# Patient Record
Sex: MALE | Race: BLACK OR AFRICAN AMERICAN | NOT HISPANIC OR LATINO | Employment: OTHER | URBAN - METROPOLITAN AREA
[De-identification: names, ages, dates, MRNs, and addresses within clinical notes are randomized per-mention and may not be internally consistent; named-entity substitution may affect disease eponyms.]

---

## 2017-02-27 RX ORDER — FUROSEMIDE 40 MG/1
TABLET ORAL
Qty: 12 TABLET | Refills: 0 | Status: SHIPPED | OUTPATIENT
Start: 2017-02-27 | End: 2017-08-04 | Stop reason: SDUPTHER

## 2017-03-30 ENCOUNTER — CLINICAL SUPPORT (OUTPATIENT)
Dept: ELECTROPHYSIOLOGY | Facility: CLINIC | Age: 72
End: 2017-03-30
Payer: MEDICARE

## 2017-03-30 DIAGNOSIS — Z95.0 CARDIAC PACEMAKER: ICD-10-CM

## 2017-03-30 DIAGNOSIS — I49.5 SSS (SICK SINUS SYNDROME): ICD-10-CM

## 2017-03-30 PROCEDURE — 93293 PM PHONE R-STRIP DEVICE EVAL: CPT | Mod: S$GLB,,, | Performed by: INTERNAL MEDICINE

## 2017-04-19 RX ORDER — FUROSEMIDE 40 MG/1
TABLET ORAL
Qty: 12 TABLET | Refills: 0 | Status: SHIPPED | OUTPATIENT
Start: 2017-04-19 | End: 2017-08-04 | Stop reason: SDUPTHER

## 2017-07-08 RX ORDER — LOSARTAN POTASSIUM 100 MG/1
TABLET ORAL
Qty: 90 TABLET | Refills: 0 | Status: SHIPPED | OUTPATIENT
Start: 2017-07-08 | End: 2018-05-09 | Stop reason: SDUPTHER

## 2017-07-10 ENCOUNTER — CLINICAL SUPPORT (OUTPATIENT)
Dept: ELECTROPHYSIOLOGY | Facility: CLINIC | Age: 72
End: 2017-07-10
Payer: MEDICARE

## 2017-07-10 DIAGNOSIS — I49.5 SSS (SICK SINUS SYNDROME): ICD-10-CM

## 2017-07-10 DIAGNOSIS — Z95.0 CARDIAC PACEMAKER: ICD-10-CM

## 2017-07-10 PROCEDURE — 93293 PM PHONE R-STRIP DEVICE EVAL: CPT | Mod: S$GLB,,, | Performed by: INTERNAL MEDICINE

## 2017-07-13 DIAGNOSIS — K74.60 HEPATIC CIRRHOSIS, UNSPECIFIED HEPATIC CIRRHOSIS TYPE: Primary | ICD-10-CM

## 2017-07-13 DIAGNOSIS — I85.00 ESOPHAGEAL VARICES WITHOUT BLEEDING, UNSPECIFIED ESOPHAGEAL VARICES TYPE: Primary | ICD-10-CM

## 2017-07-28 RX ORDER — FUROSEMIDE 40 MG/1
TABLET ORAL
Qty: 12 TABLET | Refills: 0 | Status: SHIPPED | OUTPATIENT
Start: 2017-07-28 | End: 2017-11-15 | Stop reason: SDUPTHER

## 2017-07-31 RX ORDER — HYDROCORTISONE 10 MG/1
TABLET ORAL
Qty: 60 TABLET | Refills: 0 | Status: SHIPPED | OUTPATIENT
Start: 2017-07-31 | End: 2017-08-04 | Stop reason: SDUPTHER

## 2017-07-31 NOTE — TELEPHONE ENCOUNTER
Previous pt of Dr. Valenzuela. Pt is scheduled for an upcoming appointment on 8/4/2017. Pt is out of medication

## 2017-08-04 ENCOUNTER — OFFICE VISIT (OUTPATIENT)
Dept: ENDOCRINOLOGY | Facility: CLINIC | Age: 72
End: 2017-08-04
Payer: MEDICARE

## 2017-08-04 VITALS
HEART RATE: 73 BPM | BODY MASS INDEX: 28.52 KG/M2 | SYSTOLIC BLOOD PRESSURE: 136 MMHG | WEIGHT: 203.69 LBS | DIASTOLIC BLOOD PRESSURE: 77 MMHG | HEIGHT: 71 IN | RESPIRATION RATE: 16 BRPM

## 2017-08-04 DIAGNOSIS — E27.49 SECONDARY ADRENAL INSUFFICIENCY: ICD-10-CM

## 2017-08-04 DIAGNOSIS — E29.1 SECONDARY MALE HYPOGONADISM: ICD-10-CM

## 2017-08-04 DIAGNOSIS — D35.2 NON-FUNCTIONING PITUITARY ADENOMA: ICD-10-CM

## 2017-08-04 DIAGNOSIS — E23.0 GROWTH HORMONE DEFICIENCY: ICD-10-CM

## 2017-08-04 DIAGNOSIS — E23.0 HYPOPITUITARISM: Primary | ICD-10-CM

## 2017-08-04 PROCEDURE — 1159F MED LIST DOCD IN RCRD: CPT | Mod: S$GLB,,, | Performed by: INTERNAL MEDICINE

## 2017-08-04 PROCEDURE — 99214 OFFICE O/P EST MOD 30 MIN: CPT | Mod: S$GLB,,, | Performed by: INTERNAL MEDICINE

## 2017-08-04 PROCEDURE — 3075F SYST BP GE 130 - 139MM HG: CPT | Mod: S$GLB,,, | Performed by: INTERNAL MEDICINE

## 2017-08-04 PROCEDURE — 1126F AMNT PAIN NOTED NONE PRSNT: CPT | Mod: S$GLB,,, | Performed by: INTERNAL MEDICINE

## 2017-08-04 PROCEDURE — 3078F DIAST BP <80 MM HG: CPT | Mod: S$GLB,,, | Performed by: INTERNAL MEDICINE

## 2017-08-04 PROCEDURE — 99999 PR PBB SHADOW E&M-EST. PATIENT-LVL IV: CPT | Mod: PBBFAC,,, | Performed by: INTERNAL MEDICINE

## 2017-08-04 RX ORDER — HYDROCORTISONE 10 MG/1
TABLET ORAL
Qty: 180 TABLET | Refills: 3 | Status: SHIPPED | OUTPATIENT
Start: 2017-08-04 | End: 2018-08-22 | Stop reason: SDUPTHER

## 2017-08-04 RX ORDER — DEXAMETHASONE SODIUM PHOSPHATE 4 MG/ML
4 INJECTION, SOLUTION INTRA-ARTICULAR; INTRALESIONAL; INTRAMUSCULAR; INTRAVENOUS; SOFT TISSUE ONCE
Qty: 1 ML | Refills: 2 | Status: SHIPPED | OUTPATIENT
Start: 2017-08-04 | End: 2017-08-04

## 2017-08-04 NOTE — ASSESSMENT & PLAN NOTE
See individual hormone deficiencies below.  Counseled that he needs to follow-up with Dr. Calero for imaging surveillance.  Due for eye exam as well for visual fields - no vision complaints today.  Reassess thyroid axis with TSH and FT4

## 2017-08-04 NOTE — ASSESSMENT & PLAN NOTE
Discussed T replacement. Patient would like to defer for now.  Will recheck total T today.  Check DEXA for BMD assessment.

## 2017-08-04 NOTE — PROGRESS NOTES
Subjective:      Chief Complaint: Hypogonadism and adrenal insufficiency; pituitary adenoma.      HPI: Fox Lyons Sr. is a 72 y.o. 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    He has a medical history of Hep C with cirrhosis, COPD, coronary disease, and CKD.    With regards to hypopituitarism (secondary hypogonadism, secondary adrenal insufficiency, GH deficiency):    Patient was initially seen in our clinic in 2014 after being diagnosed with hypogonadism. He was found to have a 2cm pituitary macroadenoma, which was non-functioning. Hormonal workup revealed secondary hypogonadism, hypoadrenalism, and GH deficiency. Thyroid axis was intact. He underwent gamma-knife radiosurgery in 2014, and last saw Dr. Calero in 2015.     Since being started hydrocortisone, he has generally felt better. He denies orthostatic symptoms, weakness, poor energy. He is worried the hydrocortisone may have been causing his abdomen to swell. At his last visit with Dr. Valenzuela, there was discussion regarding starting testosterone replacement due to decreased libido and erectile dysfunction. He is still having these issues. However, he says that he prefers not to start on T replacement, because he doesn't want another medication, and those things are not important to him at his age.     He has been maintained on hydrocortisone 15 AM and 5 PM. He has not been on testosterone therapy or GH replacement.    Denies visual complaints. Last saw ophthalmology in 2015.      Reviewed past medical, family, social history and updated as appropriate.    Review of Systems   Constitutional: Negative for unexpected weight change.   Eyes: Negative for visual disturbance.   Respiratory: Negative for shortness of breath.    Cardiovascular: Positive for leg swelling (intermittent). Negative for chest pain.   Gastrointestinal: Positive for abdominal distention (Intermittent). Negative for abdominal pain.   Genitourinary: Negative for  urgency.   Musculoskeletal: Negative for arthralgias.   Skin: Negative for wound.   Neurological: Negative for headaches.   Hematological: Does not bruise/bleed easily.   Psychiatric/Behavioral: Negative for sleep disturbance.     Objective:     Vitals:    08/04/17 1110   BP: 136/77   Pulse: 73   Resp: 16       Physical Exam   Constitutional: He appears well-developed.   Neck: No tracheal deviation present. No thyromegaly present.   Cardiovascular: Normal rate.    No murmur heard.  Pulmonary/Chest: Effort normal and breath sounds normal.   Abdominal: Soft. He exhibits distension (mild). There is no tenderness.   Musculoskeletal: He exhibits no edema.   No digital clubbing or extremity cyanosis   Nursing note and vitals reviewed.      Lab Results   Component Value Date    HGBA1C 6.2 10/26/2016     Lab Results   Component Value Date    CHOL 172 10/26/2016    HDL 37 (L) 10/26/2016    LDLCALC 82.6 10/26/2016    TRIG 262 (H) 10/26/2016    CHOLHDL 21.5 10/26/2016     Lab Results   Component Value Date     10/26/2016    K 3.8 10/26/2016     10/26/2016    CO2 19 (L) 10/26/2016     (H) 10/26/2016    BUN 12 10/26/2016    CREATININE 1.3 10/26/2016    CALCIUM 9.9 10/26/2016    PROT 7.8 10/26/2016    ALBUMIN 3.8 10/26/2016    BILITOT 1.1 (H) 10/26/2016    ALKPHOS 83 10/26/2016    AST 53 (H) 10/26/2016    ALT 34 10/26/2016    ANIONGAP 14 10/26/2016    ESTGFRAFRICA >60.0 10/26/2016    EGFRNONAA 54.9 (A) 10/26/2016    TSH 1.988 10/26/2016        Assessment/Plan:     Non-functioning pituitary adenoma  See individual hormone deficiencies below.  Counseled that he needs to follow-up with Dr. Calero for imaging surveillance.  Due for eye exam as well for visual fields - no vision complaints today.  Reassess thyroid axis with TSH and FT4    Secondary male hypogonadism  Discussed T replacement. Patient would like to defer for now.  Will recheck total T today.  Check DEXA for BMD assessment.    Secondary adrenal  insufficiency  Continue current dose of hydrocortisone.    Given handout on precautions and dose adjustments for acute illness.    Ordered emergency decadron IM and discussed when to give this.    Given handout for med-alert bracelet.    Will check CMP to look at sodium and potassium.    Growth hormone deficiency  No indication to treat at this time.      RTC in 6 months    I discussed the case with Dr. Gold and she is in agreement with the plan.

## 2017-08-04 NOTE — ASSESSMENT & PLAN NOTE
Continue current dose of hydrocortisone.    Given handout on precautions and dose adjustments for acute illness.    Ordered emergency decadron IM and discussed when to give this.    Given handout for med-alert bracelet.    Will check CMP to look at sodium and potassium.

## 2017-08-16 ENCOUNTER — ANESTHESIA EVENT (OUTPATIENT)
Dept: ENDOSCOPY | Facility: HOSPITAL | Age: 72
End: 2017-08-16
Payer: MEDICARE

## 2017-08-17 ENCOUNTER — ANESTHESIA (OUTPATIENT)
Dept: ENDOSCOPY | Facility: HOSPITAL | Age: 72
End: 2017-08-17
Payer: MEDICARE

## 2017-08-17 ENCOUNTER — TELEPHONE (OUTPATIENT)
Dept: GASTROENTEROLOGY | Facility: CLINIC | Age: 72
End: 2017-08-17

## 2017-08-17 ENCOUNTER — HOSPITAL ENCOUNTER (OUTPATIENT)
Facility: HOSPITAL | Age: 72
Discharge: HOME | End: 2017-08-17
Attending: INTERNAL MEDICINE | Admitting: INTERNAL MEDICINE
Payer: MEDICARE

## 2017-08-17 VITALS
BODY MASS INDEX: 28.63 KG/M2 | WEIGHT: 200 LBS | HEART RATE: 82 BPM | SYSTOLIC BLOOD PRESSURE: 141 MMHG | HEIGHT: 70 IN | RESPIRATION RATE: 15 BRPM | OXYGEN SATURATION: 95 % | DIASTOLIC BLOOD PRESSURE: 85 MMHG | TEMPERATURE: 98 F | RESPIRATION RATE: 18 BRPM

## 2017-08-17 DIAGNOSIS — I85.00 ESOPHAGEAL VARICES: Primary | ICD-10-CM

## 2017-08-17 DIAGNOSIS — K74.60 HEPATIC CIRRHOSIS, UNSPECIFIED HEPATIC CIRRHOSIS TYPE: ICD-10-CM

## 2017-08-17 DIAGNOSIS — K21.9 GASTROESOPHAGEAL REFLUX DISEASE WITHOUT ESOPHAGITIS: ICD-10-CM

## 2017-08-17 DIAGNOSIS — I85.10 SECONDARY ESOPHAGEAL VARICES WITHOUT BLEEDING: Primary | ICD-10-CM

## 2017-08-17 PROCEDURE — 88305 TISSUE EXAM BY PATHOLOGIST: CPT | Performed by: PATHOLOGY

## 2017-08-17 PROCEDURE — 43239 EGD BIOPSY SINGLE/MULTIPLE: CPT | Mod: 51,,, | Performed by: INTERNAL MEDICINE

## 2017-08-17 PROCEDURE — D9220A PRA ANESTHESIA: Mod: ,,, | Performed by: ANESTHESIOLOGY

## 2017-08-17 PROCEDURE — 63600175 PHARM REV CODE 636 W HCPCS: Performed by: NURSE ANESTHETIST, CERTIFIED REGISTERED

## 2017-08-17 PROCEDURE — 43239 EGD BIOPSY SINGLE/MULTIPLE: CPT | Performed by: INTERNAL MEDICINE

## 2017-08-17 PROCEDURE — 37000009 HC ANESTHESIA EA ADD 15 MINS: Performed by: INTERNAL MEDICINE

## 2017-08-17 PROCEDURE — 25000003 PHARM REV CODE 250: Performed by: INTERNAL MEDICINE

## 2017-08-17 PROCEDURE — 37000008 HC ANESTHESIA 1ST 15 MINUTES: Performed by: INTERNAL MEDICINE

## 2017-08-17 PROCEDURE — 88342 IMHCHEM/IMCYTCHM 1ST ANTB: CPT | Mod: 26,,, | Performed by: PATHOLOGY

## 2017-08-17 PROCEDURE — 43244 EGD VARICES LIGATION: CPT | Mod: ,,, | Performed by: INTERNAL MEDICINE

## 2017-08-17 PROCEDURE — 27201012 HC FORCEPS, HOT/COLD, DISP: Performed by: INTERNAL MEDICINE

## 2017-08-17 PROCEDURE — 43244 EGD VARICES LIGATION: CPT | Performed by: INTERNAL MEDICINE

## 2017-08-17 RX ORDER — FENTANYL CITRATE 50 UG/ML
INJECTION, SOLUTION INTRAMUSCULAR; INTRAVENOUS
Status: DISCONTINUED | OUTPATIENT
Start: 2017-08-17 | End: 2017-08-17

## 2017-08-17 RX ORDER — SODIUM CHLORIDE 9 MG/ML
INJECTION, SOLUTION INTRAVENOUS CONTINUOUS
Status: DISCONTINUED | OUTPATIENT
Start: 2017-08-17 | End: 2017-08-17 | Stop reason: HOSPADM

## 2017-08-17 RX ORDER — PROPOFOL 10 MG/ML
VIAL (ML) INTRAVENOUS
Status: DISCONTINUED | OUTPATIENT
Start: 2017-08-17 | End: 2017-08-17

## 2017-08-17 RX ORDER — LIDOCAINE HCL/PF 100 MG/5ML
SYRINGE (ML) INTRAVENOUS
Status: DISCONTINUED | OUTPATIENT
Start: 2017-08-17 | End: 2017-08-17

## 2017-08-17 RX ADMIN — PROPOFOL 100 MG: 10 INJECTION, EMULSION INTRAVENOUS at 09:08

## 2017-08-17 RX ADMIN — PROPOFOL 50 MG: 10 INJECTION, EMULSION INTRAVENOUS at 09:08

## 2017-08-17 RX ADMIN — LIDOCAINE HYDROCHLORIDE 50 MG: 20 INJECTION, SOLUTION INTRAVENOUS at 09:08

## 2017-08-17 RX ADMIN — FENTANYL CITRATE 25 MCG: 50 INJECTION, SOLUTION INTRAMUSCULAR; INTRAVENOUS at 09:08

## 2017-08-17 RX ADMIN — SODIUM CHLORIDE: 0.9 INJECTION, SOLUTION INTRAVENOUS at 08:08

## 2017-08-17 NOTE — TELEPHONE ENCOUNTER
Continue prilosec 40 mg bid and repeat egd in one month to assess need for repeat banding. May need banding of pyloric polyp. Await biopsies.

## 2017-08-17 NOTE — ANESTHESIA PREPROCEDURE EVALUATION
08/17/2017  Fox Lyons Sr. is a 72 y.o., male.    Pre-op Assessment    I have reviewed the Patient Summary Reports.      I have reviewed the Medications.     Review of Systems  Anesthesia Hx:  No problems with previous Anesthesia    Social:  Former Smoker, No Alcohol Use    Hematology/Oncology:        Oncology Comments: HCC   Cardiovascular:   Pacemaker Hypertension CAD  CABG/stent Dysrhythmias (SSS)  PVD hyperlipidemia    Pulmonary:   COPD    Renal/:   Chronic Renal Disease, CRI    Hepatic/GI:   GERD Liver Disease, (HCC) Hepatitis, C        Physical Exam  General:  Well nourished    Airway/Jaw/Neck:  Airway Findings: Mouth Opening: Normal Tongue: Normal  Mallampati: II  TM Distance: Normal, at least 6 cm      Dental:  Dental Findings: Upper Dentures, Lower Dentures   Chest/Lungs:  Chest/Lungs Findings: Normal Respiratory Rate     Heart/Vascular:  Heart Findings: Rate: Normal  Rhythm: Regular Rhythm        Mental Status:  Mental Status Findings:  Cooperative         Anesthesia Plan  Type of Anesthesia, risks & benefits discussed:  Anesthesia Type:  general  Patient's Preference:   Intra-op Monitoring Plan: standard ASA monitors  Intra-op Monitoring Plan Comments:   Post Op Pain Control Plan:   Post Op Pain Control Plan Comments:   Induction:   IV  Beta Blocker:  Patient is not currently on a Beta-Blocker (No further documentation required).       Informed Consent: Patient understands risks and agrees with Anesthesia plan.  Questions answered. Anesthesia consent signed with patient.  ASA Score: 3     Day of Surgery Review of History & Physical:    H&P update referred to the provider.         Ready For Surgery From Anesthesia Perspective.     Past Medical History:   Diagnosis Date    Anticoagulant long-term use     Cataract     Coronary artery disease     Hyperlipidemia     Hypertension      Pacemaker      Past Surgical History:   Procedure Laterality Date    CARDIAC PACEMAKER PLACEMENT      st rochelle    CARDIAC SURGERY      stents x2    CATARACT EXTRACTION  9/24/13    RT    EYE SURGERY      s/p PPV/AFX/EL (od)  05/09/2012

## 2017-08-17 NOTE — ANESTHESIA POSTPROCEDURE EVALUATION
"Anesthesia Post Evaluation    Patient: Fox Lyons     Procedure(s) Performed: Procedure(s) (LRB):  ESOPHAGOGASTRODUODENOSCOPY (EGD) (N/A)    Final Anesthesia Type: general  Patient location during evaluation: GI PACU  Patient participation: Yes- Able to Participate  Level of consciousness: awake and alert  Post-procedure vital signs: reviewed and stable  Pain management: adequate  Airway patency: patent  PONV status at discharge: No PONV  Anesthetic complications: no      Cardiovascular status: blood pressure returned to baseline  Respiratory status: unassisted  Hydration status: euvolemic  Follow-up not needed.        Visit Vitals  BP (!) 141/85 (BP Location: Left arm, Patient Position: Sitting)   Pulse 82   Temp 36.5 °C (97.7 °F)   Resp 18   Ht 5' 10" (1.778 m)   Wt 90.7 kg (200 lb)   SpO2 95%   BMI 28.70 kg/m²       Pain/Rosetta Score: Pain Assessment Performed: Yes (8/17/2017  9:53 AM)  Presence of Pain: complains of pain/discomfort (8/17/2017  9:53 AM)  Rosetta Score: 10 (8/17/2017  9:53 AM)      "

## 2017-08-17 NOTE — TELEPHONE ENCOUNTER
MA called patient inform patient to continue his Prilosec BID. And to schedule his repeat EGD. Patient understood.     ENDOSCOPY number were given,. MARCINJ

## 2017-08-17 NOTE — PATIENT INSTRUCTIONS
Discharge Summary/Instructions after an Endoscopic Procedure  Patient Name: Fox Lyons  Patient MRN: [id number]  Patient YOB: 1945 Thursday, August 17, 2017  Trice Funes MD  RESTRICTIONS:  During your procedure today, you received medications for sedation.  These   medications may affect your judgment, balance and coordination.  Therefore,   for 24 hours, you have the following restrictions:   - DO NOT drive a car, operate machinery, make legal/financial decisions,   sign important papers or drink alcohol.    ACTIVITY:  The following day: return to full activity including work, except no heavy   lifting, straining or running for 3 days if polyps were removed.  DIET:  Eat and drink normally unless instructed otherwise.  TREATMENT FOR COMMON SIDE EFFECTS:  - Mild abdominal pain, belching, bloating or excessive gas: rest, eat   lightly and use a heating pad.  - Sore Throat: treat with throat lozenges and/or gargle with warm salt   water.  SYMPTOMS TO WATCH FOR AND REPORT TO YOUR PHYSICIAN:  1. Abdominal pain or bloating, other than gas cramps.  2. Chest pain.  3. Back pain.  4. Chills or fever occurring within 24 hours after the procedure.  5. Rectal bleeding, which would show as bright red, maroon, or black stools.   (A tablespoon of blood from the rectum is not serious, especially if   hemorrhoids are present.)  6. Vomiting.  7. Weakness or dizziness.  8. Because air was used during the procedure, expelling large amounts of air   from your rectum or belching is normal.  9. If a bowel prep was taken, you may not have a bowel movement for 1-3   days.  This is normal.  GO DIRECTLY TO THE EMERGENCY ROOM IF YOU HAVE ANY OF THE FOLLOWING:   Difficulty breathing  Chills and/or fever over 101 F   Persistent vomiting and/or vomiting blood   Severe abdominal pain   Severe chest pain   Black, tarry stools   Bleeding- more than one tablespoon  Your doctor recommends these additional instructions:  If any  biopsies were taken, your doctorâs clinic will call you in 1 to 2   weeks with any results.  You are being discharged to home.   Eat a mechanical soft diet for three days.   Continue your present medications.   We are waiting for your pathology results.   Your physician has recommended a repeat upper endoscopy in one month for   surveillance.  For questions, problems or results please call your physician - Trice Funes MD at Work:  [phone].  [facility], EMERGENCY ROOM PHONE NUMBER: [phone]  IF A COMPLICATION OR EMERGENCY SITUATION ARISES AND YOU ARE UNABLE TO REACH   YOUR PHYSICIAN - GO DIRECTLY TO THE EMERGENCY ROOM.  Trice Funes MD  8/17/2017 9:33:39 AM  This report has been verified and signed electronically.

## 2017-08-17 NOTE — TRANSFER OF CARE
"Anesthesia Transfer of Care Note    Patient: Fox Lyons Sr.    Procedure(s) Performed: Procedure(s) (LRB):  ESOPHAGOGASTRODUODENOSCOPY (EGD) (N/A)    Patient location: PACU    Anesthesia Type: general    Transport from OR: Transported from OR on room air with adequate spontaneous ventilation    Post pain: adequate analgesia    Post assessment: no apparent anesthetic complications    Post vital signs: stable    Level of consciousness: awake and alert    Nausea/Vomiting: no nausea/vomiting    Complications: none    Transfer of care protocol was followed      Last vitals:   Visit Vitals  BP (!) 169/81   Pulse 74   Temp 36.5 °C (97.7 °F)   Resp 18   Ht 5' 10" (1.778 m)   Wt 90.7 kg (200 lb)   SpO2 95%   BMI 28.70 kg/m²     "

## 2017-08-17 NOTE — H&P
Short Stay Endoscopy History and Physical    PCP - Clover White MD  Referring Physician - Trice Funes MD  [address] [place] [address]    Procedure - EGD  ASA - per anesthesia  Mallampati - per anesthesia  History of Anesthesia problems - see anesthesia note  Family history Anesthesia problems -  see anesthesia note  Plan of anesthesia - as per anesthesia    HPI  72 y.o. male with cirrhosis and small esophageal varices.     Reason for procedure: variceal surveillance    Abdominal/epigastric pain: No  Reflux: No   Dysphagia: No  Change in bowel habits: No    ROS:  Constitutional: No fevers, chills  CV: No chest pain  Pulm: No shortness of breath  GI: see HPI    Medical History:  has a past medical history of Anticoagulant long-term use; Cataract; Coronary artery disease; Hyperlipidemia; Hypertension; and Pacemaker.    Surgical History:  has a past surgical history that includes s/p PPV/AFX/EL (od) (05/09/2012); Cardiac surgery; Cardiac pacemaker placement; Cataract extraction (9/24/13); and Eye surgery.    Family History: family history includes Cancer in his brother, father, sister, and sister; Diabetes in his brother and sister; Heart disease in his mother and sister; Lung cancer in his brother; No Known Problems in his brother and sister.. Otherwise no colon cancer, inflammatory bowel disease, or GI malignancies.    Social History:  reports that he quit smoking about 22 years ago. His smoking use included Cigarettes. He has a 35.00 pack-year smoking history. He has never used smokeless tobacco. He reports that he does not drink alcohol or use drugs.    Review of patient's allergies indicates:   Allergen Reactions    Codeine Itching, Rash, Edema and Other (See Comments)     Other reaction(s): Itching       Medications:   Prescriptions Prior to Admission   Medication Sig Dispense Refill Last Dose    amlodipine (NORVASC) 5 MG tablet Take 1 tablet (5 mg total) by mouth every evening. 90  tablet 3 8/17/2017 at Unknown time    aspirin (ECOTRIN) 81 MG EC tablet Take 81 mg by mouth once daily.     8/16/2017 at Unknown time    fluticasone-vilanterol (BREO) 100-25 mcg/dose diskus inhaler Inhale 1 puff into the lungs once daily. 1 each 12 8/16/2017 at Unknown time    furosemide (LASIX) 40 MG tablet TAKE ONE TABLET BY MOUTH EVERY MONDAY, WEDNESDAY, AND FRIDAY 12 tablet 0 8/16/2017 at Unknown time    hydrocodone-acetaminophen 5-325mg (NORCO) 5-325 mg per tablet Take 1 tablet by mouth every 4 (four) hours as needed for Pain. 21 tablet 0 8/16/2017 at Unknown time    hydrocortisone (CORTEF) 10 MG Tab TAKE ONE AND ONE-HALF TABLETS BY MOUTH EVERY MORNING AND ONE-HALF TABLET EVERY EVENING. 180 tablet 3 8/16/2017 at Unknown time    losartan (COZAAR) 100 MG tablet Take 1 tablet (100 mg total) by mouth once daily. 90 tablet 3 8/17/2017 at Unknown time    losartan (COZAAR) 100 MG tablet TAKE 1 TABLET BY MOUTH ONCE DAILY. 90 tablet 0 8/17/2017 at Unknown time    multivitamin-minerals-lutein (CENTRUM SILVER) Tab Take 1 tablet by mouth once daily.     8/17/2017 at Unknown time    niacin 500 MG Tab Take 1 tablet (500 mg total) by mouth 3 (three) times daily with meals. (Patient taking differently: Take 500 mg by mouth 2 (two) times daily with meals. ) 90 tablet 6 8/17/2017 at Unknown time    omeprazole (PRILOSEC) 40 MG capsule Take 1 capsule (40 mg total) by mouth 2 (two) times daily before meals. 180 capsule 3 8/17/2017 at Unknown time    ondansetron (ZOFRAN) 4 MG tablet Take 1 tablet (4 mg total) by mouth every 8 (eight) hours as needed. 12 tablet 0 8/17/2017 at Unknown time    pravastatin (PRAVACHOL) 40 MG tablet Take 1 tablet (40 mg total) by mouth every evening. 90 tablet 3 8/16/2017 at Unknown time    ursodiol (ACTIGALL) 300 mg capsule Take 1 capsule (300 mg total) by mouth 3 (three) times daily. 90 capsule 11 8/16/2017 at Unknown time    NITROSTAT 0.4 mg SL tablet DISSOLVE 1 TABLET UNDER THE TONGUE  EVERY 5 MINUTES AS NEEDED 25 tablet 0 Taking       Physical Exam:    Vital Signs:   Vitals:    08/17/17 0815   BP: (!) 169/81   Pulse: 74   Resp: 18   Temp: 97.7 °F (36.5 °C)       General Appearance: Well appearing in no acute distress  Lungs: No respiratory distress.   Heart:  Regular rate, S1, S2 normal.  Abdomen: Soft, non tender, non distended with normal bowel sounds, no masses    Labs:  Lab Results   Component Value Date    WBC 4.73 08/17/2017    HGB 12.2 (L) 08/17/2017    HCT 35.1 (L) 08/17/2017    MCV 85 08/17/2017    PLT 55 (L) 08/17/2017     Lab Results   Component Value Date    INR 1.2 08/17/2017     No results found for: IRON, FERRITIN, TIBC, FESATURATED  Lab Results   Component Value Date     08/04/2017    K 4.0 08/04/2017     08/04/2017    CO2 22 (L) 08/04/2017    BUN 12 08/04/2017    CREATININE 1.6 (H) 08/04/2017       I have explained the risks and benefits of this endoscopic procedure to the patient/family including but not limited to bleeding, inflammation, infection, perforation, hypoxia/respiratory failure, and death.       Nubia Hernandez MD  Gastroenterology & Hepatology Fellow  Pager: [phone]      I have reviewed and concur with the fellow's history, physical, assessment, and plan.  I have personally interviewed and examined the patient at bedside.

## 2017-08-21 ENCOUNTER — TELEPHONE (OUTPATIENT)
Dept: HEPATOLOGY | Facility: CLINIC | Age: 72
End: 2017-08-21

## 2017-08-21 NOTE — TELEPHONE ENCOUNTER
----- Message from Trice Funes MD sent at 8/20/2017  8:44 AM CDT -----  No stomach cancer or h pylori - please let patient know.

## 2017-08-24 ENCOUNTER — TELEPHONE (OUTPATIENT)
Dept: ENDOSCOPY | Facility: HOSPITAL | Age: 72
End: 2017-08-24

## 2017-08-25 ENCOUNTER — TELEPHONE (OUTPATIENT)
Dept: ELECTROPHYSIOLOGY | Facility: CLINIC | Age: 72
End: 2017-08-25

## 2017-08-25 DIAGNOSIS — K74.60 HEPATIC CIRRHOSIS, UNSPECIFIED HEPATIC CIRRHOSIS TYPE: Primary | ICD-10-CM

## 2017-08-25 NOTE — TELEPHONE ENCOUNTER
----- Message from Chang Degroot sent at 8/25/2017 11:48 AM CDT -----  Contact: pt wife   Please call pt wife at [phone]. Need to reschedule telephonic device ck scheduled on  10/12/17 because he has another appt same day    Thank you    I spoke with patients wife and moved patients TTM to an earlier time.

## 2017-09-07 ENCOUNTER — TELEPHONE (OUTPATIENT)
Dept: NEUROSURGERY | Facility: CLINIC | Age: 72
End: 2017-09-07

## 2017-09-07 DIAGNOSIS — D49.7 PITUITARY TUMOR: Primary | ICD-10-CM

## 2017-10-12 ENCOUNTER — ANESTHESIA EVENT (OUTPATIENT)
Dept: ENDOSCOPY | Facility: HOSPITAL | Age: 72
End: 2017-10-12
Payer: MEDICARE

## 2017-10-12 ENCOUNTER — TELEPHONE (OUTPATIENT)
Dept: ENDOSCOPY | Facility: HOSPITAL | Age: 72
End: 2017-10-12

## 2017-10-12 ENCOUNTER — SURGERY (OUTPATIENT)
Age: 72
End: 2017-10-12

## 2017-10-12 ENCOUNTER — HOSPITAL ENCOUNTER (OUTPATIENT)
Facility: HOSPITAL | Age: 72
Discharge: HOME | End: 2017-10-12
Attending: INTERNAL MEDICINE | Admitting: INTERNAL MEDICINE
Payer: MEDICARE

## 2017-10-12 ENCOUNTER — CLINICAL SUPPORT (OUTPATIENT)
Dept: ELECTROPHYSIOLOGY | Facility: CLINIC | Age: 72
End: 2017-10-12
Payer: MEDICARE

## 2017-10-12 ENCOUNTER — ANESTHESIA (OUTPATIENT)
Dept: ENDOSCOPY | Facility: HOSPITAL | Age: 72
End: 2017-10-12
Payer: MEDICARE

## 2017-10-12 VITALS
DIASTOLIC BLOOD PRESSURE: 88 MMHG | BODY MASS INDEX: 26.6 KG/M2 | OXYGEN SATURATION: 96 % | HEART RATE: 82 BPM | WEIGHT: 190 LBS | HEIGHT: 71 IN | SYSTOLIC BLOOD PRESSURE: 141 MMHG | RESPIRATION RATE: 16 BRPM | TEMPERATURE: 98 F

## 2017-10-12 VITALS — RESPIRATION RATE: 23 BRPM

## 2017-10-12 DIAGNOSIS — I85.00 ESOPHAGEAL VARICES: ICD-10-CM

## 2017-10-12 DIAGNOSIS — I49.5 SSS (SICK SINUS SYNDROME): ICD-10-CM

## 2017-10-12 DIAGNOSIS — Z95.0 CARDIAC PACEMAKER: ICD-10-CM

## 2017-10-12 DIAGNOSIS — I85.00 ESOPHAGEAL VARICES WITHOUT BLEEDING, UNSPECIFIED ESOPHAGEAL VARICES TYPE: Primary | ICD-10-CM

## 2017-10-12 PROCEDURE — 63600175 PHARM REV CODE 636 W HCPCS: Performed by: NURSE ANESTHETIST, CERTIFIED REGISTERED

## 2017-10-12 PROCEDURE — 43244 EGD VARICES LIGATION: CPT | Performed by: INTERNAL MEDICINE

## 2017-10-12 PROCEDURE — 25000003 PHARM REV CODE 250: Performed by: INTERNAL MEDICINE

## 2017-10-12 PROCEDURE — 63600175 PHARM REV CODE 636 W HCPCS: Performed by: ANESTHESIOLOGY

## 2017-10-12 PROCEDURE — 37000009 HC ANESTHESIA EA ADD 15 MINS: Performed by: INTERNAL MEDICINE

## 2017-10-12 PROCEDURE — 43244 EGD VARICES LIGATION: CPT | Mod: ,,, | Performed by: INTERNAL MEDICINE

## 2017-10-12 PROCEDURE — D9220A PRA ANESTHESIA: Mod: ,,, | Performed by: ANESTHESIOLOGY

## 2017-10-12 PROCEDURE — 37000008 HC ANESTHESIA 1ST 15 MINUTES: Performed by: INTERNAL MEDICINE

## 2017-10-12 RX ORDER — SODIUM CHLORIDE 9 MG/ML
INJECTION, SOLUTION INTRAVENOUS CONTINUOUS
Status: DISCONTINUED | OUTPATIENT
Start: 2017-10-12 | End: 2017-10-12 | Stop reason: HOSPADM

## 2017-10-12 RX ORDER — LIDOCAINE HCL/PF 100 MG/5ML
SYRINGE (ML) INTRAVENOUS
Status: DISCONTINUED | OUTPATIENT
Start: 2017-10-12 | End: 2017-10-12

## 2017-10-12 RX ORDER — FENTANYL CITRATE 50 UG/ML
25 INJECTION, SOLUTION INTRAMUSCULAR; INTRAVENOUS EVERY 5 MIN PRN
Status: DISCONTINUED | OUTPATIENT
Start: 2017-10-12 | End: 2017-10-12 | Stop reason: HOSPADM

## 2017-10-12 RX ORDER — PROPOFOL 10 MG/ML
VIAL (ML) INTRAVENOUS CONTINUOUS PRN
Status: DISCONTINUED | OUTPATIENT
Start: 2017-10-12 | End: 2017-10-12

## 2017-10-12 RX ORDER — PROPOFOL 10 MG/ML
VIAL (ML) INTRAVENOUS
Status: DISCONTINUED | OUTPATIENT
Start: 2017-10-12 | End: 2017-10-12

## 2017-10-12 RX ADMIN — PROPOFOL 80 MG: 10 INJECTION, EMULSION INTRAVENOUS at 02:10

## 2017-10-12 RX ADMIN — SODIUM CHLORIDE: 0.9 INJECTION, SOLUTION INTRAVENOUS at 01:10

## 2017-10-12 RX ADMIN — PROPOFOL 40 MG: 10 INJECTION, EMULSION INTRAVENOUS at 02:10

## 2017-10-12 RX ADMIN — LIDOCAINE HYDROCHLORIDE 75 MG: 20 INJECTION, SOLUTION INTRAVENOUS at 02:10

## 2017-10-12 RX ADMIN — FENTANYL CITRATE 25 MCG: 50 INJECTION, SOLUTION INTRAMUSCULAR; INTRAVENOUS at 03:10

## 2017-10-12 RX ADMIN — PROPOFOL 150 MCG/KG/MIN: 10 INJECTION, EMULSION INTRAVENOUS at 02:10

## 2017-10-12 NOTE — TRANSFER OF CARE
"Anesthesia Transfer of Care Note    Patient: Fox Lyons Sr.    Procedure(s) Performed: Procedure(s) (LRB):  ESOPHAGOGASTRODUODENOSCOPY (EGD) (Left)    Patient location: PACU    Anesthesia Type: general    Transport from OR: Transported from OR on 6-10 L/min O2 by face mask with adequate spontaneous ventilation    Post pain: adequate analgesia    Post assessment: no apparent anesthetic complications    Post vital signs: stable    Level of consciousness: sedated    Nausea/Vomiting: no nausea/vomiting    Complications: none          Last vitals:   Visit Vitals  BP (!) 163/86 (Patient Position: Lying)   Pulse 71   Temp 36.6 °C (97.9 °F) (Temporal)   Resp 16   Ht 5' 11" (1.803 m)   Wt 86.2 kg (190 lb)   SpO2 96%   BMI 26.50 kg/m²     "

## 2017-10-12 NOTE — PATIENT INSTRUCTIONS
Discharge Summary/Instructions after an Endoscopic Procedure  Patient Name: Fox Lyons  Patient MRN: [id number]  Patient YOB: 1945 Thursday, October 12, 2017  Mallika Humphries MD  RESTRICTIONS:  During your procedure today, you received medications for sedation.  These   medications may affect your judgment, balance and coordination.  Therefore,   for 24 hours, you have the following restrictions:   - DO NOT drive a car, operate machinery, make legal/financial decisions,   sign important papers or drink alcohol.    ACTIVITY:  The following day: return to full activity including work, except no heavy   lifting, straining or running for 3 days if polyps were removed.  DIET:  Eat and drink normally unless instructed otherwise.     TREATMENT FOR COMMON SIDE EFFECTS:  - Mild abdominal pain, belching, bloating or excessive gas: rest, eat   lightly and use a heating pad.  - Sore Throat: treat with throat lozenges and/or gargle with warm salt   water.  SYMPTOMS TO WATCH FOR AND REPORT TO YOUR PHYSICIAN:  1. Abdominal pain or bloating, other than gas cramps.  2. Chest pain.  3. Back pain.  4. Chills or fever occurring within 24 hours after the procedure.  5. Rectal bleeding, which would show as bright red, maroon, or black stools.   (A tablespoon of blood from the rectum is not serious, especially if   hemorrhoids are present.)  6. Vomiting.  7. Weakness or dizziness.  8. Because air was used during the procedure, expelling large amounts of air   from your rectum or belching is normal.  9. If a bowel prep was taken, you may not have a bowel movement for 1-3   days.  This is normal.  GO DIRECTLY TO THE NEAREST EMERGENCY ROOM IF YOU HAVE ANY OF THE FOLLOWING:      Difficulty breathing  Chills and/or fever over 101 F   Persistent vomiting and/or vomiting blood   Severe abdominal pain   Severe chest pain   Black, tarry stools   Bleeding- more than one tablespoon  Your doctor recommends these additional  instructions:  If any biopsies were taken, your doctors clinic will contact you in 1 to 2   weeks with any results.  You are being discharged to home.   Eat a soft diet for two days.   Continue your present medications.   Your physician has recommended a repeat upper endoscopy in six weeks for   surveillance.   Return to your referring physician as previously scheduled.  For questions, problems or results please call your physician - Mallika Humphries MD at Work:  [phone].  [facility], EMERGENCY ROOM PHONE NUMBER: [phone]  IF A COMPLICATION OR EMERGENCY SITUATION ARISES AND YOU ARE UNABLE TO REACH   YOUR PHYSICIAN - GO DIRECTLY TO THE EMERGENCY ROOM.  Mallika Humphries MD  10/12/2017 2:47:44 PM  This report has been verified and signed electronically.

## 2017-10-12 NOTE — ANESTHESIA PREPROCEDURE EVALUATION
10/12/2017  Fox Lyons Sr. is a 72 y.o., male.  Pre-operative evaluation for ESOPHAGOGASTRODUODENOSCOPY (EGD) (Left)    Chief Complaint: GERD with esophagitis    PMH:  Essential hypertension with retinopathy   Pituitary tumor, stable    Portal hypertension secondary to hep C with cirrhosis   S/P coronary artery stent placement    Secondary adrenal insufficiency    Sick sinus syndrome with cardiac pacemaker      COPD, mild -35 pk-yr hx    Past Surgical History:   Procedure Laterality Date    CARDIAC PACEMAKER PLACEMENT      [facility]    CARDIAC SURGERY      stents x2    CATARACT EXTRACTION  13    RT    EYE SURGERY      s/p PPV/AFX/EL (od)  2012         Vital Signs Range (Last 24H):         CBC:   No results for input(s): WBC, RBC, HGB, HCT, PLT, MCV, MCH, MCHC in the last 720 hours.    CMP: No results for input(s): NA, K, CL, CO2, BUN, CREATININE, GLU, MG, PHOS, CALCIUM, ALBUMIN, PROT, ALKPHOS, ALT, AST, BILITOT in the last 720 hours.    INR:  No results for input(s): INR, PROTIME, APTT in the last 720 hours.    Invalid input(s): PT      Diagnostic Studies:      EKD Echo:  Anesthesia Evaluation         Review of Systems      Physical Exam  General:  Obesity    Airway/Jaw/Neck:  Airway Findings: Mouth Opening: Normal Tongue: Normal  General Airway Assessment: Possible difficult mask airway  Mallampati: I  TM Distance: Normal, at least 6 cm  Jaw/Neck Findings:  Neck ROM: Normal ROM      Dental:  Dental Findings: Edentulous   Chest/Lungs:  Chest/Lungs Findings: Clear to auscultation, Normal Respiratory Rate     Heart/Vascular:  Heart Findings: Rate: Normal  Rhythm: Regular Rhythm  Sounds: Normal     Abdomen:  Abdomen Findings:  Ascites       Mental Status:  Mental Status Findings:  Cooperative, Alert and Oriented         Anesthesia Plan  Type of Anesthesia, risks & benefits  discussed:  Anesthesia Type:  general  Patient's Preference:   Intra-op Monitoring Plan:   Intra-op Monitoring Plan Comments:   Post Op Pain Control Plan:   Post Op Pain Control Plan Comments:   Induction:   IV  Beta Blocker:  Patient is not currently on a Beta-Blocker (No further documentation required).       Informed Consent: Patient understands risks and agrees with Anesthesia plan.  Questions answered. Anesthesia consent signed with patient.  ASA Score: 3     Day of Surgery Review of History & Physical:    H&P update referred to the surgeon.         Ready For Surgery From Anesthesia Perspective.

## 2017-10-12 NOTE — H&P
Short Stay Endoscopy History and Physical    PCP - Clover White MD     Procedure - EGD  ASA - per anesthesia  Mallampati - per anesthesia  History of Anesthesia problems - no  Family history Anesthesia problems -  no   Plan of anesthesia - General    HPI:  This is a 72 y.o. male here for evaluation of :     Varices surveillance. Last egd 1 month ago.with banding  Follow up gastric polyp.        ROS:  Constitutional: No fevers, chills, No weight loss  CV: No chest pain  Pulm: No cough, No shortness of breath  Ophtho: No vision changes  GI: see HPI  Derm: No rash    Medical History:  has a past medical history of Anticoagulant long-term use; Cataract; Coronary artery disease; Hyperlipidemia; Hypertension; and Pacemaker.    Surgical History:  has a past surgical history that includes s/p PPV/AFX/EL (od) (05/09/2012); Cardiac surgery; Cardiac pacemaker placement; Cataract extraction (9/24/13); and Eye surgery.    Family History: family history includes Cancer in his brother, father, sister, and sister; Diabetes in his brother and sister; Heart disease in his mother and sister; Lung cancer in his brother; No Known Problems in his brother and sister.. Otherwise no colon cancer, inflammatory bowel disease, or GI malignancies.    Social History:  reports that he quit smoking about 22 years ago. His smoking use included Cigarettes. He has a 35.00 pack-year smoking history. He has never used smokeless tobacco. He reports that he does not drink alcohol or use drugs.    Review of patient's allergies indicates:   Allergen Reactions    Codeine Itching, Rash, Edema and Other (See Comments)     Other reaction(s): Itching       Medications:   Prescriptions Prior to Admission   Medication Sig Dispense Refill Last Dose    amlodipine (NORVASC) 5 MG tablet Take 1 tablet (5 mg total) by mouth every evening. 90 tablet 3 10/12/2017 at Unknown time    aspirin (ECOTRIN) 81 MG EC tablet Take 81 mg by mouth once daily.      10/11/2017 at Unknown time    furosemide (LASIX) 40 MG tablet TAKE ONE TABLET BY MOUTH EVERY MONDAY, WEDNESDAY, AND FRIDAY 12 tablet 0 Past Week at Unknown time    hydrocortisone (CORTEF) 10 MG Tab TAKE ONE AND ONE-HALF TABLETS BY MOUTH EVERY MORNING AND ONE-HALF TABLET EVERY EVENING. 180 tablet 3 10/12/2017 at Unknown time    losartan (COZAAR) 100 MG tablet Take 1 tablet (100 mg total) by mouth once daily. 90 tablet 3 10/12/2017 at Unknown time    multivitamin-minerals-lutein (CENTRUM SILVER) Tab Take 1 tablet by mouth once daily.     10/12/2017 at Unknown time    niacin 500 MG Tab Take 1 tablet (500 mg total) by mouth 3 (three) times daily with meals. (Patient taking differently: Take 500 mg by mouth 2 (two) times daily with meals. ) 90 tablet 6 10/12/2017 at Unknown time    omeprazole (PRILOSEC) 40 MG capsule Take 1 capsule (40 mg total) by mouth 2 (two) times daily before meals. 180 capsule 3 10/12/2017 at Unknown time    pravastatin (PRAVACHOL) 40 MG tablet Take 1 tablet (40 mg total) by mouth every evening. 90 tablet 3 10/11/2017 at Unknown time    fluticasone-vilanterol (BREO) 100-25 mcg/dose diskus inhaler Inhale 1 puff into the lungs once daily. 1 each 12 More than a month at Unknown time    hydrocodone-acetaminophen 5-325mg (NORCO) 5-325 mg per tablet Take 1 tablet by mouth every 4 (four) hours as needed for Pain. 21 tablet 0 More than a month at Unknown time    losartan (COZAAR) 100 MG tablet TAKE 1 TABLET BY MOUTH ONCE DAILY. 90 tablet 0 8/17/2017 at Unknown time    NITROSTAT 0.4 mg SL tablet DISSOLVE 1 TABLET UNDER THE TONGUE EVERY 5 MINUTES AS NEEDED 25 tablet 0 Taking    ondansetron (ZOFRAN) 4 MG tablet Take 1 tablet (4 mg total) by mouth every 8 (eight) hours as needed. 12 tablet 0 More than a month at Unknown time    ursodiol (ACTIGALL) 300 mg capsule Take 1 capsule (300 mg total) by mouth 3 (three) times daily. 90 capsule 11 8/16/2017 at Unknown time       Physical Exam:    Vital  Signs:   Vitals:    10/12/17 1326   BP: (!) 163/86   Pulse: 71   Resp: 16   Temp: 97.9 °F (36.6 °C)       General Appearance: Well appearing in no acute distress  Eyes:    No scleral icterus  ENT: Neck supple, Lips, mucosa, normal  Lungs: CTA anteriorly ; nonlabored  Heart:  Regular rate, S1, S2 normal, no murmurs heard.  Abdomen: Soft, non tender, modest distended with normal bowel sounds.   Extremities: No edema  Skin: No rash    Labs:  Lab Results   Component Value Date    WBC 5.16 10/12/2017    HGB 11.7 (L) 10/12/2017    HCT 33.7 (L) 10/12/2017    PLT 61 (L) 10/12/2017    CHOL 172 10/26/2016    TRIG 262 (H) 10/26/2016    HDL 37 (L) 10/26/2016    ALT 27 08/04/2017    AST 49 (H) 08/04/2017     08/04/2017    K 4.0 08/04/2017     08/04/2017    CREATININE 1.6 (H) 08/04/2017    BUN 12 08/04/2017    CO2 22 (L) 08/04/2017    TSH 2.387 08/04/2017    PSA <0.01 10/26/2016    INR 1.2 10/12/2017    HGBA1C 6.2 10/26/2016       LABS REVIEWED  INR 1.2  Plt 61    I have explained the risks and benefits of endoscopy procedures to the patient including but not limited to bleeding, perforation, infection, and death.      Abundio Mckeon MD

## 2017-10-12 NOTE — ANESTHESIA POSTPROCEDURE EVALUATION
"Anesthesia Post Evaluation    Patient: Fox Lyons Sr.    Procedure(s) Performed: Procedure(s) (LRB):  ESOPHAGOGASTRODUODENOSCOPY (EGD) (Left)    Final Anesthesia Type: general  Patient location during evaluation: PACU  Patient participation: Yes- Able to Participate  Level of consciousness: awake and alert and oriented  Post-procedure vital signs: reviewed and stable  Pain management: adequate  Airway patency: patent  PONV status at discharge: No PONV  Anesthetic complications: no      Cardiovascular status: hemodynamically stable  Respiratory status: unassisted  Hydration status: euvolemic  Follow-up not needed.        Visit Vitals  BP (!) 145/87 (BP Location: Left arm, Patient Position: Sitting)   Pulse 89   Temp 36.5 °C (97.7 °F)   Resp 16   Ht 5' 11" (1.803 m)   Wt 86.2 kg (190 lb)   SpO2 95%   BMI 26.50 kg/m²       Pain/Rosetta Score: Pain Assessment Performed: Yes (10/12/2017  3:30 PM)  Presence of Pain: denies (10/12/2017  3:30 PM)  Pain Rating Prior to Med Admin: 8 (10/12/2017  3:15 PM)  Pain Rating Post Med Admin: 0 (10/12/2017  3:30 PM)  Rosetta Score: 9 (10/12/2017  3:08 PM)      "

## 2017-10-13 ENCOUNTER — TELEPHONE (OUTPATIENT)
Dept: ENDOSCOPY | Facility: HOSPITAL | Age: 72
End: 2017-10-13

## 2017-10-13 NOTE — TELEPHONE ENCOUNTER
Patient and wife in office to schedule EGD.  Reviewed medical history, medications and allergies.  It was noted that he has a cardiac history.  They were unsure of last cardiology appointment. His last visit with Dr Medina was 2 years ago on 11/18/15 and he was to return in 1 year.   Informed wife and patient that a follow up with Cardiology is necessary prior to scheduling procedure. Stated understanding.  Direct line phone number given to return call after appointment.

## 2017-10-19 ENCOUNTER — TELEPHONE (OUTPATIENT)
Dept: ENDOSCOPY | Facility: HOSPITAL | Age: 72
End: 2017-10-19

## 2017-10-30 DIAGNOSIS — K74.60 CIRRHOSIS OF LIVER WITHOUT ASCITES, UNSPECIFIED HEPATIC CIRRHOSIS TYPE: Primary | ICD-10-CM

## 2017-11-12 RX ORDER — PRAVASTATIN SODIUM 40 MG/1
TABLET ORAL
Qty: 90 TABLET | Refills: 0 | Status: SHIPPED | OUTPATIENT
Start: 2017-11-12 | End: 2018-02-11 | Stop reason: SDUPTHER

## 2017-11-14 RX ORDER — FUROSEMIDE 40 MG/1
TABLET ORAL
Qty: 12 TABLET | Refills: 0 | OUTPATIENT
Start: 2017-11-14

## 2017-11-15 RX ORDER — FUROSEMIDE 40 MG/1
40 TABLET ORAL DAILY
Qty: 12 TABLET | Refills: 3 | Status: SHIPPED | OUTPATIENT
Start: 2017-11-15 | End: 2017-11-15 | Stop reason: SDUPTHER

## 2017-11-15 RX ORDER — FUROSEMIDE 40 MG/1
TABLET ORAL
Qty: 90 TABLET | Refills: 3 | Status: SHIPPED | OUTPATIENT
Start: 2017-11-15 | End: 2018-05-09 | Stop reason: SDUPTHER

## 2017-11-30 ENCOUNTER — TELEPHONE (OUTPATIENT)
Dept: GASTROENTEROLOGY | Facility: CLINIC | Age: 72
End: 2017-11-30

## 2017-11-30 ENCOUNTER — HOSPITAL ENCOUNTER (OUTPATIENT)
Facility: HOSPITAL | Age: 72
Discharge: HOME | End: 2017-11-30
Attending: INTERNAL MEDICINE | Admitting: INTERNAL MEDICINE
Payer: MEDICARE

## 2017-11-30 ENCOUNTER — ANESTHESIA EVENT (OUTPATIENT)
Dept: ENDOSCOPY | Facility: HOSPITAL | Age: 72
End: 2017-11-30
Payer: MEDICARE

## 2017-11-30 ENCOUNTER — ANESTHESIA (OUTPATIENT)
Dept: ENDOSCOPY | Facility: HOSPITAL | Age: 72
End: 2017-11-30
Payer: MEDICARE

## 2017-11-30 VITALS
DIASTOLIC BLOOD PRESSURE: 76 MMHG | SYSTOLIC BLOOD PRESSURE: 127 MMHG | HEIGHT: 71 IN | BODY MASS INDEX: 26.6 KG/M2 | TEMPERATURE: 98 F | RESPIRATION RATE: 14 BRPM | RESPIRATION RATE: 16 BRPM | HEART RATE: 69 BPM | OXYGEN SATURATION: 97 % | WEIGHT: 190 LBS

## 2017-11-30 DIAGNOSIS — I85.00 ESOPHAGEAL VARICES: ICD-10-CM

## 2017-11-30 DIAGNOSIS — I85.10 SECONDARY ESOPHAGEAL VARICES WITHOUT BLEEDING: Primary | ICD-10-CM

## 2017-11-30 LAB — KOH PREP SPEC: NORMAL

## 2017-11-30 PROCEDURE — 27200946 HC BRUSH, CYTOLOGY: Performed by: INTERNAL MEDICINE

## 2017-11-30 PROCEDURE — 63600175 PHARM REV CODE 636 W HCPCS: Performed by: NURSE ANESTHETIST, CERTIFIED REGISTERED

## 2017-11-30 PROCEDURE — 87210 SMEAR WET MOUNT SALINE/INK: CPT

## 2017-11-30 PROCEDURE — 25000003 PHARM REV CODE 250: Performed by: INTERNAL MEDICINE

## 2017-11-30 PROCEDURE — 43235 EGD DIAGNOSTIC BRUSH WASH: CPT | Mod: ,,, | Performed by: INTERNAL MEDICINE

## 2017-11-30 PROCEDURE — 37000009 HC ANESTHESIA EA ADD 15 MINS: Performed by: INTERNAL MEDICINE

## 2017-11-30 PROCEDURE — D9220A PRA ANESTHESIA: Mod: ,,, | Performed by: ANESTHESIOLOGY

## 2017-11-30 PROCEDURE — 37000008 HC ANESTHESIA 1ST 15 MINUTES: Performed by: INTERNAL MEDICINE

## 2017-11-30 PROCEDURE — 43235 EGD DIAGNOSTIC BRUSH WASH: CPT | Performed by: INTERNAL MEDICINE

## 2017-11-30 RX ORDER — SODIUM CHLORIDE 9 MG/ML
INJECTION, SOLUTION INTRAVENOUS CONTINUOUS
Status: DISCONTINUED | OUTPATIENT
Start: 2017-11-30 | End: 2017-11-30 | Stop reason: HOSPADM

## 2017-11-30 RX ORDER — LIDOCAINE HCL/PF 100 MG/5ML
SYRINGE (ML) INTRAVENOUS
Status: DISCONTINUED | OUTPATIENT
Start: 2017-11-30 | End: 2017-11-30

## 2017-11-30 RX ORDER — PROPOFOL 10 MG/ML
VIAL (ML) INTRAVENOUS
Status: DISCONTINUED | OUTPATIENT
Start: 2017-11-30 | End: 2017-11-30

## 2017-11-30 RX ADMIN — PROPOFOL 20 MG: 10 INJECTION, EMULSION INTRAVENOUS at 01:11

## 2017-11-30 RX ADMIN — PROPOFOL 70 MG: 10 INJECTION, EMULSION INTRAVENOUS at 01:11

## 2017-11-30 RX ADMIN — LIDOCAINE HYDROCHLORIDE 50 MG: 20 INJECTION, SOLUTION INTRAVENOUS at 01:11

## 2017-11-30 RX ADMIN — PROPOFOL 40 MG: 10 INJECTION, EMULSION INTRAVENOUS at 01:11

## 2017-11-30 RX ADMIN — SODIUM CHLORIDE: 0.9 INJECTION, SOLUTION INTRAVENOUS at 01:11

## 2017-11-30 NOTE — ANESTHESIA POSTPROCEDURE EVALUATION
"Anesthesia Post Evaluation    Patient: Fox Lyons     Procedure(s) Performed: Procedure(s) (LRB):  ESOPHAGOGASTRODUODENOSCOPY (EGD) (N/A)    Final Anesthesia Type: general  Patient location during evaluation: PACU  Patient participation: Yes- Able to Participate  Level of consciousness: awake and alert and oriented  Post-procedure vital signs: reviewed and stable  Pain management: adequate  Airway patency: patent  PONV status at discharge: No PONV  Anesthetic complications: no      Cardiovascular status: stable  Respiratory status: unassisted, spontaneous ventilation and room air  Hydration status: euvolemic  Follow-up not needed.        Visit Vitals  /72 (Patient Position: Lying)   Pulse 69   Temp 36.7 °C (98 °F) (Temporal)   Resp 16   Ht 5' 11" (1.803 m)   Wt 86.2 kg (190 lb)   SpO2 97%   BMI 26.50 kg/m²       Pain/Rosetta Score: Pain Assessment Performed: Yes (11/30/2017  1:34 PM)  Presence of Pain: denies (11/30/2017 12:40 PM)  Rosetta Score: 10 (11/30/2017  1:34 PM)      "

## 2017-11-30 NOTE — PATIENT INSTRUCTIONS
Discharge Summary/Instructions after an Endoscopic Procedure  Patient Name: Fox Lyons  Patient MRN: [id number]  Patient YOB: 1945 Thursday, November 30, 2017  Trice Funes MD  RESTRICTIONS:  During your procedure today, you received medications for sedation.  These   medications may affect your judgment, balance and coordination.  Therefore,   for 24 hours, you have the following restrictions:   - DO NOT drive a car, operate machinery, make legal/financial decisions,   sign important papers or drink alcohol.    ACTIVITY:  The following day: return to full activity including work, except no heavy   lifting, straining or running for 3 days if polyps were removed.  DIET:  Eat and drink normally unless instructed otherwise.     TREATMENT FOR COMMON SIDE EFFECTS:  - Mild abdominal pain, belching, bloating or excessive gas: rest, eat   lightly and use a heating pad.  - Sore Throat: treat with throat lozenges and/or gargle with warm salt   water.  SYMPTOMS TO WATCH FOR AND REPORT TO YOUR PHYSICIAN:  1. Abdominal pain or bloating, other than gas cramps.  2. Chest pain.  3. Back pain.  4. Chills or fever occurring within 24 hours after the procedure.  5. Rectal bleeding, which would show as bright red, maroon, or black stools.   (A tablespoon of blood from the rectum is not serious, especially if   hemorrhoids are present.)  6. Vomiting.  7. Weakness or dizziness.  8. Because air was used during the procedure, expelling large amounts of air   from your rectum or belching is normal.  9. If a bowel prep was taken, you may not have a bowel movement for 1-3   days.  This is normal.  GO DIRECTLY TO THE NEAREST EMERGENCY ROOM IF YOU HAVE ANY OF THE FOLLOWING:      Difficulty breathing  Chills and/or fever over 101 F   Persistent vomiting and/or vomiting blood   Severe abdominal pain   Severe chest pain   Black, tarry stools   Bleeding- more than one tablespoon   Any other symptom or condition that you may  feel needs urgent attention  Your doctor recommends these additional instructions:  If any biopsies were taken, your doctor s clinic will contact you in 1 to 2   weeks with any results.  You are being discharged to home.   Resume your previous diet.   Continue your present medications.   Your physician has recommended a repeat upper endoscopy in three months for   surveillance.  For questions, problems or results please call your physician - Trice Funes MD at Work:  [phone].  [facility], EMERGENCY ROOM PHONE NUMBER: [phone]  IF A COMPLICATION OR EMERGENCY SITUATION ARISES AND YOU ARE UNABLE TO REACH   YOUR PHYSICIAN - GO DIRECTLY TO THE EMERGENCY ROOM.  Trice Funes MD  11/30/2017 1:23:14 PM  This report has been verified and signed electronically.

## 2017-11-30 NOTE — H&P
Short Stay Endoscopy History and Physical    PCP - Clover White MD     Procedure - EGD  ASA - per anesthesia  Mallampati - per anesthesia  History of Anesthesia problems - no  Family history Anesthesia problems -  no   Plan of anesthesia - General    HPI:  This is a 72 y.o. male here for evaluation of : esophageal varices. Rescope to see if further banding is needed.    Reflux - no  Dysphagia - no  Abdominal pain - no  Diarrhea - no    ROS:  Constitutional: No fevers, chills, No weight loss  CV: No chest pain  Pulm: No cough, No shortness of breath  Ophtho: No vision changes  GI: see HPI  Derm: No rash    Medical History:  has a past medical history of Anticoagulant long-term use; Cataract; Coronary artery disease; Hyperlipidemia; Hypertension; and Pacemaker.    Surgical History:  has a past surgical history that includes s/p PPV/AFX/EL (od) (05/09/2012); Cardiac surgery; Cardiac pacemaker placement; Cataract extraction (9/24/13); and Eye surgery.    Family History: family history includes Cancer in his brother, father, sister, and sister; Diabetes in his brother and sister; Heart disease in his mother and sister; Lung cancer in his brother; No Known Problems in his brother and sister.. Otherwise no colon cancer, inflammatory bowel disease, or GI malignancies.    Social History:  reports that he quit smoking about 22 years ago. His smoking use included Cigarettes. He has a 35.00 pack-year smoking history. He has never used smokeless tobacco. He reports that he does not drink alcohol or use drugs.    Review of patient's allergies indicates:   Allergen Reactions    Codeine Itching, Rash, Edema and Other (See Comments)     Other reaction(s): Itching       Medications:   Prescriptions Prior to Admission   Medication Sig Dispense Refill Last Dose    amlodipine (NORVASC) 5 MG tablet Take 1 tablet (5 mg total) by mouth every evening. 90 tablet 3 11/30/2017 at Unknown time    aspirin (ECOTRIN) 81 MG EC tablet  Take 81 mg by mouth once daily.     11/29/2017 at Unknown time    furosemide (LASIX) 40 MG tablet TAKE 1 TABLET(40 MG) BY MOUTH EVERY DAY 90 tablet 3 Past Month at Unknown time    hydrocodone-acetaminophen 5-325mg (NORCO) 5-325 mg per tablet Take 1 tablet by mouth every 4 (four) hours as needed for Pain. 21 tablet 0 Past Month at Unknown time    hydrocortisone (CORTEF) 10 MG Tab TAKE ONE AND ONE-HALF TABLETS BY MOUTH EVERY MORNING AND ONE-HALF TABLET EVERY EVENING. 180 tablet 3 11/30/2017 at Unknown time    losartan (COZAAR) 100 MG tablet Take 1 tablet (100 mg total) by mouth once daily. 90 tablet 3 11/30/2017 at Unknown time    multivitamin-minerals-lutein (CENTRUM SILVER) Tab Take 1 tablet by mouth once daily.     11/30/2017 at Unknown time    niacin 500 MG Tab Take 1 tablet (500 mg total) by mouth 3 (three) times daily with meals. (Patient taking differently: Take 500 mg by mouth 2 (two) times daily with meals. ) 90 tablet 6 11/30/2017 at Unknown time    omeprazole (PRILOSEC) 40 MG capsule Take 1 capsule (40 mg total) by mouth 2 (two) times daily before meals. 180 capsule 3 11/30/2017 at Unknown time    ondansetron (ZOFRAN) 4 MG tablet Take 1 tablet (4 mg total) by mouth every 8 (eight) hours as needed. 12 tablet 0 Past Month at Unknown time    pravastatin (PRAVACHOL) 40 MG tablet TAKE 1 TABLET(40 MG) BY MOUTH EVERY EVENING 90 tablet 0 11/30/2017 at Unknown time    fluticasone-vilanterol (BREO) 100-25 mcg/dose diskus inhaler Inhale 1 puff into the lungs once daily. 1 each 12 More than a month at Unknown time    losartan (COZAAR) 100 MG tablet TAKE 1 TABLET BY MOUTH ONCE DAILY. 90 tablet 0 8/17/2017 at Unknown time    NITROSTAT 0.4 mg SL tablet DISSOLVE 1 TABLET UNDER THE TONGUE EVERY 5 MINUTES AS NEEDED 25 tablet 0 Taking    ursodiol (ACTIGALL) 300 mg capsule Take 1 capsule (300 mg total) by mouth 3 (three) times daily. 90 capsule 11 8/16/2017 at Unknown time       Physical Exam:    Vital Signs:    Vitals:    11/30/17 1244   BP: (!) 146/80   Pulse: 87   Resp: 16   Temp: 97.2 °F (36.2 °C)       General Appearance: Well appearing in no acute distress  Eyes:    No scleral icterus  ENT: Neck supple, Lips, mucosa, and tongue normal; teeth and gums normal  Lungs: CTA anteriorly  Heart:  Regular rate, S1, S2 normal, no murmurs heard.  Abdomen: Soft, non tender, non distended with normal bowel sounds. No hepatosplenomegaly, ascites, or mass.  Extremities: No edema  Skin: No rash    Labs:  Lab Results   Component Value Date    WBC 5.38 11/30/2017    HGB 10.3 (L) 11/30/2017    HCT 31.7 (L) 11/30/2017    PLT 68 (L) 11/30/2017    CHOL 172 10/26/2016    TRIG 262 (H) 10/26/2016    HDL 37 (L) 10/26/2016    ALT 27 08/04/2017    AST 49 (H) 08/04/2017     08/04/2017    K 4.0 08/04/2017     08/04/2017    CREATININE 1.6 (H) 08/04/2017    BUN 12 08/04/2017    CO2 22 (L) 08/04/2017    TSH 2.387 08/04/2017    PSA <0.01 10/26/2016    INR 1.1 11/30/2017    HGBA1C 6.2 10/26/2016   The patient was awake and alert. The patient is stable to proceed with the procedure. The risks, benefits and alternatives, including bleeding, perforation, cardiac or respiratory complications were explained. The patient was given the opportunity to ask questions and wishes to proceed.      Trice Funes MD

## 2017-11-30 NOTE — TRANSFER OF CARE
"Anesthesia Transfer of Care Note    Patient: Fox Lyons Sr.    Procedure(s) Performed: Procedure(s) (LRB):  ESOPHAGOGASTRODUODENOSCOPY (EGD) (N/A)    Patient location: PACU    Anesthesia Type: general    Transport from OR: Transported from OR on room air with adequate spontaneous ventilation    Post pain: adequate analgesia    Post assessment: no apparent anesthetic complications    Post vital signs: stable    Level of consciousness: awake, alert and oriented    Nausea/Vomiting: no nausea/vomiting    Complications: none    Transfer of care protocol was followed      Last vitals:   Visit Vitals  BP (!) 146/80   Pulse 87   Temp 36.2 °C (97.2 °F)   Resp 16   Ht 5' 11" (1.803 m)   Wt 86.2 kg (190 lb)   SpO2 95%   BMI 26.50 kg/m²     "

## 2017-11-30 NOTE — DISCHARGE INSTRUCTIONS

## 2017-11-30 NOTE — ANESTHESIA PREPROCEDURE EVALUATION
11/30/2017  Fox Lyons Sr. is a 72 y.o., male.    Anesthesia Evaluation    I have reviewed the Patient Summary Reports.    I have reviewed the Nursing Notes.   I have reviewed the Medications.     Review of Systems      Physical Exam  General:  Well nourished    Airway/Jaw/Neck:  Airway Findings: Mouth Opening: Normal General Airway Assessment: Adult  Mallampati: II  Improves to II with phonation.  Jaw/Neck Findings:  Limited Ability to Prognath  Neck ROM: Normal ROM     Eyes/Ears/Nose:  Eyes/Ears/Nose Findings:    Dental:  Dental Findings: In tact   Chest/Lungs:  Chest/Lungs Findings: Clear to auscultation, Normal Respiratory Rate     Heart/Vascular:  Heart Findings: Rate: Normal  Rhythm: Regular Rhythm  Sounds: Normal     Abdomen:  Abdomen Findings:  Normal     Musculoskeletal:  Musculoskeletal Findings:    Skin:  Skin Findings:     Mental Status:  Mental Status Findings:  Cooperative, Alert and Oriented         Anesthesia Plan  Type of Anesthesia, risks & benefits discussed:  Anesthesia Type:  general, MAC  Patient's Preference:   Intra-op Monitoring Plan:   Intra-op Monitoring Plan Comments:   Post Op Pain Control Plan:   Post Op Pain Control Plan Comments:   Induction:   IV  Beta Blocker:  Patient is not currently on a Beta-Blocker (No further documentation required).       Informed Consent: Patient understands risks and agrees with Anesthesia plan.  Questions answered. Anesthesia consent signed with patient.  ASA Score: 2     Day of Surgery Review of History & Physical:    H&P update referred to the surgeon.         Ready For Surgery From Anesthesia Perspective.

## 2017-12-03 RX ORDER — AMLODIPINE BESYLATE 5 MG/1
TABLET ORAL
Qty: 90 TABLET | Refills: 0 | Status: SHIPPED | OUTPATIENT
Start: 2017-12-03 | End: 2018-03-02 | Stop reason: SDUPTHER

## 2017-12-04 DIAGNOSIS — K21.9 GASTROESOPHAGEAL REFLUX DISEASE WITHOUT ESOPHAGITIS: ICD-10-CM

## 2017-12-04 RX ORDER — OMEPRAZOLE 40 MG/1
CAPSULE, DELAYED RELEASE ORAL
Qty: 180 CAPSULE | Refills: 0 | Status: SHIPPED | OUTPATIENT
Start: 2017-12-04 | End: 2017-12-05 | Stop reason: SDUPTHER

## 2017-12-05 DIAGNOSIS — K21.9 GASTROESOPHAGEAL REFLUX DISEASE WITHOUT ESOPHAGITIS: ICD-10-CM

## 2017-12-05 RX ORDER — LOSARTAN POTASSIUM 100 MG/1
100 TABLET ORAL DAILY
Qty: 90 TABLET | Refills: 3 | Status: SHIPPED | OUTPATIENT
Start: 2017-12-05 | End: 2018-05-09 | Stop reason: SDUPTHER

## 2017-12-05 RX ORDER — OMEPRAZOLE 40 MG/1
CAPSULE, DELAYED RELEASE ORAL
Qty: 180 CAPSULE | Refills: 0 | Status: SHIPPED | OUTPATIENT
Start: 2017-12-05 | End: 2018-10-11

## 2017-12-05 NOTE — TELEPHONE ENCOUNTER
----- Message from Alverto Wick sent at 12/5/2017  8:31 AM CST -----  Contact: Fox Eloy      X_  1st Request  _  2nd Request  _  3rd Request    Please refill the medication(s) listed below. Please call the patient when the prescription(s) is ready for  at this phone number            Medication #1    losartan (COZAAR) 100 MG tablet 90 tablet 0 7/8/2017  No  Sig: TAKE 1 TABLET BY MOUTH ONCE DAILY.  Class: Normal  Order: [id number]  Date/Time Signed: 7/8/2017 12:52      E-Prescribing Status: Receipt confirmed by pharmacy (7/8/2017 12:52 PM CDT)        Medication #2          Preferred Pharmacy:    [facility] [address] [place] [address] AT [facility] [phone] (Phone)  [phone] (Fax)

## 2017-12-05 NOTE — TELEPHONE ENCOUNTER
----- Message from Alverto Wick sent at 12/5/2017  8:34 AM CST -----  Contact: Fox Lyons  X_  1st Request  _  2nd Request  _  3rd Request        Who: Fox Lyons    Why: Patient states the pharmacy did not receive omeprazole (PRILOSEC) 40 MG capsule         What Number to Call Back: [phone]    When to Expect a call back: (Within 24 hours)    Please return the call at earliest convenience. Thanks!

## 2017-12-06 DIAGNOSIS — I49.5 SSS (SICK SINUS SYNDROME): Primary | ICD-10-CM

## 2017-12-07 ENCOUNTER — TELEPHONE (OUTPATIENT)
Dept: ENDOSCOPY | Facility: HOSPITAL | Age: 72
End: 2017-12-07

## 2017-12-08 ENCOUNTER — OFFICE VISIT (OUTPATIENT)
Dept: ELECTROPHYSIOLOGY | Facility: CLINIC | Age: 72
End: 2017-12-08
Payer: MEDICARE

## 2017-12-08 ENCOUNTER — HOSPITAL ENCOUNTER (OUTPATIENT)
Dept: CARDIOLOGY | Facility: CLINIC | Age: 72
Discharge: HOME | End: 2017-12-08
Payer: MEDICARE

## 2017-12-08 ENCOUNTER — CLINICAL SUPPORT (OUTPATIENT)
Dept: ELECTROPHYSIOLOGY | Facility: CLINIC | Age: 72
End: 2017-12-08
Attending: INTERNAL MEDICINE
Payer: MEDICARE

## 2017-12-08 VITALS
SYSTOLIC BLOOD PRESSURE: 123 MMHG | HEIGHT: 71 IN | BODY MASS INDEX: 28.61 KG/M2 | WEIGHT: 204.38 LBS | DIASTOLIC BLOOD PRESSURE: 68 MMHG | HEART RATE: 70 BPM

## 2017-12-08 DIAGNOSIS — Z86.19 HISTORY OF HEPATITIS C: ICD-10-CM

## 2017-12-08 DIAGNOSIS — Z95.0 CARDIAC PACEMAKER IN SITU: Primary | ICD-10-CM

## 2017-12-08 DIAGNOSIS — I10 ESSENTIAL HYPERTENSION: Chronic | ICD-10-CM

## 2017-12-08 DIAGNOSIS — I25.10 CORONARY ARTERY DISEASE DUE TO LIPID RICH PLAQUE: Chronic | ICD-10-CM

## 2017-12-08 DIAGNOSIS — I49.5 SICK SINUS SYNDROME: Primary | Chronic | ICD-10-CM

## 2017-12-08 DIAGNOSIS — I25.83 CORONARY ARTERY DISEASE DUE TO LIPID RICH PLAQUE: Chronic | ICD-10-CM

## 2017-12-08 DIAGNOSIS — I49.5 SSS (SICK SINUS SYNDROME): ICD-10-CM

## 2017-12-08 DIAGNOSIS — E29.1 SECONDARY MALE HYPOGONADISM: ICD-10-CM

## 2017-12-08 DIAGNOSIS — Z95.0 CARDIAC PACEMAKER IN SITU: ICD-10-CM

## 2017-12-08 DIAGNOSIS — H35.033 HYPERTENSIVE RETINOPATHY OF BOTH EYES: ICD-10-CM

## 2017-12-08 DIAGNOSIS — E27.49 SECONDARY ADRENAL INSUFFICIENCY: ICD-10-CM

## 2017-12-08 DIAGNOSIS — I85.10 SECONDARY ESOPHAGEAL VARICES WITHOUT BLEEDING: ICD-10-CM

## 2017-12-08 DIAGNOSIS — Z95.0 CARDIAC PACEMAKER IN SITU: Chronic | ICD-10-CM

## 2017-12-08 DIAGNOSIS — E78.5 HYPERLIPIDEMIA, UNSPECIFIED HYPERLIPIDEMIA TYPE: Chronic | ICD-10-CM

## 2017-12-08 DIAGNOSIS — C22.0 HCC (HEPATOCELLULAR CARCINOMA): ICD-10-CM

## 2017-12-08 DIAGNOSIS — R76.8 HEPATITIS B ANTIBODY POSITIVE: ICD-10-CM

## 2017-12-08 DIAGNOSIS — Z95.5 S/P CORONARY ARTERY STENT PLACEMENT: Chronic | ICD-10-CM

## 2017-12-08 DIAGNOSIS — D69.6 THROMBOCYTOPENIA: ICD-10-CM

## 2017-12-08 DIAGNOSIS — K55.069 SUPERIOR MESENTERIC VEIN THROMBOSIS: ICD-10-CM

## 2017-12-08 PROCEDURE — 93279 PRGRMG DEV EVAL PM/LDLS PM: CPT | Mod: S$GLB,,, | Performed by: INTERNAL MEDICINE

## 2017-12-08 PROCEDURE — 93000 ELECTROCARDIOGRAM COMPLETE: CPT | Mod: S$GLB,,, | Performed by: INTERNAL MEDICINE

## 2017-12-08 PROCEDURE — 99999 PR PBB SHADOW E&M-EST. PATIENT-LVL III: CPT | Mod: PBBFAC,,, | Performed by: INTERNAL MEDICINE

## 2017-12-08 PROCEDURE — 99215 OFFICE O/P EST HI 40 MIN: CPT | Mod: S$GLB,,, | Performed by: INTERNAL MEDICINE

## 2017-12-08 PROCEDURE — 99499 UNLISTED E&M SERVICE: CPT | Mod: S$GLB,,, | Performed by: INTERNAL MEDICINE

## 2017-12-08 NOTE — PROGRESS NOTES
Subjective:   Patient ID:  Fox Lyons Sr. is a 72 y.o. 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 He was dx w/ chronic HCV infection in 1995, likely r/t IVDU.   For the past few months, he has been more SOB and with more ankle edema -- this times to the onset of steroid rx -- has piut tumor and had adrenal insufficiency  He gets SALDIVAR within 5 min of starting to walk etc  He in fact had similar complaints to me when he saw me in 7/2014. At the time he also c/o fatigue  He has had a PPM for SSS/RBBB in 2011 -- normal fxn -- no issues   ECG today with AR pacing and AR= 230 msec., RBBB  His wife says that his SALDIVAR is worse this year as compared to last year.   He has cut back on his salt and the ankle edema is less but the SALDIVAR is the same. He now cannot mow his grass anymore. His wife has to help.   He has a 40 py Hx of smoking -- started age 14, stopped in 1985. 1.5 pack a day.  CXR is pending today    Update since then:  He has not seen me in 2 years and has not seen Dr Contreras since 2014.  He is doing OK , can still cut his grass but gets some SALDIVAR with that. Can do 2 flights of stairs.   I have reviewed the actual image of the ECG tracing obtained today and it shows AR pacing  With RBBB, QRSd 142 and o/w with normal intervals  Has singe chamber AAIR Reply PPM      Current Outpatient Prescriptions   Medication Sig    amLODIPine (NORVASC) 5 MG tablet TAKE ONE TABLET BY MOUTH EVERY EVENING    aspirin (ECOTRIN) 81 MG EC tablet Take 81 mg by mouth once daily.      fluticasone-vilanterol (BREO) 100-25 mcg/dose diskus inhaler Inhale 1 puff into the lungs once daily.    furosemide (LASIX) 40 MG tablet TAKE 1 TABLET(40 MG) BY MOUTH EVERY DAY    hydrocodone-acetaminophen 5-325mg (NORCO) 5-325 mg per tablet Take 1  tablet by mouth every 4 (four) hours as needed for Pain.    hydrocortisone (CORTEF) 10 MG Tab TAKE ONE AND ONE-HALF TABLETS BY MOUTH EVERY MORNING AND ONE-HALF TABLET EVERY EVENING.    losartan (COZAAR) 100 MG tablet TAKE 1 TABLET BY MOUTH ONCE DAILY.    losartan (COZAAR) 100 MG tablet Take 1 tablet (100 mg total) by mouth once daily.    multivitamin-minerals-lutein (CENTRUM SILVER) Tab Take 1 tablet by mouth once daily.      niacin 500 MG Tab Take 1 tablet (500 mg total) by mouth 3 (three) times daily with meals. (Patient taking differently: Take 500 mg by mouth 2 (two) times daily with meals. )    NITROSTAT 0.4 mg SL tablet DISSOLVE 1 TABLET UNDER THE TONGUE EVERY 5 MINUTES AS NEEDED    omeprazole (PRILOSEC) 40 MG capsule TAKE 1 CAPSULE(40 MG) BY MOUTH TWICE DAILY BEFORE MEALS    ondansetron (ZOFRAN) 4 MG tablet Take 1 tablet (4 mg total) by mouth every 8 (eight) hours as needed.    pravastatin (PRAVACHOL) 40 MG tablet TAKE 1 TABLET(40 MG) BY MOUTH EVERY EVENING    ursodiol (ACTIGALL) 300 mg capsule Take 1 capsule (300 mg total) by mouth 3 (three) times daily.     No current facility-administered medications for this visit.      Review of Systems   Constitution: Positive for night sweats. Negative for decreased appetite, weakness, malaise/fatigue, weight gain and weight loss.   Eyes: Negative for blurred vision.   Cardiovascular: Negative for chest pain, claudication, cyanosis, dyspnea on exertion, irregular heartbeat, leg swelling, near-syncope, orthopnea and palpitations.   Respiratory: Positive for shortness of breath. Negative for cough, sleep disturbances due to breathing, snoring and wheezing.    Endocrine: Negative for heat intolerance.   Hematologic/Lymphatic: Does not bruise/bleed easily.   Musculoskeletal: Negative for muscle weakness and myalgias.   Gastrointestinal: Negative for melena, nausea and vomiting.   Genitourinary: Negative for nocturia.   Neurological: Positive for dizziness,  light-headedness and loss of balance. Negative for excessive daytime sleepiness and headaches.   Psychiatric/Behavioral: Negative for depression, memory loss and substance abuse. The patient does not have insomnia and is not nervous/anxious.        Objective:   Physical Exam   Constitutional: He is oriented to person, place, and time. He appears well-developed and well-nourished.   overweight   HENT:   Head: Normocephalic and atraumatic.   Right Ear: External ear normal.   Left Ear: External ear normal.   Eyes: Conjunctivae are normal. Pupils are equal, round, and reactive to light. Right eye exhibits no discharge. Left eye exhibits no discharge. Right conjunctiva is not injected. Left conjunctiva is not injected. Left conjunctiva has no hemorrhage.   Neck: Neck supple. No JVD present. No thyromegaly present.   Cardiovascular: Normal rate, regular rhythm, normal heart sounds and intact distal pulses.  PMI is not displaced.  Exam reveals no gallop, no friction rub, no midsystolic click and no opening snap.    No murmur heard.  Pulses:       Carotid pulses are 2+ on the right side, and 2+ on the left side.       Radial pulses are 2+ on the right side, and 2+ on the left side.        Dorsalis pedis pulses are 2+ on the right side, and 2+ on the left side.        Posterior tibial pulses are 2+ on the right side, and 2+ on the left side.   Pulmonary/Chest: Effort normal and breath sounds normal. No respiratory distress. He has no wheezes. He has no rales. He exhibits no tenderness.   Device pocket is in excellent repair     Abdominal: Soft. Normal appearance. He exhibits no pulsatile liver. There is no hepatomegaly. There is no tenderness. There is no rigidity, no rebound and no guarding.   Obese abdomen   Musculoskeletal: Normal range of motion. He exhibits no edema or tenderness.        Right knee: He exhibits no swelling.        Left knee: He exhibits no swelling.        Right ankle: He exhibits no swelling.         "Left ankle: He exhibits no swelling.        Right lower leg: He exhibits no swelling.        Left lower leg: He exhibits no swelling.        Right foot: There is no swelling.        Left foot: There is no swelling.   Neurological: He is alert and oriented to person, place, and time. He has normal strength and normal reflexes. No cranial nerve deficit. Coordination normal.   Skin: Skin is warm, dry and intact. No rash noted. He is not diaphoretic. No cyanosis. No pallor.   Psychiatric: He has a normal mood and affect. His behavior is normal.   Nursing note and vitals reviewed.    /68   Pulse 70   Ht 5' 11" (1.803 m)   Wt 92.7 kg (204 lb 5.9 oz)   BMI 28.50 kg/m²      Assessment:    He seems to be doing OK from a cardiac point of view  1. Sick sinus syndrome    2. Cardiac pacemaker in situ    3. Coronary artery disease due to lipid rich plaque    4. Secondary esophageal varices without bleeding    5. HCC (hepatocellular carcinoma)    6. Hepatitis B antibody positive    7. History of hepatitis C    8. Hyperlipidemia, unspecified hyperlipidemia type    9. Essential hypertension    10. Hypertensive retinopathy of both eyes    11. S/P coronary artery stent placement    12. Secondary adrenal insufficiency    13. Secondary male hypogonadism    14. Superior mesenteric vein thrombosis    15. Thrombocytopenia        Plan:    PPM eval now--- alll  OK >. Decreased outputs >. 4 to 5 year battery life    No orders of the defined types were placed in this encounter.    No Follow-up on file.  There are no discontinued medications.  New Prescriptions    No medications on file     Modified Medications    No medications on file              "

## 2018-01-23 ENCOUNTER — TELEPHONE (OUTPATIENT)
Dept: FAMILY MEDICINE | Facility: CLINIC | Age: 73
End: 2018-01-23

## 2018-01-23 NOTE — TELEPHONE ENCOUNTER
----- Message from Alverto Wick sent at 1/23/2018  7:09 AM CST -----  Contact: Fox Lyons  X_  1st Request  _  2nd Request  _  3rd Request        Who: Fox Lyons    Why: Patient needs to schedule a flu shot    What Number to Call Back: [phone]    When to Expect a call back: (Within 24 hours)    Please return the call at earliest convenience. Thanks!

## 2018-01-30 ENCOUNTER — TELEPHONE (OUTPATIENT)
Dept: PULMONOLOGY | Facility: CLINIC | Age: 73
End: 2018-01-30

## 2018-01-30 NOTE — TELEPHONE ENCOUNTER
----- Message from Katelyn Ortiz MD sent at 1/30/2018  9:33 AM CST -----  Contact: Self [phone]  Can we send him a coupon>  BRENDA Stephens  ----- Message -----  From: Micheline Griffith MA  Sent: 1/26/2018   2:42 PM  To: Katelyn Ortiz MD        ----- Message -----  From: Tianna Luevano  Sent: 1/26/2018   2:32 PM  To: Angel OWENS Staff    PT states Breo is too expensive. He wants to know if there's an alternative.     ..  [facility] [address] [place] [address] AT [address] [address]  Phone: [phone] Fax: [phone]

## 2018-02-05 RX ORDER — FLUTICASONE FUROATE AND VILANTEROL TRIFENATATE 100; 25 UG/1; UG/1
POWDER RESPIRATORY (INHALATION)
Qty: 90 EACH | Refills: 4 | Status: SHIPPED | OUTPATIENT
Start: 2018-02-05 | End: 2018-11-09

## 2018-02-12 RX ORDER — PRAVASTATIN SODIUM 40 MG/1
TABLET ORAL
Qty: 90 TABLET | Refills: 0 | Status: SHIPPED | OUTPATIENT
Start: 2018-02-12 | End: 2018-05-09 | Stop reason: SDUPTHER

## 2018-02-22 ENCOUNTER — TELEPHONE (OUTPATIENT)
Dept: ENDOSCOPY | Facility: HOSPITAL | Age: 73
End: 2018-02-22

## 2018-03-02 DIAGNOSIS — K21.9 GASTROESOPHAGEAL REFLUX DISEASE WITHOUT ESOPHAGITIS: ICD-10-CM

## 2018-03-03 RX ORDER — OMEPRAZOLE 40 MG/1
CAPSULE, DELAYED RELEASE ORAL
Qty: 180 CAPSULE | Refills: 0 | Status: SHIPPED | OUTPATIENT
Start: 2018-03-03 | End: 2018-05-09 | Stop reason: SDUPTHER

## 2018-03-03 RX ORDER — AMLODIPINE BESYLATE 5 MG/1
TABLET ORAL
Qty: 90 TABLET | Refills: 0 | Status: SHIPPED | OUTPATIENT
Start: 2018-03-03 | End: 2018-05-09

## 2018-03-05 ENCOUNTER — TELEPHONE (OUTPATIENT)
Dept: ELECTROPHYSIOLOGY | Facility: CLINIC | Age: 73
End: 2018-03-05

## 2018-03-05 NOTE — TELEPHONE ENCOUNTER
----- Message from Chang Degroot sent at 3/5/2018  9:43 AM CST -----  Contact: patient  Please call pt at [phone] today. Patient would like to cancel and reschedule his Telephonic pacemaker check from 03/12/18 to today about 10 am    Thank you

## 2018-03-05 NOTE — TELEPHONE ENCOUNTER
Returned the patient's call on this morning.  Informed the patient today would be too soon to do his Transtelephonic Pacemaker check as to it is less than 91 days from his last check.  Offered to reschedule the appointment to another day past the 12 th however the patient stated he would prefer to just call back to reschedule.

## 2018-03-08 ENCOUNTER — OFFICE VISIT (OUTPATIENT)
Dept: INTERNAL MEDICINE | Facility: CLINIC | Age: 73
End: 2018-03-08
Payer: MEDICARE

## 2018-03-08 VITALS
HEART RATE: 72 BPM | OXYGEN SATURATION: 94 % | TEMPERATURE: 98 F | DIASTOLIC BLOOD PRESSURE: 72 MMHG | WEIGHT: 207.88 LBS | BODY MASS INDEX: 29.1 KG/M2 | SYSTOLIC BLOOD PRESSURE: 124 MMHG | HEIGHT: 71 IN

## 2018-03-08 DIAGNOSIS — J06.9 UPPER RESPIRATORY TRACT INFECTION, UNSPECIFIED TYPE: Primary | ICD-10-CM

## 2018-03-08 DIAGNOSIS — R05.9 COUGH: ICD-10-CM

## 2018-03-08 PROCEDURE — 99999 PR PBB SHADOW E&M-EST. PATIENT-LVL III: CPT | Mod: PBBFAC,,, | Performed by: NURSE PRACTITIONER

## 2018-03-08 PROCEDURE — 3078F DIAST BP <80 MM HG: CPT | Mod: S$GLB,,, | Performed by: NURSE PRACTITIONER

## 2018-03-08 PROCEDURE — 3074F SYST BP LT 130 MM HG: CPT | Mod: S$GLB,,, | Performed by: NURSE PRACTITIONER

## 2018-03-08 PROCEDURE — 99213 OFFICE O/P EST LOW 20 MIN: CPT | Mod: S$GLB,,, | Performed by: NURSE PRACTITIONER

## 2018-03-08 RX ORDER — BENZONATATE 200 MG/1
200 CAPSULE ORAL 3 TIMES DAILY PRN
Qty: 30 CAPSULE | Refills: 0 | Status: SHIPPED | OUTPATIENT
Start: 2018-03-08 | End: 2018-03-18

## 2018-03-08 RX ORDER — MONTELUKAST SODIUM 10 MG/1
10 TABLET ORAL NIGHTLY
Qty: 30 TABLET | Refills: 2 | Status: SHIPPED | OUTPATIENT
Start: 2018-03-08 | End: 2018-04-07

## 2018-03-08 NOTE — PROGRESS NOTES
Subjective:       Patient ID: Fox Lyons Sr. is a 72 y.o. male.    Chief Complaint: URI (cough)    HPI:  73 yo male that presents to clinic today with cough and nasal congestion.    Patient does have a history of COPD.    States that it has been going on for about two days now.  Reports that his nasal drainage is clear looking.  States that cough is mostly dry but he does occasionally brings up some mucus which is also clear.    States that he has tried taken Claritin and zyrtec in the past but state they do not work.    Denies any fever, chest pain, SOB or dizziness.  States that his appetite and energy level are good.    Review of Systems   Constitutional: Negative for appetite change, chills, fatigue and fever.   HENT: Positive for congestion, postnasal drip and rhinorrhea. Negative for ear pain, sinus pain, sinus pressure and sore throat.    Respiratory: Positive for cough. Negative for apnea, chest tightness, shortness of breath and wheezing.    Cardiovascular: Negative for chest pain, palpitations and leg swelling.   Gastrointestinal: Negative for abdominal pain, constipation, diarrhea, nausea and vomiting.   Musculoskeletal: Negative for arthralgias, joint swelling, myalgias, neck pain and neck stiffness.   Skin: Negative for color change and rash.   Neurological: Negative for dizziness, weakness, light-headedness, numbness and headaches.   Psychiatric/Behavioral: Negative for behavioral problems.       Objective:      Physical Exam   Constitutional: He is oriented to person, place, and time. He appears well-developed and well-nourished. No distress.   HENT:   Head: Normocephalic and atraumatic.   Right Ear: External ear normal.   Left Ear: External ear normal.   + post nasal drip and mild erythema to oropharynx.   Neck: Normal range of motion. Neck supple. No thyromegaly present.   Cardiovascular: Normal rate, regular rhythm, normal heart sounds and intact distal pulses.    No murmur  heard.  Pulmonary/Chest: Effort normal and breath sounds normal. No respiratory distress. He has no wheezes. He has no rales.   Abdominal: Soft. Bowel sounds are normal. He exhibits no distension and no mass. There is no tenderness.   Lymphadenopathy:     He has no cervical adenopathy.   Neurological: He is alert and oriented to person, place, and time. No sensory deficit.   Skin: Skin is warm and dry. No erythema.   Psychiatric: His behavior is normal.       Assessment:       1. Upper respiratory tract infection, unspecified type    2. Cough        Plan:     1. Upper respiratory tract infection, unspecified type   -Appears to be viral in origin.  -Vitals are stable in clinic.  -Will start on nightly Singulair 10mg po at night to help with symptoms.  -Encouraged to increase intake of water and get plenty of rest.      2. Cough   -Will give prescription for tessalon pearls to help with cough.

## 2018-03-13 ENCOUNTER — PES CALL (OUTPATIENT)
Dept: ADMINISTRATIVE | Facility: CLINIC | Age: 73
End: 2018-03-13

## 2018-03-20 ENCOUNTER — CLINICAL SUPPORT (OUTPATIENT)
Dept: ELECTROPHYSIOLOGY | Facility: CLINIC | Age: 73
End: 2018-03-20
Attending: INTERNAL MEDICINE
Payer: MEDICARE

## 2018-03-20 DIAGNOSIS — Z95.0 CARDIAC PACEMAKER IN SITU: ICD-10-CM

## 2018-03-20 PROCEDURE — 93293 PM PHONE R-STRIP DEVICE EVAL: CPT | Mod: S$GLB,,, | Performed by: INTERNAL MEDICINE

## 2018-05-02 DIAGNOSIS — I85.00 VARICES OF ESOPHAGUS DETERMINED BY ENDOSCOPY: Primary | ICD-10-CM

## 2018-05-09 ENCOUNTER — OFFICE VISIT (OUTPATIENT)
Dept: FAMILY MEDICINE | Facility: CLINIC | Age: 73
End: 2018-05-09
Attending: FAMILY MEDICINE
Payer: MEDICARE

## 2018-05-09 ENCOUNTER — LAB VISIT (OUTPATIENT)
Dept: LAB | Facility: HOSPITAL | Age: 73
End: 2018-05-09
Attending: FAMILY MEDICINE
Payer: MEDICARE

## 2018-05-09 VITALS
HEIGHT: 71 IN | SYSTOLIC BLOOD PRESSURE: 136 MMHG | HEART RATE: 71 BPM | OXYGEN SATURATION: 98 % | WEIGHT: 200.88 LBS | BODY MASS INDEX: 28.12 KG/M2 | DIASTOLIC BLOOD PRESSURE: 84 MMHG

## 2018-05-09 DIAGNOSIS — Z23 NEED FOR PNEUMOCOCCAL VACCINATION: ICD-10-CM

## 2018-05-09 DIAGNOSIS — Z00.00 ANNUAL PHYSICAL EXAM: Primary | ICD-10-CM

## 2018-05-09 DIAGNOSIS — E78.00 HYPERCHOLESTEROLEMIA: ICD-10-CM

## 2018-05-09 DIAGNOSIS — Z00.00 ANNUAL PHYSICAL EXAM: ICD-10-CM

## 2018-05-09 DIAGNOSIS — I10 HTN (HYPERTENSION), BENIGN: ICD-10-CM

## 2018-05-09 LAB
ALBUMIN SERPL BCP-MCNC: 3.7 G/DL
ALP SERPL-CCNC: 112 U/L
ALT SERPL W/O P-5'-P-CCNC: 29 U/L
ANION GAP SERPL CALC-SCNC: 8 MMOL/L
AST SERPL-CCNC: 51 U/L
BASOPHILS # BLD AUTO: 0.03 K/UL
BASOPHILS NFR BLD: 0.5 %
BILIRUB SERPL-MCNC: 1.2 MG/DL
BUN SERPL-MCNC: 13 MG/DL
CALCIUM SERPL-MCNC: 9.4 MG/DL
CHLORIDE SERPL-SCNC: 109 MMOL/L
CHOLEST SERPL-MCNC: 143 MG/DL
CHOLEST/HDLC SERPL: 3.9 {RATIO}
CO2 SERPL-SCNC: 24 MMOL/L
CREAT SERPL-MCNC: 1.5 MG/DL
DIFFERENTIAL METHOD: ABNORMAL
EOSINOPHIL # BLD AUTO: 0.1 K/UL
EOSINOPHIL NFR BLD: 0.8 %
ERYTHROCYTE [DISTWIDTH] IN BLOOD BY AUTOMATED COUNT: 17.9 %
EST. GFR  (AFRICAN AMERICAN): 53 ML/MIN/1.73 M^2
EST. GFR  (NON AFRICAN AMERICAN): 45.9 ML/MIN/1.73 M^2
GLUCOSE SERPL-MCNC: 146 MG/DL
HCT VFR BLD AUTO: 32.7 %
HDLC SERPL-MCNC: 37 MG/DL
HDLC SERPL: 25.9 %
HGB BLD-MCNC: 9.8 G/DL
IMM GRANULOCYTES # BLD AUTO: 0.02 K/UL
IMM GRANULOCYTES NFR BLD AUTO: 0.3 %
LDLC SERPL CALC-MCNC: 70.6 MG/DL
LYMPHOCYTES # BLD AUTO: 0.9 K/UL
LYMPHOCYTES NFR BLD: 14.6 %
MCH RBC QN AUTO: 23.6 PG
MCHC RBC AUTO-ENTMCNC: 30 G/DL
MCV RBC AUTO: 79 FL
MONOCYTES # BLD AUTO: 0.4 K/UL
MONOCYTES NFR BLD: 5.8 %
NEUTROPHILS # BLD AUTO: 4.9 K/UL
NEUTROPHILS NFR BLD: 78 %
NONHDLC SERPL-MCNC: 106 MG/DL
NRBC BLD-RTO: 0 /100 WBC
PLATELET # BLD AUTO: 73 K/UL
PMV BLD AUTO: 10.9 FL
POTASSIUM SERPL-SCNC: 4 MMOL/L
PROT SERPL-MCNC: 8.1 G/DL
RBC # BLD AUTO: 4.15 M/UL
SODIUM SERPL-SCNC: 141 MMOL/L
TRIGL SERPL-MCNC: 177 MG/DL
TSH SERPL DL<=0.005 MIU/L-ACNC: 1.57 UIU/ML
WBC # BLD AUTO: 6.22 K/UL

## 2018-05-09 PROCEDURE — 80053 COMPREHEN METABOLIC PANEL: CPT

## 2018-05-09 PROCEDURE — G0009 ADMIN PNEUMOCOCCAL VACCINE: HCPCS | Mod: S$GLB,,, | Performed by: FAMILY MEDICINE

## 2018-05-09 PROCEDURE — 36415 COLL VENOUS BLD VENIPUNCTURE: CPT | Mod: PO

## 2018-05-09 PROCEDURE — 3075F SYST BP GE 130 - 139MM HG: CPT | Mod: CPTII,S$GLB,, | Performed by: FAMILY MEDICINE

## 2018-05-09 PROCEDURE — 85025 COMPLETE CBC W/AUTO DIFF WBC: CPT

## 2018-05-09 PROCEDURE — 99999 PR PBB SHADOW E&M-EST. PATIENT-LVL III: CPT | Mod: PBBFAC,,, | Performed by: FAMILY MEDICINE

## 2018-05-09 PROCEDURE — 99397 PER PM REEVAL EST PAT 65+ YR: CPT | Mod: 25,S$GLB,, | Performed by: FAMILY MEDICINE

## 2018-05-09 PROCEDURE — 90732 PPSV23 VACC 2 YRS+ SUBQ/IM: CPT | Mod: S$GLB,,, | Performed by: FAMILY MEDICINE

## 2018-05-09 PROCEDURE — 3079F DIAST BP 80-89 MM HG: CPT | Mod: CPTII,S$GLB,, | Performed by: FAMILY MEDICINE

## 2018-05-09 PROCEDURE — 84443 ASSAY THYROID STIM HORMONE: CPT

## 2018-05-09 PROCEDURE — 99499 UNLISTED E&M SERVICE: CPT | Mod: S$GLB,,, | Performed by: FAMILY MEDICINE

## 2018-05-09 PROCEDURE — 80061 LIPID PANEL: CPT

## 2018-05-09 RX ORDER — FUROSEMIDE 40 MG/1
TABLET ORAL
Qty: 90 TABLET | Refills: 3 | Status: SHIPPED | OUTPATIENT
Start: 2018-05-09 | End: 2018-11-09

## 2018-05-09 RX ORDER — LOSARTAN POTASSIUM 100 MG/1
100 TABLET ORAL DAILY
Qty: 90 TABLET | Refills: 3 | Status: ON HOLD | OUTPATIENT
Start: 2018-05-09 | End: 2019-05-24 | Stop reason: HOSPADM

## 2018-05-09 RX ORDER — PRAVASTATIN SODIUM 40 MG/1
TABLET ORAL
Qty: 90 TABLET | Refills: 3 | Status: ON HOLD | OUTPATIENT
Start: 2018-05-09 | End: 2019-05-24 | Stop reason: HOSPADM

## 2018-05-09 NOTE — PROGRESS NOTES
Subjective:       Patient ID: Fox Lyons Sr. is a 72 y.o. male.    Chief Complaint: Annual Exam    HPI   Pt is here for annual exam pt is well no sob/cp no heart burn no weight changes  Review of Systems   Constitutional: Negative for activity change, chills, fatigue and fever.   HENT: Negative for congestion, ear pain, hearing loss, postnasal drip, rhinorrhea, sinus pressure and sore throat.    Eyes: Negative for photophobia, pain, redness and visual disturbance.   Respiratory: Negative for cough, chest tightness and shortness of breath.    Cardiovascular: Negative for chest pain, palpitations and leg swelling.   Gastrointestinal: Negative for abdominal pain, blood in stool, constipation, diarrhea, nausea and vomiting.   Genitourinary: Negative for decreased urine volume, difficulty urinating, discharge, dysuria, frequency and urgency.   Musculoskeletal: Negative for back pain, joint swelling and neck pain.   Skin: Negative for color change, pallor and rash.   Neurological: Negative for dizziness, seizures, speech difficulty and numbness.   Hematological: Does not bruise/bleed easily.   Psychiatric/Behavioral: Negative for behavioral problems, confusion, decreased concentration and suicidal ideas.       Objective:      Physical Exam   Constitutional: He is oriented to person, place, and time. He appears well-developed and well-nourished. No distress.   HENT:   Head: Normocephalic and atraumatic.   Nose: Nose normal.   Mouth/Throat: Oropharynx is clear and moist.   Eyes: EOM are normal. Pupils are equal, round, and reactive to light.   Neck: Normal range of motion. Neck supple. No thyromegaly present.   Cardiovascular: Normal rate, regular rhythm and intact distal pulses.  Exam reveals no gallop and no friction rub.    Pulmonary/Chest: Effort normal and breath sounds normal. No respiratory distress.   Abdominal: Soft. Bowel sounds are normal. He exhibits no distension. There is no tenderness. There is no rebound  "and no guarding.   Musculoskeletal: Normal range of motion. He exhibits no edema or tenderness.   Neurological: He is alert and oriented to person, place, and time. No cranial nerve deficit. Coordination normal.   Skin: Skin is warm and dry. No erythema.   Psychiatric: He has a normal mood and affect. His behavior is normal. Judgment and thought content normal.       Assessment:       1. Annual physical exam    2. Need for pneumococcal vaccination    3. HTN (hypertension), benign    4. Hypercholesterolemia        Plan:     orders cbc cmp lipid tsh urine  Cont meds  Low atlow salt diet  Graded exercise  rtc 6 months and prn    Health maintenance  Lipid ordered  Flu in fall  Tetanus q 10 years  Pneumonia discussed  Colonoscopy discussed       "This note will not be shared with the patient."   "

## 2018-05-16 ENCOUNTER — TELEPHONE (OUTPATIENT)
Dept: FAMILY MEDICINE | Facility: CLINIC | Age: 73
End: 2018-05-16

## 2018-05-16 NOTE — TELEPHONE ENCOUNTER
----- Message from Clover White MD sent at 5/9/2018  1:56 PM CDT -----  Please have pt increase water intake

## 2018-05-31 ENCOUNTER — ANESTHESIA (OUTPATIENT)
Dept: ENDOSCOPY | Facility: HOSPITAL | Age: 73
End: 2018-05-31
Payer: MEDICARE

## 2018-05-31 ENCOUNTER — SURGERY (OUTPATIENT)
Age: 73
End: 2018-05-31

## 2018-05-31 ENCOUNTER — HOSPITAL ENCOUNTER (OUTPATIENT)
Facility: HOSPITAL | Age: 73
Discharge: HOME | End: 2018-05-31
Attending: INTERNAL MEDICINE | Admitting: INTERNAL MEDICINE
Payer: MEDICARE

## 2018-05-31 ENCOUNTER — ANESTHESIA EVENT (OUTPATIENT)
Dept: ENDOSCOPY | Facility: HOSPITAL | Age: 73
End: 2018-05-31
Payer: MEDICARE

## 2018-05-31 VITALS
HEIGHT: 71 IN | HEART RATE: 69 BPM | OXYGEN SATURATION: 94 % | BODY MASS INDEX: 27.3 KG/M2 | WEIGHT: 195 LBS | TEMPERATURE: 98 F | RESPIRATION RATE: 18 BRPM | DIASTOLIC BLOOD PRESSURE: 76 MMHG | SYSTOLIC BLOOD PRESSURE: 130 MMHG

## 2018-05-31 DIAGNOSIS — I85.10 SECONDARY ESOPHAGEAL VARICES WITHOUT BLEEDING: Primary | ICD-10-CM

## 2018-05-31 DIAGNOSIS — I85.00 ESOPHAGEAL VARICES: ICD-10-CM

## 2018-05-31 PROCEDURE — 37000009 HC ANESTHESIA EA ADD 15 MINS: Performed by: INTERNAL MEDICINE

## 2018-05-31 PROCEDURE — 43239 EGD BIOPSY SINGLE/MULTIPLE: CPT | Mod: ,,, | Performed by: INTERNAL MEDICINE

## 2018-05-31 PROCEDURE — E9220 PRA ENDO ANESTHESIA: HCPCS | Mod: ,,, | Performed by: NURSE ANESTHETIST, CERTIFIED REGISTERED

## 2018-05-31 PROCEDURE — 43239 EGD BIOPSY SINGLE/MULTIPLE: CPT | Performed by: INTERNAL MEDICINE

## 2018-05-31 PROCEDURE — 25000003 PHARM REV CODE 250: Performed by: INTERNAL MEDICINE

## 2018-05-31 PROCEDURE — 37000008 HC ANESTHESIA 1ST 15 MINUTES: Performed by: INTERNAL MEDICINE

## 2018-05-31 PROCEDURE — 27201012 HC FORCEPS, HOT/COLD, DISP: Performed by: INTERNAL MEDICINE

## 2018-05-31 PROCEDURE — 63600175 PHARM REV CODE 636 W HCPCS: Performed by: NURSE ANESTHETIST, CERTIFIED REGISTERED

## 2018-05-31 PROCEDURE — 88305 TISSUE EXAM BY PATHOLOGIST: CPT | Mod: 26,,, | Performed by: PATHOLOGY

## 2018-05-31 PROCEDURE — 88305 TISSUE EXAM BY PATHOLOGIST: CPT | Mod: 59 | Performed by: PATHOLOGY

## 2018-05-31 RX ORDER — SODIUM CHLORIDE 9 MG/ML
INJECTION, SOLUTION INTRAVENOUS CONTINUOUS
Status: DISCONTINUED | OUTPATIENT
Start: 2018-05-31 | End: 2018-05-31 | Stop reason: HOSPADM

## 2018-05-31 RX ORDER — PROPOFOL 10 MG/ML
VIAL (ML) INTRAVENOUS
Status: DISCONTINUED | OUTPATIENT
Start: 2018-05-31 | End: 2018-05-31

## 2018-05-31 RX ORDER — LIDOCAINE HYDROCHLORIDE 10 MG/ML
1 INJECTION, SOLUTION EPIDURAL; INFILTRATION; INTRACAUDAL; PERINEURAL ONCE
Status: CANCELLED | OUTPATIENT
Start: 2018-05-31 | End: 2018-05-31

## 2018-05-31 RX ADMIN — PROPOFOL 50 MG: 10 INJECTION, EMULSION INTRAVENOUS at 12:05

## 2018-05-31 RX ADMIN — SODIUM CHLORIDE: 9 INJECTION, SOLUTION INTRAVENOUS at 12:05

## 2018-05-31 RX ADMIN — PROPOFOL 30 MG: 10 INJECTION, EMULSION INTRAVENOUS at 01:05

## 2018-05-31 RX ADMIN — PROPOFOL 50 MG: 10 INJECTION, EMULSION INTRAVENOUS at 01:05

## 2018-05-31 NOTE — TRANSFER OF CARE
"Anesthesia Transfer of Care Note    Patient: Fox Lyons Sr.    Procedure(s) Performed: Procedure(s) (LRB):  ESOPHAGOGASTRODUODENOSCOPY (EGD) (Left)    Patient location: PACU    Anesthesia Type: general    Transport from OR: Transported from OR on 2-3 L/min O2 by NC with adequate spontaneous ventilation    Post pain: adequate analgesia    Post assessment: no apparent anesthetic complications    Post vital signs: stable    Level of consciousness: awake and alert    Nausea/Vomiting: no nausea/vomiting    Complications: none    Transfer of care protocol was followed      Last vitals:   Visit Vitals  BP (!) 166/86   Pulse 69   Temp 36.5 °C (97.7 °F)   Resp 16   Ht 5' 11" (1.803 m)   Wt 88.5 kg (195 lb)   SpO2 95%   BMI 27.20 kg/m²     "

## 2018-05-31 NOTE — H&P
Short Stay Endoscopy History and Physical    PCP - Clover White MD     Procedure - EGD  ASA - per anesthesia  Mallampati - per anesthesia  History of Anesthesia problems - no  Family history Anesthesia problems -  no   Plan of anesthesia - General    HPI:  This is a 72 y.o. male here for evaluation of : cirrhosis-r/o esophageal varices.      Reflux - no  Dysphagia - no  Abdominal pain - no  Diarrhea - no    ROS:  Constitutional: No fevers, chills, No weight loss  CV: No chest pain  Pulm: No cough, No shortness of breath  Ophtho: No vision changes  GI: see HPI  Derm: No rash    Medical History:  has a past medical history of Anticoagulant long-term use; Cataract; Coronary artery disease; Hyperlipidemia; Hypertension; and Pacemaker.    Surgical History:  has a past surgical history that includes s/p PPV/AFX/EL (od) (05/09/2012); Cardiac surgery; Cardiac pacemaker placement; Cataract extraction (9/24/13); and Eye surgery.    Family History: family history includes Cancer in his brother, father, sister, and sister; Diabetes in his brother and sister; Heart disease in his mother and sister; Lung cancer in his brother; No Known Problems in his brother and sister.. Otherwise no colon cancer, inflammatory bowel disease, or GI malignancies.    Social History:  reports that he quit smoking about 22 years ago. His smoking use included Cigarettes. He has a 35.00 pack-year smoking history. He has never used smokeless tobacco. He reports that he does not drink alcohol or use drugs.    Review of patient's allergies indicates:   Allergen Reactions    Codeine Itching, Rash, Edema and Other (See Comments)     Other reaction(s): Itching       Medications:   Prescriptions Prior to Admission   Medication Sig Dispense Refill Last Dose    aspirin (ECOTRIN) 81 MG EC tablet Take 81 mg by mouth every other day.     Taking    BREO ELLIPTA 100-25 mcg/dose diskus inhaler INHALE ONE PUFF INTO THE LUNGS ONCE DAILY 90 each 4 Taking     furosemide (LASIX) 40 MG tablet TAKE 1 TABLET(40 MG) BY MOUTH EVERY DAY 90 tablet 3     hydrocodone-acetaminophen 5-325mg (NORCO) 5-325 mg per tablet Take 1 tablet by mouth every 4 (four) hours as needed for Pain. 21 tablet 0 Taking    hydrocortisone (CORTEF) 10 MG Tab TAKE ONE AND ONE-HALF TABLETS BY MOUTH EVERY MORNING AND ONE-HALF TABLET EVERY EVENING. 180 tablet 3 Taking    losartan (COZAAR) 100 MG tablet Take 1 tablet (100 mg total) by mouth once daily. 90 tablet 3     multivitamin-minerals-lutein (CENTRUM SILVER) Tab Take 1 tablet by mouth once daily.     Taking    niacin 500 MG Tab Take 1 tablet (500 mg total) by mouth 3 (three) times daily with meals. (Patient taking differently: Take 500 mg by mouth 2 (two) times daily with meals. ) 90 tablet 6 Taking    NITROSTAT 0.4 mg SL tablet DISSOLVE 1 TABLET UNDER THE TONGUE EVERY 5 MINUTES AS NEEDED 25 tablet 0 Taking    omeprazole (PRILOSEC) 40 MG capsule TAKE 1 CAPSULE(40 MG) BY MOUTH TWICE DAILY BEFORE MEALS 180 capsule 0 Taking    pravastatin (PRAVACHOL) 40 MG tablet TAKE 1 TABLET(40 MG) BY MOUTH EVERY EVENING 90 tablet 3        Physical Exam:    Vital Signs: There were no vitals filed for this visit.    General Appearance: Well appearing in no acute distress  Eyes:    No scleral icterus  ENT: Neck supple, Lips, mucosa, and tongue normal; teeth and gums normal  Lungs: CTA anteriorly  Heart:  Regular rate, S1, S2 normal, no murmurs heard.  Abdomen: Soft, non tender, non distended with normal bowel sounds. No hepatosplenomegaly, ascites, or mass.  Extremities: No edema  Skin: No rash    Labs:  Lab Results   Component Value Date    WBC 6.03 05/31/2018    HGB 9.1 (L) 05/31/2018    HCT 30.7 (L) 05/31/2018    PLT 63 (L) 05/31/2018    CHOL 143 05/09/2018    TRIG 177 (H) 05/09/2018    HDL 37 (L) 05/09/2018    ALT 29 05/09/2018    AST 51 (H) 05/09/2018     05/09/2018    K 4.0 05/09/2018     05/09/2018    CREATININE 1.5 (H) 05/09/2018    BUN 13  05/09/2018    CO2 24 05/09/2018    TSH 1.567 05/09/2018    PSA <0.01 10/26/2016    INR 1.1 11/30/2017    HGBA1C 6.2 10/26/2016       I have explained the risks and benefits of endoscopy procedures to the patient including but not limited to bleeding, perforation, infection, and death.      Trice Funes MD

## 2018-05-31 NOTE — ANESTHESIA POSTPROCEDURE EVALUATION
"Anesthesia Post Evaluation    Patient: Fox Lyons Sr.    Procedure(s) Performed: Procedure(s) (LRB):  ESOPHAGOGASTRODUODENOSCOPY (EGD) (Left)    Final Anesthesia Type: general  Patient location during evaluation: PACU  Patient participation: Yes- Able to Participate  Level of consciousness: awake and alert  Post-procedure vital signs: reviewed and stable  Pain management: adequate  Airway patency: patent  PONV status at discharge: No PONV  Anesthetic complications: no      Cardiovascular status: hemodynamically stable and blood pressure returned to baseline  Respiratory status: unassisted and spontaneous ventilation  Hydration status: euvolemic  Follow-up not needed.        Visit Vitals  /77 (BP Location: Left arm, Patient Position: Lying)   Pulse 69   Temp 36.6 °C (97.9 °F) (Temporal)   Resp 17   Ht 5' 11" (1.803 m)   Wt 88.5 kg (195 lb)   SpO2 95%   BMI 27.20 kg/m²       Pain/Rosetta Score: Pain Assessment Performed: Yes (5/31/2018  1:23 PM)  Presence of Pain: denies (5/31/2018  1:23 PM)  Rosetta Score: 10 (5/31/2018  1:23 PM)      "

## 2018-05-31 NOTE — ANESTHESIA PREPROCEDURE EVALUATION
05/31/2018  Fox Lyons Sr. is a 72 y.o., male.    Anesthesia Evaluation         Review of Systems  Anesthesia Hx:  No problems with previous Anesthesia   Social:  Former Smoker    Hematology/Oncology:        Hematology Comments: Decreased plt, last count 63,000   Cardiovascular:   Pacemaker Hypertension, poorly controlled CAD asymptomatic Dysrhythmias  hyperlipidemia ECG has been reviewed. Sick Sinus Syndrome  Echo from 2015 good EF  On Anticoagulation   Pulmonary:   COPD, mild Shortness of breath Occ SOB   Renal/:   Chronic Renal Disease, CRI    Hepatic/GI:   GERD Liver Disease, Hepatitis, C        Physical Exam  General:  Well nourished    Airway/Jaw/Neck:  Airway Findings: Mouth Opening: Normal Tongue: Normal  General Airway Assessment: Adult  Mallampati: III  TM Distance: Normal, at least 6 cm  Jaw/Neck Findings:     Neck ROM: Normal ROM      Dental:  Dental Findings: Upper Dentures, Lower Dentures   Chest/Lungs:  Chest/Lungs Findings: Clear to auscultation, Normal Respiratory Rate     Heart/Vascular:  Heart Findings: Rate: Normal  Rhythm: Regular Rhythm  Sounds: Normal        Mental Status:  Mental Status Findings:  Cooperative, Alert and Oriented         Anesthesia Plan  Type of Anesthesia, risks & benefits discussed:  Anesthesia Type:  general  Patient's Preference:   Intra-op Monitoring Plan: standard ASA monitors  Intra-op Monitoring Plan Comments:   Post Op Pain Control Plan:   Post Op Pain Control Plan Comments:   Induction:   IV  Beta Blocker:  Patient is not currently on a Beta-Blocker (No further documentation required).       Informed Consent: Patient understands risks and agrees with Anesthesia plan.  Questions answered. Anesthesia consent signed with patient.  ASA Score: 3     Day of Surgery Review of History & Physical:            Ready For Surgery From Anesthesia Perspective.

## 2018-05-31 NOTE — PROVATION PATIENT INSTRUCTIONS
Discharge Summary/Instructions after an Endoscopic Procedure  Patient Name: Fox Lyons  Patient MRN: [id number]  Patient YOB: 1945  Thursday, May 31, 2018  Trice Funes MD  RESTRICTIONS:  During your procedure today, you received medications for sedation.  These   medications may affect your judgment, balance and coordination.  Therefore,   for 24 hours, you have the following restrictions:   - DO NOT drive a car, operate machinery, make legal/financial decisions,   sign important papers or drink alcohol.    ACTIVITY:  The following day: return to full activity including work, except no heavy   lifting, straining or running for 3 days if polyps were removed.  DIET:  Eat and drink normally unless instructed otherwise.     TREATMENT FOR COMMON SIDE EFFECTS:  - Mild abdominal pain, nausea, belching, bloating or excessive gas:  rest,   eat lightly and use a heating pad.  - Sore Throat: treat with throat lozenges and/or gargle with warm salt   water.  - Because air was used during the procedure, expelling large amounts of air   from your rectum or belching is normal.  - If a bowel prep was taken, you may not have a bowel movement for 1-3 days.    This is normal.  SYMPTOMS TO WATCH FOR AND REPORT TO YOUR PHYSICIAN:  1. Abdominal pain or bloating, other than gas cramps.  2. Chest pain.  3. Back pain.  4. Signs of infection such as: chills or fever occurring within 24 hours   after the procedure.  5. Rectal bleeding, which would show as bright red, maroon, or black stools.   (A tablespoon of blood from the rectum is not serious, especially if   hemorrhoids are present.)  6. Vomiting.  7. Weakness or dizziness.  GO DIRECTLY TO THE NEAREST EMERGENCY ROOM IF YOU HAVE ANY OF THE FOLLOWING:      Difficulty breathing              Chills and/or fever over 101 F   Persistent vomiting and/or vomiting blood   Severe abdominal pain   Severe chest pain   Black, tarry stools   Bleeding- more than one tablespoon   Any  other symptom or condition that you feel may need urgent attention  Your doctor recommends these additional instructions:  If any biopsies were taken, your doctors clinic will contact you in 1 to 2   weeks with any results.  - Discharge patient to home (with escort).   - Resume previous diet.   - Continue present medications.   - Await pathology results.   - Repeat upper endoscopy in 6 months for surveillance.  For questions, problems or results please call your physician - Trice Funes MD at Work:  [phone].  [facility] [place], EMERGENCY ROOM PHONE NUMBER: [phone]  IF A COMPLICATION OR EMERGENCY SITUATION ARISES AND YOU ARE UNABLE TO REACH   YOUR PHYSICIAN - GO DIRECTLY TO THE EMERGENCY ROOM.  Trice Funes MD  5/31/2018 1:11:41 PM  This report has been verified and signed electronically.  [employer]

## 2018-06-07 ENCOUNTER — TELEPHONE (OUTPATIENT)
Dept: ENDOSCOPY | Facility: HOSPITAL | Age: 73
End: 2018-06-07

## 2018-06-07 ENCOUNTER — TELEPHONE (OUTPATIENT)
Dept: HEPATOLOGY | Facility: CLINIC | Age: 73
End: 2018-06-07

## 2018-06-07 NOTE — TELEPHONE ENCOUNTER
----- Message from Trice Funes MD sent at 6/7/2018 12:40 PM CDT -----  No h pylori- please let patient know.

## 2018-06-09 DIAGNOSIS — K21.9 GASTROESOPHAGEAL REFLUX DISEASE WITHOUT ESOPHAGITIS: ICD-10-CM

## 2018-06-11 RX ORDER — OMEPRAZOLE 40 MG/1
CAPSULE, DELAYED RELEASE ORAL
Qty: 180 CAPSULE | Refills: 0 | Status: SHIPPED | OUTPATIENT
Start: 2018-06-11 | End: 2018-09-06 | Stop reason: SDUPTHER

## 2018-06-20 ENCOUNTER — PES CALL (OUTPATIENT)
Dept: ADMINISTRATIVE | Facility: CLINIC | Age: 73
End: 2018-06-20

## 2018-06-26 ENCOUNTER — TELEPHONE (OUTPATIENT)
Dept: ELECTROPHYSIOLOGY | Facility: CLINIC | Age: 73
End: 2018-06-26

## 2018-07-02 ENCOUNTER — TELEPHONE (OUTPATIENT)
Dept: FAMILY MEDICINE | Facility: CLINIC | Age: 73
End: 2018-07-02

## 2018-07-02 DIAGNOSIS — R04.0 EPISTAXIS: Primary | ICD-10-CM

## 2018-07-02 NOTE — TELEPHONE ENCOUNTER
----- Message from lAverto Wick sent at 7/2/2018  9:28 AM CDT -----  Contact: Alexander Lyons            Name of Who is Calling: Alexander Loyns      What is the request in detail: Patient called to be referred to ENT      Can the clinic reply by [facility]: No      What Number to Call Back if not in [facility]: [phone]

## 2018-07-16 ENCOUNTER — OFFICE VISIT (OUTPATIENT)
Dept: OTOLARYNGOLOGY | Facility: CLINIC | Age: 73
End: 2018-07-16
Payer: MEDICARE

## 2018-07-16 VITALS
WEIGHT: 198.19 LBS | TEMPERATURE: 98 F | BODY MASS INDEX: 27.64 KG/M2 | DIASTOLIC BLOOD PRESSURE: 83 MMHG | HEART RATE: 78 BPM | SYSTOLIC BLOOD PRESSURE: 153 MMHG

## 2018-07-16 DIAGNOSIS — R04.0 EPISTAXIS: Primary | ICD-10-CM

## 2018-07-16 PROCEDURE — 99499 UNLISTED E&M SERVICE: CPT | Mod: S$GLB,,, | Performed by: OTOLARYNGOLOGY

## 2018-07-16 PROCEDURE — 99999 PR PBB SHADOW E&M-EST. PATIENT-LVL III: CPT | Mod: PBBFAC,,, | Performed by: OTOLARYNGOLOGY

## 2018-07-16 PROCEDURE — 99203 OFFICE O/P NEW LOW 30 MIN: CPT | Mod: S$GLB,,, | Performed by: OTOLARYNGOLOGY

## 2018-07-16 PROCEDURE — 3077F SYST BP >= 140 MM HG: CPT | Mod: CPTII,S$GLB,, | Performed by: OTOLARYNGOLOGY

## 2018-07-16 PROCEDURE — 3079F DIAST BP 80-89 MM HG: CPT | Mod: CPTII,S$GLB,, | Performed by: OTOLARYNGOLOGY

## 2018-07-16 NOTE — LETTER
July 16, 2018      Clover White MD  [address]  [address]  [facility] [address]           [facility]  [address]  [facility] [address]  Phone: [phone]  Fax: [phone]          Patient: Fox Lyons Sr.   MR Number: [id number]   YOB: 1945   Date of Visit: 7/16/2018       Dear Dr. Clover White:    Thank you for referring Fox Lyons to me for evaluation. Attached you will find relevant portions of my assessment and plan of care.    If you have questions, please do not hesitate to call me. I look forward to following Fox Lyons along with you.    Sincerely,    Hamilton Galeano MD    Enclosure  CC:  No Recipients    If you would like to receive this communication electronically, please contact [email or web address] or [phone] to request more information on [employer] Link access.    For providers and/or their staff who would like to refer a patient to [facility], please contact us through our one-stop-shop provider referral line, [facility], at [phone].    If you feel you have received this communication in error or would no longer like to receive these types of communications, please e-mail [email or web address]

## 2018-07-16 NOTE — ASSESSMENT & PLAN NOTE
Epistaxis in the setting of thrombocytopenia.  There is diffuse irritation of the anterior nasal mucosa.  I suspect that the deviated portion of his septum is becoming dessicated and bleeding. I see no role for cautery today.  I recommended that he moisturize his nose with saline spray and Aquaphor and that he place a humidifier at his bedside.  RTC PRN.

## 2018-07-16 NOTE — PROGRESS NOTES
Chief Complaint   Patient presents with    consult/ nose bleeds       HPI   72 y.o. male presents for evaluation of epistaxis.  He reports that this problem has been present for ~2 years.  He has hep C with HCC and is thrombocytopenic.  He is not on anticoagulants.  He reports dried blood on the R that he notes each AM.  He has noted letty bleeding from the L side.     Review of Systems   Constitutional: Negative for fatigue and unexpected weight change.   HENT: Per HPI.  Eyes: Negative for visual disturbance.   Respiratory: Negative for shortness of breath, hemoptysis   Cardiovascular: Negative for chest pain and palpitations.   Musculoskeletal: Negative for decreased ROM, back pain.   Skin: Negative for rash, sunburn, itching.   Neurological: Negative for dizziness and seizures.   Hematological: Negative for adenopathy. Does not bruise/bleed easily.   Endocrine: Negative for rapid weight loss/weight gain, heat/cold intolerance.     Past Medical History   Patient Active Problem List   Diagnosis    Hypertension    Hyperlipidemia    Coronary artery disease    S/P coronary artery stent placement    Sick sinus syndrome    Cardiac pacemaker in situ    Hypertensive retinopathy - Both Eyes    Myopia - Both Eyes    Post-operative state - Right Eye    Senile nuclear sclerosis - Left Eye    Portal hypertension    Secondary male hypogonadism    GERD (gastroesophageal reflux disease)    Secondary adrenal insufficiency    Aortic atherosclerosis    Immunosuppressed status    Chronic kidney disease, stage III (moderate)    Thrombocytopenia    Chronic obstructive pulmonary disease (COPD)    Superior mesenteric vein thrombosis    History of hepatitis C    Hepatitis B antibody positive    Cirrhosis    HCC (hepatocellular carcinoma)    Non-functioning pituitary adenoma    Growth hormone deficiency    Esophageal varices           Past Surgical History   Past Surgical History:   Procedure Laterality Date     CARDIAC PACEMAKER PLACEMENT      st rochelle    CARDIAC SURGERY      stents x2    CATARACT EXTRACTION  9/24/13    RT    ESOPHAGOGASTRODUODENOSCOPY Left 5/31/2018    Procedure: ESOPHAGOGASTRODUODENOSCOPY (EGD);  Surgeon: Trice Funes MD;  [facility] ([address] [place] [address]);  Service: Endoscopy;  Laterality: Left;    EYE SURGERY      s/p PPV/AFX/EL (od)  05/09/2012         Family History   Family History   Problem Relation Age of Onset    Diabetes Brother     Lung cancer Brother     Heart disease Mother         CHF    Cancer Father         Lung    Heart disease Sister     Cancer Sister         Pancreatic    Cancer Sister         Lung    No Known Problems Sister     Diabetes Sister     Cancer Brother         Lung    No Known Problems Brother     Amblyopia Neg Hx     Blindness Neg Hx     Cataracts Neg Hx     Glaucoma Neg Hx     Hypertension Neg Hx     Macular degeneration Neg Hx     Retinal detachment Neg Hx     Strabismus Neg Hx     Stroke Neg Hx     Thyroid disease Neg Hx            Social History   .  Social History     Social History    Marital status:      Spouse name: N/A    Number of children: N/A    Years of education: N/A     Occupational History    Not on file.     Social History Main Topics    Smoking status: Former Smoker     Packs/day: 1.00     Years: 35.00     Types: Cigarettes     Quit date: 6/28/1995    Smokeless tobacco: Never Used    Alcohol use No    Drug use: No    Sexual activity: Not Currently     Other Topics Concern    Not on file     Social History Narrative    No narrative on file         Allergies   Review of patient's allergies indicates:   Allergen Reactions    Codeine Itching, Rash, Edema and Other (See Comments)     Other reaction(s): Itching           Physical Exam     Vitals:    07/16/18 0844   BP: (!) 153/83   Pulse: 78   Temp: 97.5 °F (36.4 °C)         Body mass index is 27.64 kg/m².      General: AOx3, NAD   Respiratory:  Symmetric chest rise,  normal effort  Nose: R nasal septal deviation.  Dried blood L anterior septum.  L anterior septum baffled with dried blood in concave portion.  Inferior Turbinates WNL bilaterally. No septal perforation. No masses/lesions.   Oral Cavity:  Oral Tongue mobile, no lesions noted. Hard Palate WNL. No buccal or FOM lesions.  Oropharynx:  No masses/lesions of the posterior pharyngeal wall. Tonsillar fossa without lesions. Soft palate without masses. Midline uvula.   Neck: No scars.  No cervical lymphadenopathy, thyromegaly or thyroid nodules.  Normal range of motion.    Face: House Brackmann I bilaterally.     Assessment/Plan  Problem List Items Addressed This Visit        ENT    Epistaxis - Primary     Epistaxis in the setting of thrombocytopenia.  There is diffuse irritation of the anterior nasal mucosa.  I suspect that the deviated portion of his septum is becoming dessicated and bleeding. I see no role for cautery today.  I recommended that he moisturize his nose with saline spray and Aquaphor and that he place a humidifier at his bedside.  RTC PRN.

## 2018-07-23 ENCOUNTER — CLINICAL SUPPORT (OUTPATIENT)
Dept: ELECTROPHYSIOLOGY | Facility: CLINIC | Age: 73
End: 2018-07-23
Attending: INTERNAL MEDICINE
Payer: MEDICARE

## 2018-07-23 DIAGNOSIS — Z95.0 CARDIAC PACEMAKER IN SITU: ICD-10-CM

## 2018-07-23 PROCEDURE — 93279 PRGRMG DEV EVAL PM/LDLS PM: CPT | Mod: S$GLB,,, | Performed by: INTERNAL MEDICINE

## 2018-08-22 DIAGNOSIS — E27.49 SECONDARY ADRENAL INSUFFICIENCY: ICD-10-CM

## 2018-08-22 RX ORDER — HYDROCORTISONE 10 MG/1
TABLET ORAL
Qty: 180 TABLET | Refills: 0 | Status: SHIPPED | OUTPATIENT
Start: 2018-08-22 | End: 2018-11-20 | Stop reason: SDUPTHER

## 2018-08-30 ENCOUNTER — PES CALL (OUTPATIENT)
Dept: ADMINISTRATIVE | Facility: CLINIC | Age: 73
End: 2018-08-30

## 2018-09-06 DIAGNOSIS — K21.9 GASTROESOPHAGEAL REFLUX DISEASE WITHOUT ESOPHAGITIS: ICD-10-CM

## 2018-09-06 RX ORDER — OMEPRAZOLE 40 MG/1
CAPSULE, DELAYED RELEASE ORAL
Qty: 180 CAPSULE | Refills: 0 | Status: SHIPPED | OUTPATIENT
Start: 2018-09-06 | End: 2018-12-06 | Stop reason: SDUPTHER

## 2018-10-08 ENCOUNTER — PES CALL (OUTPATIENT)
Dept: ADMINISTRATIVE | Facility: CLINIC | Age: 73
End: 2018-10-08

## 2018-10-11 ENCOUNTER — IMMUNIZATION (OUTPATIENT)
Dept: FAMILY MEDICINE | Facility: CLINIC | Age: 73
End: 2018-10-11
Payer: MEDICARE

## 2018-10-11 ENCOUNTER — TELEPHONE (OUTPATIENT)
Dept: FAMILY MEDICINE | Facility: CLINIC | Age: 73
End: 2018-10-11

## 2018-10-11 ENCOUNTER — LAB VISIT (OUTPATIENT)
Dept: LAB | Facility: HOSPITAL | Age: 73
End: 2018-10-11
Attending: FAMILY MEDICINE
Payer: MEDICARE

## 2018-10-11 ENCOUNTER — OFFICE VISIT (OUTPATIENT)
Dept: FAMILY MEDICINE | Facility: CLINIC | Age: 73
End: 2018-10-11
Attending: FAMILY MEDICINE
Payer: MEDICARE

## 2018-10-11 VITALS
DIASTOLIC BLOOD PRESSURE: 82 MMHG | HEIGHT: 71 IN | WEIGHT: 197.19 LBS | OXYGEN SATURATION: 97 % | HEART RATE: 68 BPM | BODY MASS INDEX: 27.6 KG/M2 | SYSTOLIC BLOOD PRESSURE: 150 MMHG

## 2018-10-11 DIAGNOSIS — I10 HTN (HYPERTENSION), BENIGN: ICD-10-CM

## 2018-10-11 DIAGNOSIS — I10 HTN (HYPERTENSION), BENIGN: Primary | ICD-10-CM

## 2018-10-11 DIAGNOSIS — R09.82 POST-NASAL DRIP: ICD-10-CM

## 2018-10-11 LAB
ALBUMIN SERPL BCP-MCNC: 3.3 G/DL
ALP SERPL-CCNC: 123 U/L
ALT SERPL W/O P-5'-P-CCNC: 29 U/L
ANION GAP SERPL CALC-SCNC: 8 MMOL/L
AST SERPL-CCNC: 51 U/L
BILIRUB SERPL-MCNC: 1.3 MG/DL
BUN SERPL-MCNC: 9 MG/DL
CALCIUM SERPL-MCNC: 8.9 MG/DL
CHLORIDE SERPL-SCNC: 109 MMOL/L
CO2 SERPL-SCNC: 22 MMOL/L
CREAT SERPL-MCNC: 1.4 MG/DL
EST. GFR  (AFRICAN AMERICAN): 57.2 ML/MIN/1.73 M^2
EST. GFR  (NON AFRICAN AMERICAN): 49.5 ML/MIN/1.73 M^2
GLUCOSE SERPL-MCNC: 158 MG/DL
POTASSIUM SERPL-SCNC: 3.8 MMOL/L
PROT SERPL-MCNC: 7.7 G/DL
SODIUM SERPL-SCNC: 139 MMOL/L

## 2018-10-11 PROCEDURE — 36415 COLL VENOUS BLD VENIPUNCTURE: CPT | Mod: PO

## 2018-10-11 PROCEDURE — 90662 IIV NO PRSV INCREASED AG IM: CPT | Mod: PBBFAC,PO

## 2018-10-11 PROCEDURE — 99213 OFFICE O/P EST LOW 20 MIN: CPT | Mod: PBBFAC,PO | Performed by: FAMILY MEDICINE

## 2018-10-11 PROCEDURE — 1101F PT FALLS ASSESS-DOCD LE1/YR: CPT | Mod: CPTII,,, | Performed by: FAMILY MEDICINE

## 2018-10-11 PROCEDURE — 99999 PR PBB SHADOW E&M-EST. PATIENT-LVL III: CPT | Mod: PBBFAC,,, | Performed by: FAMILY MEDICINE

## 2018-10-11 PROCEDURE — 3077F SYST BP >= 140 MM HG: CPT | Mod: CPTII,,, | Performed by: FAMILY MEDICINE

## 2018-10-11 PROCEDURE — 3079F DIAST BP 80-89 MM HG: CPT | Mod: CPTII,,, | Performed by: FAMILY MEDICINE

## 2018-10-11 PROCEDURE — 99214 OFFICE O/P EST MOD 30 MIN: CPT | Mod: S$PBB,,, | Performed by: FAMILY MEDICINE

## 2018-10-11 PROCEDURE — 80053 COMPREHEN METABOLIC PANEL: CPT

## 2018-10-11 RX ORDER — HYDROCHLOROTHIAZIDE 25 MG/1
25 TABLET ORAL DAILY
Qty: 30 TABLET | Refills: 11 | Status: SHIPPED | OUTPATIENT
Start: 2018-10-11 | End: 2018-11-09

## 2018-10-11 RX ORDER — LEVOCETIRIZINE DIHYDROCHLORIDE 5 MG/1
5 TABLET, FILM COATED ORAL NIGHTLY
Qty: 30 TABLET | Refills: 0 | Status: SHIPPED | OUTPATIENT
Start: 2018-10-11 | End: 2018-10-11 | Stop reason: SDUPTHER

## 2018-10-11 RX ORDER — LEVOCETIRIZINE DIHYDROCHLORIDE 5 MG/1
TABLET, FILM COATED ORAL
Qty: 90 TABLET | Refills: 0 | Status: SHIPPED | OUTPATIENT
Start: 2018-10-11 | End: 2018-11-09 | Stop reason: SDUPTHER

## 2018-10-11 NOTE — MEDICAL/APP STUDENT
Subjective:       Patient ID: Fox Lyons Sr. is a 73 y.o. male.    Chief Complaint: Cough    Pt presents to clinic complaining of cough for 3 days. Complains of expectorating a white to green color mucus. Complains of post nasal drip. Denies fever, chills, throat pain. Pt complains of blood pressure is high at this visit and reports he takes his blood pressure once a month and is usally on the 110s systolic. Denies any other complaints.      Review of Systems   Constitutional: Negative.    HENT: Positive for postnasal drip.    Eyes: Negative.    Respiratory: Positive for cough.    Cardiovascular: Negative.    Gastrointestinal: Negative.    Endocrine: Negative.    Genitourinary: Negative.    Musculoskeletal: Negative.    Skin: Negative.    Allergic/Immunologic: Negative.    Neurological: Negative.    Hematological: Negative.    Psychiatric/Behavioral: Negative.        Objective:      Physical Exam   Constitutional: He is oriented to person, place, and time. He appears well-developed and well-nourished.   HENT:   Head: Normocephalic and atraumatic.   Right Ear: External ear normal.   Left Ear: External ear normal.   Nose: Nose normal.   Mouth/Throat: Oropharynx is clear and moist.   Eyes: Conjunctivae and EOM are normal. Pupils are equal, round, and reactive to light.   Neck: Normal range of motion.   Cardiovascular: Normal rate, regular rhythm, normal heart sounds and intact distal pulses.   Pulmonary/Chest: Effort normal and breath sounds normal.   Abdominal: Soft. Bowel sounds are normal.   Musculoskeletal: Normal range of motion.   Neurological: He is alert and oriented to person, place, and time.   Skin: Skin is warm and dry.   Psychiatric: He has a normal mood and affect. His behavior is normal.       Assessment:       1. HTN (hypertension), benign     2.    Post Nasal Drip  Plan:   Fox was seen today for hypertension.    Diagnoses and all orders for this visit:    HTN (hypertension), benign  -      Comprehensive metabolic panel; Future    Post-nasal drip    Other orders  -     hydroCHLOROthiazide (HYDRODIURIL) 25 MG tablet; Take 1 tablet (25 mg total) by mouth once daily.  -     levocetirizine (XYZAL) 5 MG tablet; Take 1 tablet (5 mg total) by mouth every evening.    Pt to RTC in 1 month for blood pressure check

## 2018-10-11 NOTE — PROGRESS NOTES
"Subjective:       Patient ID: Fox Lyons Sr. is a 73 y.o. male.    Chief Complaint: Hypertension    HPI   Pt is here for follow up of htn stable on arb no sob/cp bp elevated today pt is not on a low salt diet no regulr exercise  Pt is c/o post nasal drip with cough worse at night no fever/chills no facial pressure  Takes nothing for this several weeks  Review of Systems   Constitutional: Negative for chills, fatigue and fever.   HENT: Positive for postnasal drip. Negative for congestion, ear pain, sinus pressure, sinus pain and sore throat.    Respiratory: Positive for cough. Negative for chest tightness and shortness of breath.    Cardiovascular: Negative for chest pain and palpitations.   Gastrointestinal: Negative for abdominal distention and abdominal pain.       Objective:      Physical Exam   Constitutional: He appears well-developed and well-nourished. No distress.   Cardiovascular: Normal rate and regular rhythm. Exam reveals no gallop.   Pulmonary/Chest: Effort normal and breath sounds normal. No respiratory distress. He has no wheezes.   Abdominal: Soft. Bowel sounds are normal. He exhibits no distension. There is no tenderness.     labs discussed with pt  Assessment:       1. HTN (hypertension), benign    2. Post-nasal drip        Plan:     orders cmp   Start hctz  Start xyzal  rtc 1 month bp check       "This note will not be shared with the patient."   "

## 2018-10-11 NOTE — PATIENT INSTRUCTIONS
Your test results will be communicated to you via : [facility], Telephone or Letter.   If you have not received your test results in one week, please contact the clinic at [phone].

## 2018-10-11 NOTE — TELEPHONE ENCOUNTER
----- Message from Tanya Dey sent at 10/11/2018  7:05 AM CDT -----  Contact: Patient Himself      Name of Who is Calling: Patient himself      What is the request in detail: Patient called requesting to come in today to have his annual flu shot.  Please give patient a call back at your earliest convenience.  THANKS!       Can the clinic reply by [facility]: No      What Number to Call Back:  Patient / ph# [phone]

## 2018-10-23 ENCOUNTER — TELEPHONE (OUTPATIENT)
Dept: FAMILY MEDICINE | Facility: CLINIC | Age: 73
End: 2018-10-23

## 2018-10-23 NOTE — TELEPHONE ENCOUNTER
----- Message from Clover White MD sent at 10/14/2018  7:58 AM CDT -----  Labs are generally fine low on albumin encourage pt to eat more egg whites recheck next visit

## 2018-10-23 NOTE — TELEPHONE ENCOUNTER
Patient was given lab results.He was also encouraged to eat more egg whites.Patient verbally understands.

## 2018-10-26 ENCOUNTER — OFFICE VISIT (OUTPATIENT)
Dept: HEPATOLOGY | Facility: CLINIC | Age: 73
End: 2018-10-26
Attending: INTERNAL MEDICINE
Payer: MEDICARE

## 2018-10-26 VITALS
BODY MASS INDEX: 26.67 KG/M2 | TEMPERATURE: 98 F | HEART RATE: 75 BPM | HEIGHT: 71 IN | WEIGHT: 190.5 LBS | SYSTOLIC BLOOD PRESSURE: 147 MMHG | RESPIRATION RATE: 19 BRPM | DIASTOLIC BLOOD PRESSURE: 97 MMHG | OXYGEN SATURATION: 94 %

## 2018-10-26 DIAGNOSIS — I85.10 SECONDARY ESOPHAGEAL VARICES WITHOUT BLEEDING: ICD-10-CM

## 2018-10-26 DIAGNOSIS — K74.60 CIRRHOSIS OF LIVER WITHOUT ASCITES, UNSPECIFIED HEPATIC CIRRHOSIS TYPE: Primary | ICD-10-CM

## 2018-10-26 DIAGNOSIS — Z86.19 HISTORY OF HEPATITIS C: ICD-10-CM

## 2018-10-26 DIAGNOSIS — K76.6 PORTAL HYPERTENSION: ICD-10-CM

## 2018-10-26 DIAGNOSIS — C22.0 HCC (HEPATOCELLULAR CARCINOMA): ICD-10-CM

## 2018-10-26 DIAGNOSIS — T81.82XD SUBCUTANEOUS EMPHYSEMA RESULTING FROM A PROCEDURE, SUBSEQUENT ENCOUNTER: ICD-10-CM

## 2018-10-26 PROCEDURE — 3080F DIAST BP >= 90 MM HG: CPT | Mod: CPTII,S$GLB,, | Performed by: INTERNAL MEDICINE

## 2018-10-26 PROCEDURE — 3077F SYST BP >= 140 MM HG: CPT | Mod: CPTII,S$GLB,, | Performed by: INTERNAL MEDICINE

## 2018-10-26 PROCEDURE — 99214 OFFICE O/P EST MOD 30 MIN: CPT | Mod: S$GLB,,, | Performed by: INTERNAL MEDICINE

## 2018-10-26 PROCEDURE — 99499 UNLISTED E&M SERVICE: CPT | Mod: S$GLB,,, | Performed by: INTERNAL MEDICINE

## 2018-10-26 PROCEDURE — 1101F PT FALLS ASSESS-DOCD LE1/YR: CPT | Mod: CPTII,S$GLB,, | Performed by: INTERNAL MEDICINE

## 2018-10-26 NOTE — PATIENT INSTRUCTIONS
1. F/u with Dr Ortiz for emphysema and SOB  2. Ct scan of the abdomen and pelvis  3. You can take peptobismol  4. EGD/colonoscopy  Return after CT scan

## 2018-10-26 NOTE — PROGRESS NOTES
HEPATOLOGY FOLLOW UP    Referring Physician: Clover White MD  Current Corresponding Physician: Clover White MD    Fox Lyons Sr. is here for follow up of cirrhosis. I saw him in 2016 for clearance for cholecystectomy. Because of cirrhosis I did not recommend a cholecystectomy.     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 Plts are 58), esophageal varices, COPD, CAD with cardiac stents and pacemaker, CKD. He was dx w/ chronic HCV infection in 1995, likely r/t IVDU. Patient presented at the time with c/o weakness, loss of appetite. Patient states had liver bx at that time as well and was told he was negative for cancer but does not recall any further results. He received INF treatment ~2003 at [facility], treated x 6 months w/ SVR. Bailee occurred shortly after completion of treatment and he never followed up.    HPI  Since Fox Lyons .'s last visit he did not undergo cholecystectomy and has not had any further RUQ pain. Today he returns for f/u of cirrhosis. In 2016, a HCV VL was checked and was negative indicating that he was treated for HCV and cured in the past. The patient denies any symptoms of decompensated cirrhosis, including no ascites or edema, cognitive problems that would suggest hepatic encephalopathy, or GI bleeding from varices. He has not had any recent imaging.    Liver function remains good: Tbil 1.3, albumin 3.3, INR 1.2, creat 1.4, Na 139.    He does have some SOB x 1 year and O2 sat today is 94%. He previously saw Dr Ortiz of pulmonology but has had no f/u. He also has had what sounds like a diarrheal illness in the last week. It is slowly improving.    Outpatient Encounter Medications as of 10/26/2018   Medication Sig Dispense Refill    furosemide (LASIX) 40 MG tablet TAKE 1 TABLET(40 MG) BY MOUTH EVERY DAY 90 tablet 3    hydroCHLOROthiazide (HYDRODIURIL) 25 MG tablet Take 1 tablet (25 mg total) by mouth once  daily. 30 tablet 11    hydrocodone-acetaminophen 5-325mg (NORCO) 5-325 mg per tablet Take 1 tablet by mouth every 4 (four) hours as needed for Pain. 21 tablet 0    hydrocortisone (CORTEF) 10 MG Tab TAKE ONE AND ONE-HALF TABLETS BY MOUTH EVERY MORNING AND ONE-HALF TABLET EVERY EVENING. 180 tablet 0    levocetirizine (XYZAL) 5 MG tablet TAKE 1 TABLET(5 MG) BY MOUTH EVERY EVENING 90 tablet 0    losartan (COZAAR) 100 MG tablet Take 1 tablet (100 mg total) by mouth once daily. 90 tablet 3    multivitamin-minerals-lutein (CENTRUM SILVER) Tab Take 1 tablet by mouth once daily.        NITROSTAT 0.4 mg SL tablet DISSOLVE 1 TABLET UNDER THE TONGUE EVERY 5 MINUTES AS NEEDED 25 tablet 0    omeprazole (PRILOSEC) 40 MG capsule TAKE 1 CAPSULE(40 MG) BY MOUTH TWICE DAILY BEFORE MEALS 180 capsule 0    pravastatin (PRAVACHOL) 40 MG tablet TAKE 1 TABLET(40 MG) BY MOUTH EVERY EVENING 90 tablet 3    BREO ELLIPTA 100-25 mcg/dose diskus inhaler INHALE ONE PUFF INTO THE LUNGS ONCE DAILY 90 each 4     No facility-administered encounter medications on file as of 10/26/2018.      Review of patient's allergies indicates:   Allergen Reactions    Codeine Itching, Rash, Edema and Other (See Comments)     Other reaction(s): Itching     Past Medical History:   Diagnosis Date    Anticoagulant long-term use     Cataract     Cirrhosis     Coronary artery disease     Hyperlipidemia     Hypertension     Pacemaker        Review of Systems   Constitutional: Negative.    HENT: Negative.    Eyes: Negative.    Respiratory: Negative.    Cardiovascular: Negative.    Gastrointestinal: Negative.    Genitourinary: Negative.    Musculoskeletal: Negative.    Skin: Negative.    Neurological: Negative.    Psychiatric/Behavioral: Negative.      Vitals:    10/26/18 1141   BP: (!) 147/97   Pulse: 75   Resp: 19   Temp: 97.8 °F (36.6 °C)       Physical Exam   Constitutional: He is oriented to person, place, and time. He appears well-developed and  well-nourished.   HENT:   Head: Normocephalic and atraumatic.   Eyes: Conjunctivae and EOM are normal. Pupils are equal, round, and reactive to light. No scleral icterus.   Neck: Normal range of motion. Neck supple. No thyromegaly present.   Cardiovascular: Normal rate, regular rhythm and normal heart sounds.   Pulmonary/Chest: Effort normal and breath sounds normal. He has no rales.   Abdominal: Soft. Bowel sounds are normal. He exhibits no distension and no mass. There is no tenderness.   Musculoskeletal: Normal range of motion. He exhibits no edema.   Neurological: He is alert and oriented to person, place, and time.   Skin: Skin is warm and dry. No rash noted.   Psychiatric: He has a normal mood and affect.   Vitals reviewed.      Lab Results   Component Value Date     (H) 10/11/2018    BUN 9 10/11/2018    CREATININE 1.4 10/11/2018    CALCIUM 8.9 10/11/2018     10/11/2018    K 3.8 10/11/2018     10/11/2018    PROT 7.7 10/11/2018    CO2 22 (L) 10/11/2018    ANIONGAP 8 10/11/2018    WBC 6.03 05/31/2018    RBC 3.99 (L) 05/31/2018    HGB 9.1 (L) 05/31/2018    HCT 30.7 (L) 05/31/2018    MCV 77 (L) 05/31/2018    MCH 22.8 (L) 05/31/2018    MCHC 29.6 (L) 05/31/2018     Lab Results   Component Value Date    RDW 18.0 (H) 05/31/2018    PLT 63 (L) 05/31/2018    MPV 9.6 05/31/2018    GRAN 4.7 05/31/2018    GRAN 78.4 (H) 05/31/2018    LYMPH 0.9 (L) 05/31/2018    LYMPH 14.9 (L) 05/31/2018    MONO 0.3 05/31/2018    MONO 5.0 05/31/2018    EOSINOPHIL 0.7 05/31/2018    BASOPHIL 0.5 05/31/2018    EOS 0.0 05/31/2018    BASO 0.03 05/31/2018    APTT 27.6 05/07/2012    GROUPTRH B POS 05/28/2011    BNP <10 02/05/2016    CHOL 143 05/09/2018    TRIG 177 (H) 05/09/2018    HDL 37 (L) 05/09/2018    CHOLHDL 25.9 05/09/2018    TOTALCHOLEST 3.9 05/09/2018    ALBUMIN 3.3 (L) 10/11/2018    ALBUMIN 3.9 10/06/2014    BILIDIR 0.4 (H) 08/02/2013    AST 51 (H) 10/11/2018    ALT 29 10/11/2018    ALKPHOS 123 10/11/2018    MG 2.0  05/31/2011    LABPROT 12.0 05/31/2018    INR 1.2 05/31/2018    PSA <0.01 10/26/2016       Assessment and Plan:    Fox Lyons Sr. is a 73 y.o. male with compensated cirrhosis. My current recommendations:  1. Cirrhosis, compensated: I am recommending that a CT scan be done to r/o HCC. His chart had HCC down as an active problem but I can not find any documentation that he has HCC and he denies this diagnosis.  2. SOB: f/u with Dr Ortiz  3. Diarrhea, infectious, improving: can take peptobismol.  4. Portal htn: recommend repeat EGD to screen for varices  5. Colorectal ca screening: recommend repeat colonoscopy.    Return after CT scan

## 2018-10-31 DIAGNOSIS — Z12.11 SPECIAL SCREENING FOR MALIGNANT NEOPLASMS, COLON: Primary | ICD-10-CM

## 2018-10-31 DIAGNOSIS — K74.60 HEPATIC CIRRHOSIS, UNSPECIFIED HEPATIC CIRRHOSIS TYPE, UNSPECIFIED WHETHER ASCITES PRESENT: ICD-10-CM

## 2018-10-31 RX ORDER — POLYETHYLENE GLYCOL 3350, SODIUM SULFATE ANHYDROUS, SODIUM BICARBONATE, SODIUM CHLORIDE, POTASSIUM CHLORIDE 236; 22.74; 6.74; 5.86; 2.97 G/4L; G/4L; G/4L; G/4L; G/4L
4 POWDER, FOR SOLUTION ORAL ONCE
Qty: 4000 ML | Refills: 0 | Status: SHIPPED | OUTPATIENT
Start: 2018-10-31 | End: 2018-10-31

## 2018-11-06 ENCOUNTER — HOSPITAL ENCOUNTER (OUTPATIENT)
Dept: PULMONOLOGY | Facility: CLINIC | Age: 73
Discharge: HOME | End: 2018-11-06
Payer: MEDICARE

## 2018-11-06 ENCOUNTER — OFFICE VISIT (OUTPATIENT)
Dept: PULMONOLOGY | Facility: CLINIC | Age: 73
End: 2018-11-06
Payer: MEDICARE

## 2018-11-06 VITALS
SYSTOLIC BLOOD PRESSURE: 148 MMHG | BODY MASS INDEX: 28.88 KG/M2 | OXYGEN SATURATION: 92 % | HEIGHT: 69 IN | WEIGHT: 195 LBS | DIASTOLIC BLOOD PRESSURE: 98 MMHG | HEART RATE: 83 BPM

## 2018-11-06 VITALS — HEIGHT: 69 IN | BODY MASS INDEX: 28.9 KG/M2 | WEIGHT: 195.13 LBS

## 2018-11-06 DIAGNOSIS — J43.9 PULMONARY EMPHYSEMA, UNSPECIFIED EMPHYSEMA TYPE: Primary | ICD-10-CM

## 2018-11-06 DIAGNOSIS — J43.9 PULMONARY EMPHYSEMA, UNSPECIFIED EMPHYSEMA TYPE: ICD-10-CM

## 2018-11-06 DIAGNOSIS — J44.9 CHRONIC OBSTRUCTIVE PULMONARY DISEASE, UNSPECIFIED COPD TYPE: Primary | ICD-10-CM

## 2018-11-06 LAB
PRE FEV1 FVC: 66
PRE FEV1: 2.32
PRE FVC: 3.54
PREDICTED FEV1 FVC: 78
PREDICTED FEV1: 3.02
PREDICTED FVC: 3.82

## 2018-11-06 PROCEDURE — 94729 DIFFUSING CAPACITY: CPT | Mod: S$GLB,,, | Performed by: INTERNAL MEDICINE

## 2018-11-06 PROCEDURE — 94010 BREATHING CAPACITY TEST: CPT | Mod: S$GLB,,, | Performed by: INTERNAL MEDICINE

## 2018-11-06 PROCEDURE — 1101F PT FALLS ASSESS-DOCD LE1/YR: CPT | Mod: CPTII,S$GLB,, | Performed by: INTERNAL MEDICINE

## 2018-11-06 PROCEDURE — 94727 GAS DIL/WSHOT DETER LNG VOL: CPT | Mod: S$GLB,,, | Performed by: INTERNAL MEDICINE

## 2018-11-06 PROCEDURE — 99214 OFFICE O/P EST MOD 30 MIN: CPT | Mod: 25,S$GLB,, | Performed by: INTERNAL MEDICINE

## 2018-11-06 PROCEDURE — 3080F DIAST BP >= 90 MM HG: CPT | Mod: CPTII,S$GLB,, | Performed by: INTERNAL MEDICINE

## 2018-11-06 PROCEDURE — 94618 PULMONARY STRESS TESTING: CPT | Mod: S$GLB,,, | Performed by: INTERNAL MEDICINE

## 2018-11-06 PROCEDURE — 3077F SYST BP >= 140 MM HG: CPT | Mod: CPTII,S$GLB,, | Performed by: INTERNAL MEDICINE

## 2018-11-06 PROCEDURE — 99999 PR PBB SHADOW E&M-EST. PATIENT-LVL III: CPT | Mod: PBBFAC,,, | Performed by: INTERNAL MEDICINE

## 2018-11-06 RX ORDER — POLYETHYLENE GLYCOL-3350 AND ELECTROLYTES 236; 6.74; 5.86; 2.97; 22.74 G/274.31G; G/274.31G; G/274.31G; G/274.31G; G/274.31G
POWDER, FOR SOLUTION ORAL
Status: ON HOLD | COMMUNITY
Start: 2018-10-31 | End: 2019-01-04 | Stop reason: ALTCHOICE

## 2018-11-06 NOTE — PROGRESS NOTES
Subjective:       Patient ID: Fox Lyons Sr. is a 73 y.o. male.    Chief Complaint: COPD    HPI   Fox Lyons Sr. 73 y.o. male    has a past medical history of Anticoagulant long-term use, Cataract, Cirrhosis, Coronary artery disease, Hyperlipidemia, Hypertension, and Pacemaker.    has a past surgical history that includes s/p PPV/AFX/EL (od) (05/09/2012); Cardiac surgery; Cardiac pacemaker placement; Cataract extraction (9/24/13); Eye surgery; ESOPHAGOGASTRODUODENOSCOPY (EGD) (Left, 5/31/2018); ESOPHAGOGASTRODUODENOSCOPY (EGD) (N/A, 11/30/2017); ESOPHAGOGASTRODUODENOSCOPY (EGD) (Left, 10/12/2017); ESOPHAGOGASTRODUODENOSCOPY (EGD) (N/A, 8/17/2017); ESOPHAGOGASTRODUODENOSCOPY (EGD) (N/A, 7/14/2016); PARACENTESIS, ABDOMINAL (N/A, 4/16/2014); PHACOEMULSIFICATION, CATARACT (Right, 9/24/2013); and INSERTION, IOL PROSTHESIS (Right, 9/24/2013).   reports that he quit smoking about 23 years ago. His smoking use included cigarettes. He has a 35.00 pack-year smoking history. he has never used smokeless tobacco. He reports that he does not drink alcohol or use drugs.  Referred by: Dr. Trice Funes  Who had concerns including COPD.  The patient's last visit with me was on 5/4/2016.    Doing well except sob with exertion  No infections  Not using breo daily, only prn due to price about once a week  No scheduled exercise    + coughing with allergies, pnd  xyxal for allergies  utd on flu shot  No fever chills, ns, wt changes, nausea, vomiting, diarrhea, constipation, chest pain, tightness, pressure    Review of Systems    Objective:      Physical Exam  Personal Diagnostic Review    No flowsheet data found.      Assessment:       1. Chronic obstructive pulmonary disease, unspecified COPD type        Outpatient Encounter Medications as of 11/6/2018   Medication Sig Dispense Refill    hydrocortisone (CORTEF) 10 MG Tab TAKE ONE AND ONE-HALF TABLETS BY MOUTH EVERY MORNING AND ONE-HALF TABLET EVERY EVENING. 180 tablet 0     losartan (COZAAR) 100 MG tablet Take 1 tablet (100 mg total) by mouth once daily. 90 tablet 3    multivitamin-minerals-lutein (CENTRUM SILVER) Tab Take 1 tablet by mouth once daily.        NITROSTAT 0.4 mg SL tablet DISSOLVE 1 TABLET UNDER THE TONGUE EVERY 5 MINUTES AS NEEDED 25 tablet 0    omeprazole (PRILOSEC) 40 MG capsule TAKE 1 CAPSULE(40 MG) BY MOUTH TWICE DAILY BEFORE MEALS 180 capsule 0    pravastatin (PRAVACHOL) 40 MG tablet TAKE 1 TABLET(40 MG) BY MOUTH EVERY EVENING 90 tablet 3    [DISCONTINUED] furosemide (LASIX) 40 MG tablet TAKE 1 TABLET(40 MG) BY MOUTH EVERY DAY 90 tablet 3    fluticasone-umeclidin-vilanter (TRELEGY ELLIPTA) 100-62.5-25 mcg DsDv Inhale 1 puff into the lungs once daily. 1 each 12    GAVILYTE-G 236-22.74-6.74 -5.86 gram suspension       [DISCONTINUED] BREO ELLIPTA 100-25 mcg/dose diskus inhaler INHALE ONE PUFF INTO THE LUNGS ONCE DAILY 90 each 4    [DISCONTINUED] hydroCHLOROthiazide (HYDRODIURIL) 25 MG tablet Take 1 tablet (25 mg total) by mouth once daily. 30 tablet 11    [DISCONTINUED] hydrocodone-acetaminophen 5-325mg (NORCO) 5-325 mg per tablet Take 1 tablet by mouth every 4 (four) hours as needed for Pain. 21 tablet 0    [DISCONTINUED] levocetirizine (XYZAL) 5 MG tablet TAKE 1 TABLET(5 MG) BY MOUTH EVERY EVENING 90 tablet 0     No facility-administered encounter medications on file as of 11/6/2018.      Orders Placed This Encounter   Procedures    Ambulatory Referral to Pharmacy Assistance     Referral Priority:   Routine     Referral Type:   Consultation     Referral Reason:   Specialty Services Required     Number of Visits Requested:   1       Plan:            I personally reviewed the      1. PFT mild obstruction, no change   2. 6MWD 305m  Assessment:  Fox was seen today for copd.    Diagnoses and all orders for this visit:    Chronic obstructive pulmonary disease, unspecified COPD type  -     Ambulatory Referral to Pharmacy Assistance    Other orders  -      fluticasone-umeclidin-vilanter (TRELEGY ELLIPTA) 100-62.5-25 mcg DsDv; Inhale 1 puff into the lungs once daily.        Plan:  Problem List Items Addressed This Visit     Chronic obstructive pulmonary disease (COPD) - Primary    Overview     Mild copd, doing well, pfts unchanged         Current Assessment & Plan     Patient Instructions   Start walking 30min every day    Start trelegy 1 puff once a day, rinse mouth after use    Use proair as needed             Relevant Orders    Ambulatory Referral to Pharmacy Assistance              No Follow-up on file.    Patient Instructions   Start walking 30min every day    Start trelegy 1 puff once a day, rinse mouth after use    Use proair as needed      Immunization History   Administered Date(s) Administered    Influenza 10/25/2007, 10/18/2008, 10/05/2009, 10/12/2010, 10/04/2011    Influenza - High Dose 12/12/2012, 09/25/2013, 10/20/2014, 10/26/2016, 01/23/2018, 10/11/2018    Pneumococcal Conjugate - 13 Valent 05/04/2016    Pneumococcal Polysaccharide - 23 Valent 05/09/2018    Tdap 10/26/2016

## 2018-11-06 NOTE — PATIENT INSTRUCTIONS
Start walking 30min every day    Start trelegy 1 puff once a day, rinse mouth after use    Use proair as needed

## 2018-11-06 NOTE — LETTER
November 9, 2018      Trice Funes MD  [address] [facility] [address]           [facility] Pulmonary [facility]  [facility] [facility] [address] [facility] [address]  [facility] [address]  Phone: [phone]          Patient: Fox Lyons Sr.   MR Number: [id number]   YOB: 1945   Date of Visit: 11/6/2018       Dear Dr. Trice Funes:    Thank you for referring Fox Lyons to me for evaluation. Attached you will find relevant portions of my assessment and plan of care.    If you have questions, please do not hesitate to call me. I look forward to following Fox Lyons along with you.    Sincerely,    Katelyn Ortiz MD    Enclosure  CC:  No Recipients    If you would like to receive this communication electronically, please contact [email or web address] or [phone] to request more information on [employer] access.    For providers and/or their staff who would like to refer a patient to [facility], please contact us through our one-stop-shop provider referral line, [facility], at [phone].    If you feel you have received this communication in error or would no longer like to receive these types of communications, please e-mail [email or web address]

## 2018-11-07 NOTE — PROCEDURES
Fox Lyons Sr. is a 73 y.o.  male patient, who presents for a 6 minute walk test ordered by MD Angel.  The diagnosis is Emphysema.  The patient's BMI is 28.9 kg/m2.  Predicted distance (lower limit of normal) is 318.25 meters.      Test Results:    The test was completed without stopping.   The total time walked was 360 seconds.  During walking, the patient reported:  Dyspnea. The patient used no assistive devices  during testing.     11/06/2018---------Distance: 304.8 meters (1000 feet)     O2 Sat % Supplemental Oxygen Heart Rate Blood Pressure Damion Scale   Pre-exercise  (Resting) 96 % Room Air 77 bpm 148/84 mmHg 3   During Exercise 91 % Room Air 85 bpm 144/83 mmHg 5-6   Post-exercise  (Recovery) 95 % Room Air  81 bpm   mmHg       Recovery Time: 120 seconds    Performing nurse/tech: [employer] RRT      PREVIOUS STUDY:   The patient had a previous study.  02/05/2016---------Distance: 356.62 meters (1170 feet)       O2 Sat % Supplemental Oxygen Heart Rate Blood Pressure Damion Scale   Pre-exercise  (Resting) 93 % Room Air 82 bpm 143/88 mmHg 0   During Exercise 88 % Room Air 83 bpm 170/95 mmHg 1   Post-exercise    96 % Room Air  74 bpm           CLINICAL INTERPRETATION:  Six minute walk distance is 304.8 meters (1000 feet) with heavy dyspnea.  During exercise, there was significant desaturation while breathing supplemental oxygen.  Both blood pressure and heart rate remained stable with walking.  The patient did not report non-pulmonary symptoms during exercise.  Since the previous study in February 2016, exercise capacity is significantly worse.  Based upon age and body mass index, exercise capacity is less than predicted.

## 2018-11-09 ENCOUNTER — LAB VISIT (OUTPATIENT)
Dept: LAB | Facility: HOSPITAL | Age: 73
End: 2018-11-09
Attending: FAMILY MEDICINE
Payer: MEDICARE

## 2018-11-09 ENCOUNTER — OFFICE VISIT (OUTPATIENT)
Dept: FAMILY MEDICINE | Facility: CLINIC | Age: 73
End: 2018-11-09
Attending: FAMILY MEDICINE
Payer: MEDICARE

## 2018-11-09 VITALS
BODY MASS INDEX: 28.49 KG/M2 | DIASTOLIC BLOOD PRESSURE: 88 MMHG | HEIGHT: 69 IN | WEIGHT: 192.38 LBS | OXYGEN SATURATION: 93 % | SYSTOLIC BLOOD PRESSURE: 162 MMHG | HEART RATE: 74 BPM

## 2018-11-09 DIAGNOSIS — R73.9 HYPERGLYCEMIA: ICD-10-CM

## 2018-11-09 DIAGNOSIS — J30.1 SEASONAL ALLERGIC RHINITIS DUE TO POLLEN: ICD-10-CM

## 2018-11-09 DIAGNOSIS — I10 HTN (HYPERTENSION), BENIGN: Primary | ICD-10-CM

## 2018-11-09 LAB
ESTIMATED AVG GLUCOSE: 126 MG/DL
HBA1C MFR BLD HPLC: 6 %

## 2018-11-09 PROCEDURE — 3079F DIAST BP 80-89 MM HG: CPT | Mod: CPTII,S$GLB,, | Performed by: FAMILY MEDICINE

## 2018-11-09 PROCEDURE — 99999 PR PBB SHADOW E&M-EST. PATIENT-LVL III: CPT | Mod: PBBFAC,,, | Performed by: FAMILY MEDICINE

## 2018-11-09 PROCEDURE — 83036 HEMOGLOBIN GLYCOSYLATED A1C: CPT

## 2018-11-09 PROCEDURE — 36415 COLL VENOUS BLD VENIPUNCTURE: CPT | Mod: PO

## 2018-11-09 PROCEDURE — 99214 OFFICE O/P EST MOD 30 MIN: CPT | Mod: S$GLB,,, | Performed by: FAMILY MEDICINE

## 2018-11-09 PROCEDURE — 99499 UNLISTED E&M SERVICE: CPT | Mod: S$GLB,,, | Performed by: FAMILY MEDICINE

## 2018-11-09 PROCEDURE — 1101F PT FALLS ASSESS-DOCD LE1/YR: CPT | Mod: CPTII,S$GLB,, | Performed by: FAMILY MEDICINE

## 2018-11-09 PROCEDURE — 3077F SYST BP >= 140 MM HG: CPT | Mod: CPTII,S$GLB,, | Performed by: FAMILY MEDICINE

## 2018-11-09 RX ORDER — FELODIPINE 5 MG/1
5 TABLET, EXTENDED RELEASE ORAL DAILY
Qty: 30 TABLET | Refills: 1 | Status: SHIPPED | OUTPATIENT
Start: 2018-11-09 | End: 2018-11-09 | Stop reason: SDUPTHER

## 2018-11-09 RX ORDER — LEVOCETIRIZINE DIHYDROCHLORIDE 5 MG/1
TABLET, FILM COATED ORAL
Qty: 90 TABLET | Refills: 0 | Status: SHIPPED | OUTPATIENT
Start: 2018-11-09 | End: 2018-12-04

## 2018-11-09 RX ORDER — FELODIPINE 5 MG/1
TABLET, EXTENDED RELEASE ORAL
Qty: 90 TABLET | Refills: 1 | Status: ON HOLD | OUTPATIENT
Start: 2018-11-09 | End: 2019-04-17 | Stop reason: ALTCHOICE

## 2018-11-09 NOTE — PATIENT INSTRUCTIONS
Your test results will be communicated to you via : My [facility], Telephone or Letter.   If you have not received your test results in one week, please contact the clinic at [phone].

## 2018-11-09 NOTE — ASSESSMENT & PLAN NOTE
Patient Instructions   Start walking 30min every day    Start trelegy 1 puff once a day, rinse mouth after use    Use proair as needed

## 2018-11-14 NOTE — PROGRESS NOTES
"Subjective:       Patient ID: Fox Lyons Sr. is a 73 y.o. male.    Chief Complaint: Hypertension and Sinus Problem    HPI   Pt is here for follow up of htn bp fine today no sob/cp pt feels well  Pt is c/o nonproductive cough with post nasal drip no facial pressure no fever/chills   Pt has elevated bs no polyuria/dipsia/phagia    Review of Systems   Constitutional: Negative for chills, fatigue and fever.   Respiratory: Negative for cough, chest tightness and shortness of breath.    Cardiovascular: Negative for chest pain and palpitations.   Gastrointestinal: Negative for abdominal distention and abdominal pain.       Objective:      Physical Exam   Constitutional: He appears well-developed and well-nourished. No distress.   HENT:   Mouth/Throat: Oropharynx is clear and moist. No oropharyngeal exudate.   Nares patent clear rhinorrhea   Cardiovascular: Normal rate and regular rhythm. Exam reveals no gallop.   Pulmonary/Chest: Effort normal and breath sounds normal. No respiratory distress. He has no wheezes.   Abdominal: Soft. He exhibits no distension. There is no tenderness.       Assessment:       1. HTN (hypertension), benign    2. Hyperglycemia        Plan:        orders cmp hgb a1c lipid  Cont meds  Start xyzal  rtc 6 months and prn    "This note will not be shared with the patient."   "

## 2018-11-17 ENCOUNTER — TELEPHONE (OUTPATIENT)
Dept: INTERNAL MEDICINE | Facility: CLINIC | Age: 73
End: 2018-11-17

## 2018-11-20 DIAGNOSIS — E27.49 SECONDARY ADRENAL INSUFFICIENCY: ICD-10-CM

## 2018-11-20 RX ORDER — HYDROCORTISONE 10 MG/1
TABLET ORAL
Qty: 180 TABLET | Refills: 0 | Status: SHIPPED | OUTPATIENT
Start: 2018-11-20 | End: 2018-12-20 | Stop reason: SDUPTHER

## 2018-11-20 NOTE — TELEPHONE ENCOUNTER
----- Message from Clementine Rivas sent at 11/20/2018  8:27 AM CST -----  Contact: Self  .Rx Refill/Request     Is this a Refill or New Rx:  Refill  Rx Name and Strength:  hydrocortisone (CORTEF) 10 MG Tab  Preferred Pharmacy with phone number:  [facility],[phone] Fax: [phone]   Communication Preference: [phone]  Additional Information:

## 2018-11-27 ENCOUNTER — TELEPHONE (OUTPATIENT)
Dept: PHARMACY | Facility: CLINIC | Age: 73
End: 2018-11-27

## 2018-11-28 RX ORDER — AMLODIPINE BESYLATE 5 MG/1
TABLET ORAL
Qty: 90 TABLET | Refills: 0 | Status: SHIPPED | OUTPATIENT
Start: 2018-11-28 | End: 2018-12-04 | Stop reason: ALTCHOICE

## 2018-11-29 ENCOUNTER — HOSPITAL ENCOUNTER (OUTPATIENT)
Dept: RADIOLOGY | Facility: HOSPITAL | Age: 73
Discharge: HOME | End: 2018-11-29
Attending: INTERNAL MEDICINE
Payer: MEDICARE

## 2018-11-29 DIAGNOSIS — K74.60 CIRRHOSIS OF LIVER WITHOUT ASCITES, UNSPECIFIED HEPATIC CIRRHOSIS TYPE: ICD-10-CM

## 2018-11-29 LAB
CREAT SERPL-MCNC: 1.3 MG/DL (ref 0.5–1.4)
SAMPLE: NORMAL

## 2018-11-29 PROCEDURE — 74177 CT ABD & PELVIS W/CONTRAST: CPT | Mod: TC

## 2018-11-29 PROCEDURE — 25500020 PHARM REV CODE 255: Performed by: INTERNAL MEDICINE

## 2018-11-29 PROCEDURE — 74177 CT ABD & PELVIS W/CONTRAST: CPT | Mod: 26,,, | Performed by: RADIOLOGY

## 2018-11-29 RX ADMIN — IOHEXOL 100 ML: 350 INJECTION, SOLUTION INTRAVENOUS at 10:11

## 2018-12-04 ENCOUNTER — OFFICE VISIT (OUTPATIENT)
Dept: HEPATOLOGY | Facility: CLINIC | Age: 73
End: 2018-12-04
Payer: MEDICARE

## 2018-12-04 ENCOUNTER — OFFICE VISIT (OUTPATIENT)
Dept: FAMILY MEDICINE | Facility: CLINIC | Age: 73
End: 2018-12-04
Attending: FAMILY MEDICINE
Payer: MEDICARE

## 2018-12-04 ENCOUNTER — LAB VISIT (OUTPATIENT)
Dept: LAB | Facility: HOSPITAL | Age: 73
End: 2018-12-04
Attending: FAMILY MEDICINE
Payer: MEDICARE

## 2018-12-04 VITALS
DIASTOLIC BLOOD PRESSURE: 71 MMHG | RESPIRATION RATE: 19 BRPM | BODY MASS INDEX: 27 KG/M2 | HEIGHT: 71 IN | WEIGHT: 192.88 LBS | HEART RATE: 73 BPM | OXYGEN SATURATION: 95 % | SYSTOLIC BLOOD PRESSURE: 129 MMHG | TEMPERATURE: 98 F

## 2018-12-04 VITALS
RESPIRATION RATE: 16 BRPM | HEART RATE: 70 BPM | HEIGHT: 69 IN | BODY MASS INDEX: 28.6 KG/M2 | SYSTOLIC BLOOD PRESSURE: 120 MMHG | WEIGHT: 193.13 LBS | OXYGEN SATURATION: 95 % | DIASTOLIC BLOOD PRESSURE: 82 MMHG

## 2018-12-04 DIAGNOSIS — I10 HTN (HYPERTENSION), BENIGN: ICD-10-CM

## 2018-12-04 DIAGNOSIS — I10 HTN (HYPERTENSION), BENIGN: Primary | ICD-10-CM

## 2018-12-04 DIAGNOSIS — R18.8 OTHER ASCITES: Primary | ICD-10-CM

## 2018-12-04 DIAGNOSIS — K76.6 PORTAL HYPERTENSION: ICD-10-CM

## 2018-12-04 DIAGNOSIS — I85.10 SECONDARY ESOPHAGEAL VARICES WITHOUT BLEEDING: ICD-10-CM

## 2018-12-04 DIAGNOSIS — E78.00 HYPERCHOLESTEROLEMIA: ICD-10-CM

## 2018-12-04 DIAGNOSIS — K74.60 CIRRHOSIS OF LIVER WITHOUT ASCITES, UNSPECIFIED HEPATIC CIRRHOSIS TYPE: ICD-10-CM

## 2018-12-04 LAB
ALBUMIN SERPL BCP-MCNC: 3.2 G/DL
ALP SERPL-CCNC: 119 U/L
ALT SERPL W/O P-5'-P-CCNC: 31 U/L
ANION GAP SERPL CALC-SCNC: 11 MMOL/L
AST SERPL-CCNC: 50 U/L
BILIRUB SERPL-MCNC: 1.6 MG/DL
BUN SERPL-MCNC: 14 MG/DL
CALCIUM SERPL-MCNC: 9 MG/DL
CHLORIDE SERPL-SCNC: 108 MMOL/L
CO2 SERPL-SCNC: 24 MMOL/L
CREAT SERPL-MCNC: 1.6 MG/DL
EST. GFR  (AFRICAN AMERICAN): 48.7 ML/MIN/1.73 M^2
EST. GFR  (NON AFRICAN AMERICAN): 42.1 ML/MIN/1.73 M^2
GLUCOSE SERPL-MCNC: 168 MG/DL
POTASSIUM SERPL-SCNC: 3.1 MMOL/L
PROT SERPL-MCNC: 8.2 G/DL
SODIUM SERPL-SCNC: 143 MMOL/L

## 2018-12-04 PROCEDURE — 99499 UNLISTED E&M SERVICE: CPT | Mod: HCNC,S$GLB,, | Performed by: INTERNAL MEDICINE

## 2018-12-04 PROCEDURE — 3079F DIAST BP 80-89 MM HG: CPT | Mod: CPTII,S$GLB,, | Performed by: FAMILY MEDICINE

## 2018-12-04 PROCEDURE — 99214 OFFICE O/P EST MOD 30 MIN: CPT | Mod: S$GLB,,, | Performed by: FAMILY MEDICINE

## 2018-12-04 PROCEDURE — 80053 COMPREHEN METABOLIC PANEL: CPT

## 2018-12-04 PROCEDURE — 3074F SYST BP LT 130 MM HG: CPT | Mod: CPTII,S$GLB,, | Performed by: FAMILY MEDICINE

## 2018-12-04 PROCEDURE — 3074F SYST BP LT 130 MM HG: CPT | Mod: CPTII,S$GLB,, | Performed by: INTERNAL MEDICINE

## 2018-12-04 PROCEDURE — 1101F PT FALLS ASSESS-DOCD LE1/YR: CPT | Mod: CPTII,S$GLB,, | Performed by: INTERNAL MEDICINE

## 2018-12-04 PROCEDURE — 36415 COLL VENOUS BLD VENIPUNCTURE: CPT | Mod: PO

## 2018-12-04 PROCEDURE — 3078F DIAST BP <80 MM HG: CPT | Mod: CPTII,S$GLB,, | Performed by: INTERNAL MEDICINE

## 2018-12-04 PROCEDURE — 99999 PR PBB SHADOW E&M-EST. PATIENT-LVL III: CPT | Mod: PBBFAC,,, | Performed by: INTERNAL MEDICINE

## 2018-12-04 PROCEDURE — 99999 PR PBB SHADOW E&M-EST. PATIENT-LVL IV: CPT | Mod: PBBFAC,,, | Performed by: FAMILY MEDICINE

## 2018-12-04 PROCEDURE — 99215 OFFICE O/P EST HI 40 MIN: CPT | Mod: S$GLB,,, | Performed by: INTERNAL MEDICINE

## 2018-12-04 PROCEDURE — 1101F PT FALLS ASSESS-DOCD LE1/YR: CPT | Mod: CPTII,S$GLB,, | Performed by: FAMILY MEDICINE

## 2018-12-04 RX ORDER — SPIRONOLACTONE 100 MG/1
50 TABLET, FILM COATED ORAL DAILY
Qty: 15 TABLET | Refills: 11 | Status: ON HOLD | OUTPATIENT
Start: 2018-12-04 | End: 2019-04-18 | Stop reason: HOSPADM

## 2018-12-04 RX ORDER — FUROSEMIDE 40 MG/1
20 TABLET ORAL 2 TIMES DAILY
Qty: 30 TABLET | Refills: 11 | Status: ON HOLD | OUTPATIENT
Start: 2018-12-04 | End: 2019-05-24 | Stop reason: DRUGHIGH

## 2018-12-04 NOTE — PATIENT INSTRUCTIONS
1. Start furosemide 1/2 tablet (20 mg daily)  2. Start spironolactone 1/2 tablet (50 mg daily)  3. Do labs 2 weeks after you start  The water pills  4. Return 4 weeks  5. Repeat ct scan in 6 months. No blood thinner because your platelets are low

## 2018-12-04 NOTE — PROGRESS NOTES
"Subjective:       Patient ID: Fox Lyons Sr. is a 73 y.o. male.    Chief Complaint: Hypertension    HPI   Pt is here for follow up of htn stable on ca channel blocker, arb and hctz no sob/cp no ankle swelling no muscle cramps  bp fine today pt has hypercholesterolemia stable on statin no muscle aches   Review of Systems   Constitutional: Negative for chills, fatigue and fever.   Respiratory: Negative for cough, chest tightness and shortness of breath.    Cardiovascular: Negative for chest pain and palpitations.   Gastrointestinal: Negative for abdominal distention and abdominal pain.       Objective:      Physical Exam   Constitutional: He appears well-developed and well-nourished. No distress.   Cardiovascular: Normal rate and regular rhythm. Exam reveals no gallop.   Pulmonary/Chest: Effort normal and breath sounds normal. No respiratory distress. He has no wheezes.   Abdominal: Soft. Bowel sounds are normal. He exhibits no distension.     labs discussed with pt   Assessment:       1. HTN (hypertension), benign    2. Hypercholesterolemia        Plan:     orders cmp lipid pt not fasting  Cont meds  Low fat low salt diet  Graded exercise   rtc 6 months       "This note will not be shared with the patient."   "

## 2018-12-04 NOTE — PATIENT INSTRUCTIONS
Your test results will be communicated to you via : My [facility], Telephone or Letter.   If you have not received your test results in one week, please contact the clinic at [phone].

## 2018-12-04 NOTE — PROGRESS NOTES
HEPATOLOGY FOLLOW UP    Referring Physician: Clover White MD  Current Corresponding Physician: Clover White MD    Fox Lyons Sr. is here for follow up of cirrhosis. I saw him in 2016 for clearance for cholecystectomy. Because of cirrhosis I did not recommend a cholecystectomy.     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 Plts are 58), esophageal varices, COPD, CAD with cardiac stents and pacemaker, CKD. He was dx w/ chronic HCV infection in 1995, likely r/t IVDU. Patient presented at the time with c/o weakness, loss of appetite. Patient states had liver bx at that time as well and was told he was negative for cancer but does not recall any further results. He received INF treatment ~2003 at [facility], treated x 6 months w/ SVR. Bailee occurred shortly after completion of treatment and he never followed up.    HPI  Since Fox Lyonscarole Soto.'s last visit he had a  Ct scan done 10/26/18: no HCC but he does have moderate ascites.     Liver function remains good: Tbil 1.6, albumin 3.2, INR 1.2, creat 1.6, Na 143.      Outpatient Encounter Medications as of 12/4/2018   Medication Sig Dispense Refill    felodipine (PLENDIL) 5 MG 24 hr tablet TAKE 1 TABLET(5 MG) BY MOUTH EVERY DAY 90 tablet 1    fluticasone-umeclidin-vilanter (TRELEGY ELLIPTA) 100-62.5-25 mcg DsDv Inhale 1 puff into the lungs once daily. 1 each 12    GAVILYTE-G 236-22.74-6.74 -5.86 gram suspension       hydrocortisone (CORTEF) 10 MG Tab TAKE ONE AND ONE-HALF TABLETS BY MOUTH EVERY MORNING AND ONE-HALF TABLET EVERY EVENING./NO FURTHER REFILLS  NEED FOLLOW UP VISIT 180 tablet 0    losartan (COZAAR) 100 MG tablet Take 1 tablet (100 mg total) by mouth once daily. 90 tablet 3    multivitamin-minerals-lutein (CENTRUM SILVER) Tab Take 1 tablet by mouth once daily.        NITROSTAT 0.4 mg SL tablet DISSOLVE 1 TABLET UNDER THE TONGUE EVERY 5 MINUTES AS NEEDED 25 tablet 0     omeprazole (PRILOSEC) 40 MG capsule TAKE 1 CAPSULE(40 MG) BY MOUTH TWICE DAILY BEFORE MEALS 180 capsule 0    pravastatin (PRAVACHOL) 40 MG tablet TAKE 1 TABLET(40 MG) BY MOUTH EVERY EVENING 90 tablet 3    [DISCONTINUED] amLODIPine (NORVASC) 5 MG tablet TAKE ONE TABLET BY MOUTH EVERY EVENING 90 tablet 0    [DISCONTINUED] levocetirizine (XYZAL) 5 MG tablet TAKE 1 TABLET(5 MG) BY MOUTH EVERY EVENING 90 tablet 0     No facility-administered encounter medications on file as of 12/4/2018.      Review of patient's allergies indicates:   Allergen Reactions    Codeine Itching, Rash, Edema and Other (See Comments)     Other reaction(s): Itching     Past Medical History:   Diagnosis Date    Anticoagulant long-term use     Cataract     Cirrhosis     Coronary artery disease     Hyperlipidemia     Hypertension     Pacemaker        Review of Systems   Constitutional: Negative.    HENT: Negative.    Eyes: Negative.    Respiratory: Negative.    Cardiovascular: Negative.    Gastrointestinal: Negative.    Genitourinary: Negative.    Musculoskeletal: Negative.    Skin: Negative.    Neurological: Negative.    Psychiatric/Behavioral: Negative.      Vitals:    12/04/18 1408   BP: 129/71   Pulse: 73   Resp: 19   Temp: 98.3 °F (36.8 °C)       Physical Exam   Constitutional: He is oriented to person, place, and time. He appears well-developed and well-nourished.   HENT:   Head: Normocephalic and atraumatic.   Eyes: Conjunctivae and EOM are normal. Pupils are equal, round, and reactive to light. No scleral icterus.   Neck: Normal range of motion. Neck supple. No thyromegaly present.   Cardiovascular: Normal rate, regular rhythm and normal heart sounds.   Pulmonary/Chest: Effort normal and breath sounds normal. He has no rales.   Abdominal: Soft. Bowel sounds are normal. He exhibits no distension and no mass. There is no tenderness.   Musculoskeletal: Normal range of motion. He exhibits no edema.   Neurological: He is alert and  oriented to person, place, and time.   Skin: Skin is warm and dry. No rash noted.   Psychiatric: He has a normal mood and affect.   Vitals reviewed.      Lab Results   Component Value Date     (H) 10/11/2018    BUN 9 10/11/2018    CREATININE 1.4 10/11/2018    CALCIUM 8.9 10/11/2018     10/11/2018    K 3.8 10/11/2018     10/11/2018    PROT 7.7 10/11/2018    CO2 22 (L) 10/11/2018    ANIONGAP 8 10/11/2018    WBC 6.03 05/31/2018    RBC 3.99 (L) 05/31/2018    HGB 9.1 (L) 05/31/2018    HCT 30.7 (L) 05/31/2018    MCV 77 (L) 05/31/2018    MCH 22.8 (L) 05/31/2018    MCHC 29.6 (L) 05/31/2018     Lab Results   Component Value Date    RDW 18.0 (H) 05/31/2018    PLT 63 (L) 05/31/2018    MPV 9.6 05/31/2018    GRAN 4.7 05/31/2018    GRAN 78.4 (H) 05/31/2018    LYMPH 0.9 (L) 05/31/2018    LYMPH 14.9 (L) 05/31/2018    MONO 0.3 05/31/2018    MONO 5.0 05/31/2018    EOSINOPHIL 0.7 05/31/2018    BASOPHIL 0.5 05/31/2018    EOS 0.0 05/31/2018    BASO 0.03 05/31/2018    APTT 27.6 05/07/2012    GROUPTRH B POS 05/28/2011    BNP <10 02/05/2016    CHOL 143 05/09/2018    TRIG 177 (H) 05/09/2018    HDL 37 (L) 05/09/2018    CHOLHDL 25.9 05/09/2018    TOTALCHOLEST 3.9 05/09/2018    ALBUMIN 3.3 (L) 10/11/2018    ALBUMIN 3.9 10/06/2014    BILIDIR 0.4 (H) 08/02/2013    AST 51 (H) 10/11/2018    ALT 29 10/11/2018    ALKPHOS 123 10/11/2018    MG 2.0 05/31/2011    LABPROT 12.0 05/31/2018    INR 1.2 05/31/2018    PSA <0.01 10/26/2016       Assessment and Plan:    Fox Lyons Sr. is a 73 y.o. male with compensated cirrhosis. My current recommendations:  1. Cirrhosis, decompensated: now with ne onset ascites. Start lasix 20 mg daily and spironolactone 50 mg daily. Check cmp in 2 weeks since has slightly elevated creat  2. Portal htn: recommend repeat EGD to screen for varices  3. Colorectal ca screening: recommend repeat colonoscopy.    Return one month  .

## 2018-12-06 DIAGNOSIS — K21.9 GASTROESOPHAGEAL REFLUX DISEASE WITHOUT ESOPHAGITIS: ICD-10-CM

## 2018-12-06 DIAGNOSIS — R79.89 ABNORMAL BILIRUBIN TEST: Primary | ICD-10-CM

## 2018-12-07 RX ORDER — OMEPRAZOLE 40 MG/1
CAPSULE, DELAYED RELEASE ORAL
Qty: 180 CAPSULE | Refills: 0 | Status: SHIPPED | OUTPATIENT
Start: 2018-12-07 | End: 2019-03-10 | Stop reason: SDUPTHER

## 2018-12-10 PROBLEM — R18.8 ASCITES: Status: ACTIVE | Noted: 2018-12-10

## 2018-12-12 ENCOUNTER — TELEPHONE (OUTPATIENT)
Dept: FAMILY MEDICINE | Facility: CLINIC | Age: 73
End: 2018-12-12

## 2018-12-12 NOTE — TELEPHONE ENCOUNTER
Patient was given test results and also informed to increase his potassium rich foods.Please schedule ultrasound appointment for the patient.Thanks.

## 2018-12-12 NOTE — TELEPHONE ENCOUNTER
----- Message from Clover White MD sent at 12/6/2018  7:24 AM CST -----  Please have pt come in for lab follow up please have him do an abd ultrasound pta. please have him increase his intake of potassium rich foods also increase protein especially eggs as his albumin is dropping.

## 2018-12-20 DIAGNOSIS — E27.49 SECONDARY ADRENAL INSUFFICIENCY: ICD-10-CM

## 2018-12-20 RX ORDER — HYDROCORTISONE 10 MG/1
TABLET ORAL
Qty: 180 TABLET | Refills: 0 | Status: SHIPPED | OUTPATIENT
Start: 2018-12-20 | End: 2019-02-15 | Stop reason: SDUPTHER

## 2018-12-20 NOTE — TELEPHONE ENCOUNTER
----- Message from Tianna Luevano sent at 12/20/2018 11:29 AM CST -----  Contact: Self [phone]  .Refill request.  hydrocortisone (CORTEF) 10 MG Tab       ..  [employer] - [address] AT [address] [address]  Phone: [phone] Fax: [phone]    PT needs an appointment too.

## 2019-01-04 ENCOUNTER — HOSPITAL ENCOUNTER (OUTPATIENT)
Facility: HOSPITAL | Age: 74
Discharge: HOME | End: 2019-01-04
Attending: INTERNAL MEDICINE | Admitting: INTERNAL MEDICINE
Payer: MEDICARE

## 2019-01-04 ENCOUNTER — ANESTHESIA (OUTPATIENT)
Dept: ENDOSCOPY | Facility: HOSPITAL | Age: 74
End: 2019-01-04
Payer: MEDICARE

## 2019-01-04 ENCOUNTER — ANESTHESIA EVENT (OUTPATIENT)
Dept: ENDOSCOPY | Facility: HOSPITAL | Age: 74
End: 2019-01-04
Payer: MEDICARE

## 2019-01-04 VITALS
OXYGEN SATURATION: 94 % | RESPIRATION RATE: 18 BRPM | BODY MASS INDEX: 27.02 KG/M2 | HEIGHT: 71 IN | WEIGHT: 193 LBS | SYSTOLIC BLOOD PRESSURE: 141 MMHG | TEMPERATURE: 98 F | DIASTOLIC BLOOD PRESSURE: 81 MMHG | HEART RATE: 63 BPM

## 2019-01-04 DIAGNOSIS — I85.10 SECONDARY ESOPHAGEAL VARICES WITHOUT BLEEDING: Primary | ICD-10-CM

## 2019-01-04 DIAGNOSIS — Z87.19 HISTORY OF ESOPHAGEAL VARICES: ICD-10-CM

## 2019-01-04 PROCEDURE — 45380 COLONOSCOPY AND BIOPSY: CPT | Performed by: INTERNAL MEDICINE

## 2019-01-04 PROCEDURE — 27201022: Performed by: INTERNAL MEDICINE

## 2019-01-04 PROCEDURE — 37000009 HC ANESTHESIA EA ADD 15 MINS: Performed by: INTERNAL MEDICINE

## 2019-01-04 PROCEDURE — 45385 COLONOSCOPY W/LESION REMOVAL: CPT | Performed by: INTERNAL MEDICINE

## 2019-01-04 PROCEDURE — 37000008 HC ANESTHESIA 1ST 15 MINUTES: Performed by: INTERNAL MEDICINE

## 2019-01-04 PROCEDURE — 25000003 PHARM REV CODE 250: Performed by: NURSE ANESTHETIST, CERTIFIED REGISTERED

## 2019-01-04 PROCEDURE — E9220 PRA ENDO ANESTHESIA: HCPCS | Mod: ,,, | Performed by: NURSE ANESTHETIST, CERTIFIED REGISTERED

## 2019-01-04 PROCEDURE — 25000003 PHARM REV CODE 250: Performed by: INTERNAL MEDICINE

## 2019-01-04 PROCEDURE — 88305 TISSUE EXAM BY PATHOLOGIST: CPT | Mod: 59 | Performed by: PATHOLOGY

## 2019-01-04 PROCEDURE — 27201089 HC SNARE, DISP (ANY): Performed by: INTERNAL MEDICINE

## 2019-01-04 PROCEDURE — 63600175 PHARM REV CODE 636 W HCPCS: Performed by: NURSE ANESTHETIST, CERTIFIED REGISTERED

## 2019-01-04 PROCEDURE — 45385 COLONOSCOPY W/LESION REMOVAL: CPT | Mod: PT,,, | Performed by: INTERNAL MEDICINE

## 2019-01-04 PROCEDURE — 45380 COLONOSCOPY AND BIOPSY: CPT | Mod: 59,,, | Performed by: INTERNAL MEDICINE

## 2019-01-04 PROCEDURE — 88305 TISSUE EXAM BY PATHOLOGIST: CPT | Mod: 26,,, | Performed by: PATHOLOGY

## 2019-01-04 PROCEDURE — 45385 PR COLONOSCOPY,REMV LESN,SNARE: ICD-10-PCS | Mod: PT,,, | Performed by: INTERNAL MEDICINE

## 2019-01-04 PROCEDURE — E9220 PRA ENDO ANESTHESIA: ICD-10-PCS | Mod: ,,, | Performed by: NURSE ANESTHETIST, CERTIFIED REGISTERED

## 2019-01-04 PROCEDURE — 88305 TISSUE SPECIMEN TO PATHOLOGY - SURGERY: ICD-10-PCS | Mod: 26,,, | Performed by: PATHOLOGY

## 2019-01-04 PROCEDURE — 43244 EGD VARICES LIGATION: CPT | Performed by: INTERNAL MEDICINE

## 2019-01-04 PROCEDURE — 45380 PR COLONOSCOPY,BIOPSY: ICD-10-PCS | Mod: 59,,, | Performed by: INTERNAL MEDICINE

## 2019-01-04 PROCEDURE — 43244 PR EGD, FLEX, W/BAND LIGATION, ESOPH/GASTR VARICES: ICD-10-PCS | Mod: 51,,, | Performed by: INTERNAL MEDICINE

## 2019-01-04 PROCEDURE — 43244 EGD VARICES LIGATION: CPT | Mod: 51,,, | Performed by: INTERNAL MEDICINE

## 2019-01-04 RX ORDER — SODIUM CHLORIDE 9 MG/ML
INJECTION, SOLUTION INTRAVENOUS CONTINUOUS
Status: DISCONTINUED | OUTPATIENT
Start: 2019-01-04 | End: 2019-01-04 | Stop reason: HOSPADM

## 2019-01-04 RX ORDER — PROPOFOL 10 MG/ML
VIAL (ML) INTRAVENOUS CONTINUOUS PRN
Status: DISCONTINUED | OUTPATIENT
Start: 2019-01-04 | End: 2019-01-04

## 2019-01-04 RX ORDER — LIDOCAINE HCL/PF 100 MG/5ML
SYRINGE (ML) INTRAVENOUS
Status: DISCONTINUED | OUTPATIENT
Start: 2019-01-04 | End: 2019-01-04

## 2019-01-04 RX ORDER — PROPOFOL 10 MG/ML
VIAL (ML) INTRAVENOUS
Status: DISCONTINUED | OUTPATIENT
Start: 2019-01-04 | End: 2019-01-04

## 2019-01-04 RX ORDER — PHENYLEPHRINE HYDROCHLORIDE 10 MG/ML
INJECTION INTRAVENOUS
Status: DISCONTINUED | OUTPATIENT
Start: 2019-01-04 | End: 2019-01-04

## 2019-01-04 RX ORDER — SODIUM CHLORIDE 9 MG/ML
INJECTION, SOLUTION INTRAVENOUS CONTINUOUS PRN
Status: DISCONTINUED | OUTPATIENT
Start: 2019-01-04 | End: 2019-01-04

## 2019-01-04 RX ORDER — SODIUM CHLORIDE 0.9 % (FLUSH) 0.9 %
3 SYRINGE (ML) INJECTION
Status: DISCONTINUED | OUTPATIENT
Start: 2019-01-04 | End: 2019-01-04 | Stop reason: HOSPADM

## 2019-01-04 RX ADMIN — PROPOFOL 50 MG: 10 INJECTION, EMULSION INTRAVENOUS at 12:01

## 2019-01-04 RX ADMIN — PHENYLEPHRINE HYDROCHLORIDE 100 MCG: 10 INJECTION INTRAVENOUS at 12:01

## 2019-01-04 RX ADMIN — SODIUM CHLORIDE: 0.9 INJECTION, SOLUTION INTRAVENOUS at 10:01

## 2019-01-04 RX ADMIN — PROPOFOL 125 MCG/KG/MIN: 10 INJECTION, EMULSION INTRAVENOUS at 12:01

## 2019-01-04 RX ADMIN — PHENYLEPHRINE HYDROCHLORIDE 100 MCG: 10 INJECTION INTRAVENOUS at 01:01

## 2019-01-04 RX ADMIN — LIDOCAINE HYDROCHLORIDE 100 MG: 20 INJECTION, SOLUTION INTRAVENOUS at 12:01

## 2019-01-04 RX ADMIN — SODIUM CHLORIDE: 0.9 INJECTION, SOLUTION INTRAVENOUS at 12:01

## 2019-01-04 RX ADMIN — PHENYLEPHRINE HYDROCHLORIDE 200 MCG: 10 INJECTION INTRAVENOUS at 01:01

## 2019-01-04 NOTE — [employer] PATIENT INSTRUCTIONS
Discharge Summary/Instructions after an Endoscopic Procedure  Patient Name: Fox Lyons  Patient MRN: [id number]  Patient YOB: 1945 Friday, January 04, 2019  Emanuel Hannah MD  RESTRICTIONS:  During your procedure today, you received medications for sedation.  These   medications may affect your judgment, balance and coordination.  Therefore,   for 24 hours, you have the following restrictions:   - DO NOT drive a car, operate machinery, make legal/financial decisions,   sign important papers or drink alcohol.    ACTIVITY:  Today: no heavy lifting, straining or running due to procedural   sedation/anesthesia.  The following day: return to full activity including work.  DIET:  Eat and drink normally unless instructed otherwise.     TREATMENT FOR COMMON SIDE EFFECTS:  - Mild abdominal pain, nausea, belching, bloating or excessive gas:  rest,   eat lightly and use a heating pad.  - Sore Throat: treat with throat lozenges and/or gargle with warm salt   water.  - Because air was used during the procedure, expelling large amounts of air   from your rectum or belching is normal.  - If a bowel prep was taken, you may not have a bowel movement for 1-3 days.    This is normal.  SYMPTOMS TO WATCH FOR AND REPORT TO YOUR PHYSICIAN:  1. Abdominal pain or bloating, other than gas cramps.  2. Chest pain.  3. Back pain.  4. Signs of infection such as: chills or fever occurring within 24 hours   after the procedure.  5. Rectal bleeding, which would show as bright red, maroon, or black stools.   (A tablespoon of blood from the rectum is not serious, especially if   hemorrhoids are present.)  6. Vomiting.  7. Weakness or dizziness.  GO DIRECTLY TO THE NEAREST EMERGENCY ROOM IF YOU HAVE ANY OF THE FOLLOWING:      Difficulty breathing              Chills and/or fever over 101 F   Persistent vomiting and/or vomiting blood   Severe abdominal pain   Severe chest pain   Black, tarry stools   Bleeding- more than one  tablespoon   Any other symptom or condition that you feel may need urgent attention  Your doctor recommends these additional instructions:  If any biopsies were taken, your doctors clinic will contact you in 1 to 2   weeks with any results.  - Discharge patient to home.   - Patient has a contact number available for emergencies.  The signs and   symptoms of potential delayed complications were discussed with the   patient.  Return to normal activities tomorrow.  Written discharge   instructions were provided to the patient.   - Resume previous diet.   - Continue present medications.   - Await pathology results.   - Telephone GI clinic for pathology results in 1 week.   - Repeat colonoscopy in 3 years for surveillance of multiple polyps.  For questions, problems or results please call your physician - Emanuel Hannah MD at Work:  [phone].  [facility] [place], EMERGENCY ROOM PHONE NUMBER: [phone]  IF A COMPLICATION OR EMERGENCY SITUATION ARISES AND YOU ARE UNABLE TO REACH   YOUR PHYSICIAN - GO DIRECTLY TO THE EMERGENCY ROOM.  Emanuel Hannah MD  1/4/2019 1:26:39 PM  This report has been verified and signed electronically.  [employer]

## 2019-01-04 NOTE — TRANSFER OF CARE
"Anesthesia Transfer of Care Note    Patient: Fox Lyons Sr.    Procedure(s) Performed: Procedure(s) (LRB):  EGD (ESOPHAGOGASTRODUODENOSCOPY) (Left)  COLONOSCOPY (Left)    Patient location: PACU    Anesthesia Type: general    Transport from OR: Transported from OR on 2-3 L/min O2 by NC with adequate spontaneous ventilation    Post pain: adequate analgesia    Post assessment: no apparent anesthetic complications and tolerated procedure well    Post vital signs: stable    Level of consciousness: sedated and responds to stimulation    Nausea/Vomiting: no nausea/vomiting    Complications: none    Transfer of care protocol was followed      Last vitals:   Visit Vitals  BP (!) 97/58 (BP Location: Left arm, Patient Position: Lying)   Pulse 73   Temp 36.5 °C (97.7 °F) (Temporal)   Resp 18   Ht 5' 11" (1.803 m)   Wt 87.5 kg (193 lb)   SpO2 98%   BMI 26.92 kg/m²     "

## 2019-01-04 NOTE — ANESTHESIA PREPROCEDURE EVALUATION
01/04/2019  Pre-operative evaluation for Procedure(s) (LRB):  EGD (ESOPHAGOGASTRODUODENOSCOPY) (Left)  COLONOSCOPY (Left)    Fox Lyons Sr. is a 73 y.o. male CAD (s/p PCI, PPM for SSS, normal EF), HCV (with cirrhosis, portal HTN, ascites, varices, last MELD 15), COPD, CKD.      Patient Active Problem List   Diagnosis    Hypertension    Hyperlipidemia    Coronary artery disease    S/P coronary artery stent placement    Sick sinus syndrome    Cardiac pacemaker in situ    Hypertensive retinopathy - Both Eyes    Myopia - Both Eyes    Post-operative state - Right Eye    Senile nuclear sclerosis - Left Eye    Portal hypertension    Secondary male hypogonadism    GERD (gastroesophageal reflux disease)    Secondary adrenal insufficiency    Aortic atherosclerosis    Immunosuppressed status    Chronic kidney disease, stage III (moderate)    Thrombocytopenia    Chronic obstructive pulmonary disease (COPD)    Superior mesenteric vein thrombosis    History of hepatitis C    Hepatitis B antibody positive    Cirrhosis    Non-functioning pituitary adenoma    Growth hormone deficiency    Esophageal varices    Epistaxis    Ascites    History of esophageal varices       Review of patient's allergies indicates:   Allergen Reactions    Codeine Itching, Rash, Edema and Other (See Comments)     Other reaction(s): Itching       No current facility-administered medications on file prior to encounter.      Current Outpatient Medications on File Prior to Encounter   Medication Sig Dispense Refill    losartan (COZAAR) 100 MG tablet Take 1 tablet (100 mg total) by mouth once daily. 90 tablet 3    multivitamin-minerals-lutein (CENTRUM SILVER) Tab Take 1 tablet by mouth once daily.        NITROSTAT 0.4 mg SL tablet DISSOLVE 1 TABLET UNDER THE TONGUE EVERY 5 MINUTES AS NEEDED 25 tablet 0    pravastatin  (PRAVACHOL) 40 MG tablet TAKE 1 TABLET(40 MG) BY MOUTH EVERY EVENING 90 tablet 3       Past Surgical History:   Procedure Laterality Date    CARDIAC PACEMAKER PLACEMENT      st rochelle    CARDIAC SURGERY      stents x2    CATARACT EXTRACTION  13    RT    ESOPHAGOGASTRODUODENOSCOPY (EGD) Left 2018    Performed by Tr[facility] Funes [facility] at [facility] ENDO (4TH FLR)    ESOPHAGOGASTRODUODENOSCOPY (EGD) N/A 2017    Performed by Tr[facility]unes [facility] at [facility] ENDO (4TH FLR)    ESOPHAGOGASTRODUODENOSCOPY (EGD) Left 10/12/2017    Performed by Jody Humphries MD at [facility] ENDO (4TH FLR)    ESOPHAGOGASTRODUODENOSCOPY (EGD) N/A 2017    Performed by Trice [facility]unes MD at [facility] ENDO (4TH FLR)    ESOPHAGOGASTRODUODENOSCOPY (EGD) N/A 2016    Performed by Trice [facility]unes MD at [facility] ENDO (4TH FLR)    EYE SURGERY      INSERTION, IOL PROSTHESIS Right 2013    Performed by Ben Sparks MD at [facility] OR 1ST FLR    PARACENTESIS, ABDOMINAL N/A 2014    Performed by [facility] at [facility] [place] CATH LAB    PHACOEMULSIFICATION, CATARACT Right 2013    Performed by Ben Sparks MD at [facility] OR 1ST FLR    s/p PPV/AFX/EL (od)  2012       Social History     Socioeconomic History    Marital status:      Spouse name: Not on file    Number of children: Not on file    Years of education: Not on file    Highest education level: Not on file   Social Needs    Financial resource strain: Not on file    Food insecurity - worry: Not on file    Food insecurity - inability: Not on file    Transportation needs - medical: Not on file    Transportation needs - non-medical: Not on file   Occupational History    Not on file   Tobacco Use    Smoking status: Former Smoker     Packs/day: 1.00     Years: 35.00     Pack years: 35.00     Types: Cigarettes     Last attempt to quit: 1995     Years since quittin.5    Smokeless tobacco: Never Used   Substance and Sexual  Activity    Alcohol use: No     Alcohol/week: 0.0 oz    Drug use: No    Sexual activity: Not Currently   Other Topics Concern    Not on file   Social History Narrative    Not on file         CBC:   Recent Labs     19   WBC 8.99   RBC 4.50*   HGB 10.5*   HCT 36.3*   PLT 89*   MCV 81*   MCH 23.3*   MCHC 28.9*       CMP: No results for input(s): NA, K, CL, CO2, BUN, CREATININE, GLU, MG, PHOS, CALCIUM, ALBUMIN, PROT, ALKPHOS, ALT, AST, BILITOT in the last 72 hours.    INR  Recent Labs     19   INR 1.3*           Diagnostic Studies:      EKD Echo:  Results for orders placed or performed during the hospital encounter of 12/02/15   2D echo with color flow doppler   Result Value Ref Range    QEF 65 55 - 65    Mitral Valve Regurgitation TRIVIAL TO MILD     Diastolic Dysfunction No     Est. PA Systolic Pressure 36     Tricuspid Valve Regurgitation TRIVIAL          Anesthesia Evaluation    I have reviewed the Patient Summary Reports.     I have reviewed the Medications.     Review of Systems  Anesthesia Hx:  History of prior surgery of interest to airway management or planning: Denies Family Hx of Anesthesia complications.   Denies Personal Hx of Anesthesia complications.       Physical Exam  General:  Well nourished    Airway/Jaw/Neck:  Airway Findings: Mouth Opening: Normal Tongue: Normal  General Airway Assessment: Adult  Mallampati: II  TM Distance: Normal, at least 6 cm  Jaw/Neck Findings:  Neck ROM: Normal ROM      Dental:  Dental Findings: In tact   Chest/Lungs:  Chest/Lungs Findings: Clear to auscultation, Normal Respiratory Rate      Abdomen:  Abdomen Findings:  Distention       Mental Status:  Mental Status Findings:  Cooperative         Anesthesia Plan  Type of Anesthesia, risks & benefits discussed:  Anesthesia Type:  general  Patient's Preference:   Intra-op Monitoring Plan: standard ASA monitors  Intra-op Monitoring Plan Comments:   Post Op Pain Control Plan: multimodal  analgesia  Post Op Pain Control Plan Comments:   Induction:   IV  Beta Blocker:  Patient is not currently on a Beta-Blocker (No further documentation required).       Informed Consent: Patient understands risks and agrees with Anesthesia plan.  Questions answered. Anesthesia consent signed with patient.  ASA Score: 3     Day of Surgery Review of History & Physical:    H&P update referred to the surgeon.         Ready For Surgery From Anesthesia Perspective.

## 2019-01-04 NOTE — ANESTHESIA POSTPROCEDURE EVALUATION
"Anesthesia Post Evaluation    Patient: Fox Lyons     Procedure(s) Performed: Procedure(s) (LRB):  EGD (ESOPHAGOGASTRODUODENOSCOPY) (Left)  COLONOSCOPY (Left)    Final Anesthesia Type: general  Patient location during evaluation: PACU  Patient participation: Yes- Able to Participate  Level of consciousness: awake and alert and oriented  Post-procedure vital signs: reviewed and stable  Pain management: adequate  Airway patency: patent  PONV status at discharge: No PONV  Anesthetic complications: no      Cardiovascular status: hemodynamically stable  Respiratory status: unassisted  Hydration status: euvolemic  Follow-up not needed.        Visit Vitals  BP (!) 141/81 (BP Location: Left arm, Patient Position: Sitting)   Pulse 63   Temp 36.5 °C (97.7 °F) (Temporal)   Resp 18   Ht 5' 11" (1.803 m)   Wt 87.5 kg (193 lb)   SpO2 (!) 94%   BMI 26.92 kg/m²       Pain/Rosetta Score: Rosetta Score: 10 (1/4/2019  1:52 PM)        "

## 2019-01-04 NOTE — [employer] PATIENT INSTRUCTIONS
Discharge Summary/Instructions after an Endoscopic Procedure  Patient Name: Fox Lyons  Patient MRN: [id number]  Patient YOB: 1945 Friday, January 04, 2019  Emanuel Hannah MD  RESTRICTIONS:  During your procedure today, you received medications for sedation.  These   medications may affect your judgment, balance and coordination.  Therefore,   for 24 hours, you have the following restrictions:   - DO NOT drive a car, operate machinery, make legal/financial decisions,   sign important papers or drink alcohol.    ACTIVITY:  Today: no heavy lifting, straining or running due to procedural   sedation/anesthesia.  The following day: return to full activity including work.  DIET:  Eat and drink normally unless instructed otherwise.     TREATMENT FOR COMMON SIDE EFFECTS:  - Mild abdominal pain, nausea, belching, bloating or excessive gas:  rest,   eat lightly and use a heating pad.  - Sore Throat: treat with throat lozenges and/or gargle with warm salt   water.  - Because air was used during the procedure, expelling large amounts of air   from your rectum or belching is normal.  - If a bowel prep was taken, you may not have a bowel movement for 1-3 days.    This is normal.  SYMPTOMS TO WATCH FOR AND REPORT TO YOUR PHYSICIAN:  1. Abdominal pain or bloating, other than gas cramps.  2. Chest pain.  3. Back pain.  4. Signs of infection such as: chills or fever occurring within 24 hours   after the procedure.  5. Rectal bleeding, which would show as bright red, maroon, or black stools.   (A tablespoon of blood from the rectum is not serious, especially if   hemorrhoids are present.)  6. Vomiting.  7. Weakness or dizziness.  GO DIRECTLY TO THE NEAREST EMERGENCY ROOM IF YOU HAVE ANY OF THE FOLLOWING:      Difficulty breathing              Chills and/or fever over 101 F   Persistent vomiting and/or vomiting blood   Severe abdominal pain   Severe chest pain   Black, tarry stools   Bleeding- more than one  tablespoon   Any other symptom or condition that you feel may need urgent attention  Your doctor recommends these additional instructions:  If any biopsies were taken, your doctors clinic will contact you in 1 to 2   weeks with any results.  - Repeat upper endoscopy in 4 weeks for surveillance.   - Perform a colonoscopy now, for colon cancer screening.   - See colonoscopy report for further details.  For questions, problems or results please call your physician - Emanuel Hannah MD at Work:  [phone].  [facility], EMERGENCY ROOM PHONE NUMBER: [phone]  IF A COMPLICATION OR EMERGENCY SITUATION ARISES AND YOU ARE UNABLE TO REACH   YOUR PHYSICIAN - GO DIRECTLY TO THE EMERGENCY ROOM.  Emanuel Hannah MD  1/4/2019 12:37:34 PM  This report has been verified and signed electronically.  [employer]

## 2019-01-04 NOTE — H&P
Short Stay Endoscopy History and Physical    PCP - Clover White MD    Procedure - EGD/Colonoscopy  ASA - per anesthesia  Mallampati - per anesthesia  History of Anesthesia problems - no  Family history Anesthesia problems -  no   Plan of anesthesia - MAC    HPI:  This is a 73 y.o. male here for evaluation of esophageal varices and also needs colon cancer screening.      ROS:  Constitutional: No fevers, chills, No weight loss  CV: No chest pain  Pulm: No cough, No shortness of breath  Ophtho: No vision changes  GI: see HPI    Medical History:  has a past medical history of Anticoagulant long-term use, Ascites (12/10/2018), Cataract, Cirrhosis, Coronary artery disease, Hyperlipidemia, Hypertension, and Pacemaker.    Surgical History:  has a past surgical history that includes s/p PPV/AFX/EL (od) (05/09/2012); Cardiac surgery; Cardiac pacemaker placement; Cataract extraction (9/24/13); Eye surgery; and Esophagogastroduodenoscopy (Left, 5/31/2018).    Family History: family history includes Cancer in his brother, father, sister, and sister; Diabetes in his brother and sister; Heart disease in his mother and sister; Lung cancer in his brother; No Known Problems in his brother and sister.. Otherwise no colon cancer, inflammatory bowel disease, or GI malignancies.    Social History:  reports that he quit smoking about 23 years ago. His smoking use included cigarettes. He has a 35.00 pack-year smoking history. he has never used smokeless tobacco. He reports that he does not drink alcohol or use drugs.    Review of patient's allergies indicates:   Allergen Reactions    Codeine Itching, Rash, Edema and Other (See Comments)     Other reaction(s): Itching       Medications:   Medications Prior to Admission   Medication Sig Dispense Refill Last Dose    felodipine (PLENDIL) 5 MG 24 hr tablet TAKE 1 TABLET(5 MG) BY MOUTH EVERY DAY 90 tablet 1 1/3/2019 at Unknown time    fluticasone-umeclidin-vilanter (TRELEGY ELLIPTA)  100-62.5-25 mcg DsDv Inhale 1 puff into the lungs once daily. 1 each 12 1/4/2019 at Unknown time    furosemide (LASIX) 40 MG tablet Take 0.5 tablets (20 mg total) by mouth 2 (two) times daily. 30 tablet 11 1/3/2019 at Unknown time    hydrocortisone (CORTEF) 10 MG Tab TAKE ONE AND ONE-HALF TABLETS BY MOUTH EVERY MORNING AND ONE-HALF TABLET EVERY EVENING./NO FURTHER REFILLS  NEED FOLLOW UP VISIT 180 tablet 0 1/4/2019 at Unknown time    losartan (COZAAR) 100 MG tablet Take 1 tablet (100 mg total) by mouth once daily. 90 tablet 3 1/4/2019 at Unknown time    multivitamin-minerals-lutein (CENTRUM SILVER) Tab Take 1 tablet by mouth once daily.     1/4/2019 at Unknown time    omeprazole (PRILOSEC) 40 MG capsule TAKE 1 CAPSULE(40 MG) BY MOUTH TWICE DAILY BEFORE MEALS 180 capsule 0 1/4/2019 at Unknown time    pravastatin (PRAVACHOL) 40 MG tablet TAKE 1 TABLET(40 MG) BY MOUTH EVERY EVENING 90 tablet 3 1/3/2019 at Unknown time    spironolactone (ALDACTONE) 100 MG tablet Take 0.5 tablets (50 mg total) by mouth once daily. 15 tablet 11 1/3/2019 at Unknown time    GAVILYTE-G 236-22.74-6.74 -5.86 gram suspension    Taking    NITROSTAT 0.4 mg SL tablet DISSOLVE 1 TABLET UNDER THE TONGUE EVERY 5 MINUTES AS NEEDED 25 tablet 0 Taking       Physical Exam:    Vital Signs:   Vitals:    01/04/19 1027   BP: (!) 143/83   Pulse: 70   Resp: 18   Temp: 97.7 °F (36.5 °C)       General Appearance: Well appearing in no acute distress  Eyes:    No scleral icterus  ENT: Neck supple, Lips, mucosa, and tongue normal; teeth and gums normal  Lungs: CTA anteriorly  Heart:  Regular rate, S1, S2 normal, no murmurs heard.  Abdomen: Soft, non tender, non distended with normal bowel sounds. No hepatosplenomegaly, ascites, or mass.    Labs:  Lab Results   Component Value Date    WBC 8.99 01/04/2019    HGB 10.5 (L) 01/04/2019    HCT 36.3 (L) 01/04/2019    PLT 89 (L) 01/04/2019    CHOL 143 05/09/2018    TRIG 177 (H) 05/09/2018    HDL 37 (L) 05/09/2018     ALT 31 12/04/2018    AST 50 (H) 12/04/2018     12/04/2018    K 3.1 (L) 12/04/2018     12/04/2018    CREATININE 1.6 (H) 12/04/2018    BUN 14 12/04/2018    CO2 24 12/04/2018    TSH 1.567 05/09/2018    PSA <0.01 10/26/2016    INR 1.3 (H) 01/04/2019    HGBA1C 6.0 (H) 11/09/2018         Assessment:  73 y.o. male with cirrhosis and esophageal varices, needs colon cancer screening.    Plan:  Proceed with EGD and colonoscopy today.  I have explained the risks and benefits of endoscopy procedures to the patient including but not limited to bleeding, perforation, infection, and death.  All questions answered.        Emanuel Hannah MD

## 2019-01-11 ENCOUNTER — TELEPHONE (OUTPATIENT)
Dept: ENDOSCOPY | Facility: HOSPITAL | Age: 74
End: 2019-01-11

## 2019-01-29 ENCOUNTER — OFFICE VISIT (OUTPATIENT)
Dept: HEPATOLOGY | Facility: CLINIC | Age: 74
End: 2019-01-29
Payer: MEDICARE

## 2019-01-29 VITALS
SYSTOLIC BLOOD PRESSURE: 142 MMHG | DIASTOLIC BLOOD PRESSURE: 77 MMHG | OXYGEN SATURATION: 94 % | WEIGHT: 184.06 LBS | BODY MASS INDEX: 25.77 KG/M2 | TEMPERATURE: 98 F | HEART RATE: 76 BPM | RESPIRATION RATE: 18 BRPM | HEIGHT: 71 IN

## 2019-01-29 DIAGNOSIS — I85.00 ESOPHAGEAL VARICES WITHOUT BLEEDING, UNSPECIFIED ESOPHAGEAL VARICES TYPE: Primary | ICD-10-CM

## 2019-01-29 DIAGNOSIS — R18.8 OTHER ASCITES: ICD-10-CM

## 2019-01-29 DIAGNOSIS — I85.10 SECONDARY ESOPHAGEAL VARICES WITHOUT BLEEDING: ICD-10-CM

## 2019-01-29 DIAGNOSIS — Z86.19 HISTORY OF HEPATITIS C: Primary | ICD-10-CM

## 2019-01-29 DIAGNOSIS — K76.6 PORTAL HYPERTENSION: ICD-10-CM

## 2019-01-29 DIAGNOSIS — K74.60 CIRRHOSIS OF LIVER WITHOUT ASCITES, UNSPECIFIED HEPATIC CIRRHOSIS TYPE: ICD-10-CM

## 2019-01-29 DIAGNOSIS — Z87.19 HISTORY OF ESOPHAGEAL VARICES: ICD-10-CM

## 2019-01-29 PROCEDURE — 1101F PT FALLS ASSESS-DOCD LE1/YR: CPT | Mod: CPTII,S$GLB,, | Performed by: INTERNAL MEDICINE

## 2019-01-29 PROCEDURE — 99999 PR PBB SHADOW E&M-EST. PATIENT-LVL III: ICD-10-PCS | Mod: PBBFAC,,, | Performed by: INTERNAL MEDICINE

## 2019-01-29 PROCEDURE — 99499 RISK ADDL DX/OHS AUDIT: ICD-10-PCS | Mod: HCNC,S$GLB,, | Performed by: INTERNAL MEDICINE

## 2019-01-29 PROCEDURE — 99215 PR OFFICE/OUTPT VISIT, EST, LEVL V, 40-54 MIN: ICD-10-PCS | Mod: S$GLB,,, | Performed by: INTERNAL MEDICINE

## 2019-01-29 PROCEDURE — 99215 OFFICE O/P EST HI 40 MIN: CPT | Mod: S$GLB,,, | Performed by: INTERNAL MEDICINE

## 2019-01-29 PROCEDURE — 1101F PR PT FALLS ASSESS DOC 0-1 FALLS W/OUT INJ PAST YR: ICD-10-PCS | Mod: CPTII,S$GLB,, | Performed by: INTERNAL MEDICINE

## 2019-01-29 PROCEDURE — 3077F SYST BP >= 140 MM HG: CPT | Mod: CPTII,S$GLB,, | Performed by: INTERNAL MEDICINE

## 2019-01-29 PROCEDURE — 3078F DIAST BP <80 MM HG: CPT | Mod: CPTII,S$GLB,, | Performed by: INTERNAL MEDICINE

## 2019-01-29 PROCEDURE — 3077F PR MOST RECENT SYSTOLIC BLOOD PRESSURE >= 140 MM HG: ICD-10-PCS | Mod: CPTII,S$GLB,, | Performed by: INTERNAL MEDICINE

## 2019-01-29 PROCEDURE — 99999 PR PBB SHADOW E&M-EST. PATIENT-LVL III: CPT | Mod: PBBFAC,,, | Performed by: INTERNAL MEDICINE

## 2019-01-29 PROCEDURE — 3078F PR MOST RECENT DIASTOLIC BLOOD PRESSURE < 80 MM HG: ICD-10-PCS | Mod: CPTII,S$GLB,, | Performed by: INTERNAL MEDICINE

## 2019-01-29 PROCEDURE — 99499 UNLISTED E&M SERVICE: CPT | Mod: HCNC,S$GLB,, | Performed by: INTERNAL MEDICINE

## 2019-01-29 RX ORDER — SODIUM CHLORIDE 0.9 % (FLUSH) 0.9 %
3 SYRINGE (ML) INJECTION
Status: CANCELLED | OUTPATIENT
Start: 2019-01-29

## 2019-01-29 RX ORDER — LACTULOSE 10 G/15ML
20 SOLUTION ORAL DAILY
Qty: 3000 ML | Refills: 11 | Status: ON HOLD | OUTPATIENT
Start: 2019-01-29 | End: 2019-04-18 | Stop reason: SDUPTHER

## 2019-01-29 NOTE — PATIENT INSTRUCTIONS
1. Labs today and monthly  2. EGD feb 19th at 9:30 am  3. Consider paracentesis  4. May need tips  5. Lactulose for balance issues  retun 8 weeks

## 2019-01-29 NOTE — PROGRESS NOTES
HEPATOLOGY FOLLOW UP    Referring Physician: Clover White MD  Current Corresponding Physician: Clover White MD    Fox Lyons Sr. is here for follow up of cirrhosis. I saw him in 2016 for clearance for cholecystectomy. Because of cirrhosis I did not recommend a cholecystectomy.     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 Plts are 58), esophageal varices, COPD, CAD with cardiac stents and pacemaker, CKD. He was dx w/ chronic HCV infection in 1995, likely r/t IVDU. Patient presented at the time with c/o weakness, loss of appetite. Patient states had liver bx at that time as well and was told he was negative for cancer but does not recall any further results. He received INF treatment ~2003 at [facility], treated x 6 months w/ SVR. Bailee occurred shortly after completion of treatment and he never followed up. Last HCV VL was done in 2016 and was negative.    HPI  Since Fox Lyons Sr.'s last visit he had a  Ct scan done 10/26/18 and 11/29/18: no HCC but he does have moderate ascites. Despite the initiation of lasix 40 mg daily and spironolactone 50 mg daily, he continues to have significant ascites. He has deferred a paracentesis. Creat and GFR not normal (42-49.5).    EGD 1/4/19:  banded  He is c/o of some issues with balance but is not confused.     MELD-Na score: 15 at 1/29/2019  3:20 PM  MELD score: 14 at 1/29/2019  3:20 PM  Calculated from:  Serum Creatinine: 1.4 mg/dL at 1/29/2019  3:20 PM  Serum Sodium: 136 mmol/L at 1/29/2019  3:20 PM  Total Bilirubin: 1.9 mg/dL at 1/29/2019  3:20 PM  INR(ratio): 1.2 at 1/29/2019  3:20 PM  Age: 73 years    Outpatient Encounter Medications as of 1/29/2019   Medication Sig Dispense Refill    felodipine (PLENDIL) 5 MG 24 hr tablet TAKE 1 TABLET(5 MG) BY MOUTH EVERY DAY 90 tablet 1    fluticasone-umeclidin-vilanter (TRELEGY ELLIPTA) 100-62.5-25 mcg DsDv Inhale 1 puff into the lungs once daily. 1  each 12    furosemide (LASIX) 40 MG tablet Take 0.5 tablets (20 mg total) by mouth 2 (two) times daily. 30 tablet 11    hydrocortisone (CORTEF) 10 MG Tab TAKE ONE AND ONE-HALF TABLETS BY MOUTH EVERY MORNING AND ONE-HALF TABLET EVERY EVENING./NO FURTHER REFILLS  NEED FOLLOW UP VISIT 180 tablet 0    losartan (COZAAR) 100 MG tablet Take 1 tablet (100 mg total) by mouth once daily. 90 tablet 3    multivitamin-minerals-lutein (CENTRUM SILVER) Tab Take 1 tablet by mouth once daily.        NITROSTAT 0.4 mg SL tablet DISSOLVE 1 TABLET UNDER THE TONGUE EVERY 5 MINUTES AS NEEDED 25 tablet 0    omeprazole (PRILOSEC) 40 MG capsule TAKE 1 CAPSULE(40 MG) BY MOUTH TWICE DAILY BEFORE MEALS 180 capsule 0    pravastatin (PRAVACHOL) 40 MG tablet TAKE 1 TABLET(40 MG) BY MOUTH EVERY EVENING 90 tablet 3    spironolactone (ALDACTONE) 100 MG tablet Take 0.5 tablets (50 mg total) by mouth once daily. 15 tablet 11     No facility-administered encounter medications on file as of 1/29/2019.      Review of patient's allergies indicates:   Allergen Reactions    Codeine Itching, Rash, Edema and Other (See Comments)     Other reaction(s): Itching     Past Medical History:   Diagnosis Date    Anticoagulant long-term use     Ascites 12/10/2018    Cataract     Cirrhosis     Coronary artery disease     Hyperlipidemia     Hypertension     Pacemaker        Review of Systems   Constitutional: Negative.    HENT: Negative.    Eyes: Negative.    Respiratory: Negative.    Cardiovascular: Negative.    Gastrointestinal: Negative.    Genitourinary: Negative.    Musculoskeletal: Negative.    Skin: Negative.    Neurological: Negative.         +problems with balance   Psychiatric/Behavioral: Negative.      Vitals:    01/29/19 1417   BP: (!) 142/77   Pulse: 76   Resp: 18   Temp: 98.2 °F (36.8 °C)       Physical Exam   Constitutional: He is oriented to person, place, and time. He appears well-developed and well-nourished.   HENT:   Head:  Normocephalic and atraumatic.   Eyes: Conjunctivae and EOM are normal. Pupils are equal, round, and reactive to light. No scleral icterus.   Neck: Normal range of motion. Neck supple. No thyromegaly present.   Cardiovascular: Normal rate, regular rhythm and normal heart sounds.   Pulmonary/Chest: Effort normal and breath sounds normal. He has no rales.   Abdominal: Soft. Bowel sounds are normal. He exhibits no distension and no mass. There is no tenderness.   Musculoskeletal: Normal range of motion. He exhibits no edema.   Neurological: He is alert and oriented to person, place, and time.   Skin: Skin is warm and dry. No rash noted.   Psychiatric: He has a normal mood and affect.   Vitals reviewed.      Lab Results   Component Value Date     (H) 12/04/2018    BUN 14 12/04/2018    CREATININE 1.6 (H) 12/04/2018    CALCIUM 9.0 12/04/2018     12/04/2018    K 3.1 (L) 12/04/2018     12/04/2018    PROT 8.2 12/04/2018    CO2 24 12/04/2018    ANIONGAP 11 12/04/2018    WBC 8.99 01/04/2019    RBC 4.50 (L) 01/04/2019    HGB 10.5 (L) 01/04/2019    HCT 36.3 (L) 01/04/2019    MCV 81 (L) 01/04/2019    MCH 23.3 (L) 01/04/2019    MCHC 28.9 (L) 01/04/2019     Lab Results   Component Value Date    RDW 20.4 (H) 01/04/2019    PLT 89 (L) 01/04/2019    MPV 10.8 01/04/2019    GRAN 6.6 01/04/2019    GRAN 73.8 (H) 01/04/2019    LYMPH 1.3 01/04/2019    LYMPH 14.7 (L) 01/04/2019    MONO 0.8 01/04/2019    MONO 8.9 01/04/2019    EOSINOPHIL 1.4 01/04/2019    BASOPHIL 0.6 01/04/2019    EOS 0.1 01/04/2019    BASO 0.05 01/04/2019    APTT 27.6 05/07/2012    GROUPTRH B POS 05/28/2011    BNP <10 02/05/2016    CHOL 143 05/09/2018    TRIG 177 (H) 05/09/2018    HDL 37 (L) 05/09/2018    CHOLHDL 25.9 05/09/2018    TOTALCHOLEST 3.9 05/09/2018    ALBUMIN 3.2 (L) 12/04/2018    ALBUMIN 3.9 10/06/2014    BILIDIR 0.4 (H) 08/02/2013    AST 50 (H) 12/04/2018    ALT 31 12/04/2018    ALKPHOS 119 12/04/2018    MG 2.0 05/31/2011    LABPROT 13.2 (H)  01/04/2019    INR 1.3 (H) 01/04/2019    PSA <0.01 10/26/2016       Assessment and Plan:    Fox Lyons Sr. is a 73 y.o. male with compensated cirrhosis. My current recommendations:  1. Cirrhosis, decompensated: not well controlled; will not increase diuretics due to renal insufficiency. Consider prn paracenteses. May need tips. Monitor meld labs monthly  2. Portal htn: recommend repeat EGD feb 19th to f/u on EV  3. Colorectal ca screening: recommend repeat colonoscopy.  4. Balance issues: may represent HE:trial of lactulose    Return 8 weeks  . .

## 2019-02-04 ENCOUNTER — OFFICE VISIT (OUTPATIENT)
Dept: OPTOMETRY | Facility: CLINIC | Age: 74
End: 2019-02-04
Payer: MEDICARE

## 2019-02-04 DIAGNOSIS — H52.4 ASTIGMATISM WITH PRESBYOPIA, BILATERAL: ICD-10-CM

## 2019-02-04 DIAGNOSIS — I10 ESSENTIAL HYPERTENSION: Primary | Chronic | ICD-10-CM

## 2019-02-04 DIAGNOSIS — H25.12 NUCLEAR SCLEROSIS OF LEFT EYE: ICD-10-CM

## 2019-02-04 DIAGNOSIS — H52.203 ASTIGMATISM WITH PRESBYOPIA, BILATERAL: ICD-10-CM

## 2019-02-04 PROCEDURE — 92004 COMPRE OPH EXAM NEW PT 1/>: CPT | Mod: S$GLB,,, | Performed by: OPTOMETRIST

## 2019-02-04 PROCEDURE — 92015 DETERMINE REFRACTIVE STATE: CPT | Mod: S$GLB,,, | Performed by: OPTOMETRIST

## 2019-02-04 PROCEDURE — 92004 PR EYE EXAM, NEW PATIENT,COMPREHESV: ICD-10-PCS | Mod: S$GLB,,, | Performed by: OPTOMETRIST

## 2019-02-04 PROCEDURE — 99999 PR PBB SHADOW E&M-EST. PATIENT-LVL II: ICD-10-PCS | Mod: PBBFAC,,, | Performed by: OPTOMETRIST

## 2019-02-04 PROCEDURE — 92015 PR REFRACTION: ICD-10-PCS | Mod: S$GLB,,, | Performed by: OPTOMETRIST

## 2019-02-04 PROCEDURE — 99999 PR PBB SHADOW E&M-EST. PATIENT-LVL II: CPT | Mod: PBBFAC,,, | Performed by: OPTOMETRIST

## 2019-02-04 NOTE — PROGRESS NOTES
HPI     Dls: 4/27/15    +blurred vision ou more the left eye at distance and near.  -no pain or irritations  +flash os prn comes and goes - mainly when closing eyes going to bed- not   sure how long it been going on  -no floaters    -no eye drops    Hx:  S/p ce iol OD  Pt had pituitary tumor 12/9/14 resolved with radiation.        Last edited by Shannon Marley on 2/4/2019  9:26 AM. (History)            Assessment /Plan     For exam results, see Encounter Report.    Essential hypertension    Nuclear sclerosis of left eye    Astigmatism with presbyopia, bilateral            1.  No retinopathy--monitor yearly.  BP control.  Eye health normal OU.  2.  Educated on cataracts and affects on vision.  Patient happy with vision.  Monitor.  3.  Bifocal rx given

## 2019-02-11 ENCOUNTER — TELEPHONE (OUTPATIENT)
Dept: TRANSPLANT | Facility: CLINIC | Age: 74
End: 2019-02-11

## 2019-02-11 DIAGNOSIS — K74.60 HEPATIC CIRRHOSIS, UNSPECIFIED HEPATIC CIRRHOSIS TYPE, UNSPECIFIED WHETHER ASCITES PRESENT: Primary | ICD-10-CM

## 2019-02-14 DIAGNOSIS — E27.49 SECONDARY ADRENAL INSUFFICIENCY: ICD-10-CM

## 2019-02-15 ENCOUNTER — TELEPHONE (OUTPATIENT)
Dept: ENDOCRINOLOGY | Facility: CLINIC | Age: 74
End: 2019-02-15

## 2019-02-15 RX ORDER — HYDROCORTISONE 10 MG/1
TABLET ORAL
Qty: 180 TABLET | Refills: 0 | Status: SHIPPED | OUTPATIENT
Start: 2019-02-15 | End: 2019-04-11 | Stop reason: SDUPTHER

## 2019-02-15 RX ORDER — HYDROCORTISONE 10 MG/1
TABLET ORAL
Qty: 180 TABLET | Refills: 0 | OUTPATIENT
Start: 2019-02-15

## 2019-02-15 NOTE — TELEPHONE ENCOUNTER
----- Message from Tianna Luevano sent at 2/14/2019  4:39 PM CST -----  Contact: PT- [phone] or [phone]  ..Refill request.hydrocortisone (CORTEF) 10 MG Tab  ..  [email or web address] [address] [place] [address] AT [address] [address]  Phone: [phone] Fax: [phone]    Charley Ball

## 2019-02-18 ENCOUNTER — TELEPHONE (OUTPATIENT)
Dept: NEUROSURGERY | Facility: CLINIC | Age: 74
End: 2019-02-18

## 2019-02-18 NOTE — TELEPHONE ENCOUNTER
Called pt's wife. Explained we cannot see him w/o a CT w & w/o and we cannot order it b/c we have not seen him in over 3 yrs.    Recommended she get the PCP to order CT Head w & w/o and once done, let us know.    She v/u    ----- Message from Nancy Luevano sent at 2/18/2019 10:06 AM CST -----  Contact: pt @ [phone] or  (cell)   Calling to schedule a follow up appt with Dr. Calero, says he also needs an order for a CT. Asking for an appt 3/20's.  Please call, says he would like to discuss his pain medication.

## 2019-02-20 ENCOUNTER — PES CALL (OUTPATIENT)
Dept: ADMINISTRATIVE | Facility: CLINIC | Age: 74
End: 2019-02-20

## 2019-03-04 ENCOUNTER — TELEPHONE (OUTPATIENT)
Dept: HEPATOLOGY | Facility: HOSPITAL | Age: 74
End: 2019-03-04

## 2019-03-04 ENCOUNTER — HOSPITAL ENCOUNTER (OUTPATIENT)
Dept: RADIOLOGY | Facility: HOSPITAL | Age: 74
Discharge: HOME | End: 2019-03-04
Attending: FAMILY MEDICINE
Payer: MEDICARE

## 2019-03-04 DIAGNOSIS — R18.8 ASCITES OF LIVER: Primary | ICD-10-CM

## 2019-03-04 DIAGNOSIS — R79.89 ABNORMAL BILIRUBIN TEST: ICD-10-CM

## 2019-03-04 PROCEDURE — 76700 US ABDOMEN COMPLETE: ICD-10-PCS | Mod: 26,HCNC,, | Performed by: RADIOLOGY

## 2019-03-04 PROCEDURE — 76700 US EXAM ABDOM COMPLETE: CPT | Mod: 26,HCNC,, | Performed by: RADIOLOGY

## 2019-03-04 PROCEDURE — 76700 US EXAM ABDOM COMPLETE: CPT | Mod: TC,HCNC

## 2019-03-06 ENCOUNTER — TELEPHONE (OUTPATIENT)
Dept: FAMILY MEDICINE | Facility: CLINIC | Age: 74
End: 2019-03-06

## 2019-03-06 NOTE — TELEPHONE ENCOUNTER
----- Message from Clover White MD sent at 3/5/2019 12:27 PM CST -----  Liver changes noted c/w cirrhosis pt is already followed in hepatology please make sure pt has follow up with them

## 2019-03-06 NOTE — TELEPHONE ENCOUNTER
Spoke with patient wife and relayed results and she said he has been following up with Hepatologist and would like to schedule a f/u with Dr ruiz. He will call back

## 2019-03-08 ENCOUNTER — TELEPHONE (OUTPATIENT)
Dept: FAMILY MEDICINE | Facility: CLINIC | Age: 74
End: 2019-03-08

## 2019-03-08 NOTE — TELEPHONE ENCOUNTER
I was unable to reach the patient by phone. Please call to schedule follow up appt for patient to see Hepatology.Thanks.

## 2019-03-10 DIAGNOSIS — K21.9 GASTROESOPHAGEAL REFLUX DISEASE WITHOUT ESOPHAGITIS: ICD-10-CM

## 2019-03-11 RX ORDER — OMEPRAZOLE 40 MG/1
CAPSULE, DELAYED RELEASE ORAL
Qty: 180 CAPSULE | Refills: 0 | Status: SHIPPED | OUTPATIENT
Start: 2019-03-11

## 2019-03-11 NOTE — TELEPHONE ENCOUNTER
Called and spoke with pt and scheduled an appt with Hepatology for 04/16/19.  Informed pt is a sooner appt is needed please call office

## 2019-03-14 ENCOUNTER — TELEPHONE (OUTPATIENT)
Dept: FAMILY MEDICINE | Facility: CLINIC | Age: 74
End: 2019-03-14

## 2019-03-14 NOTE — TELEPHONE ENCOUNTER
Please h ave pt see cardiology as he has not seen them recently.  Okay to schedule at our clinic for convenience if possible

## 2019-03-14 NOTE — TELEPHONE ENCOUNTER
----- Message from Kendal Garcia sent at 3/14/2019 11:21 AM CDT -----  Contact: Pt   Name of Who is Calling: JAN JACKSON SR. [[id number]]      What is the request in detail: Patient is requesting a call from staff in regards to getting Nitroglycerin approved by the doctor. Please contact to further discuss and advise      Can the clinic reply by [facility]: No       What Number to Call Back if not in [facility]: [phone]

## 2019-03-18 ENCOUNTER — HOSPITAL ENCOUNTER (OUTPATIENT)
Dept: INTERVENTIONAL RADIOLOGY/VASCULAR | Facility: HOSPITAL | Age: 74
Discharge: HOME | End: 2019-03-18
Attending: INTERNAL MEDICINE
Payer: MEDICARE

## 2019-03-18 ENCOUNTER — TELEPHONE (OUTPATIENT)
Dept: TRANSPLANT | Facility: CLINIC | Age: 74
End: 2019-03-18

## 2019-03-18 VITALS
SYSTOLIC BLOOD PRESSURE: 104 MMHG | DIASTOLIC BLOOD PRESSURE: 68 MMHG | OXYGEN SATURATION: 100 % | RESPIRATION RATE: 18 BRPM | HEART RATE: 72 BPM

## 2019-03-18 DIAGNOSIS — R18.8 ASCITES OF LIVER: ICD-10-CM

## 2019-03-18 DIAGNOSIS — R18.8 OTHER ASCITES: Primary | ICD-10-CM

## 2019-03-18 LAB
ALBUMIN FLD-MCNC: 0.5 G/DL
APPEARANCE FLD: CLEAR
BODY FLD TYPE: NORMAL
COLOR FLD: YELLOW
LYMPHOCYTES NFR FLD MANUAL: 41 %
MONOS+MACROS NFR FLD MANUAL: 7 %
NEUTROPHILS NFR FLD MANUAL: 52 %
PROT FLD-MCNC: <1 G/DL
SPECIMEN SOURCE: NORMAL
SPECIMEN SOURCE: NORMAL
WBC # FLD: 75 /CU MM

## 2019-03-18 PROCEDURE — 89051 BODY FLUID CELL COUNT: CPT | Mod: HCNC

## 2019-03-18 PROCEDURE — 63600175 PHARM REV CODE 636 W HCPCS: Mod: HCNC,JG | Performed by: INTERNAL MEDICINE

## 2019-03-18 PROCEDURE — P9047 ALBUMIN (HUMAN), 25%, 50ML: HCPCS | Mod: HCNC,JG | Performed by: INTERNAL MEDICINE

## 2019-03-18 PROCEDURE — 82042 OTHER SOURCE ALBUMIN QUAN EA: CPT | Mod: HCNC

## 2019-03-18 PROCEDURE — 49083 ABD PARACENTESIS W/IMAGING: CPT | Mod: HCNC,,, | Performed by: NURSE PRACTITIONER

## 2019-03-18 PROCEDURE — 49083 ABD PARACENTESIS W/IMAGING: CPT | Mod: HCNC

## 2019-03-18 PROCEDURE — 49083 IR PARACENTESIS WITH IMAGING: ICD-10-PCS | Mod: HCNC,,, | Performed by: NURSE PRACTITIONER

## 2019-03-18 PROCEDURE — 84157 ASSAY OF PROTEIN OTHER: CPT | Mod: HCNC

## 2019-03-18 RX ORDER — ALBUMIN HUMAN 250 G/1000ML
37.5 SOLUTION INTRAVENOUS ONCE
Status: COMPLETED | OUTPATIENT
Start: 2019-03-18 | End: 2019-03-18

## 2019-03-18 RX ORDER — CIPROFLOXACIN 500 MG/1
500 TABLET ORAL DAILY
Qty: 30 TABLET | Refills: 11 | Status: SHIPPED | OUTPATIENT
Start: 2019-03-18

## 2019-03-18 RX ADMIN — ALBUMIN (HUMAN) 37.5 G: 25 SOLUTION INTRAVENOUS at 11:03

## 2019-03-18 NOTE — H&P
Radiology History & Physical      SUBJECTIVE:     Chief Complaint: abdominal distention    History of Present Illness:  Fox Lyons Sr. is a 73 y.o. male who presents for evaluation of ascites  Past Medical History:   Diagnosis Date    Anemia     Anticoagulant long-term use     Ascites 12/10/2018    Biliary colic     Cataract     Chronic hepatitis C     Cirrhosis     COPD (chronic obstructive pulmonary disease)     Coronary artery disease     Dyspnea     Epistaxis     Esophageal varices without bleeding     Gallstones     GERD (gastroesophageal reflux disease)     HCC (hepatocellular carcinoma)     Hepatitis B antibody positive     Hyperglycemia     Hyperlipidemia     Hypertension     Hypopituitarism     Mobitz type 2 second degree atrioventricular block     Nuclear sclerosis, left     Pacemaker     Pituitary tumor     Portal hypertension     Pulmonary emphysema     PVT (paroxysmal ventricular tachycardia)     SA node dysfunction     Secondary adrenal insufficiency     Secondary male hypogonadism     SSS (sick sinus syndrome)     Superior mesenteric vein thrombosis     Vitreous hemorrhage, both eyes      Past Surgical History:   Procedure Laterality Date    CARDIAC PACEMAKER PLACEMENT      st ro[facility]le    CATARACT EXTRACTION  9/24/13    RT    COLONOSCOPY Left 1/4/2019    Performed by Emanuel Hannah MD at [facility] ENDO (4TH FLR)    CORONARY ANGIOPLASTY WITH STENT PLACEMENT      2 stents    EGD (ESOPHAGOGASTRODUODENOSCOPY) Left 2/19/2019    Performed by Trice Funes MD at [facility] END[facility]    EGD (ESOPHAGOGASTRODUODENOSCOPY) Left 1/4/2019    Performed by Emanuel Hannah MD at [facility] ENDO (4TH FLR)    ESOPHAGOGASTRODUODENOSCOPY (EGD) Left 5/31/2018    Performed by Trice Funes MD at [facility] ENDO (4TH FLR)    ESOPHAGOGASTRODUODENOSCOPY (EGD) N/A 11/30/2017    Performed by Trice Funes MD at [facility] ENDO (4TH FLR)    ESOPHAGOGASTRODUODENOSCOPY (EGD) Left 10/12/2017    Performed by  Jody Humphries MD at [facility] ENDO (4TH FLR)    ESOPHAGOGASTRODUODENOSCOPY (EGD) N/A 8/17/2017    Performed by Trice Funes MD at [facility] ENDO (4TH FLR)    ESOPHAGOGASTRODUODENOSCOPY (EGD) N/A 7/14/2016    Performed by Trice Funes MD at [facility] ENDO (4TH FLR)    EYE SURGERY      INSERTION, IOL PROSTHESIS Right 9/24/2013    Performed by Ben Sparks MD at [facility] OR 1ST FLR    PARACENTESIS, ABDOMINAL N/A 4/16/2014    Performed by [facility] Diagnostic Provider at [facility] CATH LAB    PHACOEMULSIFICATION, CATARACT Right 9/24/2013    Performed by Ben Sparks MD at [facility] OR 1ST FLR    s/p PPV/AFX/EL (od)  05/09/2012       Home Meds:   Prior to Admission medications    Medication Sig Start Date End Date Taking? Authorizing Provider   felodipine (PLENDIL) 5 MG 24 hr tablet TAKE 1 TABLET(5 MG) BY MOUTH EVERY DAY 11/9/18   Clover White MD   fluticasone-umeclidin-vilanter (TRELEGY ELLIPTA) 100-62.5-25 mcg DsDv Inhale 1 puff into the lungs once daily. 11/6/18   Katelyn Ortiz MD   furosemide (LASIX) 40 MG tablet Take 0.5 tablets (20 mg total) by mouth 2 (two) times daily. 12/4/18 12/4/19  Trice Funes MD   hydrocortisone (CORTEF) 10 MG Tab TAKE ONE AND ONE-HALF TABLETS BY MOUTH EVERY MORNING AND ONE-HALF TABLET EVERY EVENING./NO FURTHER REFILLS  NEED FOLLOW UP VISIT 2/15/19   Teo Isbell MD   lactulose (CHRONULAC) 20 gram/30 mL Soln Take 30 mLs (20 g total) by mouth once daily. for 100 doses 1/29/19 5/9/19  Trice Funes MD   losartan (COZAAR) 100 MG tablet Take 1 tablet (100 mg total) by mouth once daily. 5/9/18   Clover White MD   multivitamin-minerals-lutein (CENTRUM SILVER) Tab Take 1 tablet by mouth once daily.      Historical Provider, MD   NITROSTAT 0.4 mg SL tablet DISSOLVE 1 TABLET UNDER THE TONGUE EVERY 5 MINUTES AS NEEDED 5/25/16   Clover White MD   omeprazole (PRILOSEC) 40 MG capsule TAKE 1 CAPSULE(40 MG) BY MOUTH TWICE DAILY BEFORE MEALS 3/11/19   Clover BLANCO  MD Christopher   pravastatin (PRAVACHOL) 40 MG tablet TAKE 1 TABLET(40 MG) BY MOUTH EVERY EVENING 5/9/18   Clover White MD   spironolactone (ALDACTONE) 100 MG tablet Take 0.5 tablets (50 mg total) by mouth once daily. 12/4/18 12/4/19  Trice Funes MD     Anticoagulants/Antiplatelets: no anticoagulation    Allergies:   Review of patient's allergies indicates:   Allergen Reactions    Codeine Itching, Rash, Edema and Other (See Comments)     Other reaction(s): Itching     Sedation History:  no adverse reactions    Review of Systems:   Hematological: no known coagulopathies  Respiratory: no shortness of breath  Cardiovascular: no chest pain  Gastrointestinal: no abdominal pain  Genito-Urinary: no dysuria  Musculoskeletal: negative  Neurological: no TIA or stroke symptoms         OBJECTIVE:     Vital Signs (Most Recent)       Physical Exam:  ASA: 2  Mallampati: na    General: no acute distress  Mental Status: alert and oriented to person, place and time  HEENT: normocephalic, atraumatic  Chest: unlabored breathing  Heart: regular heart rate  Abdomen: distended  Extremity: moves all extremities    Laboratory  Lab Results   Component Value Date    INR 1.2 01/29/2019    INR 1.2 01/29/2019       Lab Results   Component Value Date    WBC 9.00 01/29/2019    WBC 9.00 01/29/2019    HGB 10.7 (L) 01/29/2019    HGB 10.7 (L) 01/29/2019    HCT 34.1 (L) 01/29/2019    HCT 34.1 (L) 01/29/2019    MCV 77 (L) 01/29/2019    MCV 77 (L) 01/29/2019    PLT 91 (L) 01/29/2019    PLT 91 (L) 01/29/2019      Lab Results   Component Value Date     (H) 01/29/2019     (H) 01/29/2019     01/29/2019     01/29/2019    K 3.6 01/29/2019    K 3.6 01/29/2019     01/29/2019     01/29/2019    CO2 24 01/29/2019    CO2 24 01/29/2019    BUN 15 01/29/2019    BUN 15 01/29/2019    CREATININE 1.4 01/29/2019    CREATININE 1.4 01/29/2019    CALCIUM 8.6 01/29/2019    CALCIUM 8.6 01/29/2019    MG 2.0 05/31/2011    ALT 31  01/29/2019    ALT 31 01/29/2019    AST 52 (H) 01/29/2019    AST 52 (H) 01/29/2019    ALBUMIN 3.0 (L) 01/29/2019    ALBUMIN 3.0 (L) 01/29/2019    BILITOT 1.9 (H) 01/29/2019    BILITOT 1.9 (H) 01/29/2019    BILIDIR 0.4 (H) 08/02/2013       ASSESSMENT/PLAN:     Sedation Plan: abdominal distention  Patient will undergo US guided paracentesis

## 2019-03-18 NOTE — DISCHARGE INSTRUCTIONS
For scheduling: Call Usha at [phone]    For questions or concerns call: CAT MON-FRI 8 AM- 5PM [phone]. Radiology resident on call [phone].    For immediate concerns that are not emergent, you may call our radiology clinic at: [phone]

## 2019-03-18 NOTE — PROCEDURES
Radiology Post-Procedure Note    Pre Op Diagnosis: Ascites  Post Op Diagnosis: Same    Procedure: Ultrasound Guided Paracentesis    Procedure performed by: KORIN Mayo DNP  Written Informed Consent Obtained: Yes  Specimen Removed: YES clear yellow  Estimated Blood Loss: Minimal    Findings:   Successful paracentesis.  Albumin administered PRN per protocol.    Patient tolerated procedure well.

## 2019-03-18 NOTE — PROGRESS NOTES
Pt given all d/c instructions, pt verbalizes understanding of this. 5000 removed from right side of abdomen. Dressing to site CDI. 150 mLs given per protocol. Questions answered. Pt has no complaints at this time. Pt escorted back to lobby at this time.

## 2019-04-11 ENCOUNTER — OFFICE VISIT (OUTPATIENT)
Dept: ENDOCRINOLOGY | Facility: CLINIC | Age: 74
End: 2019-04-11
Payer: MEDICARE

## 2019-04-11 VITALS
SYSTOLIC BLOOD PRESSURE: 138 MMHG | WEIGHT: 175.69 LBS | HEART RATE: 84 BPM | DIASTOLIC BLOOD PRESSURE: 80 MMHG | HEIGHT: 71 IN | BODY MASS INDEX: 24.6 KG/M2

## 2019-04-11 DIAGNOSIS — E23.0 GROWTH HORMONE DEFICIENCY: ICD-10-CM

## 2019-04-11 DIAGNOSIS — E27.49 SECONDARY ADRENAL INSUFFICIENCY: ICD-10-CM

## 2019-04-11 DIAGNOSIS — N18.30 CHRONIC KIDNEY DISEASE, STAGE III (MODERATE): ICD-10-CM

## 2019-04-11 DIAGNOSIS — D35.2 ADENOMA OF PITUITARY: Primary | ICD-10-CM

## 2019-04-11 DIAGNOSIS — M81.0 AGE-RELATED OSTEOPOROSIS WITHOUT CURRENT PATHOLOGICAL FRACTURE: ICD-10-CM

## 2019-04-11 DIAGNOSIS — E29.1 SECONDARY MALE HYPOGONADISM: ICD-10-CM

## 2019-04-11 PROCEDURE — 99214 OFFICE O/P EST MOD 30 MIN: CPT | Mod: HCNC,S$GLB,, | Performed by: INTERNAL MEDICINE

## 2019-04-11 PROCEDURE — 99999 PR PBB SHADOW E&M-EST. PATIENT-LVL V: CPT | Mod: PBBFAC,HCNC,, | Performed by: INTERNAL MEDICINE

## 2019-04-11 PROCEDURE — 1101F PR PT FALLS ASSESS DOC 0-1 FALLS W/OUT INJ PAST YR: ICD-10-PCS | Mod: HCNC,CPTII,S$GLB, | Performed by: INTERNAL MEDICINE

## 2019-04-11 PROCEDURE — 99999 PR PBB SHADOW E&M-EST. PATIENT-LVL V: ICD-10-PCS | Mod: PBBFAC,HCNC,, | Performed by: INTERNAL MEDICINE

## 2019-04-11 PROCEDURE — 3075F PR MOST RECENT SYSTOLIC BLOOD PRESS GE 130-139MM HG: ICD-10-PCS | Mod: HCNC,CPTII,S$GLB, | Performed by: INTERNAL MEDICINE

## 2019-04-11 PROCEDURE — 3079F PR MOST RECENT DIASTOLIC BLOOD PRESSURE 80-89 MM HG: ICD-10-PCS | Mod: HCNC,CPTII,S$GLB, | Performed by: INTERNAL MEDICINE

## 2019-04-11 PROCEDURE — 1101F PT FALLS ASSESS-DOCD LE1/YR: CPT | Mod: HCNC,CPTII,S$GLB, | Performed by: INTERNAL MEDICINE

## 2019-04-11 PROCEDURE — 99214 PR OFFICE/OUTPT VISIT, EST, LEVL IV, 30-39 MIN: ICD-10-PCS | Mod: HCNC,S$GLB,, | Performed by: INTERNAL MEDICINE

## 2019-04-11 PROCEDURE — 3079F DIAST BP 80-89 MM HG: CPT | Mod: HCNC,CPTII,S$GLB, | Performed by: INTERNAL MEDICINE

## 2019-04-11 PROCEDURE — 3075F SYST BP GE 130 - 139MM HG: CPT | Mod: HCNC,CPTII,S$GLB, | Performed by: INTERNAL MEDICINE

## 2019-04-11 RX ORDER — HYDROCORTISONE 10 MG/1
TABLET ORAL
Qty: 180 TABLET | Refills: 3 | Status: SHIPPED | OUTPATIENT
Start: 2019-04-11

## 2019-04-11 RX ORDER — DEXAMETHASONE SODIUM PHOSPHATE 4 MG/ML
4 INJECTION, SOLUTION INTRA-ARTICULAR; INTRALESIONAL; INTRAMUSCULAR; INTRAVENOUS; SOFT TISSUE ONCE AS NEEDED
Qty: 1 ML | Refills: 5 | Status: SHIPPED | OUTPATIENT
Start: 2019-04-11 | End: 2019-04-11

## 2019-04-11 NOTE — ASSESSMENT & PLAN NOTE
Patient is at high risk for osteoporosis due to age, ESLD, CKD and hypogonadism.    DXA ordered. Patient wants to call back to schedule - he declined having it scheduled now citing too many appointments with doctors and tests.

## 2019-04-11 NOTE — ASSESSMENT & PLAN NOTE
Continue current dose of hydrocortisone. Advised he can take the second dose in the early afternoon instead of waiting until bedtime.    Discussed sick day precautions - his wife says she has the handout at home from last time.    Ordered emergency decadron IM and discussed when to give this. Last vial .    Has med-alert bracelet.

## 2019-04-11 NOTE — ASSESSMENT & PLAN NOTE
He did not follow up with Dr. Calero as we advised last time. I advised he should be seen to have repeat evaluation of the adenoma from a structural perspective.    Formal visual field exam reportedly normal. Confrontational VF on my exam was also normal.    Will order CT of the sella w/ and w/o contrast (cannot have MRI due to pacemaker)

## 2019-04-11 NOTE — PROGRESS NOTES
Subjective:      Chief Complaint: Hypogonadism and adrenal insufficiency; pituitary adenoma.    HPI: Fox Lyons Sr. is a 73 y.o. 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    He has a medical history of Hep C with cirrhosis, COPD, coronary disease, and CKD.    Last seen by me in 8/2017.    With regards to hypopituitarism (secondary hypogonadism, secondary adrenal insufficiency, GH deficiency):    Patient was initially seen in our clinic in 2014 after being diagnosed with hypogonadism. He was found to have a 2cm pituitary macroadenoma, which was non-functioning. Hormonal workup revealed secondary hypogonadism, hypoadrenalism, and GH deficiency. Thyroid axis was intact. He underwent gamma-knife radiosurgery in 2014, and last saw Dr. Calero in 2015. Could not get MRI due to presence of a pacemaker, but had CT of the sella, which showed the adenoma. He hasn't followed up since 2015 and has not had any additional imaging of the skull. The patient is agreeable to have another CT, but says he is tired of going to so many appointments, so he would like to call back to schedule it when he has more time.    Since being started hydrocortisone, he has generally felt better. He denies orthostatic symptoms, weakness, poor energy. He is worried the hydrocortisone may have been causing his abdomen to swell. At his last visit with Dr. Valenzuela, there was discussion regarding starting testosterone replacement due to decreased libido and erectile dysfunction. He is still having these issues. However, he says that he prefers not to start on T replacement, because he doesn't want another medication, and those things are not important to him at his age.     He has been maintained on hydrocortisone 15 mg upon awakening and 5 mg around 8PM.  He has not been on testosterone therapy or GH replacement.    He does not have polyuria or polidipsia to suggest DI.    Denies visual complaints. Reports seeing an  ophthalmologist in the last few months. Had visual field testing, which the patient reports was normal.   DXA was scheduled at his last visit, but he missed the appointment and it was not rescheduled.    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 Getting intermittent therapeutic paracenteses.    Reviewed past medical, family, social history and updated as appropriate.    Review of Systems   Constitutional: Positive for appetite change (poor appetite) and unexpected weight change (losing some weight).   Eyes: Negative for visual disturbance.   Respiratory: Negative for shortness of breath.    Cardiovascular: Positive for leg swelling (intermittent). Negative for chest pain.   Gastrointestinal: Positive for abdominal distention (gets intermittent paracenteses). Negative for abdominal pain.   Genitourinary: Negative for urgency.   Musculoskeletal: Negative for arthralgias.   Skin: Negative for wound.   Neurological: Negative for headaches.        Poor balance   Hematological: Does not bruise/bleed easily.   Psychiatric/Behavioral: Negative for sleep disturbance.     Objective:     Vitals:    04/11/19 1315   BP: 138/80   Pulse: 84       Physical Exam   Constitutional: He is oriented to person, place, and time. He appears well-developed.   HENT:   Head: Normocephalic.   Eyes: Conjunctivae are normal. Right eye exhibits no discharge. Left eye exhibits no discharge.   Confrontational VF intact bilaterally   Neck: No tracheal deviation present. No thyromegaly present.   Cardiovascular: Normal rate.   No murmur heard.  Pulmonary/Chest: Effort normal and breath sounds normal.   Abdominal: Soft. He exhibits distension. There is no tenderness.   Musculoskeletal: He exhibits no edema.   No digital clubbing or extremity cyanosis   Neurological: He is alert and oriented to person, place, and time. No cranial nerve deficit.   Gait narrow based without obvious ataxia   Psychiatric: He has a normal mood and  affect. His behavior is normal.   Nursing note and vitals reviewed.      Lab Results   Component Value Date    HGBA1C 6.0 (H) 11/09/2018     Lab Results   Component Value Date    CHOL 143 05/09/2018    HDL 37 (L) 05/09/2018    LDLCALC 70.6 05/09/2018    TRIG 177 (H) 05/09/2018    CHOLHDL 25.9 05/09/2018     Lab Results   Component Value Date     01/29/2019     01/29/2019    K 3.6 01/29/2019    K 3.6 01/29/2019     01/29/2019     01/29/2019    CO2 24 01/29/2019    CO2 24 01/29/2019     (H) 01/29/2019     (H) 01/29/2019    BUN 15 01/29/2019    BUN 15 01/29/2019    CREATININE 1.4 01/29/2019    CREATININE 1.4 01/29/2019    CALCIUM 8.6 01/29/2019    CALCIUM 8.6 01/29/2019    PROT 8.2 01/29/2019    PROT 8.2 01/29/2019    ALBUMIN 3.0 (L) 01/29/2019    ALBUMIN 3.0 (L) 01/29/2019    BILITOT 1.9 (H) 01/29/2019    BILITOT 1.9 (H) 01/29/2019    ALKPHOS 132 (H) 01/29/2019    ALKPHOS 132 (H) 01/29/2019    AST 52 (H) 01/29/2019    AST 52 (H) 01/29/2019    ALT 31 01/29/2019    ALT 31 01/29/2019    ANIONGAP 7 (L) 01/29/2019    ANIONGAP 7 (L) 01/29/2019    ESTGFRAFRICA 57.2 (A) 01/29/2019    ESTGFRAFRICA 57.2 (A) 01/29/2019    EGFRNONAA 49.5 (A) 01/29/2019    EGFRNONAA 49.5 (A) 01/29/2019    TSH 1.567 05/09/2018        Assessment/Plan:     Growth hormone deficiency  No interested in replacement at this time.    Secondary male hypogonadism  Offered T replacement, but patient declined, stating he's on too many medicines as it is.    Adenoma of pituitary  He did not follow up with Dr. Calero as we advised last time. I advised he should be seen to have repeat evaluation of the adenoma from a structural perspective.    Formal visual field exam reportedly normal. Confrontational VF on my exam was also normal.    Will order CT of the sella w/ and w/o contrast (cannot have MRI due to pacemaker)    Chronic kidney disease, stage III (moderate)  Check Cr prior to CT.    Secondary adrenal  insufficiency  Continue current dose of hydrocortisone. Advised he can take the second dose in the early afternoon instead of waiting until bedtime.    Discussed sick day precautions - his wife says she has the handout at home from last time.    Ordered emergency decadron IM and discussed when to give this. Last vial .    Has med-alert bracelet.     At risk for age related osteoporosis  Patient is at high risk for osteoporosis due to age, ESLD, CKD and hypogonadism.    DXA ordered. Patient wants to call back to schedule - he declined having it scheduled now citing too many appointments with doctors and tests.    RTC in 1 year.    I discussed the case with Dr. Hameed and she is in agreement with the plan.    I, Micheline Hameed MD,  have personally taken the history and examined the patient and agree with the resident's note as stated above.

## 2019-04-16 ENCOUNTER — TELEPHONE (OUTPATIENT)
Dept: HEPATOLOGY | Facility: CLINIC | Age: 74
End: 2019-04-16

## 2019-04-16 ENCOUNTER — OFFICE VISIT (OUTPATIENT)
Dept: HEPATOLOGY | Facility: CLINIC | Age: 74
End: 2019-04-16
Payer: MEDICARE

## 2019-04-16 ENCOUNTER — TELEPHONE (OUTPATIENT)
Dept: ENDOCRINOLOGY | Facility: CLINIC | Age: 74
End: 2019-04-16

## 2019-04-16 ENCOUNTER — HOSPITAL ENCOUNTER (INPATIENT)
Facility: HOSPITAL | Age: 74
LOS: 2 days | Discharge: HOME | DRG: 441 | End: 2019-04-18
Attending: HOSPITALIST | Admitting: HOSPITALIST
Payer: MEDICARE

## 2019-04-16 VITALS
WEIGHT: 176.13 LBS | SYSTOLIC BLOOD PRESSURE: 126 MMHG | BODY MASS INDEX: 25.21 KG/M2 | DIASTOLIC BLOOD PRESSURE: 72 MMHG | RESPIRATION RATE: 18 BRPM | HEIGHT: 70 IN | OXYGEN SATURATION: 94 % | TEMPERATURE: 98 F

## 2019-04-16 DIAGNOSIS — I85.10 SECONDARY ESOPHAGEAL VARICES WITHOUT BLEEDING: ICD-10-CM

## 2019-04-16 DIAGNOSIS — K74.60 CIRRHOSIS: ICD-10-CM

## 2019-04-16 DIAGNOSIS — Z86.19 HISTORY OF HEPATITIS C: Primary | ICD-10-CM

## 2019-04-16 DIAGNOSIS — R07.9 CHEST PAIN: ICD-10-CM

## 2019-04-16 DIAGNOSIS — Z86.19 HISTORY OF HEPATITIS C: ICD-10-CM

## 2019-04-16 DIAGNOSIS — K74.60 CIRRHOSIS OF LIVER WITHOUT ASCITES, UNSPECIFIED HEPATIC CIRRHOSIS TYPE: ICD-10-CM

## 2019-04-16 DIAGNOSIS — R18.8 OTHER ASCITES: ICD-10-CM

## 2019-04-16 DIAGNOSIS — K55.069 SUPERIOR MESENTERIC VEIN THROMBOSIS: ICD-10-CM

## 2019-04-16 DIAGNOSIS — K74.60 CIRRHOSIS OF LIVER WITHOUT ASCITES, UNSPECIFIED HEPATIC CIRRHOSIS TYPE: Primary | ICD-10-CM

## 2019-04-16 DIAGNOSIS — R18.8 ASCITES OF LIVER: ICD-10-CM

## 2019-04-16 LAB
ALBUMIN SERPL BCP-MCNC: 2.3 G/DL (ref 3.5–5.2)
ALP SERPL-CCNC: 187 U/L (ref 55–135)
ALT SERPL W/O P-5'-P-CCNC: 32 U/L (ref 10–44)
AMMONIA PLAS-SCNC: 47 UMOL/L (ref 10–50)
ANION GAP SERPL CALC-SCNC: 11 MMOL/L (ref 8–16)
AST SERPL-CCNC: 50 U/L (ref 10–40)
BASOPHILS # BLD AUTO: 0.01 K/UL (ref 0–0.2)
BASOPHILS NFR BLD: 0.1 % (ref 0–1.9)
BILIRUB SERPL-MCNC: 1.2 MG/DL (ref 0.1–1)
BUN SERPL-MCNC: 21 MG/DL (ref 8–23)
CALCIUM SERPL-MCNC: 8.8 MG/DL (ref 8.7–10.5)
CHLORIDE SERPL-SCNC: 108 MMOL/L (ref 95–110)
CO2 SERPL-SCNC: 21 MMOL/L (ref 23–29)
CREAT SERPL-MCNC: 1.5 MG/DL (ref 0.5–1.4)
DIFFERENTIAL METHOD: ABNORMAL
EOSINOPHIL # BLD AUTO: 0 K/UL (ref 0–0.5)
EOSINOPHIL NFR BLD: 0.2 % (ref 0–8)
ERYTHROCYTE [DISTWIDTH] IN BLOOD BY AUTOMATED COUNT: 19.9 % (ref 11.5–14.5)
EST. GFR  (AFRICAN AMERICAN): 52.6 ML/MIN/1.73 M^2
EST. GFR  (NON AFRICAN AMERICAN): 45.5 ML/MIN/1.73 M^2
ESTIMATED AVG GLUCOSE: 128 MG/DL (ref 68–131)
GLUCOSE SERPL-MCNC: 184 MG/DL (ref 70–110)
HBA1C MFR BLD HPLC: 6.1 % (ref 4–5.6)
HCT VFR BLD AUTO: 31.4 % (ref 40–54)
HGB BLD-MCNC: 9.6 G/DL (ref 14–18)
IMM GRANULOCYTES # BLD AUTO: 0.04 K/UL (ref 0–0.04)
IMM GRANULOCYTES NFR BLD AUTO: 0.4 % (ref 0–0.5)
INR PPP: 1.4 (ref 0.8–1.2)
LACTATE SERPL-SCNC: 3.2 MMOL/L (ref 0.5–2.2)
LYMPHOCYTES # BLD AUTO: 0.7 K/UL (ref 1–4.8)
LYMPHOCYTES NFR BLD: 8.3 % (ref 18–48)
MAGNESIUM SERPL-MCNC: 1.3 MG/DL (ref 1.6–2.6)
MCH RBC QN AUTO: 25.7 PG (ref 27–31)
MCHC RBC AUTO-ENTMCNC: 30.6 G/DL (ref 32–36)
MCV RBC AUTO: 84 FL (ref 82–98)
MONOCYTES # BLD AUTO: 0.6 K/UL (ref 0.3–1)
MONOCYTES NFR BLD: 6.9 % (ref 4–15)
NEUTROPHILS # BLD AUTO: 7.5 K/UL (ref 1.8–7.7)
NEUTROPHILS NFR BLD: 84.1 % (ref 38–73)
NRBC BLD-RTO: 0 /100 WBC
PHOSPHATE SERPL-MCNC: 2.5 MG/DL (ref 2.7–4.5)
PLATELET # BLD AUTO: 104 K/UL (ref 150–350)
PMV BLD AUTO: 11.1 FL (ref 9.2–12.9)
POTASSIUM SERPL-SCNC: 3.3 MMOL/L (ref 3.5–5.1)
PROT SERPL-MCNC: 7.4 G/DL (ref 6–8.4)
PROTHROMBIN TIME: 14.1 SEC (ref 9–12.5)
RBC # BLD AUTO: 3.73 M/UL (ref 4.6–6.2)
SODIUM SERPL-SCNC: 140 MMOL/L (ref 136–145)
WBC # BLD AUTO: 8.89 K/UL (ref 3.9–12.7)

## 2019-04-16 PROCEDURE — 3078F DIAST BP <80 MM HG: CPT | Mod: HCNC,CPTII,S$GLB, | Performed by: INTERNAL MEDICINE

## 2019-04-16 PROCEDURE — 84100 ASSAY OF PHOSPHORUS: CPT | Mod: HCNC

## 2019-04-16 PROCEDURE — 99215 OFFICE O/P EST HI 40 MIN: CPT | Mod: HCNC,S$GLB,, | Performed by: INTERNAL MEDICINE

## 2019-04-16 PROCEDURE — 99999 PR PBB SHADOW E&M-EST. PATIENT-LVL III: ICD-10-PCS | Mod: PBBFAC,HCNC,, | Performed by: INTERNAL MEDICINE

## 2019-04-16 PROCEDURE — 3074F SYST BP LT 130 MM HG: CPT | Mod: HCNC,CPTII,S$GLB, | Performed by: INTERNAL MEDICINE

## 2019-04-16 PROCEDURE — 99215 PR OFFICE/OUTPT VISIT, EST, LEVL V, 40-54 MIN: ICD-10-PCS | Mod: HCNC,S$GLB,, | Performed by: INTERNAL MEDICINE

## 2019-04-16 PROCEDURE — 11000001 HC ACUTE MED/SURG PRIVATE ROOM: Mod: HCNC

## 2019-04-16 PROCEDURE — 99499 UNLISTED E&M SERVICE: CPT | Mod: S$GLB,,, | Performed by: INTERNAL MEDICINE

## 2019-04-16 PROCEDURE — 85025 COMPLETE CBC W/AUTO DIFF WBC: CPT | Mod: 91,HCNC

## 2019-04-16 PROCEDURE — 83605 ASSAY OF LACTIC ACID: CPT | Mod: HCNC

## 2019-04-16 PROCEDURE — 83036 HEMOGLOBIN GLYCOSYLATED A1C: CPT | Mod: HCNC

## 2019-04-16 PROCEDURE — 1101F PR PT FALLS ASSESS DOC 0-1 FALLS W/OUT INJ PAST YR: ICD-10-PCS | Mod: HCNC,CPTII,S$GLB, | Performed by: INTERNAL MEDICINE

## 2019-04-16 PROCEDURE — 93005 ELECTROCARDIOGRAM TRACING: CPT | Mod: HCNC

## 2019-04-16 PROCEDURE — 85610 PROTHROMBIN TIME: CPT | Mod: 91,HCNC

## 2019-04-16 PROCEDURE — 83735 ASSAY OF MAGNESIUM: CPT | Mod: HCNC

## 2019-04-16 PROCEDURE — 80053 COMPREHEN METABOLIC PANEL: CPT | Mod: 91,HCNC

## 2019-04-16 PROCEDURE — 93010 EKG 12-LEAD: ICD-10-PCS | Mod: HCNC,,, | Performed by: INTERNAL MEDICINE

## 2019-04-16 PROCEDURE — 99999 PR PBB SHADOW E&M-EST. PATIENT-LVL III: CPT | Mod: PBBFAC,HCNC,, | Performed by: INTERNAL MEDICINE

## 2019-04-16 PROCEDURE — 3074F PR MOST RECENT SYSTOLIC BLOOD PRESSURE < 130 MM HG: ICD-10-PCS | Mod: HCNC,CPTII,S$GLB, | Performed by: INTERNAL MEDICINE

## 2019-04-16 PROCEDURE — 1101F PT FALLS ASSESS-DOCD LE1/YR: CPT | Mod: HCNC,CPTII,S$GLB, | Performed by: INTERNAL MEDICINE

## 2019-04-16 PROCEDURE — 99499 RISK ADDL DX/OHS AUDIT: ICD-10-PCS | Mod: S$GLB,,, | Performed by: INTERNAL MEDICINE

## 2019-04-16 PROCEDURE — 3078F PR MOST RECENT DIASTOLIC BLOOD PRESSURE < 80 MM HG: ICD-10-PCS | Mod: HCNC,CPTII,S$GLB, | Performed by: INTERNAL MEDICINE

## 2019-04-16 PROCEDURE — 87040 BLOOD CULTURE FOR BACTERIA: CPT | Mod: 59,HCNC

## 2019-04-16 PROCEDURE — 36415 COLL VENOUS BLD VENIPUNCTURE: CPT | Mod: HCNC

## 2019-04-16 PROCEDURE — 93010 ELECTROCARDIOGRAM REPORT: CPT | Mod: HCNC,,, | Performed by: INTERNAL MEDICINE

## 2019-04-16 PROCEDURE — 82140 ASSAY OF AMMONIA: CPT | Mod: HCNC

## 2019-04-16 RX ORDER — LACTULOSE 10 G/15ML
20 SOLUTION ORAL DAILY
Status: DISCONTINUED | OUTPATIENT
Start: 2019-04-17 | End: 2019-04-18 | Stop reason: HOSPADM

## 2019-04-16 RX ORDER — IPRATROPIUM BROMIDE AND ALBUTEROL SULFATE 2.5; .5 MG/3ML; MG/3ML
3 SOLUTION RESPIRATORY (INHALATION) EVERY 4 HOURS PRN
Status: DISCONTINUED | OUTPATIENT
Start: 2019-04-16 | End: 2019-04-18 | Stop reason: HOSPADM

## 2019-04-16 RX ORDER — HYDROCORTISONE 5 MG/1
5 TABLET ORAL DAILY
Status: DISCONTINUED | OUTPATIENT
Start: 2019-04-17 | End: 2019-04-18 | Stop reason: HOSPADM

## 2019-04-16 RX ORDER — PRAVASTATIN SODIUM 40 MG/1
40 TABLET ORAL DAILY
Status: DISCONTINUED | OUTPATIENT
Start: 2019-04-17 | End: 2019-04-16

## 2019-04-16 RX ORDER — IBUPROFEN 200 MG
16 TABLET ORAL
Status: DISCONTINUED | OUTPATIENT
Start: 2019-04-16 | End: 2019-04-18 | Stop reason: HOSPADM

## 2019-04-16 RX ORDER — GLUCAGON 1 MG
1 KIT INJECTION
Status: DISCONTINUED | OUTPATIENT
Start: 2019-04-16 | End: 2019-04-18 | Stop reason: HOSPADM

## 2019-04-16 RX ORDER — SODIUM CHLORIDE 0.9 % (FLUSH) 0.9 %
10 SYRINGE (ML) INJECTION
Status: DISCONTINUED | OUTPATIENT
Start: 2019-04-16 | End: 2019-04-18 | Stop reason: HOSPADM

## 2019-04-16 RX ORDER — PANTOPRAZOLE SODIUM 40 MG/1
40 TABLET, DELAYED RELEASE ORAL DAILY
Status: DISCONTINUED | OUTPATIENT
Start: 2019-04-17 | End: 2019-04-18 | Stop reason: HOSPADM

## 2019-04-16 RX ORDER — HYDROCORTISONE 5 MG/1
15 TABLET ORAL DAILY
Status: DISCONTINUED | OUTPATIENT
Start: 2019-04-17 | End: 2019-04-18 | Stop reason: HOSPADM

## 2019-04-16 RX ORDER — IBUPROFEN 200 MG
24 TABLET ORAL
Status: DISCONTINUED | OUTPATIENT
Start: 2019-04-16 | End: 2019-04-18 | Stop reason: HOSPADM

## 2019-04-16 NOTE — PLAN OF CARE
[facility] [place] admissions ONLY: Please call extension [phone] upon patient arrival to floor for Hospital Medicine admit team assignment and for additional admit orders for the patient. Do not page the attending, staff physician or Advanced Practice Provider with the patient on arrival (may not be in-house at the time of arrival). Call back or wait to leave beeper number when prompted.      Direct Admit from Clinic Acceptance Note    Clinic Physician or Mid-Level provider/Clinic giving report: Dr. Funes from Liver Transplant     Accepting Physician for admission to hospital: Enrique Major MD     Date of acceptance: 04/16/2019     Reason for direct admission from clinic or home: Decompensated HCV cirrhosis with anorexia, weakness needing sepsis w/u / HE w/u    Report from Clinic Physician/Mid-Level Provider/HPI:  73 yr old male with portal htn from HCV-induced cirrhosis (Albumin 3.5, INR  1.2, Tbil 1.4, creat 1.8. Plts are 58), esophageal varices, COPD, CAD with cardiac stents and pacemaker, CKD3 seen in f/u.  Despite the initiation of lasix 40 mg daily and spironolactone 50 mg daily, he continues to have significant ascites. He initially deferred a paracentesis but subsequently had it done 3/18/19 (5 L). Fluid analysis was c/w portal htn but there was no SBP. Creat and GFR not normal (42-49.5). Fluid protein was <1.0. Cipro prophylaxis was added for low protein ascites.  Patient presents with anorexia and imbalance. Dr. Funes recommends sepsis w/u and HE w/u and management.      Temp:  [97.9 °F (36.6 °C)] 97.9 °F (36.6 °C)  Resp:  [18] 18  SpO2:  [94 %] 94 %  BP: (126)/(72) 126/72    LABS: See EPIC    Imaging: See EPIC    To Do List upon arrival:   - Admit to IM-L if feasible, otherwise general medicine  - Sepsis eval including paracentesis  - Consider empiric IV abx  - HE w/u and management  - Hepatology consultation  - Abdominal sono with doppler  - Consider 3D imaging abdomen    ENRIQUE QUIJANO  MD HO  Attending Staff Physician   [facility]

## 2019-04-17 LAB
ALBUMIN FLD-MCNC: 0.4 G/DL
ALBUMIN FLD-MCNC: 0.4 G/DL
ALBUMIN SERPL BCP-MCNC: 2.3 G/DL (ref 3.5–5.2)
ALP SERPL-CCNC: 167 U/L (ref 55–135)
ALT SERPL W/O P-5'-P-CCNC: 30 U/L (ref 10–44)
ANION GAP SERPL CALC-SCNC: 10 MMOL/L (ref 8–16)
APPEARANCE FLD: CLEAR
AST SERPL-CCNC: 46 U/L (ref 10–40)
BASOPHILS # BLD AUTO: 0.01 K/UL (ref 0–0.2)
BASOPHILS NFR BLD: 0.1 % (ref 0–1.9)
BILIRUB SERPL-MCNC: 1.5 MG/DL (ref 0.1–1)
BODY FLD TYPE: NORMAL
BODY FLUID SOURCE, LDH: NORMAL
BODY FLUID SOURCE, LDH: NORMAL
BUN SERPL-MCNC: 20 MG/DL (ref 8–23)
CALCIUM SERPL-MCNC: 8.9 MG/DL (ref 8.7–10.5)
CHLORIDE SERPL-SCNC: 109 MMOL/L (ref 95–110)
CO2 SERPL-SCNC: 22 MMOL/L (ref 23–29)
COLOR FLD: YELLOW
CREAT SERPL-MCNC: 1.3 MG/DL (ref 0.5–1.4)
DIFFERENTIAL METHOD: ABNORMAL
EOSINOPHIL # BLD AUTO: 0.1 K/UL (ref 0–0.5)
EOSINOPHIL NFR BLD: 0.8 % (ref 0–8)
ERYTHROCYTE [DISTWIDTH] IN BLOOD BY AUTOMATED COUNT: 19.9 % (ref 11.5–14.5)
EST. GFR  (AFRICAN AMERICAN): >60 ML/MIN/1.73 M^2
EST. GFR  (NON AFRICAN AMERICAN): 54.1 ML/MIN/1.73 M^2
GLUCOSE SERPL-MCNC: 100 MG/DL (ref 70–110)
HCT VFR BLD AUTO: 30.8 % (ref 40–54)
HGB BLD-MCNC: 9.4 G/DL (ref 14–18)
IMM GRANULOCYTES # BLD AUTO: 0.04 K/UL (ref 0–0.04)
IMM GRANULOCYTES NFR BLD AUTO: 0.5 % (ref 0–0.5)
INR PPP: 1.4 (ref 0.8–1.2)
LDH FLD L TO P-CCNC: 52 U/L
LDH FLD L TO P-CCNC: 56 U/L
LYMPHOCYTES # BLD AUTO: 1 K/UL (ref 1–4.8)
LYMPHOCYTES NFR BLD: 13.6 % (ref 18–48)
LYMPHOCYTES NFR FLD MANUAL: 57 %
MAGNESIUM SERPL-MCNC: 1.2 MG/DL (ref 1.6–2.6)
MCH RBC QN AUTO: 26.7 PG (ref 27–31)
MCHC RBC AUTO-ENTMCNC: 30.5 G/DL (ref 32–36)
MCV RBC AUTO: 88 FL (ref 82–98)
MESOTHL CELL NFR FLD MANUAL: 4 %
MONOCYTES # BLD AUTO: 0.6 K/UL (ref 0.3–1)
MONOCYTES NFR BLD: 8.2 % (ref 4–15)
MONOS+MACROS NFR FLD MANUAL: 32 %
NEUTROPHILS # BLD AUTO: 5.7 K/UL (ref 1.8–7.7)
NEUTROPHILS NFR BLD: 76.8 % (ref 38–73)
NEUTROPHILS NFR FLD MANUAL: 7 %
NRBC BLD-RTO: 0 /100 WBC
PHOSPHATE SERPL-MCNC: 3.1 MG/DL (ref 2.7–4.5)
PLATELET # BLD AUTO: 86 K/UL (ref 150–350)
PMV BLD AUTO: 10.4 FL (ref 9.2–12.9)
POCT GLUCOSE: 107 MG/DL (ref 70–110)
POCT GLUCOSE: 126 MG/DL (ref 70–110)
POCT GLUCOSE: 161 MG/DL (ref 70–110)
POTASSIUM SERPL-SCNC: 3.4 MMOL/L (ref 3.5–5.1)
PROT FLD-MCNC: <1 G/DL
PROT FLD-MCNC: <1 G/DL
PROT SERPL-MCNC: 7 G/DL (ref 6–8.4)
PROTHROMBIN TIME: 13.6 SEC (ref 9–12.5)
RBC # BLD AUTO: 3.52 M/UL (ref 4.6–6.2)
SODIUM SERPL-SCNC: 141 MMOL/L (ref 136–145)
SPECIMEN SOURCE: NORMAL
WBC # BLD AUTO: 7.42 K/UL (ref 3.9–12.7)
WBC # FLD: 33 /CU MM

## 2019-04-17 PROCEDURE — 82042 OTHER SOURCE ALBUMIN QUAN EA: CPT | Mod: HCNC

## 2019-04-17 PROCEDURE — 25000003 PHARM REV CODE 250: Mod: HCNC | Performed by: STUDENT IN AN ORGANIZED HEALTH CARE EDUCATION/TRAINING PROGRAM

## 2019-04-17 PROCEDURE — 99223 1ST HOSP IP/OBS HIGH 75: CPT | Mod: AI,HCNC,GC, | Performed by: HOSPITALIST

## 2019-04-17 PROCEDURE — 63600175 PHARM REV CODE 636 W HCPCS: Mod: HCNC | Performed by: STUDENT IN AN ORGANIZED HEALTH CARE EDUCATION/TRAINING PROGRAM

## 2019-04-17 PROCEDURE — 25000242 PHARM REV CODE 250 ALT 637 W/ HCPCS: Mod: HCNC | Performed by: HOSPITALIST

## 2019-04-17 PROCEDURE — 84100 ASSAY OF PHOSPHORUS: CPT | Mod: HCNC

## 2019-04-17 PROCEDURE — P9047 ALBUMIN (HUMAN), 25%, 50ML: HCPCS | Mod: HCNC,JG | Performed by: HOSPITALIST

## 2019-04-17 PROCEDURE — 99233 PR SUBSEQUENT HOSPITAL CARE,LEVL III: ICD-10-PCS | Mod: HCNC,GC,, | Performed by: INTERNAL MEDICINE

## 2019-04-17 PROCEDURE — 11000001 HC ACUTE MED/SURG PRIVATE ROOM: Mod: HCNC

## 2019-04-17 PROCEDURE — 36415 COLL VENOUS BLD VENIPUNCTURE: CPT | Mod: HCNC

## 2019-04-17 PROCEDURE — 97535 SELF CARE MNGMENT TRAINING: CPT | Mod: HCNC

## 2019-04-17 PROCEDURE — 89051 BODY FLUID CELL COUNT: CPT | Mod: HCNC

## 2019-04-17 PROCEDURE — 80053 COMPREHEN METABOLIC PANEL: CPT | Mod: HCNC

## 2019-04-17 PROCEDURE — 85025 COMPLETE CBC W/AUTO DIFF WBC: CPT | Mod: HCNC

## 2019-04-17 PROCEDURE — 63600175 PHARM REV CODE 636 W HCPCS: Mod: HCNC | Performed by: HOSPITALIST

## 2019-04-17 PROCEDURE — 84157 ASSAY OF PROTEIN OTHER: CPT | Mod: 91,HCNC

## 2019-04-17 PROCEDURE — 87070 CULTURE OTHR SPECIMN AEROBIC: CPT | Mod: HCNC

## 2019-04-17 PROCEDURE — 97165 OT EVAL LOW COMPLEX 30 MIN: CPT | Mod: HCNC

## 2019-04-17 PROCEDURE — 85610 PROTHROMBIN TIME: CPT | Mod: HCNC

## 2019-04-17 PROCEDURE — 83735 ASSAY OF MAGNESIUM: CPT | Mod: HCNC

## 2019-04-17 PROCEDURE — 99223 PR INITIAL HOSPITAL CARE,LEVL III: ICD-10-PCS | Mod: AI,HCNC,GC, | Performed by: HOSPITALIST

## 2019-04-17 PROCEDURE — 99233 SBSQ HOSP IP/OBS HIGH 50: CPT | Mod: HCNC,GC,, | Performed by: INTERNAL MEDICINE

## 2019-04-17 PROCEDURE — 87205 SMEAR GRAM STAIN: CPT | Mod: HCNC

## 2019-04-17 PROCEDURE — 83615 LACTATE (LD) (LDH) ENZYME: CPT | Mod: HCNC

## 2019-04-17 PROCEDURE — 87075 CULTR BACTERIA EXCEPT BLOOD: CPT | Mod: HCNC

## 2019-04-17 RX ORDER — FUROSEMIDE 20 MG/1
20 TABLET ORAL 2 TIMES DAILY
Status: DISCONTINUED | OUTPATIENT
Start: 2019-04-17 | End: 2019-04-17

## 2019-04-17 RX ORDER — ALBUMIN HUMAN 250 G/1000ML
50 SOLUTION INTRAVENOUS ONCE
Status: COMPLETED | OUTPATIENT
Start: 2019-04-17 | End: 2019-04-17

## 2019-04-17 RX ORDER — FLUTICASONE FUROATE AND VILANTEROL 100; 25 UG/1; UG/1
1 POWDER RESPIRATORY (INHALATION) DAILY
Status: DISCONTINUED | OUTPATIENT
Start: 2019-04-17 | End: 2019-04-18 | Stop reason: HOSPADM

## 2019-04-17 RX ORDER — FUROSEMIDE 10 MG/ML
40 INJECTION INTRAMUSCULAR; INTRAVENOUS ONCE
Status: COMPLETED | OUTPATIENT
Start: 2019-04-17 | End: 2019-04-17

## 2019-04-17 RX ORDER — ACETAMINOPHEN 500 MG
1000 TABLET ORAL ONCE
Status: COMPLETED | OUTPATIENT
Start: 2019-04-17 | End: 2019-04-17

## 2019-04-17 RX ORDER — HEPARIN SODIUM 5000 [USP'U]/ML
5000 INJECTION, SOLUTION INTRAVENOUS; SUBCUTANEOUS EVERY 12 HOURS
Status: DISCONTINUED | OUTPATIENT
Start: 2019-04-17 | End: 2019-04-18 | Stop reason: HOSPADM

## 2019-04-17 RX ORDER — POTASSIUM CHLORIDE 20 MEQ/1
40 TABLET, EXTENDED RELEASE ORAL ONCE
Status: COMPLETED | OUTPATIENT
Start: 2019-04-17 | End: 2019-04-17

## 2019-04-17 RX ORDER — SPIRONOLACTONE 25 MG/1
50 TABLET ORAL DAILY
Status: DISCONTINUED | OUTPATIENT
Start: 2019-04-17 | End: 2019-04-17

## 2019-04-17 RX ORDER — ACETAMINOPHEN 500 MG
1000 TABLET ORAL ONCE
Status: DISCONTINUED | OUTPATIENT
Start: 2019-04-17 | End: 2019-04-17

## 2019-04-17 RX ORDER — SPIRONOLACTONE 100 MG/1
200 TABLET, FILM COATED ORAL DAILY
Status: DISCONTINUED | OUTPATIENT
Start: 2019-04-18 | End: 2019-04-18 | Stop reason: HOSPADM

## 2019-04-17 RX ORDER — TIOTROPIUM BROMIDE 18 UG/1
1 CAPSULE ORAL; RESPIRATORY (INHALATION) DAILY
Status: DISCONTINUED | OUTPATIENT
Start: 2019-04-17 | End: 2019-04-18 | Stop reason: HOSPADM

## 2019-04-17 RX ORDER — FUROSEMIDE 10 MG/ML
40 INJECTION INTRAMUSCULAR; INTRAVENOUS 2 TIMES DAILY
Status: DISCONTINUED | OUTPATIENT
Start: 2019-04-17 | End: 2019-04-18 | Stop reason: HOSPADM

## 2019-04-17 RX ADMIN — TIOTROPIUM BROMIDE 18 MCG: 18 CAPSULE ORAL; RESPIRATORY (INHALATION) at 12:04

## 2019-04-17 RX ADMIN — FUROSEMIDE 40 MG: 10 INJECTION, SOLUTION INTRAMUSCULAR; INTRAVENOUS at 09:04

## 2019-04-17 RX ADMIN — PIPERACILLIN AND TAZOBACTAM 4.5 G: 4; .5 INJECTION, POWDER, LYOPHILIZED, FOR SOLUTION INTRAVENOUS; PARENTERAL at 12:04

## 2019-04-17 RX ADMIN — ACETAMINOPHEN 1000 MG: 500 TABLET ORAL at 09:04

## 2019-04-17 RX ADMIN — PIPERACILLIN AND TAZOBACTAM 4.5 G: 4; .5 INJECTION, POWDER, LYOPHILIZED, FOR SOLUTION INTRAVENOUS; PARENTERAL at 09:04

## 2019-04-17 RX ADMIN — HEPARIN SODIUM 5000 UNITS: 5000 INJECTION, SOLUTION INTRAVENOUS; SUBCUTANEOUS at 09:04

## 2019-04-17 RX ADMIN — ALBUMIN (HUMAN) 50 G: 25 SOLUTION INTRAVENOUS at 06:04

## 2019-04-17 RX ADMIN — POTASSIUM CHLORIDE 40 MEQ: 1500 TABLET, EXTENDED RELEASE ORAL at 12:04

## 2019-04-17 RX ADMIN — HYDROCORTISONE 15 MG: 5 TABLET ORAL at 09:04

## 2019-04-17 RX ADMIN — SPIRONOLACTONE 150 MG: 100 TABLET, FILM COATED ORAL at 03:04

## 2019-04-17 RX ADMIN — LACTULOSE 20 G: 20 SOLUTION ORAL at 09:04

## 2019-04-17 RX ADMIN — SPIRONOLACTONE 50 MG: 25 TABLET, FILM COATED ORAL at 09:04

## 2019-04-17 RX ADMIN — PANTOPRAZOLE SODIUM 40 MG: 40 TABLET, DELAYED RELEASE ORAL at 09:04

## 2019-04-17 RX ADMIN — FUROSEMIDE 40 MG: 10 INJECTION, SOLUTION INTRAMUSCULAR; INTRAVENOUS at 10:04

## 2019-04-17 RX ADMIN — FLUTICASONE FUROATE AND VILANTEROL TRIFENATATE 1 PUFF: 100; 25 POWDER RESPIRATORY (INHALATION) at 12:04

## 2019-04-17 NOTE — PHARMACY MED REC
"Admission Medication Reconciliation - Pharmacy Consult Note    The home medication history was taken by Cristel Grant Pharmacy Tech.  Based on information gathered and subsequent review by the clinical pharmacist, the items below may need attention.     You may go to "Admission" then "Reconcile Home Medications" tabs to review and/or act upon these items.     Potential issues to be addressed PRIOR TO DISCHARGE  o Patient lacks understanding of therapy  o Pt's wife states he was not taking lactulose as he said "his bowels were moving fine"; however, wife could not quantify if this was at least 3-4 BMs per day    Please address this information as you see fit.  Feel free to contact us if you have any questions or require assistance.    Shamika Geronimo, PharmD  [id number]                    .    .            "

## 2019-04-17 NOTE — PROGRESS NOTES
Paracentesis complete, 7500 MLs removed. Specimen sent to lab. 200ml Albumin administered per protocol. Dressing applied to LLQpuncture site, dressing clean dry and intact. Report called [facility].

## 2019-04-17 NOTE — PLAN OF CARE
Problem: Adult Inpatient Plan of Care  Goal: Plan of Care Review  Outcome: Ongoing (interventions implemented as appropriate)  Patient arrived to floor and admitted without incident.  Patient remained free of falls and injuries during shift.  Patient took medications without incident.  No other concerns noted.

## 2019-04-17 NOTE — ASSESSMENT & PLAN NOTE
- not clear why he has decompensated. He developed varices and ascites in the last 6 months, despite having been cured of HCV previously. He was scanned in the fall - no obvious malignancy, but did show partial thrombosis of the proximal SMV, portal confluence, and main portal vein extending into the left portal vein. It is possible that this has extended. He was not anticoagulated due to varices. Note, most recent EGD - no further banding.    MELD-Na score: 15 at 4/16/2019 10:08 PM  MELD score: 15 at 4/16/2019 10:08 PM  Calculated from:  Serum Creatinine: 1.5 mg/dL at 4/16/2019 10:08 PM  Serum Sodium: 140 mmol/L (Rounded to 137 mmol/L) at 4/16/2019 10:08 PM  Total Bilirubin: 1.2 mg/dL at 4/16/2019 10:08 PM  INR(ratio): 1.4 at 4/16/2019 10:08 PM  Age: 73 years     Plan:  1- U/S liver with dopplar ordered.  2- CMP + INR daily.  3- TP CT scan abdomen/plelvis in AM.  4- Continue lactulose for goal BM 3-4/day (not on rifaximin at home).  5- F/U ammonia level.  6- Hepatology consult in AM.   7- IR paracentesis diagnostic and therputic with labs ordered to r/o SPB  8- Continue empiric treatment with Zosyn (can deescalate to ceftriaxone in AM).  9- F/U blood and urine clx  10 Continue lasix and spironolactone

## 2019-04-17 NOTE — ASSESSMENT & PLAN NOTE
- not clear why he has decompensated. He developed varices and ascites in the last 6 months, despite having been cured of HCV previously. He was scanned in the fall - no obvious malignancy, but did show partial thrombosis of the proximal SMV, portal confluence, and main portal vein extending into the left portal vein. It is possible that this has extended. He was not anticoagulated due to varices. Note, most recent EGD - no further banding.    MELD-Na score: 15 at 4/16/2019 10:08 PM  MELD score: 15 at 4/16/2019 10:08 PM  Calculated from:  Serum Creatinine: 1.5 mg/dL at 4/16/2019 10:08 PM  Serum Sodium: 140 mmol/L (Rounded to 137 mmol/L) at 4/16/2019 10:08 PM  Total Bilirubin: 1.2 mg/dL at 4/16/2019 10:08 PM  INR(ratio): 1.4 at 4/16/2019 10:08 PM  Age: 73 years     Plan:  1- U/S liver with dopplar ordered.  2- CMP + INR daily.  3- TP CT scan abdomen/plelvis in AM.  4- Continue lactulose for goal BM 3-4/day (not on rifaximin at home).  5- F/U ammonia level.  6- Hepatology consult in AM.   7- IR paracentesis diagnostic and therputic with labs ordered to r/o SPB  8- Continue empiric treatment with Zosyn (can deescalate to ceftriaxone in AM).  9- F/U blood and urine clx

## 2019-04-17 NOTE — H&P
"[facility] Hospital Medicine  History & Physical    Patient Name: Fox Lyons Sr.  MRN: [id number]  Admission Date: 4/16/2019  Attending Physician: Uriel Pablo MD   Primary Care Provider: Clover White MD    [facility] Medicine Team: [facility] 5 [facility] Ania Franco MD     Patient information was obtained from patient, spouse/SO, past medical records and ER records.     Subjective:     Principal Problem:<principal problem not specified>    Chief Complaint: No chief complaint on file.       HPI: This is a 73 yr old male with portal htn from HCV-induced cirrhosis (Albumin 3.5, INR  1.2, Tbil 1.4, creat 1.8. Plts are 58), esophageal varices, COPD, CAD with cardiac stents and pacemaker, CKD presented from hepatology clinic for evaluation for decompensated liver cirrhosis.    Per Notes" He was dx w/ chronic HCV infection in 1995, likely r/t IVDU. Patient presented at the time with c/o weakness, loss of appetite. Patient states had liver bx at that time as well and was told he was negative for cancer but does not recall any further results. [facility], treated x 6 months w/ SVR. Bailee occurred shortly after completion of treatment and he never followed up. Last HCV VL was done in 2016 and was negative.  He had a  Ct scan done 10/26/18 and 11/29/18: no HCC but it did show moderate ascites. It also showed partial thrombosis of the proximal SMV, portal confluence, and main portal vein extending into the left portal vein. Despite the initiation of lasix 40 mg daily and spironolactone 50 mg daily, he continued to have significant ascites. Diuretics were not further escalated due to a concern for renal insufficiency. He initially deferred a paracentesis but subsequently had it done 3/18/19 (5 L). Fluid analysis was c/w portal htn but there was no SBP. Creat and GFR not normal (42-49.5). Fluid protein was <1.0. Cipro prophylaxis was added for low protein " ascites.   EGD 1/4/19:  Banded; repeat EGD 02/19/19: no banding; repeat recommended in 3 months (May 2019). Colonoscopy was done 01/19: polyps and repeat recommended in 2022.     04/16 c/o weakness. He has become progressively weak over the last week. He is slow to answer questions  although he does not have asterixis. His wife states he is not eating and sleeping all the time. He does have an increase in his abdominal girth but denies N/V, abdominal pain or diarrhea. He denies fevers/chills. He is having 2 BM per day.            Past Medical History:   Diagnosis Date    Anemia     Anticoagulant long-term use     Ascites 12/10/2018    Biliary colic     Cataract     Chronic hepatitis C     Cirrhosis     COPD (chronic obstructive pulmonary disease)     Coronary artery disease     Dyspnea     Epistaxis     Esophageal varices without bleeding     Gallstones     GERD (gastroesophageal reflux disease)     HCC (hepatocellular carcinoma)     Hepatitis B antibody positive     Hyperglycemia     Hyperlipidemia     Hypertension     Hypopituitarism     Mobitz type 2 second degree atrioventricular block     Nuclear sclerosis, left     Pacemaker     Pituitary tumor     Portal hypertension     Pulmonary emphysema     PVT (paroxysmal ventricular tachycardia)     SA node dysfunction     Secondary adrenal insufficiency     Secondary male hypogonadism     SSS (sick sinus syndrome)     Superior mesenteric vein thrombosis     Vitreous hemorrhage, both eyes        Past Surgical History:   Procedure Laterality Date    CARDIAC PACEMAKER PLACEMENT      st rochelle    CATARACT EXTRACTION  9/24/13    RT    COLONOSCOPY Left 1/4/2019    Performed by [facility] at [facility] ENDO (4TH FLR)    CORONARY ANGIOPLASTY WITH STENT PLACEMENT      2 stents    EGD (ESOPHAGOGASTRODUODENOSCOPY) Left 2/19/2019    Performed by Trice Funes MD at [facility] ENDO    EGD (ESOPHAGOGASTRODUODENOSCOPY) Left 1/4/2019    Performed  by Emanuel [facility] MD at [facility] ENDO (4TH FLR)    ESOPHAGOGASTRODUODENOSCOPY (EGD) Left 5/31/2018    Performed by Tr[facility]unes MD at [facility] ENDO (4TH FLR)    ESOPHAGOGASTRODUODENOSCOPY (EGD) N/A 11/30/2017    Performed by Trice Funes MD at [facility] ENDO (4TH FLR)    ESOPHAGOGASTRODUODENOSCOPY (EGD) Left 10/12/2017    Performed by Jody Humphries MD at [facility] ENDO (4TH FLR)    ESOPHAGOGASTRODUODENOSCOPY (EGD) N/A 8/17/2017    Performed by Tr[facility] MD at [facility] ENDO (4TH FLR)    ESOPHAGOGASTRODUODENOSCOPY (EGD) N/A 7/14/2016    [facility]    EYE SURGERY      INSERTION, IOL PROSTHESIS Right 9/24/2013    Performed by Ben Sparks MD at [facility] OR 1ST FLR    PARACENTESIS, ABDOMINAL N/A 4/16/2014    Performed by [facility] Diagnostic Provider at [facility] CATH LAB    PHACOEMULSIFICATION, CATARACT Right 9/24/2013    Performed by Ben Sparks MD at [facility] OR 1ST FLR    s/p PPV/AFX/EL (od)  05/09/2012       Review of patient's allergies indicates:   Allergen Reactions    Codeine Itching, Rash, Edema and Other (See Comments)     Other reaction(s): Itching       No current facility-administered medications on file prior to encounter.      Current Outpatient Medications on File Prior to Encounter   Medication Sig    ciprofloxacin HCl (CIPRO) 500 MG tablet Take 1 tablet (500 mg total) by mouth once daily.    felodipine (PLENDIL) 5 MG 24 hr tablet TAKE 1 TABLET(5 MG) BY MOUTH EVERY DAY    fluticasone-umeclidin-vilanter (TRELEGY ELLIPTA) 100-62.5-25 mcg DsDv Inhale 1 puff into the lungs once daily.    furosemide (LASIX) 40 MG tablet Take 0.5 tablets (20 mg total) by mouth 2 (two) times daily.    hydrocortisone (CORTEF) 10 MG Tab TAKE ONE AND ONE-HALF TABLETS BY MOUTH EVERY MORNING AND ONE-HALF TABLET EVERY EVENING./NO FURTHER REFILLS  NEED FOLLOW UP VISIT    lactulose (CHRONULAC) 20 gram/30 mL Soln Take 30 mLs (20 g total) by mouth once daily. for 100  doses    losartan (COZAAR) 100 MG tablet Take 1 tablet (100 mg total) by mouth once daily.    multivitamin-minerals-lutein (CENTRUM SILVER) Tab Take 1 tablet by mouth once daily.      NITROSTAT 0.4 mg SL tablet DISSOLVE 1 TABLET UNDER THE TONGUE EVERY 5 MINUTES AS NEEDED    omeprazole (PRILOSEC) 40 MG capsule TAKE 1 CAPSULE(40 MG) BY MOUTH TWICE DAILY BEFORE MEALS    pravastatin (PRAVACHOL) 40 MG tablet TAKE 1 TABLET(40 MG) BY MOUTH EVERY EVENING    spironolactone (ALDACTONE) 100 MG tablet Take 0.5 tablets (50 mg total) by mouth once daily.     Family History     Problem Relation (Age of Onset)    Cancer Father, Sister, Sister, Brother    Diabetes Brother, Sister    Heart disease Mother, Sister    Lung cancer Brother    No Known Problems Sister, Brother        Tobacco Use    Smoking status: Former Smoker     Packs/day: 1.00     Years: 35.00     Pack years: 35.00     Types: Cigarettes     Last attempt to quit: 1995     Years since quittin.8    Smokeless tobacco: Never Used   Substance and Sexual Activity    Alcohol use: No     Alcohol/week: 0.0 oz    Drug use: No    Sexual activity: Not Currently     Review of Systems   Constitutional: Positive for chills and fatigue. Negative for fever.   Respiratory: Positive for cough and shortness of breath (SALDIVAR).    Cardiovascular: Positive for leg swelling. Negative for chest pain and palpitations.   Gastrointestinal: Negative for abdominal pain, blood in stool, constipation, nausea and vomiting.   Genitourinary: Negative for dysuria.   Psychiatric/Behavioral: Negative for agitation, behavioral problems and confusion.     Objective:     Vital Signs (Most Recent):  Temp: 97.9 °F (36.6 °C) (19)  Pulse: 70 (19)  Resp: 16 (19)  BP: 116/77 (19)  SpO2: 95 % (19) Vital Signs (24h Range):  Temp:  [97.9 °F (36.6 °C)] 97.9 °F (36.6 °C)  Pulse:  [70] 70  Resp:  [16-18] 16  SpO2:  [93 %-95 %] 95 %  BP:  (116-126)/(61-77) 116/77     Weight: 78.3 kg (172 lb 9.9 oz)  Body mass index is 24.77 kg/m².    Physical Exam   Neck: Normal range of motion. Neck supple.   Cardiovascular: Normal rate, regular rhythm and normal heart sounds.   Pulmonary/Chest: Effort normal and breath sounds normal.   Abdominal: Soft. Bowel sounds are normal. He exhibits distension. There is no tenderness.   Musculoskeletal: He exhibits no edema.   Skin: Skin is warm and dry. No rash noted.   Psychiatric: He has a normal mood and affect. His behavior is normal. Thought content normal.           Significant Labs:   A1C:   Recent Labs   Lab 11/09/18  0855 04/16/19 2208   HGBA1C 6.0* 6.1*     Bilirubin:   Recent Labs   Lab 04/16/19 1652 04/16/19 2208   BILITOT 1.5* 1.2*     Blood Culture: No results for input(s): LABBLOO in the last 48 hours.  BMP:   Recent Labs   Lab 04/16/19 2208   *      K 3.3*      CO2 21*   BUN 21   CREATININE 1.5*   CALCIUM 8.8   MG 1.3*     CBC:   Recent Labs   Lab 04/16/19 1652 04/16/19 2208   WBC 8.30 8.89   HGB 10.5* 9.6*   HCT 33.0* 31.4*   * 104*     CMP:   Recent Labs   Lab 04/16/19 1652 04/16/19 2208    140   K 3.4* 3.3*    108   CO2 20* 21*   * 184*   BUN 23 21   CREATININE 1.5* 1.5*   CALCIUM 8.9 8.8   PROT 7.8 7.4   ALBUMIN 2.6* 2.3*   BILITOT 1.5* 1.2*   ALKPHOS 157* 187*   AST 62* 50*   ALT 35 32   ANIONGAP 13 11   EGFRNONAA 45.5* 45.5*     Cardiac Markers: No results for input(s): CKMB, MYOGLOBIN, BNP, TROPISTAT in the last 48 hours.  Coagulation:   Recent Labs   Lab 04/16/19 2208   INR 1.4*     Lactic Acid:   Recent Labs   Lab 04/16/19 2208   LACTATE 3.2*     Lipase: No results for input(s): LIPASE in the last 48 hours.  Lipid Panel: No results for input(s): CHOL, HDL, LDLCALC, TRIG, CHOLHDL in the last 48 hours.  Magnesium:   Recent Labs   Lab 04/16/19  2208   MG 1.3*     POCT Glucose: No results for input(s): POCTGLUCOSE in the last 48 hours.  Prealbumin:  No results for input(s): PREALBUMIN in the last 48 hours.  Respiratory Culture: No results for input(s): GSRESP, RESPIRATORYC in the last 48 hours.  Troponin: No results for input(s): TROPONINI in the last 48 hours.  TSH: No results for input(s): TSH in the last 4320 hours.  Urine Culture: No results for input(s): LABURIN in the last 48 hours.  Urine Studies: No results for input(s): COLORU, APPEARANCEUA, PHUR, SPECGRAV, PROTEINUA, GLUCUA, KETONESU, BILIRUBINUA, OCCULTUA, NITRITE, UROBILINOGEN, LEUKOCYTESUR, RBCUA, WBCUA, BACTERIA, SQUAMEPITHEL, HYALINECASTS in the last 48 hours.    Invalid input(s): WRIGHTSUR  All pertinent labs within the past 24 hours have been reviewed.    Significant Imaging: I have reviewed and interpreted all pertinent imaging results/findings within the past 24 hours.    Assessment/Plan:     Decompensation of cirrhosis of liver  - not clear why he has decompensated. He developed varices and ascites in the last 6 months, despite having been cured of HCV previously. He was scanned in the fall - no obvious malignancy, but did show partial thrombosis of the proximal SMV, portal confluence, and main portal vein extending into the left portal vein. It is possible that this has extended. He was not anticoagulated due to varices. Note, most recent EGD - no further banding.    MELD-Na score: 15 at 4/16/2019 10:08 PM  MELD score: 15 at 4/16/2019 10:08 PM  Calculated from:  Serum Creatinine: 1.5 mg/dL at 4/16/2019 10:08 PM  Serum Sodium: 140 mmol/L (Rounded to 137 mmol/L) at 4/16/2019 10:08 PM  Total Bilirubin: 1.2 mg/dL at 4/16/2019 10:08 PM  INR(ratio): 1.4 at 4/16/2019 10:08 PM  Age: 73 years     Plan:  1- U/S liver with dopplar ordered.  2- CMP + INR daily.  3- TP CT scan abdomen/plelvis in AM.  4- Continue lactulose for goal BM 3-4/day (not on rifaximin at home).  5- F/U ammonia level.  6- Hepatology consult in AM.   7- IR paracentesis diagnostic and therputic with labs ordered to r/o SPB  8-  "Continue empiric treatment with Zosyn (can deescalate to ceftriaxone in AM).  9- F/U blood and urine clx  10 Continue lasix and spironolactone           History of hepatitis C  - F/U hepatology reccs      Superior mesenteric vein thrombosis  - F/U Dopplar U/S      Portal hypertension  - see above      Esophageal varices  - f/u hepatology reccs      Ascites of liver  - IR para ordered.      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 Hormonal workup revealed secondary hypogonadism, hypoadrenalism, and GH deficiency. Thyroid axis was intact. He underwent gamma-knife radiosurgery in 2014, and last saw Dr. Calero in 2015. Could not get MRI due to presence of a pacemaker, but had CT of the sella, which showed the adenoma. He hasn't followed up since 2015 and has not had any additional imaging of the skull."    - Continue maintained on hydrocortisone 15 mg upon awakening and 5 mg around 8PM.  He has not been on testosterone therapy or GH replacement.  - Consider Endocrinology consult in AM for possible need for stress dosage.            Chronic kidney disease, stage III (moderate)  - at baseline ~1.5      Coronary artery disease  - Continue statin.      Hypertension  - Hold home medication.  - Normotensive.      Chronic obstructive pulmonary disease (COPD)  - Mild COPD.  - Follows with Dr. Ortiz.  - Continue home inhalers + PRN Duo-nebs      Hyperlipidemia  - Continue statin.      VTE Risk Mitigation (From admission, onward)        Ordered     Place sequential compression device  Until discontinued      04/16/19 2153     IP VTE HIGH RISK PATIENT  Once      04/16/19 2153             Shayy Franco MD  Department of Hospital Medicine   [facility]  "

## 2019-04-17 NOTE — SUBJECTIVE & OBJECTIVE
Review of Systems   Constitutional: Positive for activity change, appetite change and fatigue. Negative for fever.   HENT: Negative for trouble swallowing.    Eyes: Negative for visual disturbance.   Respiratory: Negative for cough and shortness of breath.    Cardiovascular: Positive for leg swelling. Negative for chest pain.   Gastrointestinal: Positive for abdominal distention. Negative for abdominal pain, anal bleeding, blood in stool, constipation, diarrhea, nausea and vomiting.   Genitourinary: Negative for flank pain.   Musculoskeletal: Negative for arthralgias and back pain.   Skin: Negative for color change.   Allergic/Immunologic: Negative for immunocompromised state.   Psychiatric/Behavioral: Negative for confusion.        Slow responses       Past Medical History:   Diagnosis Date    Anemia     Anticoagulant long-term use     Ascites 12/10/2018    Biliary colic     Cataract     Chronic hepatitis C     Cirrhosis     COPD (chronic obstructive pulmonary disease)     Coronary artery disease     Dyspnea     Epistaxis     Esophageal varices without bleeding     Gallstones     GERD (gastroesophageal reflux disease)     HCC (hepatocellular carcinoma)     Hepatitis B antibody positive     Hyperglycemia     Hyperlipidemia     Hypertension     Hypopituitarism     Mobitz type 2 second degree atrioventricular block     Nuclear sclerosis, left     Pacemaker     Pituitary tumor     Portal hypertension     Pulmonary emphysema     PVT (paroxysmal ventricular tachycardia)     SA node dysfunction     Secondary adrenal insufficiency     Secondary male hypogonadism     SSS (sick sinus syndrome)     Superior mesenteric vein thrombosis     Vitreous hemorrhage, both eyes        Past Surgical History:   Procedure Laterality Date    CARDIAC PACEMAKER PLACEMENT      st rochelle    CATARACT EXTRACTION  9/24/13    RT    COLONOSCOPY Left 1/4/2019    [facility]formed [facility]    CORONARY ANGIOPLASTY WITH STENT PLACEMENT      2 stents    EGD (ESOPHAGOGASTRODUODENOSCOPY) Left 2019    [facility]formed by [facility] at [facility] ENDO    EGD (ESOPHAGOGASTRODUODENOSCOPY) Left 2019    Performed by Emanuel [facility] MD at [facility] ENDO (4TH FLR)    ESOPHAGOGASTRODUODENOSCOPY (EGD) Left 2018    Performed by Trice Funes MD at [facility] ENDO (4TH FLR)    ESOPHAGOGASTRODUODENOSCOPY (EGD) N/A 2017    Performed by Trice Funes MD at [facility] ENDO (4TH FLR)    ESOPHAGOGASTRODUODENOSCOPY (EGD) Left 10/12/2017    Performed by Jody Humphries MD at [facility] ENDO (4TH FLR)    ESOPHAGOGASTRODUODENOSCOPY (EGD) N/A 2017    Performed by Tr[facility] MD at [facility] ENDO (4TH FLR)    ESOPHAGOGASTRODUODENOSCOPY (EGD) N/A 2016    Performed by Tr[facility] at [facility] (4TH FLR)    EYE SURGERY      INSERTION, IOL PROSTHESIS Right 2013    Performed by Ben Sparks MD at [facility] OR 1ST FLR    PARACENTESIS, ABDOMINAL N/A 2014    Performed by [facility] Diagnostic [facility] at [facility] CATH LAB    PHACOEMULSIFICATION, CATARACT Right 2013    Performed by Ben [facility]ks MD at [facility] OR 1ST FLR    s/p PPV/AFX/EL (od)  2012       Family history of liver disease: No    Review of patient's allergies indicates:   Allergen Reactions    Codeine Itching, Rash, Edema and Other (See Comments)     Other reaction(s): Itching       Tobacco Use    Smoking status: Former Smoker     Packs/day: 1.00     Years: 35.00     Pack years: 35.00     Types: Cigarettes     Last attempt to quit: 1995     Years since quittin.8    Smokeless tobacco: Never Used   Substance and Sexual Activity    Alcohol use: No     Alcohol/week: 0.0 oz    Drug use: No    Sexual activity: Not Currently       Medications Prior to Admission   Medication Sig Dispense Refill Last Dose    ciprofloxacin HCl (CIPRO) 500 MG tablet Take 1 tablet (500 mg total) by mouth once daily. 30  tablet 11 Taking    felodipine (PLENDIL) 5 MG 24 hr tablet TAKE 1 TABLET(5 MG) BY MOUTH EVERY DAY 90 tablet 1 Taking    fluticasone-umeclidin-vilanter (TRELEGY ELLIPTA) 100-62.5-25 mcg DsDv Inhale 1 puff into the lungs once daily. 1 each 12 Taking    furosemide (LASIX) 40 MG tablet Take 0.5 tablets (20 mg total) by mouth 2 (two) times daily. 30 tablet 11 Taking    hydrocortisone (CORTEF) 10 MG Tab TAKE ONE AND ONE-HALF TABLETS BY MOUTH EVERY MORNING AND ONE-HALF TABLET EVERY EVENING./NO FURTHER REFILLS  NEED FOLLOW UP VISIT 180 tablet 3 Taking    lactulose (CHRONULAC) 20 gram/30 mL Soln Take 30 mLs (20 g total) by mouth once daily. for 100 doses 3000 mL 11 Taking    losartan (COZAAR) 100 MG tablet Take 1 tablet (100 mg total) by mouth once daily. 90 tablet 3 Taking    multivitamin-minerals-lutein (CENTRUM SILVER) Tab Take 1 tablet by mouth once daily.     Taking    NITROSTAT 0.4 mg SL tablet DISSOLVE 1 TABLET UNDER THE TONGUE EVERY 5 MINUTES AS NEEDED 25 tablet 0 Taking    omeprazole (PRILOSEC) 40 MG capsule TAKE 1 CAPSULE(40 MG) BY MOUTH TWICE DAILY BEFORE MEALS 180 capsule 0 Taking    pravastatin (PRAVACHOL) 40 MG tablet TAKE 1 TABLET(40 MG) BY MOUTH EVERY EVENING 90 tablet 3 Taking    spironolactone (ALDACTONE) 100 MG tablet Take 0.5 tablets (50 mg total) by mouth once daily. 15 tablet 11 Taking       Objective:     Vital Signs (Most Recent):  Temp: 98.7 °F (37.1 °C) (04/17/19 0400)  Pulse: 79 (04/17/19 1212)  Resp: 18 (04/17/19 1212)  BP: 127/84 (04/17/19 0800)  SpO2: (!) 92 % (04/17/19 0800) Vital Signs (24h Range):  Temp:  [97.9 °F (36.6 °C)-98.7 °F (37.1 °C)] 98.7 °F (37.1 °C)  Pulse:  [70-79] 79  Resp:  [14-18] 18  SpO2:  [90 %-95 %] 92 %  BP: (112-127)/(61-84) 127/84     Weight: 78.3 kg (172 lb 9.9 oz) (04/16/19 2130)  Body mass index is 24.77 kg/m².    Physical Exam   Constitutional: He is oriented to person, place, and time. He appears lethargic. He appears ill. No distress.   HENT:   Head:  Normocephalic.   Eyes: Conjunctivae are normal. No scleral icterus.   Neck: Normal range of motion. Neck supple.   Cardiovascular: Normal rate and regular rhythm.   Pulmonary/Chest: Effort normal and breath sounds normal.   Abdominal: Soft. Bowel sounds are normal. He exhibits distension. He exhibits no mass. There is no tenderness. There is no guarding.   Musculoskeletal: Normal range of motion. He exhibits edema.   Neurological: He is oriented to person, place, and time. He appears lethargic.   Skin: Skin is warm and dry.   Psychiatric: He has a normal mood and affect.       MELD-Na score: 14 at 4/17/2019  6:12 AM  MELD score: 14 at 4/17/2019  6:12 AM  Calculated from:  Serum Creatinine: 1.3 mg/dL at 4/17/2019  6:12 AM  Serum Sodium: 141 mmol/L (Rounded to 137 mmol/L) at 4/17/2019  6:12 AM  Total Bilirubin: 1.5 mg/dL at 4/17/2019  6:12 AM  INR(ratio): 1.4 at 4/17/2019  6:12 AM  Age: 73 years    Significant Labs:  CBC:   Recent Labs   Lab 04/17/19 0612   WBC 7.42   RBC 3.52*   HGB 9.4*   HCT 30.8*   PLT 86*     BMP:   Recent Labs   Lab 04/17/19 0612         K 3.4*      CO2 22*   BUN 20   CREATININE 1.3   CALCIUM 8.9     CMP:   Recent Labs   Lab 04/17/19 0612      CALCIUM 8.9   ALBUMIN 2.3*   PROT 7.0      K 3.4*   CO2 22*      BUN 20   CREATININE 1.3   ALKPHOS 167*   ALT 30   AST 46*   BILITOT 1.5*     Coagulation:   Recent Labs   Lab 04/17/19 0612   INR 1.4*       Significant Imaging:  Labs: Reviewed

## 2019-04-17 NOTE — ASSESSMENT & PLAN NOTE
Patient is a 73 y.o male with HCV cirrhosis with recent decompensation with symptoms concerning for HE, and volume overload and recurrent need for paracentesis.  The patient is admitted to evaluate for causes of decompensation. Concern for active infection vs progression of SMV thrombosis; the latter has been ruled out. He was started on Zosyn, and paracentesis is pending. Urine analysis has not been collected yet. His ammonia is within normal/borderline high, which may make AMS due to HE less likely. If mental status does not improve with treatment of possible infection, lactulose and rifaximin, consider evaluation for other etiologies.    -Decompensated cirrhosis with HE and volume overlaod - U/S unchanged, and septic workup is negative so far. Continue IV antibiotics, obtain paracentesis and UA. Please start lactulose and consider adding rifaximin. Increase diuretics to at least 80mg lasix daily and spironolactone 200mg daily (given hypokalemia).  -EV: due for surveillance in May 2019.  -No history of HCC.  -Transplant candidacy: not a candidate given age, frailty, and cardiac comorbidities  -Trend CBC, CMP, INR

## 2019-04-17 NOTE — CONSULTS
[facility]  Hepatology  Consult Note    Patient Name: Fox Lyons Sr.  MRN: [id number]  Admission Date: 4/16/2019  Hospital Length of Stay: 1 days  Attending Provider: Uriel Pablo MD   Primary Care Physician: Clover White MD  Principal Problem:<principal problem not specified>    Inpatient consult to Hepatology  Consult performed by: Oh Ayala MD  Consult ordered by: Shayy Franco MD        Subjective:     Transplant status: No    HPI:  Mr. Lyons is a 72 y/o male with past medical history of HCV cirrhosis s/p interferon treatment with SVR in 2003, c/b EV s/p banding in 1/2019, partial SMV thrombosis stable since 2016, HTN, CAD, s/p PCI in 2009 with stent placement to LCx, COPD, CKD III. Follows with Dr. Funes in clinic, and was admitted from clinic for evaluation of worsening encephalopathy.     Patient states that he was diagnosed with cirrhosis in late 1990's after being diagnosed with HCV. He states that his disease has been very stable since then. His HCV was treated with IFN in 2003 with SVR. In 2016 he started following with Dr. Funes, initially for preop evaluation for CCK. He had an U/S at that time that was consistent with partial SMV thrombosis.   In 10/2018 the patient presented for follow up regarding volume overload, lasix 40mg daily and spironolactone 50mg were started, with improvement of volume overload. In 1/2019, the patient underwent screening EGD and was found to have EV s/p banding; small varices were found on f/u EGD 2/2019. In 1/2019 he was having some balance issues that were thought to be secondary to HE, and the patient was started on lactulose, however was not taking all the time as he was having persistent BMs. In March, he was noted to have abdominal distention, and underwent paracentesis on 3/18 of 5L, negative for SBP, and consistent with portal HTN.     The patient presented for follow up with Dr. Funes on 4/16, and he was complaining of  weakness and increased somnolence. Per wife, patient has been slowly getting worse, sleeping most of the time, and with minimal activities. He is oriented x3. He has more abdominal distention with increased LE edema around ankles. He denies fever, chills, sob, N/V, change in bowel habits, change in urinary habits. The patient was directly admitted for evaluation.     On presentation, patient had relatively stable labs compared to baseline. An U/S of the abdomen with dopplers was obtained and was unchanged with stable SMV thrombosis, and a left sided pleural effusion. Blood cultures were obtained, and CXR with minimal atelectasis reported. Zosyn was started, and home diuretics were continued. Paracentesis is ordered. Of note ammonia level is 47.    Review of Systems   Constitutional: Positive for activity change, appetite change and fatigue. Negative for fever.   HENT: Negative for trouble swallowing.    Eyes: Negative for visual disturbance.   Respiratory: Negative for cough and shortness of breath.    Cardiovascular: Positive for leg swelling. Negative for chest pain.   Gastrointestinal: Positive for abdominal distention. Negative for abdominal pain, anal bleeding, blood in stool, constipation, diarrhea, nausea and vomiting.   Genitourinary: Negative for flank pain.   Musculoskeletal: Negative for arthralgias and back pain.   Skin: Negative for color change.   Allergic/Immunologic: Negative for immunocompromised state.   Psychiatric/Behavioral: Negative for confusion.        Slow responses       Past Medical History:   Diagnosis Date    Anemia     Anticoagulant long-term use     Ascites 12/10/2018    Biliary colic     Cataract     Chronic hepatitis C     Cirrhosis     COPD (chronic obstructive pulmonary disease)     Coronary artery disease     Dyspnea     Epistaxis     Esophageal varices without bleeding     Gallstones     GERD (gastroesophageal reflux disease)     HCC (hepatocellular carcinoma)      Hepatitis B antibody positive     Hyperglycemia     Hyperlipidemia     Hypertension     Hypopituitarism     Mobitz type 2 second degree atrioventricular block     Nuclear sclerosis, left     Pacemaker     Pituitary tumor     Portal hypertension     Pulmonary emphysema     PVT (paroxysmal ventricular tachycardia)     SA node dysfunction     Secondary adrenal insufficiency     Secondary male hypogonadism     SSS (sick sinus syndrome)     Superior mesenteric vein thrombosis     Vitreous hemorrhage, both eyes        Past Surgical History:   Procedure Laterality Date    CARDIAC PACEMAKER PLACEMENT      [facility]    CATARACT EXTRACTION  9/24/13    RT    COLONOSCOPY Left 1/4/2019    Performed by Emnauel Hannah MD at [facility] ENDO (4TH FLR)    CORONARY ANGIOPLASTY WITH STENT PLACEMENT      2 stents    EGD (ESOPHAGOGASTRODUODENOSCOPY) Left 2/19/2019    [facility] at [facility] ENDO    EGD (ESOPHAGOGASTRODUODENOSCOPY) Left 1/4/2019    Performed by Emanuel Hannah MD at [facility] ENDO (4TH FLR)    ESOPHAGOGASTRODUODENOSCOPY (EGD) Left 5/31/2018    Performed by Tr[facility] at [facility] ENDO (4TH FLR)    ESOPHAGOGASTRODUODENOSCOPY (EGD) N/A 11/30/2017    Performed by Trice [facility]unes [facility] at [facility] ENDO (4TH FLR)    ESOPHAGOGASTRODUODENOSCOPY (EGD) Left 10/12/2017    Performed by Jody Humphries MD at [facility] ENDO (4TH FLR)    ESOPHAGOGASTRODUODENOSCOPY (EGD) N/A 8/17/2017    Performed by Tr[facility] at [facility] ENDO (4TH FLR)    ESOPHAGOGASTRODUODENOSCOPY (EGD) N/A 7/14/2016    Performed by Tr[facility] at [facility] ENDO (4TH FLR)    EYE SURGERY      INSERTION, IOL PROSTHESIS Right 9/24/2013    Performed by Ben Sparks MD at [facility] OR 1ST FLR    PARACENTESIS, ABDOMINAL N/A 4/16/2014    Performed by [facility] at [facility] [place] CATH LAB    PHACOEMULSIFICATION, CATARACT Right 9/24/2013    Performed by Ben Sparks MD at [facility] OR 1ST FLR    s/p PPV/AFX/EL  (od)  2012       Family history of liver disease: No    Review of patient's allergies indicates:   Allergen Reactions    Codeine Itching, Rash, Edema and Other (See Comments)     Other reaction(s): Itching       Tobacco Use    Smoking status: Former Smoker     Packs/day: 1.00     Years: 35.00     Pack years: 35.00     Types: Cigarettes     Last attempt to quit: 1995     Years since quittin.8    Smokeless tobacco: Never Used   Substance and Sexual Activity    Alcohol use: No     Alcohol/week: 0.0 oz    Drug use: No    Sexual activity: Not Currently       Medications Prior to Admission   Medication Sig Dispense Refill Last Dose    ciprofloxacin HCl (CIPRO) 500 MG tablet Take 1 tablet (500 mg total) by mouth once daily. 30 tablet 11 Taking    felodipine (PLENDIL) 5 MG 24 hr tablet TAKE 1 TABLET(5 MG) BY MOUTH EVERY DAY 90 tablet 1 Taking    fluticasone-umeclidin-vilanter (TRELEGY ELLIPTA) 100-62.5-25 mcg DsDv Inhale 1 puff into the lungs once daily. 1 each 12 Taking    furosemide (LASIX) 40 MG tablet Take 0.5 tablets (20 mg total) by mouth 2 (two) times daily. 30 tablet 11 Taking    hydrocortisone (CORTEF) 10 MG Tab TAKE ONE AND ONE-HALF TABLETS BY MOUTH EVERY MORNING AND ONE-HALF TABLET EVERY EVENING./NO FURTHER REFILLS  NEED FOLLOW UP VISIT 180 tablet 3 Taking    lactulose (CHRONULAC) 20 gram/30 mL Soln Take 30 mLs (20 g total) by mouth once daily. for 100 doses 3000 mL 11 Taking    losartan (COZAAR) 100 MG tablet Take 1 tablet (100 mg total) by mouth once daily. 90 tablet 3 Taking    multivitamin-minerals-lutein (CENTRUM SILVER) Tab Take 1 tablet by mouth once daily.     Taking    NITROSTAT 0.4 mg SL tablet DISSOLVE 1 TABLET UNDER THE TONGUE EVERY 5 MINUTES AS NEEDED 25 tablet 0 Taking    omeprazole (PRILOSEC) 40 MG capsule TAKE 1 CAPSULE(40 MG) BY MOUTH TWICE DAILY BEFORE MEALS 180 capsule 0 Taking    pravastatin (PRAVACHOL) 40 MG tablet TAKE 1 TABLET(40 MG) BY MOUTH EVERY EVENING  90 tablet 3 Taking    spironolactone (ALDACTONE) 100 MG tablet Take 0.5 tablets (50 mg total) by mouth once daily. 15 tablet 11 Taking       Objective:     Vital Signs (Most Recent):  Temp: 98.7 °F (37.1 °C) (04/17/19 0400)  Pulse: 79 (04/17/19 1212)  Resp: 18 (04/17/19 1212)  BP: 127/84 (04/17/19 0800)  SpO2: (!) 92 % (04/17/19 0800) Vital Signs (24h Range):  Temp:  [97.9 °F (36.6 °C)-98.7 °F (37.1 °C)] 98.7 °F (37.1 °C)  Pulse:  [70-79] 79  Resp:  [14-18] 18  SpO2:  [90 %-95 %] 92 %  BP: (112-127)/(61-84) 127/84     Weight: 78.3 kg (172 lb 9.9 oz) (04/16/19 2130)  Body mass index is 24.77 kg/m².    Physical Exam   Constitutional: He is oriented to person, place, and time. He appears lethargic. He appears ill. No distress.   HENT:   Head: Normocephalic.   Eyes: Conjunctivae are normal. No scleral icterus.   Neck: Normal range of motion. Neck supple.   Cardiovascular: Normal rate and regular rhythm.   Pulmonary/Chest: Effort normal and breath sounds normal.   Abdominal: Soft. Bowel sounds are normal. He exhibits distension. He exhibits no mass. There is no tenderness. There is no guarding.   Musculoskeletal: Normal range of motion. He exhibits edema.   Neurological: He is oriented to person, place, and time. He appears lethargic.   Skin: Skin is warm and dry.   Psychiatric: He has a normal mood and affect.       MELD-Na score: 14 at 4/17/2019  6:12 AM  MELD score: 14 at 4/17/2019  6:12 AM  Calculated from:  Serum Creatinine: 1.3 mg/dL at 4/17/2019  6:12 AM  Serum Sodium: 141 mmol/L (Rounded to 137 mmol/L) at 4/17/2019  6:12 AM  Total Bilirubin: 1.5 mg/dL at 4/17/2019  6:12 AM  INR(ratio): 1.4 at 4/17/2019  6:12 AM  Age: 73 years    Significant Labs:  CBC:   Recent Labs   Lab 04/17/19 0612   WBC 7.42   RBC 3.52*   HGB 9.4*   HCT 30.8*   PLT 86*     BMP:   Recent Labs   Lab 04/17/19 0612         K 3.4*      CO2 22*   BUN 20   CREATININE 1.3   CALCIUM 8.9     CMP:   Recent Labs   Lab  04/17/19  0612      CALCIUM 8.9   ALBUMIN 2.3*   PROT 7.0      K 3.4*   CO2 22*      BUN 20   CREATININE 1.3   ALKPHOS 167*   ALT 30   AST 46*   BILITOT 1.5*     Coagulation:   Recent Labs   Lab 04/17/19  0612   INR 1.4*       Significant Imaging:  Labs: Reviewed    Assessment/Plan:     Decompensation of cirrhosis of liver  Patient is a 73 y.o male with HCV cirrhosis with recent decompensation with symptoms concerning for HE, and volume overload and recurrent need for paracentesis.  The patient is admitted to evaluate for causes of decompensation. Concern for active infection vs progression of SMV thrombosis; the latter has been ruled out. He was started on Zosyn, and paracentesis is pending. Urine analysis has not been collected yet. His ammonia is within normal/borderline high, which may make AMS due to HE less likely. If mental status does not improve with treatment of possible infection, lactulose and rifaximin, consider evaluation for other etiologies.    -Decompensated cirrhosis with HE and volume overlaod - U/S unchanged, and septic workup is negative so far. Continue IV antibiotics, obtain paracentesis and UA. Please start lactulose and consider adding rifaximin. Increase diuretics to at least 80mg lasix daily and spironolactone 200mg daily (given hypokalemia).  -EV: due for surveillance in May 2019.  -No history of HCC.  -Transplant candidacy: not a candidate given age, frailty, and cardiac comorbidities  -Trend CBC, CMP, INR        Thank you for your consult. I will follow-up with patient. Please contact us if you have any additional questions.    Oh Ayala MD  Hepatology  [facility]

## 2019-04-17 NOTE — PROCEDURES
Radiology Post-Procedure Note    Pre Op Diagnosis: Ascites  Post Op Diagnosis: Same    Procedure: Paracentesis    Procedure performed by: Jovon Sen MD     Following informed consent, the patient underwent sterile preparation and 1% lidocaine for local anesthesia. Ultrasound guidance was used to insert a 5-Armenian Yueh catheter into the peritoneal space. Patient tolerated well without complication. Please see imaging report for further details.    Written Informed Consent Obtained: Yes  Specimen Removed: YES  Estimated Blood Loss: Minimal    Findings:   Successful paracentesis.    Patient tolerated procedure well.    Derek Stallings MD

## 2019-04-17 NOTE — SUBJECTIVE & OBJECTIVE
Past Medical History:   Diagnosis Date    Anemia     Anticoagulant long-term use     Ascites 12/10/2018    Biliary colic     Cataract     Chronic hepatitis C     Cirrhosis     COPD (chronic obstructive pulmonary disease)     Coronary artery disease     Dyspnea     Epistaxis     Esophageal varices without bleeding     Gallstones     GERD (gastroesophageal reflux disease)     HCC (hepatocellular carcinoma)     Hepatitis B antibody positive     Hyperglycemia     Hyperlipidemia     Hypertension     Hypopituitarism     Mobitz type 2 second degree atrioventricular block     Nuclear sclerosis, left     Pacemaker     Pituitary tumor     Portal hypertension     Pulmonary emphysema     PVT (paroxysmal ventricular tachycardia)     SA node dysfunction     Secondary adrenal insufficiency     Secondary male hypogonadism     SSS (sick sinus syndrome)     Superior mesenteric vein thrombosis     Vitreous hemorrhage, both eyes        Past Surgical History:   Procedure Laterality Date    CARDIAC PACEMAKER PLACEMENT      [facility]    CATARACT EXTRACTION  9/24/13    RT    COLONOSCOPY Left 1/4/2019    Performed by Emanuel [facility] MD at [facility] ENDO (4TH FLR)    CORONARY ANGIOPLASTY WITH STENT PLACEMENT      2 stents    EGD (ESOPHAGOGASTRODUODENOSCOPY) Left 2/19/2019    [facility]formed by [facility] ENDO    EGD (ESOPHAGOGASTRODUODENOSCOPY) Left 1/4/2019    Performed by Emanuel Hannah MD at [facility] ENDO (4TH FLR)    ESOPHAGOGASTRODUODENOSCOPY (EGD) Left 5/31/2018    Performed by Tr[facility] at [facility] ENDO (4TH FLR)    ESOPHAGOGASTRODUODENOSCOPY (EGD) N/A 11/30/2017    Performed by Tr[facility] at [facility] ENDO (4TH FLR)    ESOPHAGOGASTRODUODENOSCOPY (EGD) Left 10/12/2017    Performed by Jody Humphries MD at [facility] ENDO (4TH FLR)    ESOPHAGOGASTRODUODENOSCOPY (EGD) N/A 8/17/2017    Performed by Tr[facility] at [facility] ENDO (4TH FLR)    ESOPHAGOGASTRODUODENOSCOPY (EGD)  N/A 7/14/2016    Performed by [facility] at [facility] ENDO (4TH FLR)    EYE SURGERY      INSERTION, IOL PROSTHESIS Right 9/24/2013    Performed by Ben Sparks MD at [facility] OR 1ST FLR    PARACENTESIS, ABDOMINAL N/A 4/16/2014    Performed by [facility] at [facility] CATH LAB    PHACOEMULSIFICATION, CATARACT Right 9/24/2013    Performed by Ben Sparks MD at [facility] OR 1ST FLR    s/p PPV/AFX/EL (od)  05/09/2012       Review of patient's allergies indicates:   Allergen Reactions    Codeine Itching, Rash, Edema and Other (See Comments)     Other reaction(s): Itching       No current facility-administered medications on file prior to encounter.      Current Outpatient Medications on File Prior to Encounter   Medication Sig    ciprofloxacin HCl (CIPRO) 500 MG tablet Take 1 tablet (500 mg total) by mouth once daily.    felodipine (PLENDIL) 5 MG 24 hr tablet TAKE 1 TABLET(5 MG) BY MOUTH EVERY DAY    fluticasone-umeclidin-vilanter (TRELEGY ELLIPTA) 100-62.5-25 mcg DsDv Inhale 1 puff into the lungs once daily.    furosemide (LASIX) 40 MG tablet Take 0.5 tablets (20 mg total) by mouth 2 (two) times daily.    hydrocortisone (CORTEF) 10 MG Tab TAKE ONE AND ONE-HALF TABLETS BY MOUTH EVERY MORNING AND ONE-HALF TABLET EVERY EVENING./NO FURTHER REFILLS  NEED FOLLOW UP VISIT    lactulose (CHRONULAC) 20 gram/30 mL Soln Take 30 mLs (20 g total) by mouth once daily. for 100 doses    losartan (COZAAR) 100 MG tablet Take 1 tablet (100 mg total) by mouth once daily.    multivitamin-minerals-lutein (CENTRUM SILVER) Tab Take 1 tablet by mouth once daily.      NITROSTAT 0.4 mg SL tablet DISSOLVE 1 TABLET UNDER THE TONGUE EVERY 5 MINUTES AS NEEDED    omeprazole (PRILOSEC) 40 MG capsule TAKE 1 CAPSULE(40 MG) BY MOUTH TWICE DAILY BEFORE MEALS    pravastatin (PRAVACHOL) 40 MG tablet TAKE 1 TABLET(40 MG) BY MOUTH EVERY EVENING    spironolactone (ALDACTONE) 100 MG tablet Take 0.5 tablets (50 mg total)  by mouth once daily.     Family History     Problem Relation (Age of Onset)    Cancer Father, Sister, Sister, Brother    Diabetes Brother, Sister    Heart disease Mother, Sister    Lung cancer Brother    No Known Problems Sister, Brother        Tobacco Use    Smoking status: Former Smoker     Packs/day: 1.00     Years: 35.00     Pack years: 35.00     Types: Cigarettes     Last attempt to quit: 1995     Years since quittin.8    Smokeless tobacco: Never Used   Substance and Sexual Activity    Alcohol use: No     Alcohol/week: 0.0 oz    Drug use: No    Sexual activity: Not Currently     Review of Systems   Constitutional: Positive for chills and fatigue. Negative for fever.   Respiratory: Positive for cough and shortness of breath (SALDIVAR).    Cardiovascular: Positive for leg swelling. Negative for chest pain and palpitations.   Gastrointestinal: Negative for abdominal pain, blood in stool, constipation, nausea and vomiting.   Genitourinary: Negative for dysuria.   Psychiatric/Behavioral: Negative for agitation, behavioral problems and confusion.     Objective:     Vital Signs (Most Recent):  Temp: 97.9 °F (36.6 °C) (19)  Pulse: 70 (19)  Resp: 16 (19)  BP: 116/77 (19)  SpO2: 95 % (19) Vital Signs (24h Range):  Temp:  [97.9 °F (36.6 °C)] 97.9 °F (36.6 °C)  Pulse:  [70] 70  Resp:  [16-18] 16  SpO2:  [93 %-95 %] 95 %  BP: (116-126)/(61-77) 116/77     Weight: 78.3 kg (172 lb 9.9 oz)  Body mass index is 24.77 kg/m².    Physical Exam   Neck: Normal range of motion. Neck supple.   Cardiovascular: Normal rate, regular rhythm and normal heart sounds.   Pulmonary/Chest: Effort normal and breath sounds normal.   Abdominal: Soft. Bowel sounds are normal. He exhibits distension. There is no tenderness.   Musculoskeletal: He exhibits no edema.   Skin: Skin is warm and dry. No rash noted.   Psychiatric: He has a normal mood and affect. His behavior is normal. Thought  content normal.           Significant Labs:   A1C:   Recent Labs   Lab 11/09/18  0855 04/16/19 2208   HGBA1C 6.0* 6.1*     Bilirubin:   Recent Labs   Lab 04/16/19 1652 04/16/19 2208   BILITOT 1.5* 1.2*     Blood Culture: No results for input(s): LABBLOO in the last 48 hours.  BMP:   Recent Labs   Lab 04/16/19 2208   *      K 3.3*      CO2 21*   BUN 21   CREATININE 1.5*   CALCIUM 8.8   MG 1.3*     CBC:   Recent Labs   Lab 04/16/19 1652 04/16/19 2208   WBC 8.30 8.89   HGB 10.5* 9.6*   HCT 33.0* 31.4*   * 104*     CMP:   Recent Labs   Lab 04/16/19 1652 04/16/19 2208    140   K 3.4* 3.3*    108   CO2 20* 21*   * 184*   BUN 23 21   CREATININE 1.5* 1.5*   CALCIUM 8.9 8.8   PROT 7.8 7.4   ALBUMIN 2.6* 2.3*   BILITOT 1.5* 1.2*   ALKPHOS 157* 187*   AST 62* 50*   ALT 35 32   ANIONGAP 13 11   EGFRNONAA 45.5* 45.5*     Cardiac Markers: No results for input(s): CKMB, MYOGLOBIN, BNP, TROPISTAT in the last 48 hours.  Coagulation:   Recent Labs   Lab 04/16/19 2208   INR 1.4*     Lactic Acid:   Recent Labs   Lab 04/16/19 2208   LACTATE 3.2*     Lipase: No results for input(s): LIPASE in the last 48 hours.  Lipid Panel: No results for input(s): CHOL, HDL, LDLCALC, TRIG, CHOLHDL in the last 48 hours.  Magnesium:   Recent Labs   Lab 04/16/19 2208   MG 1.3*     POCT Glucose: No results for input(s): POCTGLUCOSE in the last 48 hours.  Prealbumin: No results for input(s): PREALBUMIN in the last 48 hours.  Respiratory Culture: No results for input(s): GSRESP, RESPIRATORYC in the last 48 hours.  Troponin: No results for input(s): TROPONINI in the last 48 hours.  TSH: No results for input(s): TSH in the last 4320 hours.  Urine Culture: No results for input(s): LABURIN in the last 48 hours.  Urine Studies: No results for input(s): COLORU, APPEARANCEUA, PHUR, SPECGRAV, PROTEINUA, GLUCUA, KETONESU, BILIRUBINUA, OCCULTUA, NITRITE, UROBILINOGEN, LEUKOCYTESUR, RBCUA, WBCUA, BACTERIA,  MIRIAN, HYALINECASTS in the last 48 hours.    Invalid input(s): JOSE  All pertinent labs within the past 24 hours have been reviewed.    Significant Imaging: I have reviewed and interpreted all pertinent imaging results/findings within the past 24 hours.

## 2019-04-17 NOTE — ASSESSMENT & PLAN NOTE
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 Hormonal workup revealed secondary hypogonadism, hypoadrenalism, and GH deficiency. Thyroid axis was intact. He underwent gamma-knife radiosurgery in 2014, and last saw Dr. Calero in 2015. Could not get MRI due to presence of a pacemaker, but had CT of the sella, which showed the adenoma. He hasn't followed up since 2015 and has not had any additional imaging of the skull."    - Continue maintained on hydrocortisone 15 mg upon awakening and 5 mg around 8PM.  He has not been on testosterone therapy or GH replacement.  - Consider Endocrinology consult in AM for possible need for stress dosage.          "

## 2019-04-17 NOTE — HPI
Mr. Lyons is a 72 y/o male with past medical history of HCV cirrhosis s/p interferon treatment with SVR in 2003, c/b EV s/p banding in 1/2019, partial SMV thrombosis stable since 2016, HTN, CAD, s/p PCI in 2009 with stent placement to LCx, COPD, CKD III. Follows with Dr. Funes in clinic, and was admitted from clinic for evaluation of worsening encephalopathy.     Patient states that he was diagnosed with cirrhosis in late 1990's after being diagnosed with HCV. He states that his disease has been very stable since then. His HCV was treated with IFN in 2003 with SVR. In 2016 he started following with Dr. Funes, initially for preop evaluation for CCK. He had an U/S at that time that was consistent with partial SMV thrombosis.   In 10/2018 the patient presented for follow up regarding volume overload, lasix 40mg daily and spironolactone 50mg were started, with improvement of volume overload. In 1/2019, the patient underwent screening EGD and was found to have EV s/p banding; small varices were found on f/u EGD 2/2019. In 1/2019 he was having some balance issues that were thought to be secondary to HE, and the patient was started on lactulose, however was not taking all the time as he was having persistent BMs. In March, he was noted to have abdominal distention, and underwent paracentesis on 3/18 of 5L, negative for SBP, and consistent with portal HTN.     The patient presented for follow up with Dr. Funes on 4/16, and he was complaining of weakness and increased somnolence. Per wife, patient has been slowly getting worse, sleeping most of the time, and with minimal activities. He is oriented x3. He has more abdominal distention with increased LE edema around ankles. He denies fever, chills, sob, N/V, change in bowel habits, change in urinary habits. The patient was directly admitted for evaluation.     On presentation, patient had relatively stable labs compared to baseline. An U/S of the abdomen with dopplers was  obtained and was unchanged with stable SMV thrombosis, and a left sided pleural effusion. Blood cultures were obtained, and CXR with minimal atelectasis reported. Zosyn was started, and home diuretics were continued. Paracentesis is ordered. Of note ammonia level is 47.

## 2019-04-17 NOTE — H&P
Inpatient Radiology Pre-procedure Note    History of Present Illness:    Fox Lyons Sr. is a 73 y.o. male who presents for image-guided paracentesis.    Admission H&P reviewed.  Past Medical History:   Diagnosis Date    Anemia     Anticoagulant long-term use     Ascites 12/10/2018    Biliary colic     Cataract     Chronic hepatitis C     Cirrhosis     COPD (chronic obstructive pulmonary disease)     Coronary artery disease     Dyspnea     Epistaxis     Esophageal varices without bleeding     Gallstones     GERD (gastroesophageal reflux disease)     HCC (hepatocellular carcinoma)     Hepatitis B antibody positive     Hyperglycemia     Hyperlipidemia     Hypertension     Hypopituitarism     Mobitz type 2 second degree atrioventricular block     Nuclear sclerosis, left     Pacemaker     Pituitary tumor     Portal hypertension     Pulmonary emphysema     PVT (paroxysmal ventricular tachycardia)     SA node dysfunction     Secondary adrenal insufficiency     Secondary male hypogonadism     SSS (sick sinus syndrome)     Superior mesenteric vein thrombosis     Vitreous hemorrhage, both eyes      Past Surgical History:   Procedure Laterality Date    CARDIAC PACEMAKER PLACEMENT      [facility]    CATARACT EXTRACTION  9/24/13    RT    COLONOSCOPY Left 1/4/2019    Performed by Emanuel Hannah MD at [facility] (4TH FLR)    CORONARY ANGIOPLASTY WITH STENT PLACEMENT      2 stents    EGD (ESOPHAGOGASTRODUODENOSCOPY) Left 2/19/2019    [facility]    EGD (ESOPHAGOGASTRODUODENOSCOPY) Left 1/4/2019    Performed by Emanuel Hannah MD at [facility] END[facility] (4TH FLR)    ESOPHAGOGASTRODUODENOSCOPY (EGD) Left 5/31/2018    Performed by Trice Funes MD at [facility] (4TH FLR)    ESOPHAGOGASTRODUODENOSCOPY (EGD) N/A 11/30/2017    Performed by Trice Funes MD at [facility] (4TH FLR)    ESOPHAGOGASTRODUODENOSCOPY (EGD) Left 10/12/2017    Performed by Jody LE  MD Kristel at [facility] ENDO (4TH FLR)    ESOPHAGOGASTRODUODENOSCOPY (EGD) N/A 8/17/2017    Performed by Trice Funes MD at [facility] ENDO (4TH FLR)    ESOPHAGOGASTRODUODENOSCOPY (EGD) N/A 7/14/2016    Performed by Trice Funes MD at [facility] ENDO (4TH FLR)    EYE SURGERY      INSERTION, IOL PROSTHESIS Right 9/24/2013    Performed by Ben Sparks MD at [facility] OR 1ST FLR    PARACENTESIS, ABDOMINAL N/A 4/16/2014    Performed by [facility] at [facility] CATH LAB    PHACOEMULSIFICATION, CATARACT Right 9/24/2013    Performed by Ben Sparks MD at [facility] OR 1ST FLR    s/p PPV/AFX/EL (od)  05/09/2012       Review of Systems:   As documented in primary team H&P    Home Meds:   Prior to Admission medications    Medication Sig Start Date End Date Taking? Authorizing Provider   fluticasone-umeclidin-vilanter (TRELEGY ELLIPTA) 100-62.5-25 mcg DsDv Inhale 1 puff into the lungs once daily. 11/6/18  Yes Katelyn Ortiz MD   furosemide (LASIX) 40 MG tablet Take 0.5 tablets (20 mg total) by mouth 2 (two) times daily.  Patient taking differently: Take 40 mg by mouth 3 (three) times a week.  12/4/18 12/4/19 Yes Trice Funes MD   hydrocortisone (CORTEF) 10 MG Tab TAKE ONE AND ONE-HALF TABLETS BY MOUTH EVERY MORNING AND ONE-HALF TABLET EVERY EVENING./NO FURTHER REFILLS  NEED FOLLOW UP VISIT 4/11/19  Yes Teo Isbell MD   losartan (COZAAR) 100 MG tablet Take 1 tablet (100 mg total) by mouth once daily. 5/9/18  Yes Clover White MD   multivitamin-minerals-lutein (CENTRUM SILVER) Tab Take 1 tablet by mouth once daily.     Yes Historical Provider, MD   NITROSTAT 0.4 mg SL tablet DISSOLVE 1 TABLET UNDER THE TONGUE EVERY 5 MINUTES AS NEEDED 5/25/16  Yes Clover White MD   omeprazole (PRILOSEC) 40 MG capsule TAKE 1 CAPSULE(40 MG) BY MOUTH TWICE DAILY BEFORE MEALS 3/11/19  Yes Clover White MD   pravastatin (PRAVACHOL) 40 MG tablet TAKE 1 TABLET(40 MG) BY MOUTH EVERY EVENING 5/9/18  Yes Clover  BELLA White MD   spironolactone (ALDACTONE) 100 MG tablet Take 0.5 tablets (50 mg total) by mouth once daily. 12/4/18 12/4/19 Yes Trice Funes MD   ciprofloxacin HCl (CIPRO) 500 MG tablet Take 1 tablet (500 mg total) by mouth once daily. 3/18/19   Trice Funes MD   lactulose (CHRONULAC) 20 gram/30 mL Soln Take 30 mLs (20 g total) by mouth once daily. for 100 doses 1/29/19 5/9/19  Trice Funes MD   felodipine (PLENDIL) 5 MG 24 hr tablet TAKE 1 TABLET(5 MG) BY MOUTH EVERY DAY 11/9/18 4/17/19  Clover White MD     Scheduled Meds:    fluticasone-vilanterol  1 puff Inhalation Daily    furosemide  40 mg Intravenous BID    heparin (porcine)  5,000 Units Subcutaneous Q12H    hydrocortisone  15 mg Oral Daily    hydrocortisone  5 mg Oral Daily    lactulose  20 g Oral Daily    pantoprazole  40 mg Oral Daily    piperacillin-tazobactam (ZOSYN) IVPB  4.5 g Intravenous Q8H    [START ON 4/18/2019] spironolactone  200 mg Oral Daily    tiotropium  1 capsule Inhalation Daily     Continuous Infusions:   PRN Meds:albuterol-ipratropium, dextrose 50%, dextrose 50%, glucagon (human recombinant), glucose, glucose, sodium chloride 0.9%  Anticoagulants/Antiplatelets: Heparin    Allergies:   Review of patient's allergies indicates:   Allergen Reactions    Codeine Itching, Rash, Edema and Other (See Comments)     Other reaction(s): Itching     Sedation Hx: have not been any systemic reactions    Labs:  Recent Labs   Lab 04/17/19 0612   INR 1.4*       Recent Labs   Lab 04/17/19 0612   WBC 7.42   HGB 9.4*   HCT 30.8*   MCV 88   PLT 86*      Recent Labs   Lab 04/17/19 0612         K 3.4*      CO2 22*   BUN 20   CREATININE 1.3   CALCIUM 8.9   MG 1.2*   ALT 30   AST 46*   ALBUMIN 2.3*   BILITOT 1.5*         Vitals:  Temp: 98.7 °F (37.1 °C) (04/17/19 0400)  Pulse: 79 (04/17/19 1212)  Resp: 18 (04/17/19 1212)  BP: 127/84 (04/17/19 0800)  SpO2: (!) 92 % (04/17/19 0800)     Physical Exam:  ASA:  2  Mallampati: 2    General: no acute distress  Mental Status: alert and oriented to person, place and time  HEENT: normocephalic, atraumatic  Chest: unlabored breathing  Extremity: moves all extremities    Plan: Image-guided paracentesis  Sedation Plan: Local    Derek Stallings M.D.  PGY-3 Radiology

## 2019-04-17 NOTE — HPI
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 Plts are 58), esophageal varices, COPD, CAD with cardiac stents and pacemaker, CKD presented from hepatology clinic for evaluation for decompensated liver cirrhosis.    Per Notes" He was dx w/ chronic HCV infection in 1995, likely r/t IVDU. Patient presented at the time with c/o weakness, loss of appetite. Patient states had liver bx at that time as well and was told he was negative for cancer but does not recall any further results. He received INF treatment ~2003 at [facility], treated x 6 months w/ SVR. Bailee occurred shortly after completion of treatment and he never followed up. Last HCV VL was done in 2016 and was negative.  He had a  Ct scan done 10/26/18 and 11/29/18: no HCC but it did show moderate ascites. It also showed partial thrombosis of the proximal SMV, portal confluence, and main portal vein extending into the left portal vein. Despite the initiation of lasix 40 mg daily and spironolactone 50 mg daily, he continued to have significant ascites. Diuretics were not further escalated due to a concern for renal insufficiency. He initially deferred a paracentesis but subsequently had it done 3/18/19 (5 L). Fluid analysis was c/w portal htn but there was no SBP. Creat and GFR not normal (42-49.5). Fluid protein was <1.0. Cipro prophylaxis was added for low protein ascites.   EGD 1/4/19:  Banded; repeat EGD 02/19/19: no banding; repeat recommended in 3 months (May 2019). Colonoscopy was done 01/19: polyps and repeat recommended in 2022.     04/16 c/o weakness. He has become progressively weak over the last week. He is slow to answer questions  although he does not have asterixis. His wife states he is not eating and sleeping all the time. He does have an increase in his abdominal girth but denies N/V, abdominal pain or diarrhea. He denies fevers/chills. He is having 2 BM per day.          "

## 2019-04-17 NOTE — PLAN OF CARE
04/17/19 1718   Discharge Assessment   Assessment Type Discharge Planning Assessment   Confirmed/corrected address and phone number on facesheet? No   Assessment information obtained from? Patient;Caregiver;Medical Record   Prior to hospitilization cognitive status: Alert/Oriented   Prior to hospitalization functional status: Independent   Current cognitive status: Alert/Oriented   Current Functional Status: Independent   Lives With spouse   Able to Return to Prior Arrangements yes   Is patient able to care for self after discharge? Yes   Who are your caregiver(s) and their phone number(s)?   (Cathryn Lyons (Spouse) [phone])   Patient's perception of discharge disposition home or selfcare   Readmission Within the Last 30 Days no previous admission in last 30 days   Patient currently being followed by outpatient case management? No   Patient currently receives any other outside agency services? No   Equipment Currently Used at Home none   Do you have any problems affording any of your prescribed medications? No   Is the patient taking medications as prescribed? yes   Does the patient have transportation home? Yes   Transportation Anticipated family or friend will provide   Discharge Plan A Home with family   Discharge Plan B Home   DME Needed Upon Discharge  none   Patient/Family in Agreement with Plan yes

## 2019-04-17 NOTE — MEDICAL/APP STUDENT
Subjective:       Patient ID: Fox Lyons Sr. is a 73 y.o. male.    Chief Complaint: No chief complaint on file.    Mr Lyons is a 74 yo M with pmhx of portal HTN 2/2 to HCV cirrhosis, esophageal varicies, COPD, CAD w/ stents and pacemaker, CKD3 who presented last night from clinic for decompensated liver cirrhosis.  For the past week, pt has been feeling unwell, weakness, loss of appetite, not sleeping well, increase in abdominal girth.    Pt was previously diagnosed with HCV and cirrhosis in 1990s, was treated with INF in 2003, HCV VL was negative in 2016.  Pt started having ascites in late 2018, CT in 11/29/2018 showed partial thrombosis of portal veins and cirrhosis but no focal nodules.  Pt was treated with lasix 40 mg and spironolactone 50 mg and was eventually underwent paracentesis on 3/18/2019.  Fluid analysis consistent with portal HTN, no SBP found.  Pt also underwent EGD with banding on 1/4/2019, and EGD on 2/19/2019 with no banding.      Interval history:  Pt slow to answer questions, but no confusion.  Endorses weakness, decreased appetite, decreased sleep.  Denies abdo pain, fever, chills, cough, sob, chest pain, n/v/c/d.      Review of Systems   Constitutional: Positive for activity change, appetite change and fatigue. Negative for chills, diaphoresis, fever and unexpected weight change.   Respiratory: Negative for cough, shortness of breath and wheezing.    Cardiovascular: Negative for chest pain.   Gastrointestinal: Positive for abdominal distention. Negative for abdominal pain, blood in stool, constipation, diarrhea, nausea and vomiting.   Genitourinary: Negative for dysuria, frequency, hematuria and urgency.   Neurological: Negative for dizziness, light-headedness, numbness and headaches.   Psychiatric/Behavioral: Negative.  Negative for confusion.       No current facility-administered medications on file prior to encounter.      Current Outpatient Medications on File Prior to Encounter    Medication Sig Dispense Refill    fluticasone-umeclidin-vilanter (TRELEGY ELLIPTA) 100-62.5-25 mcg DsDv Inhale 1 puff into the lungs once daily. 1 each 12    furosemide (LASIX) 40 MG tablet Take 0.5 tablets (20 mg total) by mouth 2 (two) times daily. (Patient taking differently: Take 40 mg by mouth 3 (three) times a week. ) 30 tablet 11    hydrocortisone (CORTEF) 10 MG Tab TAKE ONE AND ONE-HALF TABLETS BY MOUTH EVERY MORNING AND ONE-HALF TABLET EVERY EVENING./NO FURTHER REFILLS  NEED FOLLOW UP VISIT 180 tablet 3    losartan (COZAAR) 100 MG tablet Take 1 tablet (100 mg total) by mouth once daily. 90 tablet 3    multivitamin-minerals-lutein (CENTRUM SILVER) Tab Take 1 tablet by mouth once daily.        NITROSTAT 0.4 mg SL tablet DISSOLVE 1 TABLET UNDER THE TONGUE EVERY 5 MINUTES AS NEEDED 25 tablet 0    omeprazole (PRILOSEC) 40 MG capsule TAKE 1 CAPSULE(40 MG) BY MOUTH TWICE DAILY BEFORE MEALS 180 capsule 0    pravastatin (PRAVACHOL) 40 MG tablet TAKE 1 TABLET(40 MG) BY MOUTH EVERY EVENING 90 tablet 3    spironolactone (ALDACTONE) 100 MG tablet Take 0.5 tablets (50 mg total) by mouth once daily. 15 tablet 11    ciprofloxacin HCl (CIPRO) 500 MG tablet Take 1 tablet (500 mg total) by mouth once daily. 30 tablet 11    felodipine (PLENDIL) 5 MG 24 hr tablet TAKE 1 TABLET(5 MG) BY MOUTH EVERY DAY 90 tablet 1    lactulose (CHRONULAC) 20 gram/30 mL Soln Take 30 mLs (20 g total) by mouth once daily. for 100 doses 3000 mL 11     Past Medical History:   Diagnosis Date    Anemia     Anticoagulant long-term use     Ascites 12/10/2018    Biliary colic     Cataract     Chronic hepatitis C     Cirrhosis     COPD (chronic obstructive pulmonary disease)     Coronary artery disease     Dyspnea     Epistaxis     Esophageal varices without bleeding     Gallstones     GERD (gastroesophageal reflux disease)     HCC (hepatocellular carcinoma)     Hepatitis B antibody positive     Hyperglycemia      Hyperlipidemia     Hypertension     Hypopituitarism     Mobitz type 2 second degree atrioventricular block     Nuclear sclerosis, left     Pacemaker     Pituitary tumor     Portal hypertension     Pulmonary emphysema     PVT (paroxysmal ventricular tachycardia)     SA node dysfunction     Secondary adrenal insufficiency     Secondary male hypogonadism     SSS (sick sinus syndrome)     Superior mesenteric vein thrombosis     Vitreous hemorrhage, both eyes      Past Surgical History:   Procedure Laterality Date    CARDIAC PACEMAKER PLACEMENT      [facility]    CATARACT EXTRACTION  9/24/13    RT    COLONOSCOPY Left 1/4/2019    Performed by Emanuel Hannah MD at [facility] ENDO (4TH FLR)    CORONARY ANGIOPLASTY WITH STENT PLACEMENT      2 stents    EGD (ESOPHAGOGASTRODUODENOSCOPY) Left 2/19/2019    [facility]    EGD (ESOPHAGOGASTRODUODENOSCOPY) Left 1/4/2019    Performed by Emanuel Hannah MD at [facility] ENDO (4TH FLR)    ESOPHAGOGASTRODUODENOSCOPY (EGD) Left 5/31/2018    Performed by Trice Funes MD at [facility] ENDO (4TH FLR)    ESOPHAGOGASTRODUODENOSCOPY (EGD) N/A 11/30/2017    Performed by Trice Funes MD at [facility] ENDO (4TH FLR)    ESOPHAGOGASTRODUODENOSCOPY (EGD) Left 10/12/2017    Performed by Jody Humphries MD at [facility] ENDO (4TH FLR)    ESOPHAGOGASTRODUODENOSCOPY (EGD) N/A 8/17/2017    Performed by Trice Funes MD at [facility] ENDO (4TH FLR)    ESOPHAGOGASTRODUODENOSCOPY (EGD) N/A 7/14/2016    [facility] at [facility] ([facility])    EYE SURGERY      INSERTION, IOL PROSTHESIS Right 9/24/2013    Performed by Ben Sparks MD at [facility] OR 1ST FLR    PARACENTESIS, ABDOMINAL N/A 4/16/2014    Performed by [facility] Diagnostic Provider at [facility] CATH LAB    PHACOEMULSIFICATION, CATARACT Right 9/24/2013    Performed by Ben Sparks MD at [facility] OR 1ST FLR    s/p PPV/AFX/EL (od)  05/09/2012       Objective:       Vitals:     04/17/19 1212   BP:    Pulse: 79   Resp: 18   Temp:      Physical Exam   Constitutional: He is oriented to person, place, and time.   HENT:   Head: Normocephalic and atraumatic.   Cardiovascular: Normal rate, regular rhythm and normal heart sounds. Exam reveals no gallop and no friction rub.   No murmur heard.  Pulmonary/Chest: Effort normal and breath sounds normal. No respiratory distress. He has no wheezes.   Abdominal: Soft. Bowel sounds are normal. He exhibits distension (moderate to severe). He exhibits no mass. There is no tenderness. There is no guarding.   Distant bowel sounds   Musculoskeletal:   Thin extremities   Neurological: He is alert and oriented to person, place, and time.   Skin: Skin is warm and dry.   Psychiatric: He has a normal mood and affect.       Assessment:       1. Cirrhosis    2. Chest pain        Plan:       Decompensated liver failure secondary to HCV cirrhosis  - U/s yesterday consistent with cirrhosis, no focal nodules, thrombosis in portal vein unchanged since last CT in 11/29/2018  - No infectious source of decompensation found, blood cx NGTD, CXR with minimal atelectasis reported, UA not collected  - Hepatology consulted, to do therapeutic tap today  - ammonia 47 today  - continue lactulose, consider rifaximin  - increase lasix to spironolactone 200 mg qd and lasix 80 mg qd per hepatology  - pt received zosyn on arrival, hold until cx return  - continue to monitor CBC/CMP/INR    Hypokalemia  - K 3.4 today, from 3.3  - order potassium 40 meq today    Chronic Kidney Disease  - Cr 1.3, baseline around 1.5    COPD  - continue home albuterol and duo-nebs    Hypertension  - pt normotensive today  - hold home HTN meds    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

## 2019-04-18 ENCOUNTER — TELEPHONE (OUTPATIENT)
Dept: ENDOCRINOLOGY | Facility: HOSPITAL | Age: 74
End: 2019-04-18

## 2019-04-18 VITALS
BODY MASS INDEX: 24.71 KG/M2 | WEIGHT: 172.63 LBS | HEART RATE: 81 BPM | HEIGHT: 70 IN | SYSTOLIC BLOOD PRESSURE: 111 MMHG | DIASTOLIC BLOOD PRESSURE: 60 MMHG | OXYGEN SATURATION: 94 % | RESPIRATION RATE: 16 BRPM | TEMPERATURE: 99 F

## 2019-04-18 DIAGNOSIS — E27.49 SECONDARY ADRENAL INSUFFICIENCY: Primary | ICD-10-CM

## 2019-04-18 DIAGNOSIS — K72.10 END STAGE LIVER DISEASE: Primary | ICD-10-CM

## 2019-04-18 PROBLEM — E87.6 HYPOKALEMIA: Status: ACTIVE | Noted: 2019-04-18

## 2019-04-18 LAB
ALBUMIN SERPL BCP-MCNC: 2.6 G/DL (ref 3.5–5.2)
ALP SERPL-CCNC: 140 U/L (ref 55–135)
ALT SERPL W/O P-5'-P-CCNC: 23 U/L (ref 10–44)
ANION GAP SERPL CALC-SCNC: 10 MMOL/L (ref 8–16)
AST SERPL-CCNC: 36 U/L (ref 10–40)
BASOPHILS # BLD AUTO: 0.02 K/UL (ref 0–0.2)
BASOPHILS NFR BLD: 0.4 % (ref 0–1.9)
BILIRUB SERPL-MCNC: 1.4 MG/DL (ref 0.1–1)
BUN SERPL-MCNC: 21 MG/DL (ref 8–23)
CALCIUM SERPL-MCNC: 8.5 MG/DL (ref 8.7–10.5)
CHLORIDE SERPL-SCNC: 111 MMOL/L (ref 95–110)
CO2 SERPL-SCNC: 21 MMOL/L (ref 23–29)
CREAT SERPL-MCNC: 1.5 MG/DL (ref 0.5–1.4)
DIFFERENTIAL METHOD: ABNORMAL
EOSINOPHIL # BLD AUTO: 0.1 K/UL (ref 0–0.5)
EOSINOPHIL NFR BLD: 1.7 % (ref 0–8)
ERYTHROCYTE [DISTWIDTH] IN BLOOD BY AUTOMATED COUNT: 19.8 % (ref 11.5–14.5)
EST. GFR  (AFRICAN AMERICAN): 52.6 ML/MIN/1.73 M^2
EST. GFR  (NON AFRICAN AMERICAN): 45.5 ML/MIN/1.73 M^2
GLUCOSE SERPL-MCNC: 114 MG/DL (ref 70–110)
GRAM STN SPEC: NORMAL
GRAM STN SPEC: NORMAL
HCT VFR BLD AUTO: 28.8 % (ref 40–54)
HGB BLD-MCNC: 9 G/DL (ref 14–18)
IMM GRANULOCYTES # BLD AUTO: 0.03 K/UL (ref 0–0.04)
IMM GRANULOCYTES NFR BLD AUTO: 0.6 % (ref 0–0.5)
INR PPP: 1.4 (ref 0.8–1.2)
LYMPHOCYTES # BLD AUTO: 0.8 K/UL (ref 1–4.8)
LYMPHOCYTES NFR BLD: 15.3 % (ref 18–48)
MAGNESIUM SERPL-MCNC: 1.2 MG/DL (ref 1.6–2.6)
MCH RBC QN AUTO: 26.6 PG (ref 27–31)
MCHC RBC AUTO-ENTMCNC: 31.3 G/DL (ref 32–36)
MCV RBC AUTO: 85 FL (ref 82–98)
MONOCYTES # BLD AUTO: 0.5 K/UL (ref 0.3–1)
MONOCYTES NFR BLD: 8.8 % (ref 4–15)
NEUTROPHILS # BLD AUTO: 4 K/UL (ref 1.8–7.7)
NEUTROPHILS NFR BLD: 73.2 % (ref 38–73)
NRBC BLD-RTO: 0 /100 WBC
PHOSPHATE SERPL-MCNC: 3.1 MG/DL (ref 2.7–4.5)
PLATELET # BLD AUTO: 82 K/UL (ref 150–350)
PMV BLD AUTO: 11.2 FL (ref 9.2–12.9)
POCT GLUCOSE: 111 MG/DL (ref 70–110)
POCT GLUCOSE: 115 MG/DL (ref 70–110)
POCT GLUCOSE: 152 MG/DL (ref 70–110)
POTASSIUM SERPL-SCNC: 3.1 MMOL/L (ref 3.5–5.1)
PROT SERPL-MCNC: 6.4 G/DL (ref 6–8.4)
PROTHROMBIN TIME: 14.1 SEC (ref 9–12.5)
RBC # BLD AUTO: 3.38 M/UL (ref 4.6–6.2)
SODIUM SERPL-SCNC: 142 MMOL/L (ref 136–145)
WBC # BLD AUTO: 5.44 K/UL (ref 3.9–12.7)

## 2019-04-18 PROCEDURE — 99232 SBSQ HOSP IP/OBS MODERATE 35: CPT | Mod: HCNC,GC,, | Performed by: INTERNAL MEDICINE

## 2019-04-18 PROCEDURE — 80053 COMPREHEN METABOLIC PANEL: CPT | Mod: HCNC

## 2019-04-18 PROCEDURE — 63600175 PHARM REV CODE 636 W HCPCS: Mod: HCNC | Performed by: STUDENT IN AN ORGANIZED HEALTH CARE EDUCATION/TRAINING PROGRAM

## 2019-04-18 PROCEDURE — 97161 PT EVAL LOW COMPLEX 20 MIN: CPT | Mod: HCNC

## 2019-04-18 PROCEDURE — 25000242 PHARM REV CODE 250 ALT 637 W/ HCPCS: Mod: HCNC | Performed by: HOSPITALIST

## 2019-04-18 PROCEDURE — 99232 PR SUBSEQUENT HOSPITAL CARE,LEVL II: ICD-10-PCS | Mod: HCNC,GC,, | Performed by: INTERNAL MEDICINE

## 2019-04-18 PROCEDURE — 83735 ASSAY OF MAGNESIUM: CPT | Mod: HCNC

## 2019-04-18 PROCEDURE — 85610 PROTHROMBIN TIME: CPT | Mod: HCNC

## 2019-04-18 PROCEDURE — 99239 HOSP IP/OBS DSCHRG MGMT >30: CPT | Mod: HCNC,GC,, | Performed by: HOSPITALIST

## 2019-04-18 PROCEDURE — 25000003 PHARM REV CODE 250: Mod: HCNC | Performed by: STUDENT IN AN ORGANIZED HEALTH CARE EDUCATION/TRAINING PROGRAM

## 2019-04-18 PROCEDURE — 84100 ASSAY OF PHOSPHORUS: CPT | Mod: HCNC

## 2019-04-18 PROCEDURE — 36415 COLL VENOUS BLD VENIPUNCTURE: CPT | Mod: HCNC

## 2019-04-18 PROCEDURE — 85025 COMPLETE CBC W/AUTO DIFF WBC: CPT | Mod: HCNC

## 2019-04-18 PROCEDURE — 99239 PR HOSPITAL DISCHARGE DAY,>30 MIN: ICD-10-PCS | Mod: HCNC,GC,, | Performed by: HOSPITALIST

## 2019-04-18 PROCEDURE — 63600175 PHARM REV CODE 636 W HCPCS: Mod: HCNC | Performed by: HOSPITALIST

## 2019-04-18 RX ORDER — LANOLIN ALCOHOL/MO/W.PET/CERES
800 CREAM (GRAM) TOPICAL EVERY 4 HOURS
Status: DISCONTINUED | OUTPATIENT
Start: 2019-04-18 | End: 2019-04-18 | Stop reason: HOSPADM

## 2019-04-18 RX ORDER — SPIRONOLACTONE 100 MG/1
200 TABLET, FILM COATED ORAL DAILY
Qty: 60 TABLET | Refills: 3 | Status: CANCELLED | OUTPATIENT
Start: 2019-04-19 | End: 2020-04-18

## 2019-04-18 RX ORDER — SPIRONOLACTONE 100 MG/1
200 TABLET, FILM COATED ORAL DAILY
Qty: 60 TABLET | Refills: 3 | Status: ON HOLD | OUTPATIENT
Start: 2019-04-19 | End: 2019-05-24 | Stop reason: HOSPADM

## 2019-04-18 RX ORDER — DEXAMETHASONE SODIUM PHOSPHATE 4 MG/ML
4 INJECTION, SOLUTION INTRA-ARTICULAR; INTRALESIONAL; INTRAMUSCULAR; INTRAVENOUS; SOFT TISSUE ONCE AS NEEDED
Qty: 1 ML | Refills: 5 | Status: SHIPPED | OUTPATIENT
Start: 2019-04-18 | End: 2019-04-18

## 2019-04-18 RX ORDER — FUROSEMIDE 40 MG/1
40 TABLET ORAL 2 TIMES DAILY
Qty: 60 TABLET | Refills: 2 | Status: CANCELLED | OUTPATIENT
Start: 2019-04-18

## 2019-04-18 RX ORDER — LOSARTAN POTASSIUM 100 MG/1
100 TABLET ORAL DAILY
Qty: 90 TABLET | Refills: 3 | Status: CANCELLED
Start: 2019-04-18

## 2019-04-18 RX ORDER — LACTULOSE 10 G/15ML
20 SOLUTION ORAL DAILY
Qty: 3000 ML | Refills: 11 | Status: CANCELLED
Start: 2019-04-18 | End: 2019-07-27

## 2019-04-18 RX ORDER — POTASSIUM CHLORIDE 20 MEQ/1
40 TABLET, EXTENDED RELEASE ORAL
Status: DISCONTINUED | OUTPATIENT
Start: 2019-04-18 | End: 2019-04-18 | Stop reason: HOSPADM

## 2019-04-18 RX ORDER — LACTULOSE 10 G/15ML
20 SOLUTION ORAL DAILY
Qty: 3000 ML | Refills: 11 | Status: ON HOLD
Start: 2019-04-18 | End: 2019-05-24 | Stop reason: DRUGHIGH

## 2019-04-18 RX ADMIN — PIPERACILLIN AND TAZOBACTAM 4.5 G: 4; .5 INJECTION, POWDER, LYOPHILIZED, FOR SOLUTION INTRAVENOUS; PARENTERAL at 04:04

## 2019-04-18 RX ADMIN — FUROSEMIDE 40 MG: 10 INJECTION, SOLUTION INTRAMUSCULAR; INTRAVENOUS at 08:04

## 2019-04-18 RX ADMIN — LACTULOSE 20 G: 20 SOLUTION ORAL at 08:04

## 2019-04-18 RX ADMIN — TIOTROPIUM BROMIDE 18 MCG: 18 CAPSULE ORAL; RESPIRATORY (INHALATION) at 08:04

## 2019-04-18 RX ADMIN — MAGNESIUM OXIDE TAB 400 MG (241.3 MG ELEMENTAL MG) 800 MG: 400 (241.3 MG) TAB at 09:04

## 2019-04-18 RX ADMIN — HEPARIN SODIUM 5000 UNITS: 5000 INJECTION, SOLUTION INTRAVENOUS; SUBCUTANEOUS at 08:04

## 2019-04-18 RX ADMIN — POTASSIUM CHLORIDE 40 MEQ: 1500 TABLET, EXTENDED RELEASE ORAL at 11:04

## 2019-04-18 RX ADMIN — FLUTICASONE FUROATE AND VILANTEROL TRIFENATATE 1 PUFF: 100; 25 POWDER RESPIRATORY (INHALATION) at 08:04

## 2019-04-18 RX ADMIN — SPIRONOLACTONE 200 MG: 100 TABLET, FILM COATED ORAL at 08:04

## 2019-04-18 RX ADMIN — PANTOPRAZOLE SODIUM 40 MG: 40 TABLET, DELAYED RELEASE ORAL at 08:04

## 2019-04-18 RX ADMIN — HYDROCORTISONE 15 MG: 5 TABLET ORAL at 08:04

## 2019-04-18 NOTE — DISCHARGE SUMMARY
"[facility] Hospital Medicine  Discharge Summary      Patient Name: Fox Lyons Sr.  MRN: [id number]  Admission Date: 4/16/2019  Hospital Length of Stay: 2 days  Discharge Date and Time:  04/18/2019 6:28 PM  Attending Physician: No att. 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 Plts are 58), esophageal varices, COPD, CAD with cardiac stents and pacemaker, CKD presented from hepatology clinic for evaluation for decompensated liver cirrhosis.    Per Notes" He was dx w/ chronic HCV infection in 1995, likely r/t IVDU. Patient presented at the time with c/o weakness, loss of appetite. Patient states had liver bx at that time as well and was told he was negative for cancer but does not recall any further results. He received INF treatment ~2003 at [facility], treated x 6 months w/ SVR. Bailee occurred shortly after completion of treatment and he never followed up. Last HCV VL was done in 2016 and was negative.  He had a  Ct scan done 10/26/18 and 11/29/18: no HCC but it did show moderate ascites. It also showed partial thrombosis of the proximal SMV, portal confluence, and main portal vein extending into the left portal vein. Despite the initiation of lasix 40 mg daily and spironolactone 50 mg daily, he continued to have significant ascites. Diuretics were not further escalated due to a concern for renal insufficiency. He initially deferred a paracentesis but subsequently had it done 3/18/19 (5 L). Fluid analysis was c/w portal htn but there was no SBP. Creat and GFR not normal (42-49.5). Fluid protein was <1.0. Cipro prophylaxis was added for low protein ascites.   EGD 1/4/19:  Banded; repeat EGD 02/19/19: no banding; repeat recommended in 3 months (May 2019). " Colonoscopy was done 01/19: polyps and repeat recommended in 2022.     04/16 c/o weakness. He has become progressively weak over the last week. He is slow to answer questions  although he does not have asterixis. His wife states he is not eating and sleeping all the time. He does have an increase in his abdominal girth but denies N/V, abdominal pain or diarrhea. He denies fevers/chills. He is having 2 BM per day.        * No surgery found *      Hospital Course:   Patient received therapeutic paracentesis and back at his baseline. Fluid did not show any infectious cause and ascites 2/2 liver problem. Patient was put on daily lasix and SBP ppx.      Physical Exam   Constitutional: He is oriented to person, place, and time. No distress.   HENT:   Head: Normocephalic.   Eyes: Conjunctivae are normal. No scleral icterus.   Neck: Normal range of motion. Neck supple.   Cardiovascular: Normal rate and regular rhythm.   Pulmonary/Chest: Effort normal and breath sounds normal.   Abdominal: Soft. Bowel sounds are normal. He exhibits no mass. There is no tenderness. There is no guarding.   Musculoskeletal: Normal range of motion. He exhibits edema.   Neurological: He is oriented to person, place, and time.   Skin: Skin is warm and dry.   Psychiatric: He has a normal mood and affect.       Consults:   Consults (From admission, onward)        Status Ordering Provider     Inpatient consult to Hepatology  Once     Provider:  (Not yet assigned)    Completed EVELYNE RIBERA          * Decompensation of cirrhosis of liver  - not clear why he has decompensated. He developed varices and ascites in the last 6 months, despite having been cured of HCV previously. He was scanned in the fall - no obvious malignancy, but did show partial thrombosis of the proximal SMV, portal confluence, and main portal vein extending into the left portal vein. It is possible that this has extended. He was not anticoagulated due to varices. Note,  most recent EGD - no further banding.    MELD-Na score: 15 at 4/16/2019 10:08 PM  MELD score: 15 at 4/16/2019 10:08 PM  Calculated from:  Serum Creatinine: 1.5 mg/dL at 4/16/2019 10:08 PM  Serum Sodium: 140 mmol/L (Rounded to 137 mmol/L) at 4/16/2019 10:08 PM  Total Bilirubin: 1.2 mg/dL at 4/16/2019 10:08 PM  INR(ratio): 1.4 at 4/16/2019 10:08 PM  Age: 73 years     Plan:  1- U/S liver with dopplar ordered.  2- CMP + INR daily.  3- TP CT scan abdomen/plelvis in AM.  4- Continue lactulose for goal BM 3-4/day (not on rifaximin at home).  5- F/U ammonia level.  6- Hepatology consult in AM.   7- IR paracentesis diagnostic and therputic with labs ordered to r/o SPB  8- Continue empiric treatment with Zosyn (can deescalate to ceftriaxone in AM).  9- F/U blood and urine clx  10 Continue lasix and spironolactone     Hypokalemia  - Check BMP  - Replace PRN    Chronic kidney disease, stage III (moderate)  - at baseline ~1.5      Hypertension  - Hold home medication.  - Normotensive.      Final Active Diagnoses:    Diagnosis Date Noted POA    PRINCIPAL PROBLEM:  Decompensation of cirrhosis of liver [K72.90] 07/14/2016 Yes    Hypokalemia [E87.6] 04/18/2019 Unknown    Ascites of liver [R18.8] 12/10/2018 Yes    Esophageal varices [I85.00] 08/17/2017 Yes    History of hepatitis C [Z86.19] 06/14/2016 Yes    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] 06/09/2016 Yes    Chronic kidney disease, stage III (moderate) [N18.3] 05/04/2016 Yes    Chronic obstructive pulmonary disease (COPD) [J44.9] 05/04/2016 Yes    Secondary adrenal insufficiency [E27.49] 03/31/2015 Yes    Portal hypertension [K76.6] 04/15/2014 Yes    S/P coronary artery stent placement [Z95.5] 06/28/2013 Not Applicable     Chronic    Cardiac pacemaker in situ [Z95.0] 06/28/2013 Yes     Chronic    Sick sinus syndrome [I49.5] 06/28/2013 Yes     Chronic    Coronary artery disease [I25.10]  Yes     Chronic    Hypertension [I10]  Yes     Chronic    Hyperlipidemia  [E78.5]  Yes     Chronic      Problems Resolved During this Admission:       Discharged Condition: good    Disposition: Home or Self Care    Follow Up:  Follow-up Information     Clover White MD.    Specialty:  Family Medicine  Why:  Appt scheduled for 5/8/19 @ 11AM  Contact information:  [address]  [address]  [facility] [address]  [phone]                 Patient Instructions:      CBC W/ AUTO DIFFERENTIAL   Standing Status: Future Standing Exp. Date: 06/16/20     COMPREHENSIVE METABOLIC PANEL   Standing Status: Future Standing Exp. Date: 06/16/20       Significant Diagnostic Studies: Labs:   CMP   Recent Labs   Lab 04/16/19 2208 04/17/19 0612 04/18/19  0658    141 142   K 3.3* 3.4* 3.1*    109 111*   CO2 21* 22* 21*   * 100 114*   BUN 21 20 21   CREATININE 1.5* 1.3 1.5*   CALCIUM 8.8 8.9 8.5*   PROT 7.4 7.0 6.4   ALBUMIN 2.3* 2.3* 2.6*   BILITOT 1.2* 1.5* 1.4*   ALKPHOS 187* 167* 140*   AST 50* 46* 36   ALT 32 30 23   ANIONGAP 11 10 10   ESTGFRAFRICA 52.6* >60.0 52.6*   EGFRNONAA 45.5* 54.1* 45.5*   , CBC   Recent Labs   Lab 04/16/19 2208 04/17/19 0612 04/18/19  0658   WBC 8.89 7.42 5.44   HGB 9.6* 9.4* 9.0*   HCT 31.4* 30.8* 28.8*   * 86* 82*    and INR   Lab Results   Component Value Date    INR 1.4 (H) 04/18/2019    INR 1.4 (H) 04/17/2019    INR 1.4 (H) 04/16/2019       Pending Diagnostic Studies:     None         Medications:  Reconciled Home Medications:      Medication List      START taking these medications    dexamethasone 4 mg/mL injection  Commonly known as:  DECADRON  Inject 1 mL (4 mg total) into the muscle once as needed. For adrenal crisis.        CHANGE how you take these medications    furosemide 40 MG tablet  Commonly known as:  LASIX  Take 0.5 tablets (20 mg total) by mouth 2 (two) times daily.  What changed:    · how much to take  · when to take this     lactulose 20 gram/30 mL Soln  Commonly known as:  CHRONULAC  Take 30 mLs (20 g total) by mouth  once daily. Please ensure you are having 3-4 bowels movements daily for 100 doses  What changed:  additional instructions     spironolactone 100 MG tablet  Commonly known as:  ALDACTONE  Take 2 tablets (200 mg total) by mouth once daily.  Start taking on:  4/19/2019  What changed:  how much to take        CONTINUE taking these medications    CENTRUM SILVER Tab  Generic drug:  multivitamin-minerals-lutein  Take 1 tablet by mouth once daily.     ciprofloxacin HCl 500 MG tablet  Commonly known as:  CIPRO  Take 1 tablet (500 mg total) by mouth once daily.     fluticasone-umeclidin-vilanter 100-62.5-25 mcg Dsdv  Commonly known as:  TRELEGY ELLIPTA  Inhale 1 puff into the lungs once daily.     hydrocortisone 10 MG Tab  Commonly known as:  CORTEF  TAKE ONE AND ONE-HALF TABLETS BY MOUTH EVERY MORNING AND ONE-HALF TABLET EVERY EVENING./NO FURTHER REFILLS  NEED FOLLOW UP VISIT     losartan 100 MG tablet  Commonly known as:  COZAAR  Take 1 tablet (100 mg total) by mouth once daily.     NITROSTAT 0.4 MG SL tablet  Generic drug:  nitroGLYCERIN  DISSOLVE 1 TABLET UNDER THE TONGUE EVERY 5 MINUTES AS NEEDED     omeprazole 40 MG capsule  Commonly known as:  PRILOSEC  TAKE 1 CAPSULE(40 MG) BY MOUTH TWICE DAILY BEFORE MEALS     pravastatin 40 MG tablet  Commonly known as:  PRAVACHOL  TAKE 1 TABLET(40 MG) BY MOUTH EVERY EVENING            Indwelling Lines/Drains at time of discharge:   Lines/Drains/Airways          None          Time spent on the discharge of patient: 35 minutes  Patient was seen and examined on the date of discharge and determined to be suitable for discharge.         TIMBO Brooks MD  Department of Hospital Medicine  [facility]

## 2019-04-18 NOTE — PLAN OF CARE
Problem: Physical Therapy Goal  Goal: Physical Therapy Goal  Pt does not require additional acute PT services at this time d/t baseline c functional mobility.     Pt educated on asking medical staff for PT consult if changes in functional status occurs. - v/u        Outcome: Outcome(s) achieved Date Met: 04/18/19  Eval and D/C from acute PT services    Teo Conroy, PT,DPT  4/18/2019

## 2019-04-18 NOTE — PT/OT/SLP EVAL
"Occupational Therapy   Evaluation and Discharge Note    Name: Fox Lyons Sr.  MRN: [id number]  Admitting Diagnosis:  <principal problem not specified>      Recommendations:     Discharge Recommendations: home  Discharge Equipment Recommendations:  none  Barriers to discharge:  None    Assessment:     Fox Lyons Sr. is a 73 y.o. male with a medical diagnosis of <principal problem not specified>. At this time, patient is functioning at their prior level of function and does not require further acute OT services.     Plan:     During this hospitalization, patient does not require further acute OT services.  Please re-consult if situation changes.    · Plan of Care Reviewed with: spouse, patient    Subjective     Chief Complaint: "I got all this fluid on me"  Patient/Family Comments/goals: Medical management and discharge.     Occupational Profile:  Living Environment: Pt lives w/ spouse in a H w/ 0 KHAL[place], and has a tub/shower  Previous level of function: Independent w/ ADLs/IADLs  Roles and Routines: Semi-retired , fisherman  Equipment Used at home:  none  Assistance upon Discharge: yes from self, and spouse    Pain/Comfort:  · Pain Rating 1: 0/10  · Pain Rating Post-Intervention 1: 0/10    Patients cultural, spiritual, Worship conflicts given the current situation: no    Objective:     Communicated with: RN prior to session.  Patient found HOB elevated with telemetry, pulse ox (continuous) upon OT entry to room.    General Precautions: Standard, fall   Orthopedic Precautions:N/A   Braces: N/A     Occupational Performance:    Bed Mobility:    · Patient completed Rolling/Turning to Left with  independence  · Patient completed Rolling/Turning to Right with independence  · Patient completed Scooting/Bridging with independence  · Patient completed Supine to Sit with independence  · Patient completed Sit to Supine with independence    Functional Mobility/Transfers:  · Patient completed Sit <> Stand " Transfer with independence  with  no assistive device   · Patient completed Toilet Transfer Step Transfer technique with independence with  no AD  · Functional Mobility: Able to walk long household distances w/ no AD and Independent level of assistance.     Activities of Daily Living:  · Bathing: independence standing at sink to wash hands  · Upper Body Dressing: independence seated EOB  · Lower Body Dressing: independence seated EOB  · Toileting: independence standing at toilet    Cognitive/Visual Perceptual:  Completely cognitively intact adult w/ no glasses worn or present during eval.       Physical Exam:  Balance:    -       grossly good  Motor Planning:    -       Normal  Dominant hand:    -       RH  Upper Extremity Range of Motion:     -       Right Upper Extremity: WNL  -       Left Upper Extremity: WNL  Upper Extremity Strength:    -       Right Upper Extremity: WNL  -       Left Upper Extremity: WNL   Strength:    -       Right Upper Extremity: WNL  -       Left Upper Extremity: WNL  Fine Motor Coordination:    -       Intact  Gross motor coordination:   WFL    AMPAC 6 Click ADL:  AMPAC Total Score: 24    Treatment & Education:  -Pt edu on OT role/POC  -Importance of OOB activity with staff assistance  -Safety during functional t/f and mobility  - White board updated  - Multiple self care tasks/functional mobility completed-- assistance level noted above  - All questions/concerns answered within OT scope of practice    Education:    Patient left HOB elevated with all lines intact, call button in reach, RN notified and spouse present    GOALS:   Multidisciplinary Problems     Occupational Therapy Goals     Not on file          Multidisciplinary Problems (Resolved)        Problem: Occupational Therapy Goal    Goal Priority Disciplines Outcome Interventions   Occupational Therapy Goal   (Resolved)     OT, PT/OT Outcome(s) achieved                    History:     Past Medical History:   Diagnosis Date     Anemia     Anticoagulant long-term use     Ascites 12/10/2018    Biliary colic     Cataract     Chronic hepatitis C     Cirrhosis     COPD (chronic obstructive pulmonary disease)     Coronary artery disease     Dyspnea     Epistaxis     Esophageal varices without bleeding     Gallstones     GERD (gastroesophageal reflux disease)     HCC (hepatocellular carcinoma)     Hepatitis B antibody positive     Hyperglycemia     Hyperlipidemia     Hypertension     Hypopituitarism     Mobitz type 2 second degree atrioventricular block     Nuclear sclerosis, left     Pacemaker     Pituitary tumor     Portal hypertension     Pulmonary emphysema     PVT (paroxysmal ventricular tachycardia)     SA node dysfunction     Secondary adrenal insufficiency     Secondary male hypogonadism     SSS (sick sinus syndrome)     Superior mesenteric vein thrombosis     Vitreous hemorrhage, both eyes        Past Surgical History:   Procedure Laterality Date    CARDIAC PACEMAKER PLACEMENT      [facility]    CATARACT EXTRACTION  9/24/13    RT    COLONOSCOPY Left 1/4/2019    [facility] ([facility])    CORONARY ANGIOPLASTY WITH STENT PLACEMENT      2 stents    EGD (ESOPHAGOGASTRODUODENOSCOPY) Left 2/19/2019    [facility] ENDO    EGD (ESOPHAGOGASTRODUODENOSCOPY) Left 1/4/2019    Performed by [facility] at [facility] ENDO (4TH FLR)    ESOPHAGOGASTRODUODENOSCOPY (EGD) Left 5/31/2018    Performed by Tr[facility]unes MD at [facility] ENDO (4TH FLR)    ESOPHAGOGASTRODUODENOSCOPY (EGD) N/A 11/30/2017    Performed by Tr[facility]unes MD at [facility] ENDO (4TH FLR)    ESOPHAGOGASTRODUODENOSCOPY (EGD) Left 10/12/2017    Performed by Jody Humphries MD at [facility] ENDO (4TH FLR)    ESOPHAGOGASTRODUODENOSCOPY (EGD) N/A 8/17/2017    Performed by Tr[facility] at [facility] ENDO (4TH FLR)    ESOPHAGOGASTRODUODENOSCOPY (EGD) N/A 7/14/2016    Performed by Trice QUIJANO  MD [facility] at [facility] ENDO (4TH FLR)    EYE SURGERY      INSERTION, IOL PROSTHESIS Right 9/24/2013    Performed by Ben Sparks MD at [facility] OR 1ST FLR    PARACENTESIS, ABDOMINAL N/A 4/16/2014    Performed by [facility] at [facility] CATH LAB    PHACOEMULSIFICATION, CATARACT Right 9/24/2013    Performed by Ben [facility]ks MD at [facility] OR 1ST FLR    s/p PPV/AFX/EL (od)  05/09/2012       Time Tracking:     OT Date of Treatment: 04/17/19  OT Start Time: 1415  OT Stop Time: 1434  OT Total Time (min): 19 min    Billable Minutes:Evaluation 10 mins  Self Care/Home Management 9 mins    Johny Costello, OT  4/17/2019

## 2019-04-18 NOTE — HOSPITAL COURSE
Patient received therapeutic paracentesis and back at his baseline. Fluid did not show any infectious cause and ascites 2/2 liver problem. Patient was put on daily lasix and SBP ppx.

## 2019-04-18 NOTE — TELEPHONE ENCOUNTER
Received message from [facility] that dexamethasone IM injection not available. Sent Rx to [facility] pharmacy.

## 2019-04-18 NOTE — PLAN OF CARE
Problem: Occupational Therapy Goal  Goal: Occupational Therapy Goal  Outcome: Outcome(s) achieved Date Met: 04/17/19  Eval complete. Pt tolerated session well. D/C from OT.

## 2019-04-18 NOTE — PT/OT/SLP EVAL
Physical Therapy Evaluation and Discharge Note    Patient Name:  Fox Lyons Sr.   MRN:  [id number]    Recommendations:     Discharge Recommendations:  home health PT   Discharge Equipment Recommendations: walker, rolling   Barriers to discharge: None    Assessment:     Fox Lyons Sr. is a 73 y.o. male admitted with a medical diagnosis of Decompensation of cirrhosis of liver. .  At this time, patient is functioning at their prior level of function and does not require further acute PT services.     Recent Surgery: * No surgery found *      Plan:     During this hospitalization, patient does not require further acute PT services.  Please re-consult if situation changes.      Subjective     Chief Complaint: none  Patient/Family Comments/goals: to return home  Pain/Comfort:  · Pain Rating 1: 0/10    Patients cultural, spiritual, Confucianism conflicts given the current situation: no    Living Environment:  Pt lives c wife in [facility] c [address].  PTA driving, not working, enjoys fishing and no falls.  Prior to admission, patients level of function was independent.  Equipment used at home: none.  DME owned (not currently used): none.  Upon discharge, patient will have assistance from family.    Objective:     Communicated with RN prior to session.  Patient found HOB elevated with peripheral IV, telemetry upon PT entry to room.    General Precautions: Standard, fall, aspiration   Orthopedic Precautions:N/A   Braces: N/A     Exams:  · Cognitive Exam:  Patient is oriented to Person, Place, Time and Situation  · RLE ROM: WFL  · RLE Strength: WFL  · LLE ROM: WFL  · LLE Strength: WFL    Functional Mobility:  · Bed Mobility:     · Rolling Left:  independence  · Scooting: independence  · Supine to Sit: independence  · Sit to Supine: independence  · Transfers:  Sit to Stand:  supervision with no AD  · Gait: 100ft c no AD (S)  · Balance: standing (S)    AM-PAC 6 CLICK MOBILITY  Total Score:20       Therapeutic Activities and  Exercises:   Pt educated on: PT role/POC; safety c mobility; benefits of OOB activities; performing therex; d/c recs - v/u      AM-PAC 6 CLICK MOBILITY  Total Score:20     Patient left HOB elevated with all lines intact, call button in reach, RN notified and wife present.    GOALS:   Multidisciplinary Problems     Physical Therapy Goals     Not on file          Multidisciplinary Problems (Resolved)        Problem: Physical Therapy Goal    Goal Priority Disciplines Outcome Goal Variances Interventions   Physical Therapy Goal   (Resolved)     PT, PT/OT Outcome(s) achieved     Description:  Pt does not require additional acute PT services at this time d/t baseline c functional mobility.     Pt educated on asking medical staff for PT consult if changes in functional status occurs. - v/u                          History:     Past Medical History:   Diagnosis Date    Anemia     Anticoagulant long-term use     Ascites 12/10/2018    Biliary colic     Cataract     Chronic hepatitis C     Cirrhosis     COPD (chronic obstructive pulmonary disease)     Coronary artery disease     Dyspnea     Epistaxis     Esophageal varices without bleeding     Gallstones     GERD (gastroesophageal reflux disease)     HCC (hepatocellular carcinoma)     Hepatitis B antibody positive     Hyperglycemia     Hyperlipidemia     Hypertension     Hypopituitarism     Mobitz type 2 second degree atrioventricular block     Nuclear sclerosis, left     Pacemaker     Pituitary tumor     Portal hypertension     Pulmonary emphysema     PVT (paroxysmal ventricular tachycardia)     SA node dysfunction     Secondary adrenal insufficiency     Secondary male hypogonadism     SSS (sick sinus syndrome)     Superior mesenteric vein thrombosis     Vitreous hemorrhage, both eyes        Past Surgical History:   Procedure Laterality Date    CARDIAC PACEMAKER PLACEMENT      [facility]    CATARACT EXTRACTION  9/24/13    RT    COLONOSCOPY  Left 1/4/2019    Performed by Emanuel Hannah MD at [facility] ENDO (4TH FLR)    CORONARY ANGIOPLASTY WITH STENT PLACEMENT      2 stents    EGD (ESOPHAGOGASTRODUODENOSCOPY) Left 2/19/2019    Performed by Trice [facility] at [facility] ENDO    EGD (ESOPHAGOGASTRODUODENOSCOPY) Left 1/4/2019    Performed by Emanuel Hannah MD at [facility] ENDO (4TH FLR)    ESOPHAGOGASTRODUODENOSCOPY (EGD) Left 5/31/2018    Performed by Trice Funes MD at [facility] ENDO (4TH FLR)    ESOPHAGOGASTRODUODENOSCOPY (EGD) N/A 11/30/2017    Performed by Trice Funes MD at [facility] ENDO (4TH FLR)    ESOPHAGOGASTRODUODENOSCOPY (EGD) Left 10/12/2017    Performed by Jody Humphries MD at [facility] ENDO (4TH FLR)    ESOPHAGOGASTRODUODENOSCOPY (EGD) N/A 8/17/2017    Performed by Trice Funes MD at [facility] ENDO (4TH FLR)    ESOPHAGOGASTRODUODENOSCOPY (EGD) N/A 7/14/2016    Performed by [facility] at [facility] (4TH FLR)    EYE SURGERY      INSERTION, IOL PROSTHESIS Right 9/24/2013    Performed by Ben Sparks MD at [facility] OR 1ST FLR    PARACENTESIS, ABDOMINAL N/A 4/16/2014    Performed by [facility] at [facility] CATH LAB    PHACOEMULSIFICATION, CATARACT Right 9/24/2013    Performed by Ben Sparks MD at [facility] OR 1ST FLR    s/p PPV/AFX/EL (od)  05/09/2012       Time Tracking:     PT Received On: 04/18/19  PT Start Time: 1000     PT Stop Time: 1009  PT Total Time (min): 9 min     Billable Minutes: Evaluation 9 min      Teo Conroy, PT  04/18/2019

## 2019-04-19 NOTE — PROGRESS NOTES
[facility]  Hepatology  Progress Note    Patient Name: Fox Lyons Sr.  MRN: [id number]  Admission Date: 4/16/2019  Hospital Length of Stay: 2 days  Attending Provider: No att. providers found   Primary Care Physician: Clover White MD  Principal Problem:Decompensation of cirrhosis of liver    Subjective:     Transplant status: No    HPI: Mr. Lyons is a 72 y/o male with past medical history of HCV cirrhosis s/p interferon treatment with SVR in 2003, c/b EV s/p banding in 1/2019, partial SMV thrombosis stable since 2016, HTN, CAD, s/p PCI in 2009 with stent placement to LCx, COPD, CKD III. Follows with Dr. Funes in clinic, and was admitted from clinic for evaluation of worsening encephalopathy.     Patient states that he was diagnosed with cirrhosis in late 1990's after being diagnosed with HCV. He states that his disease has been very stable since then. His HCV was treated with IFN in 2003 with SVR. In 2016 he started following with Dr. Funes, initially for preop evaluation for CCK. He had an U/S at that time that was consistent with partial SMV thrombosis.   In 10/2018 the patient presented for follow up regarding volume overload, lasix 40mg daily and spironolactone 50mg were started, with improvement of volume overload. In 1/2019, the patient underwent screening EGD and was found to have EV s/p banding; small varices were found on f/u EGD 2/2019. In 1/2019 he was having some balance issues that were thought to be secondary to HE, and the patient was started on lactulose, however was not taking all the time as he was having persistent BMs. In March, he was noted to have abdominal distention, and underwent paracentesis on 3/18 of 5L, negative for SBP, and consistent with portal HTN.     The patient presented for follow up with Dr. Funes on 4/16, and he was complaining of weakness and increased somnolence. Per wife, patient has been slowly getting worse, sleeping most of the time, and with  minimal activities. He is oriented x3. He has more abdominal distention with increased LE edema around ankles. He denies fever, chills, sob, N/V, change in bowel habits, change in urinary habits. The patient was directly admitted for evaluation.     On presentation, patient had relatively stable labs compared to baseline. An U/S of the abdomen with dopplers was obtained and was unchanged with stable SMV thrombosis, and a left sided pleural effusion. Blood cultures were obtained, and CXR with minimal atelectasis reported. Zosyn was started, and home diuretics were continued. Paracentesis is ordered. Of note ammonia level is 47.    Interval History:   Underwent LVP. 7.5 L, no SBP  Mental status slightly improved.  Kidney function remains stable after starting diuretics    No current facility-administered medications for this encounter.      Current Outpatient Medications   Medication Sig    fluticasone-umeclidin-vilanter (TRELEGY ELLIPTA) 100-62.5-25 mcg DsDv Inhale 1 puff into the lungs once daily.    furosemide (LASIX) 40 MG tablet Take 0.5 tablets (20 mg total) by mouth 2 (two) times daily. (Patient taking differently: Take 40 mg by mouth 3 (three) times a week. )    hydrocortisone (CORTEF) 10 MG Tab TAKE ONE AND ONE-HALF TABLETS BY MOUTH EVERY MORNING AND ONE-HALF TABLET EVERY EVENING./NO FURTHER REFILLS  NEED FOLLOW UP VISIT    losartan (COZAAR) 100 MG tablet Take 1 tablet (100 mg total) by mouth once daily.    multivitamin-minerals-lutein (CENTRUM SILVER) Tab Take 1 tablet by mouth once daily.      NITROSTAT 0.4 mg SL tablet DISSOLVE 1 TABLET UNDER THE TONGUE EVERY 5 MINUTES AS NEEDED    omeprazole (PRILOSEC) 40 MG capsule TAKE 1 CAPSULE(40 MG) BY MOUTH TWICE DAILY BEFORE MEALS    pravastatin (PRAVACHOL) 40 MG tablet TAKE 1 TABLET(40 MG) BY MOUTH EVERY EVENING    ciprofloxacin HCl (CIPRO) 500 MG tablet Take 1 tablet (500 mg total) by mouth once daily.    lactulose (CHRONULAC) 20 gram/30 mL Soln Take 30  mLs (20 g total) by mouth once daily. Please ensure you are having 3-4 bowels movements daily for 100 doses    spironolactone (ALDACTONE) 100 MG tablet Take 2 tablets (200 mg total) by mouth once daily.       Objective:     Vital Signs (Most Recent):  Temp: 98.6 °F (37 °C) (04/18/19 0800)  Pulse: 81 (04/18/19 0831)  Resp: 16 (04/18/19 0831)  BP: 111/60 (04/18/19 0800)  SpO2: (!) 94 % (04/18/19 0800) Vital Signs (24h Range):  Temp:  [98.6 °F (37 °C)] 98.6 °F (37 °C)  Pulse:  [71-81] 81  Resp:  [16] 16  SpO2:  [94 %] 94 %  BP: (111)/(60) 111/60     Weight: 78.3 kg (172 lb 9.9 oz) (04/16/19 2130)  Body mass index is 24.77 kg/m².    Physical Exam   Constitutional: He is oriented to person, place, and time. He appears lethargic. He appears cachectic. No distress.   HENT:   Head: Normocephalic.   Eyes: Conjunctivae are normal. No scleral icterus.   Neck: Normal range of motion. Neck supple.   Cardiovascular: Normal rate and regular rhythm.   Pulmonary/Chest: Effort normal and breath sounds normal.   Abdominal: Soft. Bowel sounds are normal. He exhibits distension. He exhibits no mass. There is no tenderness. There is no guarding.   Musculoskeletal: Normal range of motion.   Neurological: He is oriented to person, place, and time. He appears lethargic.   Skin: Skin is warm and dry.   Psychiatric: He has a normal mood and affect.       MELD-Na score: 15 at 4/18/2019  6:58 AM  MELD score: 15 at 4/18/2019  6:58 AM  Calculated from:  Serum Creatinine: 1.5 mg/dL at 4/18/2019  6:58 AM  Serum Sodium: 142 mmol/L (Rounded to 137 mmol/L) at 4/18/2019  6:58 AM  Total Bilirubin: 1.4 mg/dL at 4/18/2019  6:58 AM  INR(ratio): 1.4 at 4/18/2019  6:58 AM  Age: 73 years    Significant Labs:  CBC:   Recent Labs   Lab 04/18/19  0658   WBC 5.44   RBC 3.38*   HGB 9.0*   HCT 28.8*   PLT 82*     BMP:   Recent Labs   Lab 04/18/19 0658   *      K 3.1*   *   CO2 21*   BUN 21   CREATININE 1.5*   CALCIUM 8.5*     CMP:   Recent Labs    Lab 04/18/19  0658   *   CALCIUM 8.5*   ALBUMIN 2.6*   PROT 6.4      K 3.1*   CO2 21*   *   BUN 21   CREATININE 1.5*   ALKPHOS 140*   ALT 23   AST 36   BILITOT 1.4*     Coagulation:   Recent Labs   Lab 04/18/19  0658   INR 1.4*       Significant Imaging:  Labs: Reviewed    Assessment/Plan:     * Decompensation of cirrhosis of liver  Patient is a 73 y.o male with HCV cirrhosis with recent decompensation with symptoms concerning for HE, and volume overload and recurrent need for paracentesis.  The patient is admitted to evaluate for causes of decompensation. Concern for active infection vs progression of SMV thrombosis; the latter has been ruled out. He was started on Zosyn, but septic workup negative with no convincing evidence for infection.   Mental status improved. Started on diuretics 40mg BID lasix, and spironolactone 200mg daily. Kidney function remains stable    -Decompensated cirrhosis with HE and volume overlaod - U/S unchanged, and septic workup is negative so far. Can switch back to prophylactic cipro. Resume lactulose. Can discharge on current dose of diuretics with repeat labs on Monday 4/22  -EV: due for surveillance in May 2019.  -No history of HCC.  -Transplant candidacy: not a candidate given age, frailty, and cardiac comorbidities  -Trend CBC, CMP, INR        Thank you for your consult. I will sign off. Please contact us if you have any additional questions.    Oh Ayala MD  Hepatology  [facility]

## 2019-04-19 NOTE — ASSESSMENT & PLAN NOTE
Patient is a 73 y.o male with HCV cirrhosis with recent decompensation with symptoms concerning for HE, and volume overload and recurrent need for paracentesis.  The patient is admitted to evaluate for causes of decompensation. Concern for active infection vs progression of SMV thrombosis; the latter has been ruled out. He was started on Zosyn, but septic workup negative with no convincing evidence for infection.   Mental status improved. Started on diuretics 40mg BID lasix, and spironolactone 200mg daily. Kidney function remains stable    -Decompensated cirrhosis with HE and volume overlaod - U/S unchanged, and septic workup is negative so far. Can switch back to prophylactic cipro. Resume lactulose. Can discharge on current dose of diuretics with repeat labs on Monday 4/22  -EV: due for surveillance in May 2019.  -No history of HCC.  -Transplant candidacy: not a candidate given age, frailty, and cardiac comorbidities  -Trend CBC, CMP, INR

## 2019-04-19 NOTE — SUBJECTIVE & OBJECTIVE
Interval History:   Underwent LVP. 7.5 L, no SBP  Mental status slightly improved.  Kidney function remains stable after starting diuretics    No current facility-administered medications for this encounter.      Current Outpatient Medications   Medication Sig    fluticasone-umeclidin-vilanter (TRELEGY ELLIPTA) 100-62.5-25 mcg DsDv Inhale 1 puff into the lungs once daily.    furosemide (LASIX) 40 MG tablet Take 0.5 tablets (20 mg total) by mouth 2 (two) times daily. (Patient taking differently: Take 40 mg by mouth 3 (three) times a week. )    hydrocortisone (CORTEF) 10 MG Tab TAKE ONE AND ONE-HALF TABLETS BY MOUTH EVERY MORNING AND ONE-HALF TABLET EVERY EVENING./NO FURTHER REFILLS  NEED FOLLOW UP VISIT    losartan (COZAAR) 100 MG tablet Take 1 tablet (100 mg total) by mouth once daily.    multivitamin-minerals-lutein (CENTRUM SILVER) Tab Take 1 tablet by mouth once daily.      NITROSTAT 0.4 mg SL tablet DISSOLVE 1 TABLET UNDER THE TONGUE EVERY 5 MINUTES AS NEEDED    omeprazole (PRILOSEC) 40 MG capsule TAKE 1 CAPSULE(40 MG) BY MOUTH TWICE DAILY BEFORE MEALS    pravastatin (PRAVACHOL) 40 MG tablet TAKE 1 TABLET(40 MG) BY MOUTH EVERY EVENING    ciprofloxacin HCl (CIPRO) 500 MG tablet Take 1 tablet (500 mg total) by mouth once daily.    lactulose (CHRONULAC) 20 gram/30 mL Soln Take 30 mLs (20 g total) by mouth once daily. Please ensure you are having 3-4 bowels movements daily for 100 doses    spironolactone (ALDACTONE) 100 MG tablet Take 2 tablets (200 mg total) by mouth once daily.       Objective:     Vital Signs (Most Recent):  Temp: 98.6 °F (37 °C) (04/18/19 0800)  Pulse: 81 (04/18/19 0831)  Resp: 16 (04/18/19 0831)  BP: 111/60 (04/18/19 0800)  SpO2: (!) 94 % (04/18/19 0800) Vital Signs (24h Range):  Temp:  [98.6 °F (37 °C)] 98.6 °F (37 °C)  Pulse:  [71-81] 81  Resp:  [16] 16  SpO2:  [94 %] 94 %  BP: (111)/(60) 111/60     Weight: 78.3 kg (172 lb 9.9 oz) (04/16/19 2130)  Body mass index is 24.77  kg/m².    Physical Exam   Constitutional: He is oriented to person, place, and time. He appears lethargic. He appears cachectic. No distress.   HENT:   Head: Normocephalic.   Eyes: Conjunctivae are normal. No scleral icterus.   Neck: Normal range of motion. Neck supple.   Cardiovascular: Normal rate and regular rhythm.   Pulmonary/Chest: Effort normal and breath sounds normal.   Abdominal: Soft. Bowel sounds are normal. He exhibits distension. He exhibits no mass. There is no tenderness. There is no guarding.   Musculoskeletal: Normal range of motion.   Neurological: He is oriented to person, place, and time. He appears lethargic.   Skin: Skin is warm and dry.   Psychiatric: He has a normal mood and affect.       MELD-Na score: 15 at 4/18/2019  6:58 AM  MELD score: 15 at 4/18/2019  6:58 AM  Calculated from:  Serum Creatinine: 1.5 mg/dL at 4/18/2019  6:58 AM  Serum Sodium: 142 mmol/L (Rounded to 137 mmol/L) at 4/18/2019  6:58 AM  Total Bilirubin: 1.4 mg/dL at 4/18/2019  6:58 AM  INR(ratio): 1.4 at 4/18/2019  6:58 AM  Age: 73 years    Significant Labs:  CBC:   Recent Labs   Lab 04/18/19  0658   WBC 5.44   RBC 3.38*   HGB 9.0*   HCT 28.8*   PLT 82*     BMP:   Recent Labs   Lab 04/18/19  0658   *      K 3.1*   *   CO2 21*   BUN 21   CREATININE 1.5*   CALCIUM 8.5*     CMP:   Recent Labs   Lab 04/18/19  0658   *   CALCIUM 8.5*   ALBUMIN 2.6*   PROT 6.4      K 3.1*   CO2 21*   *   BUN 21   CREATININE 1.5*   ALKPHOS 140*   ALT 23   AST 36   BILITOT 1.4*     Coagulation:   Recent Labs   Lab 04/18/19  0658   INR 1.4*       Significant Imaging:  Labs: Reviewed

## 2019-04-21 LAB
BACTERIA BLD CULT: NORMAL
BACTERIA BLD CULT: NORMAL

## 2019-04-22 ENCOUNTER — PATIENT OUTREACH (OUTPATIENT)
Dept: ADMINISTRATIVE | Facility: CLINIC | Age: 74
End: 2019-04-22

## 2019-04-22 LAB — BACTERIA SPEC AEROBE CULT: NO GROWTH

## 2019-04-22 NOTE — PLAN OF CARE
Patient discharged home to care of family on 4/18/19.     04/22/19 0800   Final Note   Assessment Type Final Discharge Note   Anticipated Discharge Disposition Home   What phone number can be called within the next 1-3 days to see how you are doing after discharge?   ([phone])   Hospital Follow Up  Appt(s) scheduled? Yes   Discharge plans and expectations educations in teach back method with documentation complete? Yes   Right Care Referral Info   Post Acute Recommendation No Care

## 2019-04-22 NOTE — PATIENT INSTRUCTIONS
Discharge Instructions for Cirrhosis of the Liver  You have been diagnosed with cirrhosis of the liver. This is a long-term (chronic) problem. It occurs when liver tissue is destroyed and replaced by scar tissue. Causes of cirrhosis include:  · Infection such as viral hepatitis  · Chronic alcoholism  · The bodys immune system attacks healthy cells (autoimmune disorders)  · Obesity  · Medicine side effects  · Genetic diseases  Sometimes the exact cause is unknown. You may not have any symptoms at first. Or your symptoms may be mild. But they usually get worse. Cirrhosis is likely to occur if you have a history of long-term alcohol abuse. Cirrhosis cant be cured. But it can be treated.   Home care  Alcohol  · People with liver disease should not drink alcohol. If you stop drinking, you may feel better and live longer.  · If you are a chronic alcohol user, you will have withdrawal symptoms. Talk with your healthcare provider for more information.    · If alcohol is a problem, ask your provider about medicine that can help you quit drinking.    · Find a local Alcoholics Anonymous support group online at www.aa.org.  Diet  · Ask your provider what kind of diet you should follow. You may be asked to limit or not eat certain foods. Do not limit your protein intake.  · Weigh yourself daily and keep a weight log. If you have a sudden change in weight, call your provider.  · Cut back on salt:  ¨ Limit canned, dried, packaged, and fast foods.  ¨ Dont add salt to your food at the table.  ¨ Season foods with herbs instead of salt when you cook.  Medicines, supplements, and vaccines  · Take your medicines exactly as directed.  · Talk to your provider before taking vitamins, over-the-counter medicines, or herbal supplements. Many herbal supplements may be poisonous (toxic) to the liver.  · Avoid aspirin and other blood-thinning medicines.  · Discuss vitamin supplements and deficiencies with your provider.  · Ask your provider  about getting vaccinations for viruses that can cause liver diseases.  Follow-up care  Follow up with your healthcare provider, or as advised. You will likely have the following tests:  · Lab tests  · Blood tests for liver cancer  · Ultrasound of your liver every 6 months  · Endoscopy to check for swollen veins (varices) in your digestive tract  When to call your provider  Call your healthcare provider right away if you have any of the following:  · Fever of 100.4°F (38.0°C) or higher  · Extreme tiredness (fatigue), weakness, or lack of appetite  · Vomiting (with or without blood)  · Yellowing of your skin or eyes (jaundice)  · Itching  · Swelling in your belly or legs  · Black or tarry stools  · Skin that bruises easily  · Confusion or trouble thinking clearly   Date Last Reviewed: 8/1/2016  © 3668-2560 [employer]. [address] [place] [address], [place] [address]. All rights reserved. This information is not intended as a substitute for professional medical care. Always follow your healthcare professional's instructions.

## 2019-04-25 LAB — BACTERIA SPEC ANAEROBE CULT: NORMAL

## 2019-05-03 ENCOUNTER — HOSPITAL ENCOUNTER (INPATIENT)
Facility: HOSPITAL | Age: 74
LOS: 5 days | Discharge: HOME HEALTH | DRG: 442 | End: 2019-05-08
Attending: EMERGENCY MEDICINE | Admitting: HOSPITALIST
Payer: MEDICARE

## 2019-05-03 DIAGNOSIS — R41.82 ALTERED MENTAL STATUS: ICD-10-CM

## 2019-05-03 DIAGNOSIS — E83.42 HYPOMAGNESEMIA: ICD-10-CM

## 2019-05-03 DIAGNOSIS — Z91.148 NON COMPLIANCE W MEDICATION REGIMEN: ICD-10-CM

## 2019-05-03 DIAGNOSIS — K76.82 HEPATIC ENCEPHALOPATHY: Primary | ICD-10-CM

## 2019-05-03 LAB
ALBUMIN SERPL BCP-MCNC: 2.5 G/DL (ref 3.5–5.2)
ALP SERPL-CCNC: 211 U/L (ref 55–135)
ALT SERPL W/O P-5'-P-CCNC: 39 U/L (ref 10–44)
AMMONIA PLAS-SCNC: 76 UMOL/L (ref 10–50)
AMPHET+METHAMPHET UR QL: NEGATIVE
ANION GAP SERPL CALC-SCNC: 12 MMOL/L (ref 8–16)
APTT BLDCRRT: 24.1 SEC (ref 21–32)
AST SERPL-CCNC: 69 U/L (ref 10–40)
BARBITURATES UR QL SCN>200 NG/ML: NEGATIVE
BASOPHILS # BLD AUTO: 0.01 K/UL (ref 0–0.2)
BASOPHILS NFR BLD: 0.1 % (ref 0–1.9)
BENZODIAZ UR QL SCN>200 NG/ML: NEGATIVE
BILIRUB SERPL-MCNC: 2.4 MG/DL (ref 0.1–1)
BILIRUB UR QL STRIP: NEGATIVE
BNP SERPL-MCNC: 36 PG/ML (ref 0–99)
BUN SERPL-MCNC: 26 MG/DL (ref 8–23)
BZE UR QL SCN: NEGATIVE
CALCIUM SERPL-MCNC: 10 MG/DL (ref 8.7–10.5)
CANNABINOIDS UR QL SCN: NEGATIVE
CHLORIDE SERPL-SCNC: 102 MMOL/L (ref 95–110)
CK SERPL-CCNC: 125 U/L (ref 20–200)
CLARITY UR REFRACT.AUTO: CLEAR
CO2 SERPL-SCNC: 23 MMOL/L (ref 23–29)
COLOR UR AUTO: NORMAL
CREAT SERPL-MCNC: 1.3 MG/DL (ref 0.5–1.4)
CREAT UR-MCNC: 160 MG/DL (ref 23–375)
DIFFERENTIAL METHOD: ABNORMAL
EOSINOPHIL # BLD AUTO: 0.1 K/UL (ref 0–0.5)
EOSINOPHIL NFR BLD: 1.3 % (ref 0–8)
ERYTHROCYTE [DISTWIDTH] IN BLOOD BY AUTOMATED COUNT: 19.3 % (ref 11.5–14.5)
EST. GFR  (AFRICAN AMERICAN): >60 ML/MIN/1.73 M^2
EST. GFR  (NON AFRICAN AMERICAN): 54.1 ML/MIN/1.73 M^2
ETHANOL SERPL-MCNC: <10 MG/DL
GLUCOSE SERPL-MCNC: 121 MG/DL (ref 70–110)
GLUCOSE UR QL STRIP: NEGATIVE
HCT VFR BLD AUTO: 33.4 % (ref 40–54)
HGB BLD-MCNC: 10.4 G/DL (ref 14–18)
HGB UR QL STRIP: NEGATIVE
IMM GRANULOCYTES # BLD AUTO: 0.05 K/UL (ref 0–0.04)
IMM GRANULOCYTES NFR BLD AUTO: 0.5 % (ref 0–0.5)
INR PPP: 1.5 (ref 0.8–1.2)
KETONES UR QL STRIP: NEGATIVE
LACTATE SERPL-SCNC: 2.3 MMOL/L (ref 0.5–2.2)
LACTATE SERPL-SCNC: 2.6 MMOL/L (ref 0.5–2.2)
LEUKOCYTE ESTERASE UR QL STRIP: NEGATIVE
LYMPHOCYTES # BLD AUTO: 1 K/UL (ref 1–4.8)
LYMPHOCYTES NFR BLD: 9.2 % (ref 18–48)
MAGNESIUM SERPL-MCNC: 1.5 MG/DL (ref 1.6–2.6)
MCH RBC QN AUTO: 26.5 PG (ref 27–31)
MCHC RBC AUTO-ENTMCNC: 31.1 G/DL (ref 32–36)
MCV RBC AUTO: 85 FL (ref 82–98)
METHADONE UR QL SCN>300 NG/ML: NEGATIVE
MONOCYTES # BLD AUTO: 0.8 K/UL (ref 0.3–1)
MONOCYTES NFR BLD: 7.4 % (ref 4–15)
NEUTROPHILS # BLD AUTO: 8.4 K/UL (ref 1.8–7.7)
NEUTROPHILS NFR BLD: 81.5 % (ref 38–73)
NITRITE UR QL STRIP: NEGATIVE
NRBC BLD-RTO: 0 /100 WBC
OPIATES UR QL SCN: NEGATIVE
PCP UR QL SCN>25 NG/ML: NEGATIVE
PH UR STRIP: 6 [PH] (ref 5–8)
PLATELET # BLD AUTO: 92 K/UL (ref 150–350)
PMV BLD AUTO: 10.9 FL (ref 9.2–12.9)
POTASSIUM SERPL-SCNC: 3.7 MMOL/L (ref 3.5–5.1)
PROT SERPL-MCNC: 7.5 G/DL (ref 6–8.4)
PROT UR QL STRIP: NEGATIVE
PROTHROMBIN TIME: 14.8 SEC (ref 9–12.5)
RBC # BLD AUTO: 3.92 M/UL (ref 4.6–6.2)
SODIUM SERPL-SCNC: 137 MMOL/L (ref 136–145)
SP GR UR STRIP: 1.02 (ref 1–1.03)
TOXICOLOGY INFORMATION: NORMAL
TROPONIN I SERPL DL<=0.01 NG/ML-MCNC: 0.02 NG/ML (ref 0–0.03)
URN SPEC COLLECT METH UR: NORMAL
WBC # BLD AUTO: 10.33 K/UL (ref 3.9–12.7)

## 2019-05-03 PROCEDURE — 83605 ASSAY OF LACTIC ACID: CPT | Mod: 91,HCNC

## 2019-05-03 PROCEDURE — 25000003 PHARM REV CODE 250: Mod: HCNC | Performed by: STUDENT IN AN ORGANIZED HEALTH CARE EDUCATION/TRAINING PROGRAM

## 2019-05-03 PROCEDURE — 85025 COMPLETE CBC W/AUTO DIFF WBC: CPT | Mod: HCNC

## 2019-05-03 PROCEDURE — 85730 THROMBOPLASTIN TIME PARTIAL: CPT | Mod: HCNC

## 2019-05-03 PROCEDURE — 82550 ASSAY OF CK (CPK): CPT | Mod: HCNC

## 2019-05-03 PROCEDURE — 93005 ELECTROCARDIOGRAM TRACING: CPT | Mod: HCNC

## 2019-05-03 PROCEDURE — 85610 PROTHROMBIN TIME: CPT | Mod: HCNC

## 2019-05-03 PROCEDURE — 99285 EMERGENCY DEPT VISIT HI MDM: CPT | Mod: 25

## 2019-05-03 PROCEDURE — 93010 ELECTROCARDIOGRAM REPORT: CPT | Mod: HCNC,,, | Performed by: INTERNAL MEDICINE

## 2019-05-03 PROCEDURE — 80307 DRUG TEST PRSMV CHEM ANLYZR: CPT | Mod: HCNC

## 2019-05-03 PROCEDURE — 63600175 PHARM REV CODE 636 W HCPCS: Mod: HCNC | Performed by: PHYSICIAN ASSISTANT

## 2019-05-03 PROCEDURE — 96366 THER/PROPH/DIAG IV INF ADDON: CPT

## 2019-05-03 PROCEDURE — 83880 ASSAY OF NATRIURETIC PEPTIDE: CPT | Mod: HCNC

## 2019-05-03 PROCEDURE — 80320 DRUG SCREEN QUANTALCOHOLS: CPT | Mod: HCNC

## 2019-05-03 PROCEDURE — 93010 EKG 12-LEAD: ICD-10-PCS | Mod: HCNC,,, | Performed by: INTERNAL MEDICINE

## 2019-05-03 PROCEDURE — 83735 ASSAY OF MAGNESIUM: CPT | Mod: HCNC

## 2019-05-03 PROCEDURE — 99285 EMERGENCY DEPT VISIT HI MDM: CPT | Mod: HCNC,,, | Performed by: PHYSICIAN ASSISTANT

## 2019-05-03 PROCEDURE — 80053 COMPREHEN METABOLIC PANEL: CPT | Mod: HCNC

## 2019-05-03 PROCEDURE — 81003 URINALYSIS AUTO W/O SCOPE: CPT | Mod: HCNC

## 2019-05-03 PROCEDURE — 25000003 PHARM REV CODE 250: Mod: HCNC | Performed by: PHYSICIAN ASSISTANT

## 2019-05-03 PROCEDURE — 96365 THER/PROPH/DIAG IV INF INIT: CPT

## 2019-05-03 PROCEDURE — 84484 ASSAY OF TROPONIN QUANT: CPT | Mod: HCNC

## 2019-05-03 PROCEDURE — 82140 ASSAY OF AMMONIA: CPT | Mod: HCNC

## 2019-05-03 PROCEDURE — 83605 ASSAY OF LACTIC ACID: CPT | Mod: HCNC

## 2019-05-03 PROCEDURE — 12000002 HC ACUTE/MED SURGE SEMI-PRIVATE ROOM: Mod: HCNC

## 2019-05-03 PROCEDURE — 87040 BLOOD CULTURE FOR BACTERIA: CPT | Mod: 59,HCNC

## 2019-05-03 PROCEDURE — 99285 PR EMERGENCY DEPT VISIT,LEVEL V: ICD-10-PCS | Mod: HCNC,,, | Performed by: PHYSICIAN ASSISTANT

## 2019-05-03 RX ORDER — IBUPROFEN 200 MG
24 TABLET ORAL
Status: DISCONTINUED | OUTPATIENT
Start: 2019-05-03 | End: 2019-05-08 | Stop reason: HOSPADM

## 2019-05-03 RX ORDER — SODIUM CHLORIDE 0.9 % (FLUSH) 0.9 %
10 SYRINGE (ML) INJECTION
Status: DISCONTINUED | OUTPATIENT
Start: 2019-05-03 | End: 2019-05-08 | Stop reason: HOSPADM

## 2019-05-03 RX ORDER — SPIRONOLACTONE 100 MG/1
200 TABLET, FILM COATED ORAL DAILY
Status: DISCONTINUED | OUTPATIENT
Start: 2019-05-04 | End: 2019-05-04

## 2019-05-03 RX ORDER — GLUCAGON 1 MG
1 KIT INJECTION
Status: DISCONTINUED | OUTPATIENT
Start: 2019-05-03 | End: 2019-05-08 | Stop reason: HOSPADM

## 2019-05-03 RX ORDER — LACTULOSE 10 G/15ML
20 SOLUTION ORAL DAILY
Status: DISCONTINUED | OUTPATIENT
Start: 2019-05-04 | End: 2019-05-03

## 2019-05-03 RX ORDER — IBUPROFEN 200 MG
16 TABLET ORAL
Status: DISCONTINUED | OUTPATIENT
Start: 2019-05-03 | End: 2019-05-08 | Stop reason: HOSPADM

## 2019-05-03 RX ORDER — CIPROFLOXACIN 500 MG/1
500 TABLET ORAL DAILY
Status: DISCONTINUED | OUTPATIENT
Start: 2019-05-04 | End: 2019-05-04

## 2019-05-03 RX ORDER — HYDROCORTISONE 5 MG/1
10 TABLET ORAL DAILY
Status: DISCONTINUED | OUTPATIENT
Start: 2019-05-04 | End: 2019-05-08 | Stop reason: HOSPADM

## 2019-05-03 RX ORDER — LACTULOSE 10 G/15ML
30 SOLUTION ORAL EVERY 6 HOURS
Status: DISCONTINUED | OUTPATIENT
Start: 2019-05-04 | End: 2019-05-06

## 2019-05-03 RX ORDER — PANTOPRAZOLE SODIUM 40 MG/1
40 TABLET, DELAYED RELEASE ORAL DAILY
Status: DISCONTINUED | OUTPATIENT
Start: 2019-05-04 | End: 2019-05-08 | Stop reason: HOSPADM

## 2019-05-03 RX ORDER — LACTULOSE 10 G/15ML
30 SOLUTION ORAL
Status: COMPLETED | OUTPATIENT
Start: 2019-05-03 | End: 2019-05-03

## 2019-05-03 RX ADMIN — SODIUM CHLORIDE 500 ML: 0.9 INJECTION, SOLUTION INTRAVENOUS at 07:05

## 2019-05-03 RX ADMIN — LACTULOSE 30 G: 20 SOLUTION ORAL at 07:05

## 2019-05-03 RX ADMIN — RIFAXIMIN 550 MG: 550 TABLET ORAL at 11:05

## 2019-05-03 RX ADMIN — MAGNESIUM SULFATE HEPTAHYDRATE 1 G: 500 INJECTION, SOLUTION INTRAMUSCULAR; INTRAVENOUS at 08:05

## 2019-05-03 RX ADMIN — LACTULOSE 30 G: 20 SOLUTION ORAL at 11:05

## 2019-05-03 NOTE — ED NOTES
LOC: The patient is awake, and alert. Limited responses to questions.  Affect is flat.  Speech is soft and hard to understand.    APPEARANCE: Patient resting comfortably in no acute distress.  Patient is clean and well groomed.    SKIN: The skin is warm and dry; slightly jaundiced.   Patient has normal skin turgor and moist mucus membranes.  Skin intact; no breakdown or bruising noted.      MUSCULOSKELETAL: Patient moving upper and lower extremities without difficulty.  Wife states she had to assist him from the floor after a fall earlier this am.     RESPIRATORY: Airway is open and patent. Respirations spontaneous, even, easy, and non-labored.  Patient has a normal effort and rate.  No accessory muscle use noted. Denies cough.     CARDIAC:  No peripheral edema noted. No complaints of chest pain.      ABDOMEN:  Distention noted with ascites..     NEUROLOGIC: Eyes open spontaneously.    Follows commands; facial expression symmetrical.  Purposeful motor response noted.

## 2019-05-03 NOTE — ED PROVIDER NOTES
Encounter Date: 5/3/2019       History     Chief Complaint   Patient presents with    Altered Mental Status     hx cirrhosis, fell at 2 am  , confusion noticed getting worse for 5d,      73 year old male with medical history of HCV induced cirrhosis, Hx of hepatic encephalopathy, esophageal varices, CAD, COPD presenting to the ED with the chief complaint of AMS. History provided by wife and son at bedside. Patient experienced a fall this morning at 2AM while getting out of bed. Patient's fall was unwitnessed, but his wife heard him fall and came to his aid. Patient was non-verbal on her assessment, but the patient was making purposeful movements with his extremities. Patient was able to get back into bed with his wife's assistance. Patient's son came over later today and brought him to the ED for evaluation. Patient was able to walk to the car with his assistance. Patient is able to have conversations at his baseline and complete ADLs without assistance. Patient has not been taking Lactulose over the past few days as he does not like going to the restroom often. Patient recently discharged on 4/18 after admission for hepatic encephalopathy. Patient is not on blood thinners. No reported fevers or emesis at home.          Review of patient's allergies indicates:   Allergen Reactions    Codeine Itching, Rash, Edema and Other (See Comments)     Other reaction(s): Itching     Past Medical History:   Diagnosis Date    Anemia     Anticoagulant long-term use     Ascites 12/10/2018    Biliary colic     Cataract     Chronic hepatitis C     Cirrhosis     COPD (chronic obstructive pulmonary disease)     Coronary artery disease     Dyspnea     Epistaxis     Esophageal varices without bleeding     Gallstones     GERD (gastroesophageal reflux disease)     HCC (hepatocellular carcinoma)     Hepatitis B antibody positive     Hyperglycemia     Hyperlipidemia     Hypertension     Hypopituitarism     Mobitz type  2 second degree atrioventricular block     Nuclear sclerosis, left     Pacemaker     Pituitary tumor     Portal hypertension     Pulmonary emphysema     PVT (paroxysmal ventricular tachycardia)     SA node dysfunction     Secondary adrenal insufficiency     Secondary male hypogonadism     SSS (sick sinus syndrome)     Superior mesenteric vein thrombosis     Vitreous hemorrhage, both eyes      Past Surgical History:   Procedure Laterality Date    CARDIAC PACEMAKER PLACEMENT      [facility]    CATARACT EXTRACTION  9/24/13    RT    COLONOSCOPY Left 1/4/2019    Performed by Emanuel [facility] at [facility] ENDO (4TH FLR)    CORONARY ANGIOPLASTY WITH STENT PLACEMENT      2 stents    EGD (ESOPHAGOGASTRODUODENOSCOPY) Left 2/19/2019    Per[facility] by [facility] at [facility]    EGD (ESOPHAGOGASTRODUODENOSCOPY) Left 1/4/2019    Performed by Emanuel [facility] at [facility] ENDO (4TH FLR)    ESOPHAGOGASTRODUODENOSCOPY (EGD) Left 5/31/2018    Performed by Tr[facility] at [facility] ENDO (4TH FLR)    ESOPHAGOGASTRODUODENOSCOPY (EGD) N/A 11/30/2017    Performed by Tr[facility] at [facility] ENDO (4TH FLR)    ESOPHAGOGASTRODUODENOSCOPY (EGD) Left 10/12/2017    Performed by Jody Humphries MD at [facility] ENDO (4TH FLR)    ESOPHAGOGASTRODUODENOSCOPY (EGD) N/A 8/17/2017    Performed by Tr[facility] at [facility] ENDO (4TH FLR)    ESOPHAGOGASTRODUODENOSCOPY (EGD) N/A 7/14/2016    Performed by Trice F[facility] at [facility] ENDO (4TH FLR)    EYE SURGERY      INSERTION, IOL PROSTHESIS Right 9/24/2013    Performed by Ben Sparks MD at [facility] OR 1ST FLR    PARACENTESIS, ABDOMINAL N/A 4/16/2014    Performed by [facility] LAB    PHACOEMULSIFICATION, CATARACT Right 9/24/2013    Performed by Ben Sparks [facility] at [facility] OR 1ST FLR    s/p PPV/AFX/EL (od)  05/09/2012     Family History   Problem Relation Age of Onset    Diabetes Brother     Lung cancer Brother     Heart  disease Mother         CHF    Cancer Father         Lung    Heart disease Sister     Cancer Sister         Pancreatic    Cancer Sister         Lung    No Known Problems Sister     Diabetes Sister     Cancer Brother         Lung    No Known Problems Brother     Amblyopia Neg Hx     Blindness Neg Hx     Cataracts Neg Hx     Glaucoma Neg Hx     Hypertension Neg Hx     Macular degeneration Neg Hx     Retinal detachment Neg Hx     Strabismus Neg Hx     Stroke Neg Hx     Thyroid disease Neg Hx      Social History     Tobacco Use    Smoking status: Former Smoker     Packs/day: 1.00     Years: 35.00     Pack years: 35.00     Types: Cigarettes     Last attempt to quit: 1995     Years since quittin.8    Smokeless tobacco: Never Used   Substance Use Topics    Alcohol use: No     Alcohol/week: 0.0 oz    Drug use: No     Review of Systems   Unable to perform ROS: Acuity of condition     Physical Exam     Initial Vitals [19 1626]   BP Pulse Resp Temp SpO2   135/69 70 18 97.3 °F (36.3 °C) 97 %      MAP       --         Physical Exam    Constitutional: He appears well-developed and well-nourished. He is not diaphoretic. No distress.   HENT:   Head: Normocephalic and atraumatic.   Mouth/Throat: Oropharynx is clear and moist. No oropharyngeal exudate.   Eyes: EOM are normal. Pupils are equal, round, and reactive to light.   Neck: Normal range of motion. Neck supple.   Cardiovascular: Normal rate and regular rhythm.   Pulmonary/Chest: Breath sounds normal. No respiratory distress. He has no wheezes.   Abdominal: Soft. He exhibits distension and ascites (mild). There is no tenderness.   Musculoskeletal: Normal range of motion. He exhibits no tenderness.   Neurological: He is alert.   Able to state name and . Not oriented to place or time. Able to follow some commands. Positive asterixis    Skin:   Jaundiced       ED Course   Procedures  Labs Reviewed   CULTURE, BLOOD   CULTURE, BLOOD   CBC W/  AUTO DIFFERENTIAL   COMPREHENSIVE METABOLIC PANEL   MAGNESIUM   URINALYSIS, REFLEX TO URINE CULTURE   LACTIC ACID, PLASMA   AMMONIA          Imaging Results    None          Medical Decision Making:   History:   Old Medical Records: I decided to obtain old medical records.  Old Records Summarized: records from clinic visits and records from previous admission(s).  Clinical Tests:   Lab Tests: Ordered and Reviewed  Radiological Study: Ordered and Reviewed  Medical Tests: Ordered and Reviewed       APC / Resident Notes:   73 year old male with medical history of HCV induced cirrhosis, Hx of hepatic encephalopathy, esophageal varices, CAD, COPD presenting to the ED c/o AMS and fall this AM. Has not taken lactulose over the past few days. Recently discharged on 4/19 for Hepatic Encephalopathy. DDx includes but not limited to concussion, ICH, SDH, Hepatic encephalopathy, electrolyte disturbance, UTI, viral syndrome, sepsis, bacteremia, pneumonia. Will get labs, BCx, CT head, CXR, UA.    ECG shows atrial-paced rhythm 79 bpm. RBBB. Similar to previous.   Work-up shows WBC 10.3, H/H 10.4/33.4, Plt 92, Crt 1.3, Tbil 2.4, AST/ALT 69/39, Alk phos 211, Mg 1.5, Lactate 2.3, Ammonia 76, INR 1.5, Trop 0.018, ETOH <10,   CXR without focal consolidations  UA and UDS pending  CT head pending    Etiology most consistent with hepatic encephalopathy likely from lactulose non-compliance. Lactulose 30 given in the ED. Magnesium replaced. Repeat lactate ordered. CT head pending and signed out to oncoming team to follow-up results. Will admit to medicine for ongoing management. I have discussed the care of this patient with my supervising physician.          Attending Attestation:             Attending ED Notes:   Discussed with midlevel provider.  Patient seen and examined by me.  Patient with discharge in April after paracentesis.  He has not been wanting to take his lactulose.  Ammonia is high.  Patient likely with hepatic  encephalopathy.  Afebrile.  No tenderness to palpation on the belly.  No vomiting or diarrhea.  We are going to admit this patient for lactulose altered mental status hepatic encephalopathy.             Clinical Impression:       ICD-10-CM ICD-9-CM   1. Hepatic encephalopathy K72.90 572.2   2. Altered mental status R41.82 780.97   3. Non compliance w medication regimen Z91.14 V15.81   4. Hypomagnesemia E83.42 275.2         Disposition:   Disposition: Admitted  Condition: Serious                        Jameson Page PA-C  05/03/19 2035       Bertha Alexander MD  05/03/19 2039

## 2019-05-04 PROBLEM — E83.42 HYPOMAGNESEMIA: Status: ACTIVE | Noted: 2019-05-04

## 2019-05-04 LAB
ALBUMIN FLD-MCNC: 0.4 G/DL
ALBUMIN SERPL BCP-MCNC: 2.7 G/DL (ref 3.5–5.2)
ALP SERPL-CCNC: 219 U/L (ref 55–135)
ALT SERPL W/O P-5'-P-CCNC: 43 U/L (ref 10–44)
AMMONIA PLAS-SCNC: 68 UMOL/L (ref 10–50)
ANION GAP SERPL CALC-SCNC: 10 MMOL/L (ref 8–16)
ANION GAP SERPL CALC-SCNC: 13 MMOL/L (ref 8–16)
APPEARANCE FLD: CLEAR
AST SERPL-CCNC: 75 U/L (ref 10–40)
BASOPHILS # BLD AUTO: 0.02 K/UL (ref 0–0.2)
BASOPHILS NFR BLD: 0.2 % (ref 0–1.9)
BILIRUB SERPL-MCNC: 2.9 MG/DL (ref 0.1–1)
BODY FLD TYPE: NORMAL
BODY FLUID SOURCE, LDH: NORMAL
BUN SERPL-MCNC: 19 MG/DL (ref 8–23)
BUN SERPL-MCNC: 25 MG/DL (ref 8–23)
CALCIUM SERPL-MCNC: 10.4 MG/DL (ref 8.7–10.5)
CALCIUM SERPL-MCNC: 9.1 MG/DL (ref 8.7–10.5)
CHLORIDE SERPL-SCNC: 104 MMOL/L (ref 95–110)
CHLORIDE SERPL-SCNC: 110 MMOL/L (ref 95–110)
CO2 SERPL-SCNC: 21 MMOL/L (ref 23–29)
CO2 SERPL-SCNC: 22 MMOL/L (ref 23–29)
COLOR FLD: YELLOW
CREAT SERPL-MCNC: 1.3 MG/DL (ref 0.5–1.4)
CREAT SERPL-MCNC: 1.6 MG/DL (ref 0.5–1.4)
DIFFERENTIAL METHOD: ABNORMAL
EOSINOPHIL # BLD AUTO: 0.1 K/UL (ref 0–0.5)
EOSINOPHIL NFR BLD: 0.5 % (ref 0–8)
ERYTHROCYTE [DISTWIDTH] IN BLOOD BY AUTOMATED COUNT: 20 % (ref 11.5–14.5)
EST. GFR  (AFRICAN AMERICAN): 48.7 ML/MIN/1.73 M^2
EST. GFR  (AFRICAN AMERICAN): >60 ML/MIN/1.73 M^2
EST. GFR  (NON AFRICAN AMERICAN): 42.1 ML/MIN/1.73 M^2
EST. GFR  (NON AFRICAN AMERICAN): 54.1 ML/MIN/1.73 M^2
GLUCOSE SERPL-MCNC: 132 MG/DL (ref 70–110)
GLUCOSE SERPL-MCNC: 151 MG/DL (ref 70–110)
GRAM STN SPEC: NORMAL
HCT VFR BLD AUTO: 35.3 % (ref 40–54)
HGB BLD-MCNC: 11.2 G/DL (ref 14–18)
IMM GRANULOCYTES # BLD AUTO: 0.07 K/UL (ref 0–0.04)
IMM GRANULOCYTES NFR BLD AUTO: 0.6 % (ref 0–0.5)
INR PPP: 1.5 (ref 0.8–1.2)
LACTATE SERPL-SCNC: 2.7 MMOL/L (ref 0.5–2.2)
LACTATE SERPL-SCNC: 3.9 MMOL/L (ref 0.5–2.2)
LACTATE SERPL-SCNC: 4 MMOL/L (ref 0.5–2.2)
LDH FLD L TO P-CCNC: 89 U/L
LYMPHOCYTES # BLD AUTO: 0.9 K/UL (ref 1–4.8)
LYMPHOCYTES NFR BLD: 8.6 % (ref 18–48)
LYMPHOCYTES NFR FLD MANUAL: 31 %
MAGNESIUM SERPL-MCNC: 1.9 MG/DL (ref 1.6–2.6)
MCH RBC QN AUTO: 27 PG (ref 27–31)
MCHC RBC AUTO-ENTMCNC: 31.7 G/DL (ref 32–36)
MCV RBC AUTO: 85 FL (ref 82–98)
MESOTHL CELL NFR FLD MANUAL: 5 %
MONOCYTES # BLD AUTO: 0.9 K/UL (ref 0.3–1)
MONOCYTES NFR BLD: 8.3 % (ref 4–15)
MONOS+MACROS NFR FLD MANUAL: 29 %
NEUTROPHILS # BLD AUTO: 9 K/UL (ref 1.8–7.7)
NEUTROPHILS NFR BLD: 81.8 % (ref 38–73)
NEUTROPHILS NFR FLD MANUAL: 35 %
NRBC BLD-RTO: 0 /100 WBC
PLATELET # BLD AUTO: 111 K/UL (ref 150–350)
PMV BLD AUTO: 11.3 FL (ref 9.2–12.9)
POTASSIUM SERPL-SCNC: 3.7 MMOL/L (ref 3.5–5.1)
POTASSIUM SERPL-SCNC: 4 MMOL/L (ref 3.5–5.1)
PROT FLD-MCNC: <1 G/DL
PROT SERPL-MCNC: 8 G/DL (ref 6–8.4)
PROTHROMBIN TIME: 14.9 SEC (ref 9–12.5)
RBC # BLD AUTO: 4.15 M/UL (ref 4.6–6.2)
SODIUM SERPL-SCNC: 138 MMOL/L (ref 136–145)
SODIUM SERPL-SCNC: 142 MMOL/L (ref 136–145)
SPECIMEN SOURCE: NORMAL
SPECIMEN SOURCE: NORMAL
WBC # BLD AUTO: 10.98 K/UL (ref 3.9–12.7)
WBC # FLD: 128 /CU MM

## 2019-05-04 PROCEDURE — 87070 CULTURE OTHR SPECIMN AEROBIC: CPT | Mod: HCNC

## 2019-05-04 PROCEDURE — 20600001 HC STEP DOWN PRIVATE ROOM: Mod: HCNC

## 2019-05-04 PROCEDURE — 87205 SMEAR GRAM STAIN: CPT | Mod: HCNC

## 2019-05-04 PROCEDURE — 63600175 PHARM REV CODE 636 W HCPCS: Mod: HCNC | Performed by: STUDENT IN AN ORGANIZED HEALTH CARE EDUCATION/TRAINING PROGRAM

## 2019-05-04 PROCEDURE — 99223 1ST HOSP IP/OBS HIGH 75: CPT | Mod: HCNC,AI,, | Performed by: HOSPITALIST

## 2019-05-04 PROCEDURE — 27000221 HC OXYGEN, UP TO 24 HOURS: Mod: HCNC

## 2019-05-04 PROCEDURE — 25000003 PHARM REV CODE 250: Mod: HCNC | Performed by: STUDENT IN AN ORGANIZED HEALTH CARE EDUCATION/TRAINING PROGRAM

## 2019-05-04 PROCEDURE — 87075 CULTR BACTERIA EXCEPT BLOOD: CPT | Mod: HCNC

## 2019-05-04 PROCEDURE — 94761 N-INVAS EAR/PLS OXIMETRY MLT: CPT | Mod: HCNC

## 2019-05-04 PROCEDURE — 80053 COMPREHEN METABOLIC PANEL: CPT | Mod: HCNC

## 2019-05-04 PROCEDURE — 82140 ASSAY OF AMMONIA: CPT | Mod: HCNC

## 2019-05-04 PROCEDURE — 80048 BASIC METABOLIC PNL TOTAL CA: CPT | Mod: HCNC

## 2019-05-04 PROCEDURE — 83735 ASSAY OF MAGNESIUM: CPT | Mod: HCNC

## 2019-05-04 PROCEDURE — 85610 PROTHROMBIN TIME: CPT | Mod: HCNC

## 2019-05-04 PROCEDURE — 89051 BODY FLUID CELL COUNT: CPT | Mod: HCNC

## 2019-05-04 PROCEDURE — 99223 PR INITIAL HOSPITAL CARE,LEVL III: ICD-10-PCS | Mod: HCNC,AI,, | Performed by: HOSPITALIST

## 2019-05-04 PROCEDURE — 84157 ASSAY OF PROTEIN OTHER: CPT | Mod: HCNC

## 2019-05-04 PROCEDURE — 83605 ASSAY OF LACTIC ACID: CPT | Mod: 91,HCNC

## 2019-05-04 PROCEDURE — 83615 LACTATE (LD) (LDH) ENZYME: CPT | Mod: HCNC

## 2019-05-04 PROCEDURE — 83605 ASSAY OF LACTIC ACID: CPT | Mod: HCNC

## 2019-05-04 PROCEDURE — 82042 OTHER SOURCE ALBUMIN QUAN EA: CPT | Mod: HCNC

## 2019-05-04 PROCEDURE — 85025 COMPLETE CBC W/AUTO DIFF WBC: CPT | Mod: HCNC

## 2019-05-04 PROCEDURE — P9047 ALBUMIN (HUMAN), 25%, 50ML: HCPCS | Mod: HCNC,JG | Performed by: STUDENT IN AN ORGANIZED HEALTH CARE EDUCATION/TRAINING PROGRAM

## 2019-05-04 PROCEDURE — 36415 COLL VENOUS BLD VENIPUNCTURE: CPT | Mod: HCNC

## 2019-05-04 RX ORDER — TIOTROPIUM BROMIDE 18 UG/1
1 CAPSULE ORAL; RESPIRATORY (INHALATION) DAILY
Status: DISCONTINUED | OUTPATIENT
Start: 2019-05-05 | End: 2019-05-08 | Stop reason: HOSPADM

## 2019-05-04 RX ORDER — ONDANSETRON 4 MG/1
4 TABLET, ORALLY DISINTEGRATING ORAL ONCE
Status: COMPLETED | OUTPATIENT
Start: 2019-05-04 | End: 2019-05-04

## 2019-05-04 RX ORDER — FUROSEMIDE 40 MG/1
40 TABLET ORAL 2 TIMES DAILY
Status: DISCONTINUED | OUTPATIENT
Start: 2019-05-04 | End: 2019-05-04

## 2019-05-04 RX ORDER — LIDOCAINE HYDROCHLORIDE 10 MG/ML
10 INJECTION INFILTRATION; PERINEURAL ONCE
Status: DISCONTINUED | OUTPATIENT
Start: 2019-05-04 | End: 2019-05-04

## 2019-05-04 RX ORDER — FLUTICASONE FUROATE AND VILANTEROL 200; 25 UG/1; UG/1
1 POWDER RESPIRATORY (INHALATION) DAILY
Status: DISCONTINUED | OUTPATIENT
Start: 2019-05-05 | End: 2019-05-08 | Stop reason: HOSPADM

## 2019-05-04 RX ORDER — ALBUMIN HUMAN 250 G/1000ML
25 SOLUTION INTRAVENOUS EVERY 8 HOURS
Status: COMPLETED | OUTPATIENT
Start: 2019-05-04 | End: 2019-05-05

## 2019-05-04 RX ORDER — LIDOCAINE HYDROCHLORIDE AND EPINEPHRINE 10; 10 MG/ML; UG/ML
10 INJECTION, SOLUTION INFILTRATION; PERINEURAL ONCE
Status: COMPLETED | OUTPATIENT
Start: 2019-05-04 | End: 2019-05-04

## 2019-05-04 RX ADMIN — LIDOCAINE HYDROCHLORIDE AND EPINEPHRINE 10 ML: 10; 10 INJECTION, SOLUTION INFILTRATION; PERINEURAL at 03:05

## 2019-05-04 RX ADMIN — HYDROCORTISONE 10 MG: 5 TABLET ORAL at 10:05

## 2019-05-04 RX ADMIN — RIFAXIMIN 550 MG: 550 TABLET ORAL at 08:05

## 2019-05-04 RX ADMIN — LACTULOSE 30 G: 20 SOLUTION ORAL at 05:05

## 2019-05-04 RX ADMIN — RIFAXIMIN 550 MG: 550 TABLET ORAL at 10:05

## 2019-05-04 RX ADMIN — LACTULOSE 30 G: 20 SOLUTION ORAL at 06:05

## 2019-05-04 RX ADMIN — SODIUM CHLORIDE 1000 ML: 0.9 INJECTION, SOLUTION INTRAVENOUS at 12:05

## 2019-05-04 RX ADMIN — PANTOPRAZOLE SODIUM 40 MG: 40 TABLET, DELAYED RELEASE ORAL at 10:05

## 2019-05-04 RX ADMIN — CEFTRIAXONE 2 G: 2 INJECTION, SOLUTION INTRAVENOUS at 10:05

## 2019-05-04 RX ADMIN — ALBUMIN HUMAN 25 G: 0.25 SOLUTION INTRAVENOUS at 10:05

## 2019-05-04 RX ADMIN — ALBUMIN HUMAN 25 G: 0.25 SOLUTION INTRAVENOUS at 06:05

## 2019-05-04 RX ADMIN — ONDANSETRON 4 MG: 4 TABLET, ORALLY DISINTEGRATING ORAL at 08:05

## 2019-05-04 RX ADMIN — SODIUM CHLORIDE 1000 ML: 0.9 INJECTION, SOLUTION INTRAVENOUS at 04:05

## 2019-05-04 RX ADMIN — ONDANSETRON 4 MG: 4 TABLET, ORALLY DISINTEGRATING ORAL at 10:05

## 2019-05-04 NOTE — HPI
74 y/o male with a history of portal hypertension from HCV-induced cirrhosis (2/2 IV drug use in 1995), esophageal varices (2/19 EGD non-bleeding small (< 5 mm) esophageal varices), COPD, CAD (s/p RCA and OM2 stents in 2011), permanent pacemaker placement in 2011 (2/2 SSS/RBBB), and adrenal insufficiency 2/2 pituitary tumor s/p gamma knife, presents to the ED with a chief complaint of increasing lethargy. Around 2 AM last night, wife noticed patient had fallen out of bed. On her initial assessment, patient was unresponsive and not moving at all. A couple of minutes later he slowly regained consciousness but was extremely lethargic. He went back to bed and the next day patient was brought into the emergency room by his son. As per wife, patient will tend to skip his lactulose dosing as it gives him diarrhea. Patient denies headache, edema, nausea, fevers, palpitations or pain in joints. Denies dysuria however,compalins of very dark urine. Patient also complains of a 20 lb weight loss. Patient admitted last month for acute decompensated heart failure. Patient had diagnostic/therapeutic paracentesis last admission in which 3 L was removed showed portal HTN however, no SBP. He was then discharged on ciprofloxacin (for SBP prophylaxis), as well as lasix and spironolactone. Patient was a former smoker (quit many years ago). He does not drink or use IV drugs. Home medications include ciprofloxacin daily, furosemide 40 mg BID, hydrocortisone 10 mg daily, lactulose 20 g total daily, omeprazole 40 mg, pravastatin 40 mg, spironolactone 200 mg daily.     On arrival to the ED, patient was afebrile, with a blood pressure of 129/77. Work-up revealed WBC 10.3, H/H 10.4/33.4, Plt 92, Crt 1.3, Tbil 2.4, AST/ALT 69/39, Alk phos 211, Mg 1.5, Lactate 2.3, Ammonia 76, INR 1.5, Trop 0.018, ETOH <10, . MELD score 17. CT revealed no acute abnormality. EKG showed atrial paced rhythm at 79 bpm, with a RBBB (similar to previous). CXR  shows no focal consolidations. Blood cultures pending. Patient was given a one time dose of lactulose. Magnesium was replaced.  Urinalysis and UDS shows no abnormalities.    On my physical examination, patient resting comfortably in bed. He is alert and oriented to person, place and time. He is able to follow all commands. Abdomen is distended however, soft. He is not complaining of any abdominal pain. Scleral icterus present.

## 2019-05-04 NOTE — H&P
[facility] Medicine  History & Physical    Patient Name: Fox Lyons Sr.  MRN: [id number]  Admission Date: 5/3/2019  Attending Physician: Marshall Justice, *   Primary Care Provider: Clover White MD    [facility] Medicine Team: [facility] MED 3 Simon Garcia MD     Patient information was obtained from patient and ER records.     Subjective:     Principal Problem:<principal problem not specified>    Chief Complaint:   Chief Complaint   Patient presents with    Altered Mental Status     hx cirrhosis, fell at 2 am  , confusion noticed getting worse for 5d,         HPI: 74 y/o male with a history of portal hypertension from HCV-induced cirrhosis (2/2 IV drug use in 1995), esophageal varices (2/19 EGD non-bleeding small (< 5 mm) esophageal varices), COPD, CAD (s/p RCA and OM2 stents in 2011), permanent pacemaker placement in 2011 (2/2 SSS/RBBB), and adrenal insufficiency 2/2 pituitary tumor s/p gamma knife, presents to the ED with a chief complaint of increasing lethargy. Around 2 AM last night, wife noticed patient had fallen out of bed. On her initial assessment, patient was unresponsive and not moving at all. A couple of minutes later he slowly regained consciousness but was extremely lethargic. He went back to bed and the next day patient was brought into the emergency room by his son. As per wife, patient will tend to skip his lactulose dosing as it gives him diarrhea. Patient denies headache, edema, nausea, fevers, palpitations or pain in joints. Denies dysuria however,compalins of very dark urine. Patient also complains of a 20 lb weight loss. Patient admitted last month for acute decompensated heart failure. Patient had diagnostic/therapeutic paracentesis last admission in which 3 L was removed showed portal HTN however, no SBP. He was then discharged on ciprofloxacin (for SBP prophylaxis), as well as lasix and spironolactone. Patient was a former smoker (quit many years  ago). He does not drink or use IV drugs. Home medications include ciprofloxacin daily, furosemide 40 mg BID, hydrocortisone 10 mg daily, lactulose 20 g total daily, omeprazole 40 mg, pravastatin 40 mg, spironolactone 200 mg daily.     On arrival to the ED, patient was afebrile, with a blood pressure of 129/77. Work-up revealed WBC 10.3, H/H 10.4/33.4, Plt 92, Crt 1.3, Tbil 2.4, AST/ALT 69/39, Alk phos 211, Mg 1.5, Lactate 2.3, Ammonia 76, INR 1.5, Trop 0.018, ETOH <10, . MELD score 17. CT revealed no acute abnormality. EKG showed atrial paced rhythm at 79 bpm, with a RBBB (similar to previous). CXR shows no focal consolidations. Blood cultures pending. Patient was given a one time dose of lactulose. Magnesium was replaced.  Urinalysis and UDS shows no abnormalities.    On my physical examination, patient resting comfortably in bed. He is alert and oriented to person, place and time. He is able to follow all commands. Abdomen is distended however, soft. He is not complaining of any abdominal pain. Scleral icterus present.       Past Medical History:   Diagnosis Date    Anemia     Anticoagulant long-term use     Ascites 12/10/2018    Biliary colic     Cataract     Chronic hepatitis C     Cirrhosis     COPD (chronic obstructive pulmonary disease)     Coronary artery disease     Dyspnea     Epistaxis     Esophageal varices without bleeding     Gallstones     GERD (gastroesophageal reflux disease)     HCC (hepatocellular carcinoma)     Hepatitis B antibody positive     Hyperglycemia     Hyperlipidemia     Hypertension     Hypopituitarism     Mobitz type 2 second degree atrioventricular block     Nuclear sclerosis, left     Pacemaker     Pituitary tumor     Portal hypertension     Pulmonary emphysema     PVT (paroxysmal ventricular tachycardia)     SA node dysfunction     Secondary adrenal insufficiency     Secondary male hypogonadism     SSS (sick sinus syndrome)     Superior  mesenteric vein thrombosis     Vitreous hemorrhage, both eyes        Past Surgical History:   Procedure Laterality Date    CARDIAC PACEMAKER PLACEMENT      st [facility]le    CATARACT EXTRACTION  9/24/13    RT    COLONOSCOPY Left 1/4/2019    Performed by Emanuel Hannah MD at [facility] ENDO (4TH FLR)    CORONARY ANGIOPLASTY WITH STENT PLACEMENT      2 stents    EGD (ESOPHAGOGASTRODUODENOSCOPY) Left 2/19/2019    [facility] at [facility] END[facility]    EGD (ESOPHAGOGASTRODUODENOSCOPY) Left 1/4/2019    Performed by Emanuel Hannah MD at [facility] ENDO (4TH FLR)    ESOPHAGOGASTRODUODENOSCOPY (EGD) Left 5/31/2018    Performed by Trice Funes MD at [facility] ENDO (4TH FLR)    ESOPHAGOGASTRODUODENOSCOPY (EGD) N/A 11/30/2017    Performed by Trice Funes MD at [facility] ENDO (4TH FLR)    ESOPHAGOGASTRODUODENOSCOPY (EGD) Left 10/12/2017    Performed by Jody Humphries MD at [facility] ENDO (4TH FLR)    ESOPHAGOGASTRODUODENOSCOPY (EGD) N/A 8/17/2017    Performed by Trice Funes MD at [facility] ENDO (4TH FLR)    ESOPHAGOGASTRODUODENOSCOPY (EGD) N/A 7/14/2016    Performed by [facility] at [facility] (4[facility] FLR)    EYE SURGERY      INSERTION, IOL PROSTHESIS Right 9/24/2013    Performed by Ben Sparks MD at [facility] OR 1ST FLR    PARACENTESIS, ABDOMINAL N/A 4/16/2014    Performed by [facility] at [facility] CATH LAB    PHACOEMULSIFICATION, CATARACT Right 9/24/2013    Performed by Ben Sparks MD at [facility] OR 1ST FLR    s/p PPV/AFX/EL (od)  05/09/2012       Review of patient's allergies indicates:   Allergen Reactions    Codeine Itching, Rash, Edema and Other (See Comments)     Other reaction(s): Itching       No current facility-administered medications on file prior to encounter.      Current Outpatient Medications on File Prior to Encounter   Medication Sig    ciprofloxacin HCl (CIPRO) 500 MG tablet Take 1 tablet (500 mg total) by mouth once daily.    furosemide (LASIX) 40 MG tablet  Take 0.5 tablets (20 mg total) by mouth 2 (two) times daily. (Patient taking differently: Take 40 mg by mouth 3 (three) times a week. )    hydrocortisone (CORTEF) 10 MG Tab TAKE ONE AND ONE-HALF TABLETS BY MOUTH EVERY MORNING AND ONE-HALF TABLET EVERY EVENING./NO FURTHER REFILLS  NEED FOLLOW UP VISIT    lactulose (CHRONULAC) 20 gram/30 mL Soln Take 30 mLs (20 g total) by mouth once daily. Please ensure you are having 3-4 bowels movements daily for 100 doses    losartan (COZAAR) 100 MG tablet Take 1 tablet (100 mg total) by mouth once daily.    omeprazole (PRILOSEC) 40 MG capsule TAKE 1 CAPSULE(40 MG) BY MOUTH TWICE DAILY BEFORE MEALS    pravastatin (PRAVACHOL) 40 MG tablet TAKE 1 TABLET(40 MG) BY MOUTH EVERY EVENING    spironolactone (ALDACTONE) 100 MG tablet Take 2 tablets (200 mg total) by mouth once daily.    fluticasone-umeclidin-vilanter (TRELEGY ELLIPTA) 100-62.5-25 mcg DsDv Inhale 1 puff into the lungs once daily.    multivitamin-minerals-lutein (CENTRUM SILVER) Tab Take 1 tablet by mouth once daily.      NITROSTAT 0.4 mg SL tablet DISSOLVE 1 TABLET UNDER THE TONGUE EVERY 5 MINUTES AS NEEDED     Family History     Problem Relation (Age of Onset)    Cancer Father, Sister, Sister, Brother    Diabetes Brother, Sister    Heart disease Mother, Sister    Lung cancer Brother    No Known Problems Sister, Brother        Tobacco Use    Smoking status: Former Smoker     Packs/day: 1.00     Years: 35.00     Pack years: 35.00     Types: Cigarettes     Last attempt to quit: 1995     Years since quittin.8    Smokeless tobacco: Never Used   Substance and Sexual Activity    Alcohol use: No     Alcohol/week: 0.0 oz    Drug use: No    Sexual activity: Not Currently     Review of Systems   Constitutional: Positive for fatigue and unexpected weight change. Negative for activity change, appetite change, chills, diaphoresis and fever.   HENT: Negative for congestion, dental problem, drooling, ear discharge,  ear pain, facial swelling, hearing loss, mouth sores, nosebleeds, postnasal drip, rhinorrhea, sinus pressure, sinus pain, sneezing, sore throat, tinnitus, trouble swallowing and voice change.    Eyes: Negative for photophobia, pain, discharge, redness, itching and visual disturbance.   Respiratory: Negative for apnea, cough, choking, chest tightness, shortness of breath, wheezing and stridor.    Cardiovascular: Negative for chest pain, palpitations and leg swelling.   Gastrointestinal: Positive for abdominal distention and constipation. Negative for abdominal pain, anal bleeding, blood in stool, diarrhea, nausea, rectal pain and vomiting.   Endocrine: Negative for cold intolerance, heat intolerance, polydipsia, polyphagia and polyuria.   Genitourinary: Negative for decreased urine volume, difficulty urinating, dysuria, enuresis, flank pain, frequency and urgency.   Musculoskeletal: Negative for arthralgias, back pain, gait problem, joint swelling, myalgias, neck pain and neck stiffness.   Skin: Negative for color change, pallor, rash and wound.   Allergic/Immunologic: Negative for environmental allergies, food allergies and immunocompromised state.   Neurological: Negative for dizziness, tremors, seizures, syncope, facial asymmetry, speech difficulty, weakness, light-headedness, numbness and headaches.   Hematological: Negative for adenopathy. Does not bruise/bleed easily.   Psychiatric/Behavioral: Negative for agitation, behavioral problems, confusion, decreased concentration, dysphoric mood, hallucinations, self-injury, sleep disturbance and suicidal ideas. The patient is not nervous/anxious and is not hyperactive.      Objective:     Vital Signs (Most Recent):  Temp: 97.9 °F (36.6 °C) (05/03/19 1825)  Pulse: 68 (05/03/19 2050)  Resp: 14 (05/03/19 2050)  BP: 128/77 (05/03/19 2137)  SpO2: (!) 94 % (05/03/19 2140) Vital Signs (24h Range):  Temp:  [97.3 °F (36.3 °C)-97.9 °F (36.6 °C)] 97.9 °F (36.6 °C)  Pulse:   [68-76] 68  Resp:  [14-18] 14  SpO2:  [94 %-97 %] 94 %  BP: (121-135)/(63-77) 128/77     Weight: 69.8 kg (153 lb 14.1 oz)  Body mass index is 21.46 kg/m².    Physical Exam   Constitutional: He is oriented to person, place, and time. He appears well-developed and well-nourished. No distress.   HENT:   Head: Normocephalic and atraumatic.   Right Ear: External ear normal.   Left Ear: External ear normal.   Mouth/Throat: No oropharyngeal exudate.   Eyes: Pupils are equal, round, and reactive to light. Conjunctivae and EOM are normal. Right eye exhibits no discharge. Left eye exhibits no discharge. Scleral icterus is present.   Neck: Normal range of motion. Neck supple. No JVD present. No tracheal deviation present. No thyromegaly present.   Cardiovascular: Normal rate, regular rhythm and normal heart sounds. Exam reveals no gallop and no friction rub.   No murmur heard.  Pulmonary/Chest: Effort normal and breath sounds normal. No stridor. No respiratory distress. He has no wheezes. He has no rales. He exhibits no tenderness.   Abdominal: Soft. Bowel sounds are normal. He exhibits no distension and no mass. There is no tenderness. There is no guarding.   Musculoskeletal: Normal range of motion. He exhibits no edema, tenderness or deformity.   Neurological: He is alert and oriented to person, place, and time. He displays abnormal reflex. No cranial nerve deficit or sensory deficit. He exhibits normal muscle tone. Coordination normal.   Skin: Skin is warm and dry. Capillary refill takes less than 2 seconds. No rash noted. He is not diaphoretic. No erythema.   Psychiatric: He has a normal mood and affect.         CRANIAL NERVES     CN III, IV, VI   Pupils are equal, round, and reactive to light.  Extraocular motions are normal.        Significant Labs:   A1C:   Recent Labs   Lab 11/09/18  0855 04/16/19  2208   HGBA1C 6.0* 6.1*     ABGs: No results for input(s): PH, PCO2, HCO3, POCSATURATED, BE, TOTALHB, COHB, METHB, O2HB,  POCFIO2 in the last 48 hours.  Bilirubin:   Recent Labs   Lab 04/16/19  2208 04/17/19  0612 04/18/19  0658 04/23/19  1022 05/03/19 1759   BILITOT 1.2* 1.5* 1.4* 2.1* 2.4*     Blood Culture: No results for input(s): LABBLOO in the last 48 hours.  BMP:   Recent Labs   Lab 05/03/19 1759   *      K 3.7      CO2 23   BUN 26*   CREATININE 1.3   CALCIUM 10.0   MG 1.5*     CBC:   Recent Labs   Lab 05/03/19 1759   WBC 10.33   HGB 10.4*   HCT 33.4*   PLT 92*     CMP:   Recent Labs   Lab 05/03/19 1759      K 3.7      CO2 23   *   BUN 26*   CREATININE 1.3   CALCIUM 10.0   PROT 7.5   ALBUMIN 2.5*   BILITOT 2.4*   ALKPHOS 211*   AST 69*   ALT 39   ANIONGAP 12   EGFRNONAA 54.1*     Cardiac Markers:   Recent Labs   Lab 05/03/19  2019   BNP 36     Coagulation:   Recent Labs   Lab 05/03/19 1759   INR 1.5*   APTT 24.1     Lactic Acid:   Recent Labs   Lab 05/03/19 1759 05/03/19  2146   LACTATE 2.3* 2.6*     Lipase: No results for input(s): LIPASE in the last 48 hours.  Lipid Panel: No results for input(s): CHOL, HDL, LDLCALC, TRIG, CHOLHDL in the last 48 hours.  Magnesium:   Recent Labs   Lab 05/03/19 1759   MG 1.5*     Pathology Results  (Last 10 years)    None        POCT Glucose: No results for input(s): POCTGLUCOSE in the last 48 hours.  Prealbumin: No results for input(s): PREALBUMIN in the last 48 hours.  Respiratory Culture: No results for input(s): GSRESP, RESPIRATORYC in the last 48 hours.  Troponin:   Recent Labs   Lab 05/03/19 1759   TROPONINI 0.018     TSH: No results for input(s): TSH in the last 4320 hours.  Urine Culture: No results for input(s): LABURIN in the last 48 hours.  Urine Studies:   Recent Labs   Lab 05/03/19  1933   COLORU Anahy   APPEARANCEUA Clear   PHUR 6.0   SPECGRAV 1.020   PROTEINUA Negative   GLUCUA Negative   KETONESU Negative   BILIRUBINUA Negative   OCCULTUA Negative   NITRITE Negative   LEUKOCYTESUR Negative       Significant Imaging: I have reviewed  and interpreted all pertinent imaging results/findings within the past 24 hours.    Assessment/Plan:     Hepatic encephalopathy  74 y/o male with a medical history of HCV-induced cirrhosis (2/2 IVDU in 1995), esophageal varices (2/19 EGD non-bleeding small (< 5 mm) esophageal varices), COPD, CAD (s/p RCA and OM2 stents in 2011), permanent pacemaker placement in 2011 (2/2 SSS/RBBB), and adrenal insufficiency 2/2 pituitary tumor s/p gamma knife, presents to the ED with altered mental status. As per wife, she found him on the floor near his bed around 2AM. He was unresponsive for a couple of minutes however, regained consciousness and has been extremely lethargic since then. Although he takes his regular medications, he does not take his lactulose as it causes diarrhea. Admitted last month for acute decompensated liver liver; paracentesis ruled out SBP. MELD score 17; WBC 10.3, H/H 10.4/33.4, Plt 92, Crt 1.3, Tbil 2.4, AST/ALT 69/39, Alk phos 211, Mg 1.5, Lactate 2.3, Ammonia 76, INR 1.5, Trop 0.018, ETOH <10, . Patient currently afebrile.     Assessment   -Patient presents with an ammonia of 76. He did not properly take his medication which led him to become altered. Very low threshold for SBP as patient is afebrile and he does show any leukocytosis. He has been afebrile and is not complaining of any abdominal pain.   -Low likelihood that AMS is secondary to stroke as CT head is negative for any acute intracranial abnormalities.    -Patient not showing any signs of infection (no leukocytosis, afebrile and blood pressure is normal). UA and chest xray normal.  -Unlikely due to drugs as UDS was negative     Plan   1) Lactulose 30 g QID   2) Rifaximin 550 mg BID  3) F/u blood cultures to r/o all causes of infection  4) Ciprofloxacin 500 mg daily for SBP prophylaxis   5) Continue furosemide 40 mg BID  6) Continue spirinolactone 200 mg daily  7) Daily CMP, CBC, PT/INR. Obtain ammonia for two more days   8) Consider  paracentesis in the future. US revealed no fluid pockets for proper paracentesis however very low likelihood of SBP. Patient currently resting comfortably on RA and is not complaining of any abdominal pain. Abdomen also very soft.     Hypomagnesemia  2/2 to lasix?  Plan  1) Will obtain daily labs and replete      Thrombocytopenia  2/2 cirrhosis       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 Echo from three years ago shows an EF of 55% with elevated PAP    Hyperlipidemia  Hold statin in the setting of hepatic encephalopathy     Hypertension  Hold cozaar as patient's blood pressure has been running on the lower end.        VTE Risk Mitigation (From admission, onward)        Ordered     IP VTE HIGH RISK PATIENT  Once      05/03/19 2214     Place UMANG hose  Until discontinued      05/03/19 2214             Simon Garcia MD  Department of Hospital Medicine   [facility]

## 2019-05-04 NOTE — ASSESSMENT & PLAN NOTE
74 y/o male with a medical history of HCV-induced cirrhosis (2/2 IVDU in 1995), esophageal varices (2/19 EGD non-bleeding small (< 5 mm) esophageal varices), COPD, CAD (s/p RCA and OM2 stents in 2011), permanent pacemaker placement in 2011 (2/2 SSS/RBBB), and adrenal insufficiency 2/2 pituitary tumor s/p gamma knife, presents to the ED with altered mental status. As per wife, she found him on the floor near his bed around 2AM. He was unresponsive for a couple of minutes however, regained consciousness and has been extremely lethargic since then. Although he takes his regular medications, he does not take his lactulose as it causes diarrhea. Admitted last month for acute decompensated liver liver; paracentesis ruled out SBP. MELD score 17; WBC 10.3, H/H 10.4/33.4, Plt 92, Crt 1.3, Tbil 2.4, AST/ALT 69/39, Alk phos 211, Mg 1.5, Lactate 2.3, Ammonia 76, INR 1.5, Trop 0.018, ETOH <10, . Patient currently afebrile.     Assessment   -Patient presents with an ammonia of 76. He did not properly take his medication which led him to become altered. Very low threshold for SBP as patient is afebrile and he does show any leukocytosis. He has been afebrile and is not complaining of any abdominal pain.   -Low likelihood that AMS is secondary to stroke as CT head is negative for any acute intracranial abnormalities.    -Patient not showing any signs of infection (no leukocytosis, afebrile and blood pressure is normal). UA and chest xray normal.  -Unlikely due to drugs as UDS was negative     Plan   1) Lactulose 30 g QID   2) Rifaximin 550 mg BID  3) F/u blood cultures to r/o all causes of infection  4) Ciprofloxacin 500 mg daily for SBP prophylaxis   5) Continue furosemide 40 mg BID  6) Continue spirinolactone 200 mg daily  7) Daily CMP, CBC, PT/INR. Obtain ammonia for two more days   8) Consider paracentesis in the future. US revealed no fluid pockets for proper paracentesis however very low likelihood of SBP. Patient  currently resting comfortably on RA and is not complaining of any abdominal pain. Abdomen also very soft.

## 2019-05-04 NOTE — PLAN OF CARE
Problem: Adult Inpatient Plan of Care  Goal: Plan of Care Review  Pt is AA and oriented to self and situation.  No complaints of pain. Call light and personal belongings within reach. Spouse at bedside. Plan of care reviewed with pt and spouse. [employer] camera in place for pt safety. Will continue to monitor.

## 2019-05-04 NOTE — SUBJECTIVE & OBJECTIVE
Past Medical History:   Diagnosis Date    Anemia     Anticoagulant long-term use     Ascites 12/10/2018    Biliary colic     Cataract     Chronic hepatitis C     Cirrhosis     COPD (chronic obstructive pulmonary disease)     Coronary artery disease     Dyspnea     Epistaxis     Esophageal varices without bleeding     Gallstones     GERD (gastroesophageal reflux disease)     HCC (hepatocellular carcinoma)     Hepatitis B antibody positive     Hyperglycemia     Hyperlipidemia     Hypertension     Hypopituitarism     Mobitz type 2 second degree atrioventricular block     Nuclear sclerosis, left     Pacemaker     Pituitary tumor     Portal hypertension     Pulmonary emphysema     PVT (paroxysmal ventricular tachycardia)     SA node dysfunction     Secondary adrenal insufficiency     Secondary male hypogonadism     SSS (sick sinus syndrome)     Superior mesenteric vein thrombosis     Vitreous hemorrhage, both eyes        Past Surgical History:   Procedure Laterality Date    CARDIAC PACEMAKER PLACEMENT      st rochelle    CATARACT EXTRACTION  9/24/13    RT    COLONOSCOPY Left 1/4/2019    Performed by Emanuel Hannah MD at [facility] ENDO (4TH FLR)    CORONARY ANGIOPLASTY WITH STENT PLACEMENT      2 stents    EGD (ESOPHAGOGASTRODUODENOSCOPY) Left 2/19/2019    Per[facility] by [facility] at [facility]    EGD (ESOPHAGOGASTRODUODENOSCOPY) Left 1/4/2019    Performed by Emanuel Hannah MD at [facility] ENDO (4TH FLR)    ESOPHAGOGASTRODUODENOSCOPY (EGD) Left 5/31/2018    Performed by Trice [facility]unes MD at [facility] ENDO (4TH FLR)    ESOPHAGOGASTRODUODENOSCOPY (EGD) N/A 11/30/2017    Performed by Trice Funes MD at [facility] ENDO (4TH FLR)    ESOPHAGOGASTRODUODENOSCOPY (EGD) Left 10/12/2017    Performed by Jody Humphries MD at [facility] ENDO (4TH FLR)    ESOPHAGOGASTRODUODENOSCOPY (EGD) N/A 8/17/2017    Performed by Trice Funes MD at [facility] ENDO (4TH FLR)    ESOPHAGOGASTRODUODENOSCOPY (EGD)  N/A 7/14/2016    Performed by [facility] at [facility] ENDO (4TH FLR)    EYE SURGERY      INSERTION, IOL PROSTHESIS Right 9/24/2013    Performed by Ben Sparks MD at [facility] OR 1ST FLR    PARACENTESIS, ABDOMINAL N/A 4/16/2014    Performed by [facility] at [facility] CATH LAB    PHACOEMULSIFICATION, CATARACT Right 9/24/2013    Performed by Ben Sparks MD at [facility] OR 1ST FLR    s/p PPV/AFX/EL (od)  05/09/2012       Review of patient's allergies indicates:   Allergen Reactions    Codeine Itching, Rash, Edema and Other (See Comments)     Other reaction(s): Itching       No current facility-administered medications on file prior to encounter.      Current Outpatient Medications on File Prior to Encounter   Medication Sig    ciprofloxacin HCl (CIPRO) 500 MG tablet Take 1 tablet (500 mg total) by mouth once daily.    furosemide (LASIX) 40 MG tablet Take 0.5 tablets (20 mg total) by mouth 2 (two) times daily. (Patient taking differently: Take 40 mg by mouth 3 (three) times a week. )    hydrocortisone (CORTEF) 10 MG Tab TAKE ONE AND ONE-HALF TABLETS BY MOUTH EVERY MORNING AND ONE-HALF TABLET EVERY EVENING./NO FURTHER REFILLS  NEED FOLLOW UP VISIT    lactulose (CHRONULAC) 20 gram/30 mL Soln Take 30 mLs (20 g total) by mouth once daily. Please ensure you are having 3-4 bowels movements daily for 100 doses    losartan (COZAAR) 100 MG tablet Take 1 tablet (100 mg total) by mouth once daily.    omeprazole (PRILOSEC) 40 MG capsule TAKE 1 CAPSULE(40 MG) BY MOUTH TWICE DAILY BEFORE MEALS    pravastatin (PRAVACHOL) 40 MG tablet TAKE 1 TABLET(40 MG) BY MOUTH EVERY EVENING    spironolactone (ALDACTONE) 100 MG tablet Take 2 tablets (200 mg total) by mouth once daily.    fluticasone-umeclidin-vilanter (TRELEGY ELLIPTA) 100-62.5-25 mcg DsDv Inhale 1 puff into the lungs once daily.    multivitamin-minerals-lutein (CENTRUM SILVER) Tab Take 1 tablet by mouth once daily.      NITROSTAT 0.4 mg SL  tablet DISSOLVE 1 TABLET UNDER THE TONGUE EVERY 5 MINUTES AS NEEDED     Family History     Problem Relation (Age of Onset)    Cancer Father, Sister, Sister, Brother    Diabetes Brother, Sister    Heart disease Mother, Sister    Lung cancer Brother    No Known Problems Sister, Brother        Tobacco Use    Smoking status: Former Smoker     Packs/day: 1.00     Years: 35.00     Pack years: 35.00     Types: Cigarettes     Last attempt to quit: 1995     Years since quittin.8    Smokeless tobacco: Never Used   Substance and Sexual Activity    Alcohol use: No     Alcohol/week: 0.0 oz    Drug use: No    Sexual activity: Not Currently     Review of Systems   Constitutional: Positive for fatigue and unexpected weight change. Negative for activity change, appetite change, chills, diaphoresis and fever.   HENT: Negative for congestion, dental problem, drooling, ear discharge, ear pain, facial swelling, hearing loss, mouth sores, nosebleeds, postnasal drip, rhinorrhea, sinus pressure, sinus pain, sneezing, sore throat, tinnitus, trouble swallowing and voice change.    Eyes: Negative for photophobia, pain, discharge, redness, itching and visual disturbance.   Respiratory: Negative for apnea, cough, choking, chest tightness, shortness of breath, wheezing and stridor.    Cardiovascular: Negative for chest pain, palpitations and leg swelling.   Gastrointestinal: Positive for abdominal distention and constipation. Negative for abdominal pain, anal bleeding, blood in stool, diarrhea, nausea, rectal pain and vomiting.   Endocrine: Negative for cold intolerance, heat intolerance, polydipsia, polyphagia and polyuria.   Genitourinary: Negative for decreased urine volume, difficulty urinating, dysuria, enuresis, flank pain, frequency and urgency.   Musculoskeletal: Negative for arthralgias, back pain, gait problem, joint swelling, myalgias, neck pain and neck stiffness.   Skin: Negative for color change, pallor, rash and  wound.   Allergic/Immunologic: Negative for environmental allergies, food allergies and immunocompromised state.   Neurological: Negative for dizziness, tremors, seizures, syncope, facial asymmetry, speech difficulty, weakness, light-headedness, numbness and headaches.   Hematological: Negative for adenopathy. Does not bruise/bleed easily.   Psychiatric/Behavioral: Negative for agitation, behavioral problems, confusion, decreased concentration, dysphoric mood, hallucinations, self-injury, sleep disturbance and suicidal ideas. The patient is not nervous/anxious and is not hyperactive.      Objective:     Vital Signs (Most Recent):  Temp: 97.9 °F (36.6 °C) (05/03/19 1825)  Pulse: 68 (05/03/19 2050)  Resp: 14 (05/03/19 2050)  BP: 128/77 (05/03/19 2137)  SpO2: (!) 94 % (05/03/19 2140) Vital Signs (24h Range):  Temp:  [97.3 °F (36.3 °C)-97.9 °F (36.6 °C)] 97.9 °F (36.6 °C)  Pulse:  [68-76] 68  Resp:  [14-18] 14  SpO2:  [94 %-97 %] 94 %  BP: (121-135)/(63-77) 128/77     Weight: 69.8 kg (153 lb 14.1 oz)  Body mass index is 21.46 kg/m².    Physical Exam   Constitutional: He is oriented to person, place, and time. He appears well-developed and well-nourished. No distress.   HENT:   Head: Normocephalic and atraumatic.   Right Ear: External ear normal.   Left Ear: External ear normal.   Mouth/Throat: No oropharyngeal exudate.   Eyes: Pupils are equal, round, and reactive to light. Conjunctivae and EOM are normal. Right eye exhibits no discharge. Left eye exhibits no discharge. Scleral icterus is present.   Neck: Normal range of motion. Neck supple. No JVD present. No tracheal deviation present. No thyromegaly present.   Cardiovascular: Normal rate, regular rhythm and normal heart sounds. Exam reveals no gallop and no friction rub.   No murmur heard.  Pulmonary/Chest: Effort normal and breath sounds normal. No stridor. No respiratory distress. He has no wheezes. He has no rales. He exhibits no tenderness.   Abdominal: Soft.  Bowel sounds are normal. He exhibits no distension and no mass. There is no tenderness. There is no guarding.   Musculoskeletal: Normal range of motion. He exhibits no edema, tenderness or deformity.   Neurological: He is alert and oriented to person, place, and time. He displays abnormal reflex. No cranial nerve deficit or sensory deficit. He exhibits normal muscle tone. Coordination normal.   Skin: Skin is warm and dry. Capillary refill takes less than 2 seconds. No rash noted. He is not diaphoretic. No erythema.   Psychiatric: He has a normal mood and affect.         CRANIAL NERVES     CN III, IV, VI   Pupils are equal, round, and reactive to light.  Extraocular motions are normal.        Significant Labs:   A1C:   Recent Labs   Lab 11/09/18  0855 04/16/19 2208   HGBA1C 6.0* 6.1*     ABGs: No results for input(s): PH, PCO2, HCO3, POCSATURATED, BE, TOTALHB, COHB, METHB, O2HB, POCFIO2 in the last 48 hours.  Bilirubin:   Recent Labs   Lab 04/16/19 2208 04/17/19  0612 04/18/19  0658 04/23/19  1022 05/03/19  1759   BILITOT 1.2* 1.5* 1.4* 2.1* 2.4*     Blood Culture: No results for input(s): LABBLOO in the last 48 hours.  BMP:   Recent Labs   Lab 05/03/19 1759   *      K 3.7      CO2 23   BUN 26*   CREATININE 1.3   CALCIUM 10.0   MG 1.5*     CBC:   Recent Labs   Lab 05/03/19 1759   WBC 10.33   HGB 10.4*   HCT 33.4*   PLT 92*     CMP:   Recent Labs   Lab 05/03/19 1759      K 3.7      CO2 23   *   BUN 26*   CREATININE 1.3   CALCIUM 10.0   PROT 7.5   ALBUMIN 2.5*   BILITOT 2.4*   ALKPHOS 211*   AST 69*   ALT 39   ANIONGAP 12   EGFRNONAA 54.1*     Cardiac Markers:   Recent Labs   Lab 05/03/19  2019   BNP 36     Coagulation:   Recent Labs   Lab 05/03/19 1759   INR 1.5*   APTT 24.1     Lactic Acid:   Recent Labs   Lab 05/03/19 1759 05/03/19  2146   LACTATE 2.3* 2.6*     Lipase: No results for input(s): LIPASE in the last 48 hours.  Lipid Panel: No results for input(s): CHOL, HDL,  LDLCALC, TRIG, CHOLHDL in the last 48 hours.  Magnesium:   Recent Labs   Lab 05/03/19  1759   MG 1.5*     Pathology Results  (Last 10 years)    None        POCT Glucose: No results for input(s): POCTGLUCOSE in the last 48 hours.  Prealbumin: No results for input(s): PREALBUMIN in the last 48 hours.  Respiratory Culture: No results for input(s): GSRESP, RESPIRATORYC in the last 48 hours.  Troponin:   Recent Labs   Lab 05/03/19  1759   TROPONINI 0.018     TSH: No results for input(s): TSH in the last 4320 hours.  Urine Culture: No results for input(s): LABURIN in the last 48 hours.  Urine Studies:   Recent Labs   Lab 05/03/19  1933   COLORU Anahy   APPEARANCEUA Clear   PHUR 6.0   SPECGRAV 1.020   PROTEINUA Negative   GLUCUA Negative   KETONESU Negative   BILIRUBINUA Negative   OCCULTUA Negative   NITRITE Negative   LEUKOCYTESUR Negative       Significant Imaging: I have reviewed and interpreted all pertinent imaging results/findings within the past 24 hours.

## 2019-05-04 NOTE — PLAN OF CARE
I have seen the patient, discussed the resident's history and physical, assessment and plan. I have personally interviewed and examined the patient at bedside.  In summary:    Mr. Fox Lyons Sr. is a 73 y.o. 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  Ammonia 76 - family reports he hasn't been compliant with his Lactulose.  Restart Lactulose.  Start Rifaximin.  Continue Lasix/Aldactone/Cipro for SBP px (home med).  Para in the morning if primary feels it is necessary - but no pain, leukocytosis, fever or chills.    MELD-Na score: 17 at 5/3/2019  5:59 PM  MELD score: 17 at 5/3/2019  5:59 PM  Calculated from:  Serum Creatinine: 1.3 mg/dL at 5/3/2019  5:59 PM  Serum Sodium: 137 mmol/L at 5/3/2019  5:59 PM  Total Bilirubin: 2.4 mg/dL at 5/3/2019  5:59 PM  INR(ratio): 1.5 at 5/3/2019  5:59 PM  Age: 73 years    Full H&P by team intern to follow.    Diana Christiansen MD  [facility] Medicine  Cell:  [phone]  Spectra:  [phone]  Pager:  [phone]

## 2019-05-04 NOTE — ED NOTES
Telemetry Verification   Patient placed on Telemetry Box  Verified with War Room  Box # [id number]   Monitor Tech    Rate 85   Rhythm NSR

## 2019-05-04 NOTE — CONSULTS
Radiology Consult    Fox Lyons Sr. is a 73 y.o. male with a history of HCV cirrhosis. IR consulted for diagnostic paracentesis.    Past Medical History:   Diagnosis Date    Anemia     Anticoagulant long-term use     Ascites 12/10/2018    Biliary colic     Cataract     Chronic hepatitis C     Cirrhosis     COPD (chronic obstructive pulmonary disease)     Coronary artery disease     Dyspnea     Epistaxis     Esophageal varices without bleeding     Gallstones     GERD (gastroesophageal reflux disease)     HCC (hepatocellular carcinoma)     Hepatitis B antibody positive     Hyperglycemia     Hyperlipidemia     Hypertension     Hypopituitarism     Mobitz type 2 second degree atrioventricular block     Nuclear sclerosis, left     Pacemaker     Pituitary tumor     Portal hypertension     Pulmonary emphysema     PVT (paroxysmal ventricular tachycardia)     SA node dysfunction     Secondary adrenal insufficiency     Secondary male hypogonadism     SSS (sick sinus syndrome)     Superior mesenteric vein thrombosis     Vitreous hemorrhage, both eyes      Past Surgical History:   Procedure Laterality Date    CARDIAC PACEMAKER PLACEMENT      st rochelle    CATARACT EXTRACTION  9/24/13    RT    COLONOSCOPY Left 1/4/2019    Performed by Emanuel Hannah MD at [facility] ENDO (4TH FLR)    CORONARY ANGIOPLASTY WITH STENT PLACEMENT      2 stents    EGD (ESOPHAGOGASTRODUODENOSCOPY) Left 2/19/2019    [facility]formed by [facility] at [facility] ENDO    EGD (ESOPHAGOGASTRODUODENOSCOPY) Left 1/4/2019    Performed by Emanuel Hannah MD at [facility] ENDO (4TH FLR)    ESOPHAGOGASTRODUODENOSCOPY (EGD) Left 5/31/2018    Performed by Trice Funes MD at [facility] ENDO (4TH FLR)    ESOPHAGOGASTRODUODENOSCOPY (EGD) N/A 11/30/2017    Performed by Trice Funes MD at [facility] ENDO (4TH FLR)    ESOPHAGOGASTRODUODENOSCOPY (EGD) Left 10/12/2017    Performed by Jody Humphries MD at [facility] ENDO (4TH FLR)     ESOPHAGOGASTRODUODENOSCOPY (EGD) N/A 8/17/2017    Performed by Tr[facility] F[facility] MD at [facility] ENDO (4TH FLR)    ESOPHAGOGASTRODUODENOSCOPY (EGD) N/A 7/14/2016    Performed by Trice Funes MD at [facility] ENDO (4TH FLR)    EYE SURGERY      INSERTION, IOL PROSTHESIS Right 9/24/2013    Performed by Ben Sparks MD at [facility] OR 1ST FLR    PARACENTESIS, ABDOMINAL N/A 4/16/2014    Performed by [facility] Diagnostic Provider at [facility] CATH LAB    PHACOEMULSIFICATION, CATARACT Right 9/24/2013    Performed by Ben Sparks MD at [facility] OR 1ST FLR    s/p PPV/AFX/EL (od)  05/09/2012       Discussed with primary team.    Imaging reviewed with Radiology staff, Dr. Jules.     Procedure: paracentesis.    Scheduled Meds:    albumin human 25%  25 g Intravenous Q8H    cefTRIAXone (ROCEPHIN) IVPB  2 g Intravenous Q24H    fluticasone-umeclidin-vilanter  1 puff Inhalation Daily    hydrocortisone  10 mg Oral Daily    lactulose  30 g Oral Q6H    pantoprazole  40 mg Oral Daily    rifAXImin  550 mg Oral BID     Continuous Infusions:   PRN Meds:dextrose 50%, dextrose 50%, glucagon (human recombinant), glucose, glucose, sodium chloride 0.9%, sodium chloride 0.9%    Allergies:   Review of patient's allergies indicates:   Allergen Reactions    Codeine Itching, Rash, Edema and Other (See Comments)     Other reaction(s): Itching       Labs:  Recent Labs   Lab 05/04/19  0616   INR 1.5*       Recent Labs   Lab 05/04/19  0616   WBC 10.98   HGB 11.2*   HCT 35.3*   MCV 85   *      Recent Labs   Lab 05/04/19  0616   *      K 4.0      CO2 21*   BUN 25*   CREATININE 1.6*   CALCIUM 10.4   MG 1.9   ALT 43   AST 75*   ALBUMIN 2.7*   BILITOT 2.9*       Vitals (Most Recent):  Temp: 98.1 °F (36.7 °C) (05/04/19 1155)  Pulse: 72 (05/04/19 1155)  Resp: 18 (05/04/19 1155)  BP: 126/60 (05/04/19 1155)  SpO2: 99 % (05/04/19 1155)    Plan:   SBP felt unlikley pre chart review, if necessary IR can perform diagnostic  and/or therapeuric paracentesis Monday morning. If this is the case, NPO at midnight on Sunday.     Tommy Sylvester MD  Interventional Radiology PGY-II  [facility]  Pager: [phone]

## 2019-05-04 NOTE — HOSPITAL COURSE
Patient admitted for hepatic encephalopathy likely secondary to non-compliance with lactulose. Original concern for SBP. Patient with elevated Lactic acid with a peak of 4.0, patient was given 2.5L IVF with downtrend to 2.7. Patient was started on Rocephin. S/p diagnostic paracentesis with results not concerning for SBP. Patient originally with ERNESTINA, however renal function has improved since admission. Will resume spironolactone and lasix and titrate up to home dose. Patient stable for discharge with HH PT/OT.

## 2019-05-04 NOTE — PROCEDURES
"Fox Lyons Sr. is a 73 y.o. male patient.    Temp: 97.7 °F (36.5 °C) (19)  Pulse: 67 (19)  Resp: 18 (19)  BP: (!) 145/68 (19)  SpO2: 96 % (19)  Weight: 64.8 kg (142 lb 13.7 oz) (19)  Height: 5' 11" (180.3 cm) (19)       Paracentesis  Date/Time: 2019 5:18 PM  Performed by: Warner Ac MD  Authorized by: Warner Ac MD   Consent Done: Yes  Consent: Verbal consent obtained. Written consent obtained.  Consent given by: patient (Spouse and patinet both consented,  signed afterward)  Patient understanding: patient states understanding of the procedure being performed  Patient consent: the patient's understanding of the procedure matches consent given  Procedure consent: procedure consent matches procedure scheduled  Relevant documents: relevant documents present and verified  Test results: test results available and properly labeled  Site marked: the operative site was marked  Imaging studies: imaging studies available  Patient identity confirmed: , MRN, name, verbally with patient and provided demographic data  Time out: Immediately prior to procedure a "time out" was called to verify the correct patient, procedure, equipment, support staff and site/side marked as required.  Initial or subsequent exam: initial  Procedure purpose: diagnostic  Indications: secondary bacterial peritonitis  Anesthesia: local infiltration    Anesthesia:  Local Anesthetic: lidocaine 1% with epinephrine  Patient sedated: no  Needle gauge: 22  Ultrasound guidance: yes  Puncture site: left lower quadrant  Fluid removed: 40(ml)  Fluid characteristics: Clear, yellow.  Complications: No  Estimated blood loss (mL): 3  Specimens: No  Implants: No  Patient tolerance: Patient tolerated the procedure well with no immediate complications  Comments:     Diagnostic paracentesis performed considering LA of 4 with patient reporting feeling ill  Previously AMS, was " clear prior to procedure (after Abx and Fluids)  AOx4 and repeated risk/benefits.  Fluid was clear and yellow. Not cloudy, bloody, or purulent.   Patient was bleeding at site, pressure was held for 5 consecutive minutes.  Patient tolerated procedure well.            Warner Ac MD  Internal Medicine   [phone]    Warner Ac  5/4/2019

## 2019-05-04 NOTE — ASSESSMENT & PLAN NOTE
Stent placement in 2011- 2 stents placed in the RCX and OM2. Echo from three years ago shows an EF of 55% with elevated PAP

## 2019-05-05 PROBLEM — R53.81 DEBILITY: Status: ACTIVE | Noted: 2019-05-05

## 2019-05-05 LAB
ALBUMIN SERPL BCP-MCNC: 3.5 G/DL (ref 3.5–5.2)
ALP SERPL-CCNC: 153 U/L (ref 55–135)
ALT SERPL W/O P-5'-P-CCNC: 27 U/L (ref 10–44)
ANION GAP SERPL CALC-SCNC: 8 MMOL/L (ref 8–16)
AST SERPL-CCNC: 50 U/L (ref 10–40)
BASOPHILS # BLD AUTO: 0.01 K/UL (ref 0–0.2)
BASOPHILS # BLD AUTO: 0.02 K/UL (ref 0–0.2)
BASOPHILS NFR BLD: 0.1 % (ref 0–1.9)
BASOPHILS NFR BLD: 0.2 % (ref 0–1.9)
BILIRUB SERPL-MCNC: 1.5 MG/DL (ref 0.1–1)
BUN SERPL-MCNC: 17 MG/DL (ref 8–23)
CALCIUM SERPL-MCNC: 9.6 MG/DL (ref 8.7–10.5)
CHLORIDE SERPL-SCNC: 109 MMOL/L (ref 95–110)
CO2 SERPL-SCNC: 23 MMOL/L (ref 23–29)
CREAT SERPL-MCNC: 1.3 MG/DL (ref 0.5–1.4)
DIFFERENTIAL METHOD: ABNORMAL
DIFFERENTIAL METHOD: ABNORMAL
EOSINOPHIL # BLD AUTO: 0.1 K/UL (ref 0–0.5)
EOSINOPHIL # BLD AUTO: 0.1 K/UL (ref 0–0.5)
EOSINOPHIL NFR BLD: 1.1 % (ref 0–8)
EOSINOPHIL NFR BLD: 1.4 % (ref 0–8)
ERYTHROCYTE [DISTWIDTH] IN BLOOD BY AUTOMATED COUNT: 19.2 % (ref 11.5–14.5)
ERYTHROCYTE [DISTWIDTH] IN BLOOD BY AUTOMATED COUNT: 19.5 % (ref 11.5–14.5)
EST. GFR  (AFRICAN AMERICAN): >60 ML/MIN/1.73 M^2
EST. GFR  (NON AFRICAN AMERICAN): 54.1 ML/MIN/1.73 M^2
GLUCOSE SERPL-MCNC: 127 MG/DL (ref 70–110)
HCT VFR BLD AUTO: 28.2 % (ref 40–54)
HCT VFR BLD AUTO: 28.9 % (ref 40–54)
HGB BLD-MCNC: 8.7 G/DL (ref 14–18)
HGB BLD-MCNC: 8.8 G/DL (ref 14–18)
IMM GRANULOCYTES # BLD AUTO: 0.03 K/UL (ref 0–0.04)
IMM GRANULOCYTES # BLD AUTO: 0.04 K/UL (ref 0–0.04)
IMM GRANULOCYTES NFR BLD AUTO: 0.4 % (ref 0–0.5)
IMM GRANULOCYTES NFR BLD AUTO: 0.5 % (ref 0–0.5)
INR PPP: 1.5 (ref 0.8–1.2)
LACTATE SERPL-SCNC: 3.1 MMOL/L (ref 0.5–2.2)
LACTATE SERPL-SCNC: 3.2 MMOL/L (ref 0.5–2.2)
LYMPHOCYTES # BLD AUTO: 0.7 K/UL (ref 1–4.8)
LYMPHOCYTES # BLD AUTO: 0.7 K/UL (ref 1–4.8)
LYMPHOCYTES NFR BLD: 8 % (ref 18–48)
LYMPHOCYTES NFR BLD: 9.9 % (ref 18–48)
MAGNESIUM SERPL-MCNC: 1.9 MG/DL (ref 1.6–2.6)
MCH RBC QN AUTO: 26.3 PG (ref 27–31)
MCH RBC QN AUTO: 26.8 PG (ref 27–31)
MCHC RBC AUTO-ENTMCNC: 30.4 G/DL (ref 32–36)
MCHC RBC AUTO-ENTMCNC: 30.9 G/DL (ref 32–36)
MCV RBC AUTO: 85 FL (ref 82–98)
MCV RBC AUTO: 88 FL (ref 82–98)
MONOCYTES # BLD AUTO: 0.7 K/UL (ref 0.3–1)
MONOCYTES # BLD AUTO: 0.7 K/UL (ref 0.3–1)
MONOCYTES NFR BLD: 8.3 % (ref 4–15)
MONOCYTES NFR BLD: 9.2 % (ref 4–15)
NEUTROPHILS # BLD AUTO: 5.6 K/UL (ref 1.8–7.7)
NEUTROPHILS # BLD AUTO: 6.8 K/UL (ref 1.8–7.7)
NEUTROPHILS NFR BLD: 79.3 % (ref 38–73)
NEUTROPHILS NFR BLD: 81.6 % (ref 38–73)
NRBC BLD-RTO: 0 /100 WBC
NRBC BLD-RTO: 0 /100 WBC
PLATELET # BLD AUTO: 67 K/UL (ref 150–350)
PLATELET # BLD AUTO: 80 K/UL (ref 150–350)
PMV BLD AUTO: 11.5 FL (ref 9.2–12.9)
PMV BLD AUTO: 9.9 FL (ref 9.2–12.9)
POTASSIUM SERPL-SCNC: 3.8 MMOL/L (ref 3.5–5.1)
PROT SERPL-MCNC: 7.1 G/DL (ref 6–8.4)
PROTHROMBIN TIME: 15.1 SEC (ref 9–12.5)
RBC # BLD AUTO: 3.28 M/UL (ref 4.6–6.2)
RBC # BLD AUTO: 3.31 M/UL (ref 4.6–6.2)
SODIUM SERPL-SCNC: 140 MMOL/L (ref 136–145)
WBC # BLD AUTO: 7.09 K/UL (ref 3.9–12.7)
WBC # BLD AUTO: 8.3 K/UL (ref 3.9–12.7)

## 2019-05-05 PROCEDURE — 83605 ASSAY OF LACTIC ACID: CPT | Mod: 91,HCNC

## 2019-05-05 PROCEDURE — 99233 SBSQ HOSP IP/OBS HIGH 50: CPT | Mod: HCNC,GC,, | Performed by: HOSPITALIST

## 2019-05-05 PROCEDURE — 25000242 PHARM REV CODE 250 ALT 637 W/ HCPCS: Mod: HCNC | Performed by: STUDENT IN AN ORGANIZED HEALTH CARE EDUCATION/TRAINING PROGRAM

## 2019-05-05 PROCEDURE — 20600001 HC STEP DOWN PRIVATE ROOM: Mod: HCNC

## 2019-05-05 PROCEDURE — 85025 COMPLETE CBC W/AUTO DIFF WBC: CPT | Mod: 91,HCNC

## 2019-05-05 PROCEDURE — 25000003 PHARM REV CODE 250: Mod: HCNC | Performed by: STUDENT IN AN ORGANIZED HEALTH CARE EDUCATION/TRAINING PROGRAM

## 2019-05-05 PROCEDURE — 80053 COMPREHEN METABOLIC PANEL: CPT | Mod: HCNC

## 2019-05-05 PROCEDURE — P9047 ALBUMIN (HUMAN), 25%, 50ML: HCPCS | Mod: HCNC,JG | Performed by: STUDENT IN AN ORGANIZED HEALTH CARE EDUCATION/TRAINING PROGRAM

## 2019-05-05 PROCEDURE — 63600175 PHARM REV CODE 636 W HCPCS: Mod: HCNC,JG | Performed by: STUDENT IN AN ORGANIZED HEALTH CARE EDUCATION/TRAINING PROGRAM

## 2019-05-05 PROCEDURE — 36415 COLL VENOUS BLD VENIPUNCTURE: CPT | Mod: HCNC

## 2019-05-05 PROCEDURE — 83735 ASSAY OF MAGNESIUM: CPT | Mod: HCNC

## 2019-05-05 PROCEDURE — 99233 PR SUBSEQUENT HOSPITAL CARE,LEVL III: ICD-10-PCS | Mod: HCNC,GC,, | Performed by: HOSPITALIST

## 2019-05-05 PROCEDURE — 83605 ASSAY OF LACTIC ACID: CPT | Mod: HCNC

## 2019-05-05 PROCEDURE — 85610 PROTHROMBIN TIME: CPT | Mod: HCNC

## 2019-05-05 RX ORDER — SPIRONOLACTONE 100 MG/1
100 TABLET, FILM COATED ORAL DAILY
Status: DISCONTINUED | OUTPATIENT
Start: 2019-05-05 | End: 2019-05-06

## 2019-05-05 RX ORDER — FUROSEMIDE 40 MG/1
40 TABLET ORAL DAILY
Status: DISCONTINUED | OUTPATIENT
Start: 2019-05-05 | End: 2019-05-06

## 2019-05-05 RX ORDER — CIPROFLOXACIN 500 MG/1
500 TABLET ORAL DAILY
Status: DISCONTINUED | OUTPATIENT
Start: 2019-05-05 | End: 2019-05-08 | Stop reason: HOSPADM

## 2019-05-05 RX ADMIN — CIPROFLOXACIN HYDROCHLORIDE 500 MG: 500 TABLET, FILM COATED ORAL at 02:05

## 2019-05-05 RX ADMIN — PANTOPRAZOLE SODIUM 40 MG: 40 TABLET, DELAYED RELEASE ORAL at 08:05

## 2019-05-05 RX ADMIN — FLUTICASONE FUROATE AND VILANTEROL TRIFENATATE 1 PUFF: 200; 25 POWDER RESPIRATORY (INHALATION) at 08:05

## 2019-05-05 RX ADMIN — ALBUMIN HUMAN 25 G: 0.25 SOLUTION INTRAVENOUS at 02:05

## 2019-05-05 RX ADMIN — TIOTROPIUM BROMIDE 18 MCG: 18 CAPSULE ORAL; RESPIRATORY (INHALATION) at 08:05

## 2019-05-05 RX ADMIN — RIFAXIMIN 550 MG: 550 TABLET ORAL at 08:05

## 2019-05-05 RX ADMIN — SPIRONOLACTONE 100 MG: 100 TABLET, FILM COATED ORAL at 02:05

## 2019-05-05 RX ADMIN — LACTULOSE 30 G: 20 SOLUTION ORAL at 11:05

## 2019-05-05 RX ADMIN — HYDROCORTISONE 10 MG: 5 TABLET ORAL at 08:05

## 2019-05-05 RX ADMIN — FUROSEMIDE 40 MG: 40 TABLET ORAL at 02:05

## 2019-05-05 RX ADMIN — CEFTRIAXONE 2 G: 2 INJECTION, SOLUTION INTRAVENOUS at 10:05

## 2019-05-05 RX ADMIN — SODIUM CHLORIDE 500 ML: 0.9 INJECTION, SOLUTION INTRAVENOUS at 05:05

## 2019-05-05 NOTE — PLAN OF CARE
"Problem: Adult Inpatient Plan of Care  Goal: Plan of Care Review  Outcome: Ongoing (interventions implemented as appropriate)     05/05/19 9656   Plan of Care Review   Plan of Care Reviewed With patient       Pt up to BSC with staff, AO x self "[facility]" "2019", and "my liver", reoriented to date/time, pt more alert today, better appetite, BMs x 5 this shift, IV bolus per MAR, labs and POC reviewed with pt and wife, call light within reach, bed alarm and remote camera continued for safety, no further needs assessed at this time.       "

## 2019-05-05 NOTE — ASSESSMENT & PLAN NOTE
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 As per wife, she found him on the floor near his bed around 2AM. He was unresponsive for a couple of minutes however, regained consciousness and has been extremely lethargic since then. Although he takes his regular medications, he does not take his lactulose as it causes diarrhea. Admitted last month for acute decompensated liver liver; paracentesis ruled out SBP. MELD score 17; WBC 10.3, H/H 10.4/33.4, Plt 92, Crt 1.3, Tbil 2.4, AST/ALT 69/39, Alk phos 211, Mg 1.5, Lactate 2.3, Ammonia 76, INR 1.5, Trop 0.018, ETOH <10, . Patient currently afebrile.     Assessment   -Patient presents with an ammonia of 76. He did not properly take his medication which led him to become altered. Very low threshold for SBP as patient is afebrile and he does show any leukocytosis. He has been afebrile and is not complaining of any abdominal pain.   -Low likelihood that AMS is secondary to stroke as CT head is negative for any acute intracranial abnormalities.    -Patient not showing any signs of infection (no leukocytosis, afebrile and blood pressure is normal). UA and chest xray normal.  -Unlikely due to drugs as UDS was negative     Plan   - Lactulose 30 g QID   - Rifaximin 550 mg BID  - Blood cultures NGTD  - Ciprofloxacin 500 mg daily for SBP prophylaxis  - S/p diagnostic paracentesis, , Segs 35, PMN 44, no organisms seen on gram stain  - Discontinue Rocephin   - Continue furosemide 40 mg , uptitrate to BID dosing tomorrow  - Continue spirinolactone 100 mg, uptitrate to 200 mg tomorrow   - Lactic acid 2.5 >> 4.0 >>3.9 >> 2.7 >> 3.1.  - Will give 500cc  bolus. Continue to trend lactate

## 2019-05-05 NOTE — PHARMACY MED REC
"Admission Medication Reconciliation - Pharmacy Consult Note    The home medication history was taken by Cristel Grant, Pharmacy Technician.  Based on information gathered and subsequent review by the clinical pharmacist, the items below may need attention.     You may go to "Admission" then "Reconcile Home Medications" tabs to review and/or act upon these items.     Potentially problematic discrepancies with current MAR  o Patient IS taking the following which was not ordered upon admit  o Furosemide 40mg by mouth twice daily   o Losartan 100mg by mouth daily   o Multivitamin 1 tablet by mouth daily   o Pravastatin 40mg by mouth daily   o Spironolactone 200mg by mouth daily     o Patient is taking a drug DIFFERENTLY than how ordered upon admit  o Hydrocortisone 15mg by mouth every morning and 5mg by mouth every evening - ordered as 10mg by mouth daily   o Omeprazole 40mg by outh twice daily - ordered as formulary alternative pantoprazole 40mg daily - may consider re-evaluating indication for twice daily PPI, may consider re-evaluating indication for PPI in general given history of SBP    Potential issues to be addressed PRIOR TO DISCHARGE  o Patient is non-compliant with lactulose therapy at home and reports not taking. If prescribed on discharge patient will need a new prescription and education.     Please address this information as you see fit.  Feel free to contact us if you have any questions or require assistance.    Ashley Beckford, PharmD, BCPS, Internal Medicine Clinical Pharmacy Specialist  EXT [phone]                    .    .            "

## 2019-05-05 NOTE — SUBJECTIVE & OBJECTIVE
Interval History: NAEON. Patient with improved mentation today. Complains of being off balance, plan for patient to work with PT/OT.    Review of Systems   Constitutional: Positive for activity change, appetite change and fatigue. Negative for chills.   HENT: Negative for congestion and sore throat.    Eyes: Negative for photophobia and visual disturbance.   Respiratory: Negative for cough, shortness of breath and wheezing.    Cardiovascular: Negative for chest pain and palpitations.   Gastrointestinal: Positive for abdominal distention and nausea. Negative for abdominal pain, blood in stool and vomiting.   Endocrine: Negative for polyphagia and polyuria.   Genitourinary: Negative for difficulty urinating and dysuria.   Musculoskeletal: Positive for gait problem. Negative for arthralgias and back pain.   Skin: Negative for color change and pallor.   Neurological: Positive for weakness. Negative for dizziness, tremors, seizures and headaches.   Psychiatric/Behavioral: Negative for behavioral problems and confusion. The patient is not nervous/anxious.      Objective:     Vital Signs (Most Recent):  Temp: 97.8 °F (36.6 °C) (05/05/19 1230)  Pulse: 74 (05/05/19 1525)  Resp: 16 (05/05/19 1230)  BP: (!) 125/59 (05/05/19 1230)  SpO2: (!) 94 % (05/05/19 1230) Vital Signs (24h Range):  Temp:  [97.7 °F (36.5 °C)-98.5 °F (36.9 °C)] 97.8 °F (36.6 °C)  Pulse:  [62-82] 74  Resp:  [16-18] 16  SpO2:  [92 %-96 %] 94 %  BP: (108-145)/(56-69) 125/59     Weight: 64.8 kg (142 lb 13.7 oz)  Body mass index is 19.92 kg/m².    Intake/Output Summary (Last 24 hours) at 5/5/2019 1611  Last data filed at 5/5/2019 0231  Gross per 24 hour   Intake 220 ml   Output --   Net 220 ml      Physical Exam   Constitutional: He is oriented to person, place, and time. He appears well-developed and well-nourished. No distress.   HENT:   Head: Normocephalic and atraumatic.   Right Ear: External ear normal.   Left Ear: External ear normal.   Mouth/Throat: No  oropharyngeal exudate.   Eyes: Pupils are equal, round, and reactive to light. Conjunctivae and EOM are normal. Right eye exhibits no discharge. Left eye exhibits no discharge.   Neck: Normal range of motion. Neck supple. No JVD present.   Cardiovascular: Normal rate, regular rhythm and normal heart sounds.   No murmur heard.  Pulmonary/Chest: Effort normal and breath sounds normal. He has no wheezes.   Abdominal: Soft. Bowel sounds are normal. He exhibits distension. There is no tenderness. There is no guarding.   Musculoskeletal: Normal range of motion. He exhibits no edema, tenderness or deformity.   Neurological: He is alert and oriented to person, place, and time. No sensory deficit. He exhibits normal muscle tone. Coordination normal.   Skin: Skin is warm and dry. Capillary refill takes less than 2 seconds. No rash noted. He is not diaphoretic. No erythema.   Psychiatric: He has a normal mood and affect.       Significant Labs:   CBC:   Recent Labs   Lab 05/04/19  0616 05/05/19  0546 05/05/19  1126   WBC 10.98 7.09 8.30   HGB 11.2* 8.7* 8.8*   HCT 35.3* 28.2* 28.9*   * 67* 80*     CMP:   Recent Labs   Lab 05/03/19  1759 05/04/19  0616 05/04/19  2235 05/05/19  0546    138 142 140   K 3.7 4.0 3.7 3.8    104 110 109   CO2 23 21* 22* 23   * 132* 151* 127*   BUN 26* 25* 19 17   CREATININE 1.3 1.6* 1.3 1.3   CALCIUM 10.0 10.4 9.1 9.6   PROT 7.5 8.0  --  7.1   ALBUMIN 2.5* 2.7*  --  3.5   BILITOT 2.4* 2.9*  --  1.5*   ALKPHOS 211* 219*  --  153*   AST 69* 75*  --  50*   ALT 39 43  --  27   ANIONGAP 12 13 10 8   EGFRNONAA 54.1* 42.1* 54.1* 54.1*       Significant Imaging: n/a

## 2019-05-05 NOTE — PLAN OF CARE
Problem: Adult Inpatient Plan of Care  Goal: Plan of Care Review  Pt is AA and oriented to self and situation. Vital signs are stable.  No complaints of pain. PRN zofran given for nausea x1. Call light and personal belongings within reach. Spouse at bedside. Plan of care reviewed with pt and spouse. Avasys camera in place for pt safety. Will continue to monitor.

## 2019-05-05 NOTE — PROGRESS NOTES
[facility] Hospital Medicine  Progress Note    Patient Name: Fox Lyons Sr.  MRN: [id number]  Patient Class: IP- Inpatient   Admission Date: 5/3/2019  Length of Stay: 2 days  Attending Physician: Marshall Jutsice, *  Primary Care Provider: Clover Whiet MD    [facility] Medicine Team: [facility] 3 Khushbu Angeles DO    Subjective:     Principal Problem:<principal problem not specified>    HPI:  74 y/o male with a history of portal hypertension from HCV-induced cirrhosis (2/2 IV drug use in 1995), esophageal varices (2/19 EGD non-bleeding small (< 5 mm) esophageal varices), COPD, CAD (s/p RCA and OM2 stents in 2011), permanent pacemaker placement in 2011 (2/2 SSS/RBBB), and adrenal insufficiency 2/2 pituitary tumor s/p gamma knife, presents to the ED with a chief complaint of increasing lethargy. Around 2 AM last night, wife noticed patient had fallen out of bed. On her initial assessment, patient was unresponsive and not moving at all. A couple of minutes later he slowly regained consciousness but was extremely lethargic. He went back to bed and the next day patient was brought into the emergency room by his son. As per wife, patient will tend to skip his lactulose dosing as it gives him diarrhea. Patient denies headache, edema, nausea, fevers, palpitations or pain in joints. Denies dysuria however,compalins of very dark urine. Patient also complains of a 20 lb weight loss. Patient admitted last month for acute decompensated heart failure. Patient had diagnostic/therapeutic paracentesis last admission in which 3 L was removed showed portal HTN however, no SBP. He was then discharged on ciprofloxacin (for SBP prophylaxis), as well as lasix and spironolactone. Patient was a former smoker (quit many years ago). He does not drink or use IV drugs. Home medications include ciprofloxacin daily, furosemide 40 mg BID, hydrocortisone 10 mg daily, lactulose 20 g total daily, omeprazole 40 mg,  pravastatin 40 mg, spironolactone 200 mg daily.     On arrival to the ED, patient was afebrile, with a blood pressure of 129/77. Work-up revealed WBC 10.3, H/H 10.4/33.4, Plt 92, Crt 1.3, Tbil 2.4, AST/ALT 69/39, Alk phos 211, Mg 1.5, Lactate 2.3, Ammonia 76, INR 1.5, Trop 0.018, ETOH <10, . MELD score 17. CT revealed no acute abnormality. EKG showed atrial paced rhythm at 79 bpm, with a RBBB (similar to previous). CXR shows no focal consolidations. Blood cultures pending. Patient was given a one time dose of lactulose. Magnesium was replaced.  Urinalysis and UDS shows no abnormalities.    On my physical examination, patient resting comfortably in bed. He is alert and oriented to person, place and time. He is able to follow all commands. Abdomen is distended however, soft. He is not complaining of any abdominal pain. Scleral icterus present.       Hospital Course:  Patient admitted for hepatic encephalopathy likely secondary to non-compliance with lactulose. Original concern for SBP. Patient with elevated Lactic acid with a peak of 4.0, patient was given 2.5L IVF with downtrend to 2.7. Patient was started on Rocephin. S/p diagnostic paracentesis with results not concerning for SBP. Patient originally with ERNESTINA, however renal function has improved since admission. Will resume spironolactone and lasix and titrate up to home dose.    Interval History: NAEON. Patient with improved mentation today. Complains of being off balance, plan for patient to work with PT/OT.    Review of Systems   Constitutional: Positive for activity change, appetite change and fatigue. Negative for chills.   HENT: Negative for congestion and sore throat.    Eyes: Negative for photophobia and visual disturbance.   Respiratory: Negative for cough, shortness of breath and wheezing.    Cardiovascular: Negative for chest pain and palpitations.   Gastrointestinal: Positive for abdominal distention and nausea. Negative for abdominal pain, blood  in stool and vomiting.   Endocrine: Negative for polyphagia and polyuria.   Genitourinary: Negative for difficulty urinating and dysuria.   Musculoskeletal: Positive for gait problem. Negative for arthralgias and back pain.   Skin: Negative for color change and pallor.   Neurological: Positive for weakness. Negative for dizziness, tremors, seizures and headaches.   Psychiatric/Behavioral: Negative for behavioral problems and confusion. The patient is not nervous/anxious.      Objective:     Vital Signs (Most Recent):  Temp: 97.8 °F (36.6 °C) (05/05/19 1230)  Pulse: 74 (05/05/19 1525)  Resp: 16 (05/05/19 1230)  BP: (!) 125/59 (05/05/19 1230)  SpO2: (!) 94 % (05/05/19 1230) Vital Signs (24h Range):  Temp:  [97.7 °F (36.5 °C)-98.5 °F (36.9 °C)] 97.8 °F (36.6 °C)  Pulse:  [62-82] 74  Resp:  [16-18] 16  SpO2:  [92 %-96 %] 94 %  BP: (108-145)/(56-69) 125/59     Weight: 64.8 kg (142 lb 13.7 oz)  Body mass index is 19.92 kg/m².    Intake/Output Summary (Last 24 hours) at 5/5/2019 1611  Last data filed at 5/5/2019 0231  Gross per 24 hour   Intake 220 ml   Output --   Net 220 ml      Physical Exam   Constitutional: He is oriented to person, place, and time. He appears well-developed and well-nourished. No distress.   HENT:   Head: Normocephalic and atraumatic.   Right Ear: External ear normal.   Left Ear: External ear normal.   Mouth/Throat: No oropharyngeal exudate.   Eyes: Pupils are equal, round, and reactive to light. Conjunctivae and EOM are normal. Right eye exhibits no discharge. Left eye exhibits no discharge.   Neck: Normal range of motion. Neck supple. No JVD present.   Cardiovascular: Normal rate, regular rhythm and normal heart sounds.   No murmur heard.  Pulmonary/Chest: Effort normal and breath sounds normal. He has no wheezes.   Abdominal: Soft. Bowel sounds are normal. He exhibits distension. There is no tenderness. There is no guarding.   Musculoskeletal: Normal range of motion. He exhibits no edema,  tenderness or deformity.   Neurological: He is alert and oriented to person, place, and time. No sensory deficit. He exhibits normal muscle tone. Coordination normal.   Skin: Skin is warm and dry. Capillary refill takes less than 2 seconds. No rash noted. He is not diaphoretic. No erythema.   Psychiatric: He has a normal mood and affect.       Significant Labs:   CBC:   Recent Labs   Lab 05/04/19  0616 05/05/19  0546 05/05/19  1126   WBC 10.98 7.09 8.30   HGB 11.2* 8.7* 8.8*   HCT 35.3* 28.2* 28.9*   * 67* 80*     CMP:   Recent Labs   Lab 05/03/19  1759 05/04/19  0616 05/04/19  2235 05/05/19  0546    138 142 140   K 3.7 4.0 3.7 3.8    104 110 109   CO2 23 21* 22* 23   * 132* 151* 127*   BUN 26* 25* 19 17   CREATININE 1.3 1.6* 1.3 1.3   CALCIUM 10.0 10.4 9.1 9.6   PROT 7.5 8.0  --  7.1   ALBUMIN 2.5* 2.7*  --  3.5   BILITOT 2.4* 2.9*  --  1.5*   ALKPHOS 211* 219*  --  153*   AST 69* 75*  --  50*   ALT 39 43  --  27   ANIONGAP 12 13 10 8   EGFRNONAA 54.1* 42.1* 54.1* 54.1*       Significant Imaging: n/a    Assessment/Plan:      Hypomagnesemia  Replace as needed      Hepatic encephalopathy  72 y/o male with a medical history of HCV-induced cirrhosis (2/2 IVDU in 1995), esophageal varices (2/19 EGD non-bleeding small (< 5 mm) esophageal varices), COPD, CAD (s/p RCA and OM2 stents in 2011), permanent pacemaker placement in 2011 (2/2 SSS/RBBB), and adrenal insufficiency 2/2 pituitary tumor s/p gamma knife, presents to the ED with altered mental status. As per wife, she found him on the floor near his bed around 2AM. He was unresponsive for a couple of minutes however, regained consciousness and has been extremely lethargic since then. Although he takes his regular medications, he does not take his lactulose as it causes diarrhea. Admitted last month for acute decompensated liver liver; paracentesis ruled out SBP. MELD score 17; WBC 10.3, H/H 10.4/33.4, Plt 92, Crt 1.3, Tbil 2.4, AST/ALT 69/39,  Alk phos 211, Mg 1.5, Lactate 2.3, Ammonia 76, INR 1.5, Trop 0.018, ETOH <10, . Patient currently afebrile.     Assessment   -Patient presents with an ammonia of 76. He did not properly take his medication which led him to become altered. Very low threshold for SBP as patient is afebrile and he does show any leukocytosis. He has been afebrile and is not complaining of any abdominal pain.   -Low likelihood that AMS is secondary to stroke as CT head is negative for any acute intracranial abnormalities.    -Patient not showing any signs of infection (no leukocytosis, afebrile and blood pressure is normal). UA and chest xray normal.  -Unlikely due to drugs as UDS was negative     Plan   - Lactulose 30 g QID   - Rifaximin 550 mg BID  - Blood cultures NGTD  - Ciprofloxacin 500 mg daily for SBP prophylaxis  - S/p diagnostic paracentesis, , Segs 35, PMN 44, no organisms seen on gram stain  - Discontinue Rocephin   - Continue furosemide 40 mg , uptitrate to BID dosing tomorrow  - Continue spirinolactone 100 mg, uptitrate to 200 mg tomorrow   - Lactic acid 2.5 >> 4.0 >>3.9 >> 2.7 >> 3.1.  - Will give 500cc bolus. Continue to trend lactate      Thrombocytopenia  2/2 cirrhosis       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 Echo from three years ago shows an EF of 55% with elevated PAP    Hyperlipidemia  Hold statin in the setting of hepatic encephalopathy     Hypertension  Hold cozaar as patient's blood pressure has been running on the lower end.        VTE Risk Mitigation (From admission, onward)        Ordered     IP VTE HIGH RISK PATIENT  Once      05/03/19 2214     Place UMANG hose  Until discontinued      05/03/19 2214               Khushbu Angeles DO  Department of Hospital Medicine   [facility]

## 2019-05-06 LAB
ALBUMIN SERPL BCP-MCNC: 2.8 G/DL (ref 3.5–5.2)
ALP SERPL-CCNC: 168 U/L (ref 55–135)
ALT SERPL W/O P-5'-P-CCNC: 31 U/L (ref 10–44)
ANION GAP SERPL CALC-SCNC: 9 MMOL/L (ref 8–16)
APPEARANCE FLD: NORMAL
AST SERPL-CCNC: 68 U/L (ref 10–40)
BASOPHILS # BLD AUTO: 0.01 K/UL (ref 0–0.2)
BASOPHILS NFR BLD: 0.1 % (ref 0–1.9)
BILIRUB SERPL-MCNC: 1.1 MG/DL (ref 0.1–1)
BODY FLD TYPE: NORMAL
BUN SERPL-MCNC: 13 MG/DL (ref 8–23)
CALCIUM SERPL-MCNC: 8.9 MG/DL (ref 8.7–10.5)
CHLORIDE SERPL-SCNC: 108 MMOL/L (ref 95–110)
CO2 SERPL-SCNC: 20 MMOL/L (ref 23–29)
COLOR FLD: NORMAL
CREAT SERPL-MCNC: 1.2 MG/DL (ref 0.5–1.4)
DIFFERENTIAL METHOD: ABNORMAL
EOSINOPHIL # BLD AUTO: 0.1 K/UL (ref 0–0.5)
EOSINOPHIL NFR BLD: 1.1 % (ref 0–8)
ERYTHROCYTE [DISTWIDTH] IN BLOOD BY AUTOMATED COUNT: 19 % (ref 11.5–14.5)
EST. GFR  (AFRICAN AMERICAN): >60 ML/MIN/1.73 M^2
EST. GFR  (NON AFRICAN AMERICAN): 59.6 ML/MIN/1.73 M^2
GLUCOSE SERPL-MCNC: 124 MG/DL (ref 70–110)
HCT VFR BLD AUTO: 26.3 % (ref 40–54)
HGB BLD-MCNC: 8.5 G/DL (ref 14–18)
IMM GRANULOCYTES # BLD AUTO: 0.03 K/UL (ref 0–0.04)
IMM GRANULOCYTES NFR BLD AUTO: 0.4 % (ref 0–0.5)
LACTATE SERPL-SCNC: 2.2 MMOL/L (ref 0.5–2.2)
LYMPHOCYTES # BLD AUTO: 0.8 K/UL (ref 1–4.8)
LYMPHOCYTES NFR BLD: 8.8 % (ref 18–48)
LYMPHOCYTES NFR FLD MANUAL: 35 %
MAGNESIUM SERPL-MCNC: 1.6 MG/DL (ref 1.6–2.6)
MCH RBC QN AUTO: 28.6 PG (ref 27–31)
MCHC RBC AUTO-ENTMCNC: 32.3 G/DL (ref 32–36)
MCV RBC AUTO: 89 FL (ref 82–98)
MESOTHL CELL NFR FLD MANUAL: 3 %
MONOCYTES # BLD AUTO: 0.7 K/UL (ref 0.3–1)
MONOCYTES NFR BLD: 8.4 % (ref 4–15)
MONOS+MACROS NFR FLD MANUAL: 34 %
NEUTROPHILS # BLD AUTO: 7 K/UL (ref 1.8–7.7)
NEUTROPHILS NFR BLD: 81.2 % (ref 38–73)
NEUTROPHILS NFR FLD MANUAL: 28 %
NRBC BLD-RTO: 0 /100 WBC
PLATELET # BLD AUTO: 62 K/UL (ref 150–350)
PMV BLD AUTO: 11.1 FL (ref 9.2–12.9)
POTASSIUM SERPL-SCNC: 4.2 MMOL/L (ref 3.5–5.1)
PROT SERPL-MCNC: 6.4 G/DL (ref 6–8.4)
RBC # BLD AUTO: 2.97 M/UL (ref 4.6–6.2)
SODIUM SERPL-SCNC: 137 MMOL/L (ref 136–145)
WBC # BLD AUTO: 8.56 K/UL (ref 3.9–12.7)
WBC # FLD: 81 /CU MM

## 2019-05-06 PROCEDURE — 36415 COLL VENOUS BLD VENIPUNCTURE: CPT | Mod: HCNC

## 2019-05-06 PROCEDURE — 97161 PT EVAL LOW COMPLEX 20 MIN: CPT | Mod: HCNC

## 2019-05-06 PROCEDURE — 25000003 PHARM REV CODE 250: Mod: HCNC | Performed by: STUDENT IN AN ORGANIZED HEALTH CARE EDUCATION/TRAINING PROGRAM

## 2019-05-06 PROCEDURE — 87102 FUNGUS ISOLATION CULTURE: CPT | Mod: HCNC

## 2019-05-06 PROCEDURE — 83735 ASSAY OF MAGNESIUM: CPT | Mod: HCNC

## 2019-05-06 PROCEDURE — 99233 SBSQ HOSP IP/OBS HIGH 50: CPT | Mod: HCNC,GC,, | Performed by: HOSPITALIST

## 2019-05-06 PROCEDURE — 87206 SMEAR FLUORESCENT/ACID STAI: CPT

## 2019-05-06 PROCEDURE — 85025 COMPLETE CBC W/AUTO DIFF WBC: CPT | Mod: HCNC

## 2019-05-06 PROCEDURE — 20600001 HC STEP DOWN PRIVATE ROOM: Mod: HCNC

## 2019-05-06 PROCEDURE — 63600175 PHARM REV CODE 636 W HCPCS: Mod: HCNC | Performed by: STUDENT IN AN ORGANIZED HEALTH CARE EDUCATION/TRAINING PROGRAM

## 2019-05-06 PROCEDURE — P9047 ALBUMIN (HUMAN), 25%, 50ML: HCPCS | Mod: HCNC,JG | Performed by: HOSPITALIST

## 2019-05-06 PROCEDURE — 87116 MYCOBACTERIA CULTURE: CPT

## 2019-05-06 PROCEDURE — 99233 PR SUBSEQUENT HOSPITAL CARE,LEVL III: ICD-10-PCS | Mod: HCNC,GC,, | Performed by: HOSPITALIST

## 2019-05-06 PROCEDURE — 83605 ASSAY OF LACTIC ACID: CPT | Mod: HCNC

## 2019-05-06 PROCEDURE — 25000242 PHARM REV CODE 250 ALT 637 W/ HCPCS: Mod: HCNC | Performed by: STUDENT IN AN ORGANIZED HEALTH CARE EDUCATION/TRAINING PROGRAM

## 2019-05-06 PROCEDURE — 97165 OT EVAL LOW COMPLEX 30 MIN: CPT | Mod: HCNC

## 2019-05-06 PROCEDURE — 87070 CULTURE OTHR SPECIMN AEROBIC: CPT | Mod: HCNC

## 2019-05-06 PROCEDURE — 63600175 PHARM REV CODE 636 W HCPCS: Mod: HCNC,JG | Performed by: HOSPITALIST

## 2019-05-06 PROCEDURE — 89051 BODY FLUID CELL COUNT: CPT

## 2019-05-06 PROCEDURE — 80053 COMPREHEN METABOLIC PANEL: CPT | Mod: HCNC

## 2019-05-06 PROCEDURE — 87075 CULTR BACTERIA EXCEPT BLOOD: CPT | Mod: HCNC

## 2019-05-06 RX ORDER — MAGNESIUM SULFATE HEPTAHYDRATE 40 MG/ML
2 INJECTION, SOLUTION INTRAVENOUS ONCE
Status: COMPLETED | OUTPATIENT
Start: 2019-05-06 | End: 2019-05-06

## 2019-05-06 RX ORDER — ALBUMIN HUMAN 250 G/1000ML
25 SOLUTION INTRAVENOUS ONCE
Status: COMPLETED | OUTPATIENT
Start: 2019-05-06 | End: 2019-05-06

## 2019-05-06 RX ORDER — LACTULOSE 10 G/15ML
30 SOLUTION ORAL 3 TIMES DAILY
Status: DISCONTINUED | OUTPATIENT
Start: 2019-05-06 | End: 2019-05-08 | Stop reason: HOSPADM

## 2019-05-06 RX ORDER — FUROSEMIDE 40 MG/1
40 TABLET ORAL 2 TIMES DAILY
Status: DISCONTINUED | OUTPATIENT
Start: 2019-05-06 | End: 2019-05-08 | Stop reason: HOSPADM

## 2019-05-06 RX ORDER — SPIRONOLACTONE 100 MG/1
200 TABLET, FILM COATED ORAL DAILY
Status: DISCONTINUED | OUTPATIENT
Start: 2019-05-07 | End: 2019-05-08 | Stop reason: HOSPADM

## 2019-05-06 RX ADMIN — RIFAXIMIN 550 MG: 550 TABLET ORAL at 08:05

## 2019-05-06 RX ADMIN — FLUTICASONE FUROATE AND VILANTEROL TRIFENATATE 1 PUFF: 200; 25 POWDER RESPIRATORY (INHALATION) at 08:05

## 2019-05-06 RX ADMIN — FUROSEMIDE 40 MG: 40 TABLET ORAL at 05:05

## 2019-05-06 RX ADMIN — MAGNESIUM SULFATE IN WATER 2 G: 40 INJECTION, SOLUTION INTRAVENOUS at 08:05

## 2019-05-06 RX ADMIN — SODIUM CHLORIDE 500 ML: 0.9 INJECTION, SOLUTION INTRAVENOUS at 01:05

## 2019-05-06 RX ADMIN — TIOTROPIUM BROMIDE 18 MCG: 18 CAPSULE ORAL; RESPIRATORY (INHALATION) at 08:05

## 2019-05-06 RX ADMIN — SPIRONOLACTONE 100 MG: 100 TABLET, FILM COATED ORAL at 08:05

## 2019-05-06 RX ADMIN — HYDROCORTISONE 10 MG: 5 TABLET ORAL at 08:05

## 2019-05-06 RX ADMIN — CIPROFLOXACIN HYDROCHLORIDE 500 MG: 500 TABLET, FILM COATED ORAL at 08:05

## 2019-05-06 RX ADMIN — FUROSEMIDE 40 MG: 40 TABLET ORAL at 08:05

## 2019-05-06 RX ADMIN — PANTOPRAZOLE SODIUM 40 MG: 40 TABLET, DELAYED RELEASE ORAL at 08:05

## 2019-05-06 RX ADMIN — ALBUMIN (HUMAN) 25 G: 25 SOLUTION INTRAVENOUS at 03:05

## 2019-05-06 RX ADMIN — LACTULOSE 30 G: 20 SOLUTION ORAL at 05:05

## 2019-05-06 NOTE — PROGRESS NOTES
[facility] Hospital Medicine  Progress Note    Patient Name: Fox Lyons Sr.  MRN: [id number]  Patient Class: IP- Inpatient   Admission Date: 5/3/2019  Length of Stay: 3 days  Attending Physician: Marshall Justice, *  Primary Care Provider: Clover White MD    [facility] Medicine Team: [facility]OSP MED 3 Khadra Moy MD    Subjective:     Principal Problem:Hepatic encephalopathy    HPI:  74 y/o male with a history of portal hypertension from HCV-induced cirrhosis (2/2 IV drug use in 1995), esophageal varices (2/19 EGD non-bleeding small (< 5 mm) esophageal varices), COPD, CAD (s/p RCA and OM2 stents in 2011), permanent pacemaker placement in 2011 (2/2 SSS/RBBB), and adrenal insufficiency 2/2 pituitary tumor s/p gamma knife, presents to the ED with a chief complaint of increasing lethargy. Around 2 AM last night, wife noticed patient had fallen out of bed. On her initial assessment, patient was unresponsive and not moving at all. A couple of minutes later he slowly regained consciousness but was extremely lethargic. He went back to bed and the next day patient was brought into the emergency room by his son. As per wife, patient will tend to skip his lactulose dosing as it gives him diarrhea. Patient denies headache, edema, nausea, fevers, palpitations or pain in joints. Denies dysuria however,compalins of very dark urine. Patient also complains of a 20 lb weight loss. Patient admitted last month for acute decompensated heart failure. Patient had diagnostic/therapeutic paracentesis last admission in which 3 L was removed showed portal HTN however, no SBP. He was then discharged on ciprofloxacin (for SBP prophylaxis), as well as lasix and spironolactone. Patient was a former smoker (quit many years ago). He does not drink or use IV drugs. Home medications include ciprofloxacin daily, furosemide 40 mg BID, hydrocortisone 10 mg daily, lactulose 20 g total daily, omeprazole 40 mg,  pravastatin 40 mg, spironolactone 200 mg daily.     On arrival to the ED, patient was afebrile, with a blood pressure of 129/77. Work-up revealed WBC 10.3, H/H 10.4/33.4, Plt 92, Crt 1.3, Tbil 2.4, AST/ALT 69/39, Alk phos 211, Mg 1.5, Lactate 2.3, Ammonia 76, INR 1.5, Trop 0.018, ETOH <10, . MELD score 17. CT revealed no acute abnormality. EKG showed atrial paced rhythm at 79 bpm, with a RBBB (similar to previous). CXR shows no focal consolidations. Blood cultures pending. Patient was given a one time dose of lactulose. Magnesium was replaced.  Urinalysis and UDS shows no abnormalities.    On my physical examination, patient resting comfortably in bed. He is alert and oriented to person, place and time. He is able to follow all commands. Abdomen is distended however, soft. He is not complaining of any abdominal pain. Scleral icterus present.       Hospital Course:  Patient admitted for hepatic encephalopathy likely secondary to non-compliance with lactulose. Original concern for SBP. Patient with elevated Lactic acid with a peak of 4.0, patient was given 2.5L IVF with downtrend to 2.7. Patient was started on Rocephin. S/p diagnostic paracentesis with results not concerning for SBP. Patient originally with ERNESTINA, however renal function has improved since admission. Will resume spironolactone and lasix and titrate up to home dose.    Interval History: Patient alert and oriented this morning, reports frequent stools (6x yesterday). Lactic acid elevated to 3.2 overnight, given 500cc bolus.     Review of Systems   Constitutional: Positive for activity change, appetite change and fatigue. Negative for chills.   HENT: Negative for congestion and sore throat.    Eyes: Negative for photophobia and visual disturbance.   Respiratory: Negative for cough, shortness of breath and wheezing.    Cardiovascular: Negative for chest pain and palpitations.   Gastrointestinal: Positive for abdominal distention. Negative for  abdominal pain, blood in stool, nausea and vomiting.   Endocrine: Negative for polyphagia and polyuria.   Genitourinary: Negative for difficulty urinating and dysuria.   Musculoskeletal: Positive for gait problem. Negative for arthralgias and back pain.   Skin: Negative for color change and pallor.   Neurological: Positive for weakness. Negative for dizziness, tremors, seizures and headaches.   Psychiatric/Behavioral: Negative for behavioral problems and confusion. The patient is not nervous/anxious.      Objective:     Vital Signs (Most Recent):  Temp: 98.3 °F (36.8 °C) (05/06/19 0805)  Pulse: 70 (05/06/19 0808)  Resp: 16 (05/06/19 0808)  BP: 109/78 (05/06/19 0805)  SpO2: (!) 94 % (05/06/19 0808) Vital Signs (24h Range):  Temp:  [97.8 °F (36.6 °C)-98.7 °F (37.1 °C)] 98.3 °F (36.8 °C)  Pulse:  [69-95] 70  Resp:  [16-18] 16  SpO2:  [92 %-94 %] 94 %  BP: (106-128)/(58-78) 109/78     Weight: 64.8 kg (142 lb 13.7 oz)  Body mass index is 19.92 kg/m².  No intake or output data in the 24 hours ending 05/06/19 0927   Physical Exam   Constitutional: He is oriented to person, place, and time. He appears well-developed and well-nourished. No distress.   HENT:   Head: Normocephalic and atraumatic.   Right Ear: External ear normal.   Left Ear: External ear normal.   Mouth/Throat: No oropharyngeal exudate.   Eyes: Pupils are equal, round, and reactive to light. Conjunctivae and EOM are normal. Right eye exhibits no discharge. Left eye exhibits no discharge.   Neck: Normal range of motion. Neck supple. No JVD present.   Cardiovascular: Normal rate, regular rhythm and normal heart sounds.   No murmur heard.  Pulmonary/Chest: Effort normal and breath sounds normal. He has no wheezes.   Abdominal: Soft. Bowel sounds are normal. He exhibits distension. There is no tenderness. There is no guarding.   Musculoskeletal: Normal range of motion. He exhibits no edema, tenderness or deformity.   Neurological: He is alert and oriented to  person, place, and time. No sensory deficit. He exhibits normal muscle tone. Coordination normal.   Skin: Skin is warm and dry. Capillary refill takes less than 2 seconds. No rash noted. He is not diaphoretic. No erythema.   Psychiatric: He has a normal mood and affect.   Nursing note and vitals reviewed.      Significant Labs:   CBC:   Recent Labs   Lab 05/05/19  0546 05/05/19  1126 05/06/19  0312   WBC 7.09 8.30 8.56   HGB 8.7* 8.8* 8.5*   HCT 28.2* 28.9* 26.3*   PLT 67* 80* 62*     CMP:   Recent Labs   Lab 05/04/19  2235 05/05/19  0546 05/06/19  0312    140 137   K 3.7 3.8 4.2    109 108   CO2 22* 23 20*   * 127* 124*   BUN 19 17 13   CREATININE 1.3 1.3 1.2   CALCIUM 9.1 9.6 8.9   PROT  --  7.1 6.4   ALBUMIN  --  3.5 2.8*   BILITOT  --  1.5* 1.1*   ALKPHOS  --  153* 168*   AST  --  50* 68*   ALT  --  27 31   ANIONGAP 10 8 9   EGFRNONAA 54.1* 54.1* 59.6*     Magnesium:   Recent Labs   Lab 05/05/19  0546 05/06/19  0312   MG 1.9 1.6       Significant Imaging: I have reviewed all pertinent imaging results/findings within the past 24 hours.     CT Head Without Contrast  Narrative: EXAMINATION:  CT HEAD WITHOUT CONTRAST    CLINICAL HISTORY:  Confusion/delirium, altered LOC, unexplained;Unwitnessed fall this AM;    TECHNIQUE:  Low dose axial images were obtained through the head.  Coronal and sagittal reformations were also performed. Contrast was not administered.    COMPARISON:  04/07/2015.    FINDINGS:  No evidence of acute territorial infarct, hemorrhage, mass effect, or midline shift.  Pituitary macroadenoma is no longer identified and may have been resected.    Ventricles are normal in size and configuration.    No displaced calvarial fracture.    Visualized paranasal sinuses and mastoid air cells are clear.  Impression: No CT evidence of acute intracranial abnormality.    Electronically signed by: Renny Garay MD  Date:    05/03/2019  Time:    21:41  X-Ray Chest 1 View  Narrative:  EXAMINATION:  XR CHEST 1 VIEW    CLINICAL HISTORY:  Altered mental status, unspecified    TECHNIQUE:  Single frontal view of the chest was performed.    COMPARISON:  Radiograph 04/16/2019.    FINDINGS:  Left chest wall pacemaker with cardiac leads projecting over the right atrial appendage.  Mediastinal structures are midline.  Hilar contours are unremarkable.  Cardiac silhouette is normal in size.  Lung volumes are low but symmetric.  No consolidation.  There is suspected subsegmental atelectasis within the left lung base.  No pneumothorax or pleural effusions.  No free air beneath the diaphragm.  No acute osseous abnormalities.  Impression: 1. Hypoventilatory lung changes without focal consolidation.    Electronically signed by: Jeronimo Toure MD  Date:    05/03/2019  Time:    18:37        Assessment/Plan:      * Hepatic encephalopathy  74 y/o male with a medical history of HCV-induced cirrhosis (2/2 IVDU in 1995), esophageal varices (2/19 EGD non-bleeding small (< 5 mm) esophageal varices), COPD, CAD (s/p RCA and OM2 stents in 2011), permanent pacemaker placement in 2011 (2/2 SSS/RBBB), and adrenal insufficiency 2/2 pituitary tumor s/p gamma knife, presents to the ED with altered mental status. As per wife, she found him on the floor near his bed around 2AM. He was unresponsive for a couple of minutes however, regained consciousness and has been extremely lethargic since then. Although he takes his regular medications, he does not take his lactulose as it causes diarrhea. Admitted last month for acute decompensated liver liver; paracentesis ruled out SBP. MELD score 17; WBC 10.3, H/H 10.4/33.4, Plt 92, Crt 1.3, Tbil 2.4, AST/ALT 69/39, Alk phos 211, Mg 1.5, Lactate 2.3, Ammonia 76, INR 1.5, Trop 0.018, ETOH <10, . Patient currently afebrile.     Assessment   -Patient presents with an ammonia of 76. He did not properly take his medication which led him to become altered. Very low threshold for SBP as  patient is afebrile and he does show any leukocytosis. He has been afebrile and is not complaining of any abdominal pain.   -Low likelihood that AMS is secondary to stroke as CT head is negative for any acute intracranial abnormalities.    -Patient not showing any signs of infection (no leukocytosis, afebrile and blood pressure is normal). UA and chest xray normal.  -Unlikely due to drugs as UDS was negative     Plan   - Lactulose 30 g QID   - Rifaximin 550 mg BID  - Blood cultures NGTD  - Ciprofloxacin 500 mg daily for SBP prophylaxis  - S/p diagnostic paracentesis, , Segs 35, PMN 44, no organisms seen on gram stain  - Discontinue Rocephin -> cipro 500mg bid (day 2)  - Uptitrate lasix to 40mg bid today  - Uptitrate spironolactone to 200mg today  - Plan for paracentesis today followed by liver US with doppler for further evaluation  - Lactic acid 2.5 >> 4.0 >>3.9 >> 2.7 >> 3.1>>2.2        Hypomagnesemia  Replace as needed      Thrombocytopenia  2/2 cirrhosis       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 Echo from three years ago shows an EF of 55% with elevated PAP    Hyperlipidemia  Hold statin in the setting of hepatic encephalopathy     Hypertension  Hold cozaar as patient's blood pressure has been running on the lower end.        VTE Risk Mitigation (From admission, onward)        Ordered     IP VTE HIGH RISK PATIENT  Once      05/03/19 2214     Place UMANG hose  Until discontinued      05/03/19 2214              Khadra Moy MD  Department of Hospital Medicine   [facility]

## 2019-05-06 NOTE — PT/OT/SLP EVAL
Occupational Therapy   Evaluation    Name: Fox Lyons Sr.  MRN: [id number]  Admitting Diagnosis:  Hepatic encephalopathy      Recommendations:     Discharge Recommendations: nursing facility, skilled(SS-SNF)  Discharge Equipment Recommendations:  walker, rolling  Barriers to discharge:  None    Assessment:     Fox Lyons Sr. is a 73 y.o. male with a medical diagnosis of Hepatic encephalopathy. Pt. currently demonstrates decreased (I) with ADLs, functional mobility & t/fs as well as decreased overall strength, ROM, endurance and balance. Pt would benefit from skilled OT services as well as SS-SNF to address these deficits and to facilitate improving (I) with daily tasks. Performance deficits affecting function: weakness, impaired self care skills, impaired balance, decreased coordination, impaired functional mobilty, impaired endurance, gait instability.      Rehab Prognosis: Good; patient would benefit from acute skilled OT services to address these deficits and reach maximum level of function.       Plan:     Patient to be seen 3 x/week to address the above listed problems via self-care/home management, therapeutic activities, therapeutic exercises  · Plan of Care Expires: 06/05/19  · Plan of Care Reviewed with: patient, spouse    Subjective     Occupational Profile:  Living Environment: Pt lives with his wife in a [facility] with no steps and a tub setup in place.  Previous level of function: (I) with ADLs/IADLs/walking --- was going to the gym.  Roles and Routines: Former   Equipment Used at Home:  none  Assistance upon Discharge: Wife    Pain/Comfort:  · Pain Rating 1: 0/10    Patients cultural, spiritual, Jainism conflicts given the current situation:      Objective:     Communicated with: RN prior to session.  Patient found supine with   upon OT entry to room.    General Precautions: Standard,     Orthopedic Precautions:    Braces:       Occupational Performance:    Bed Mobility:    · Patient  completed Rolling/Turning to Right with supervision  · Patient completed Scooting/Bridging with stand by assistance  · Patient completed Supine to Sit with stand by assistance    Functional Mobility/Transfers:  · Patient completed Sit <> Stand Transfer with contact guard assistance  with  rolling walker   · Patient completed Bed <> Chair Transfer using Step Transfer technique with contact guard assistance with rolling walker  · Patient completed Toilet Transfer Step Transfer technique with minimum assistance with  rolling walker and grab bars  · Functional Mobility: Pt walked ~ 10' CGA to BR. (R) foot drop noted.    Activities of Daily Living:  · Grooming: stand by assistance to wash hands standing at sink.  · Upper Body Dressing: supervision  · Lower Body Dressing: stand by assistance for socks  · Toileting: contact guard assistance for pericare.    Cognitive/Visual Perceptual:  Cognitive/Psychosocial Skills:     -       Oriented to: Person, Place, Time and Situation   -       Safety awareness/insight to disability: intact     Physical Exam:  BUE AROM/MMT: WNL    AMPAC 6 Click ADL:  AMPAC Total Score: 21    Treatment & Education:  UE ROM/MMT  Educated on proper RW management and t/f techniques  Bed mobility training / assessment  Functional mobility assessment  Sit/standing balance assessment  Educated on importance of sitting OOB in bedside chair to promote increased strength, endurance & breathing.  Discussed OT POC / Post-acute plan  Education:    Patient left up in chair with all lines intact and call button in reach    GOALS:   Multidisciplinary Problems     Occupational Therapy Goals        Problem: Occupational Therapy Goal    Goal Priority Disciplines Outcome Interventions   Occupational Therapy Goal     OT, PT/OT Ongoing (interventions implemented as appropriate)    Description:  Goals to be met by: 5/13/19    Patient will increase functional independence with ADLs by performing:    Grooming while standing  at sink with Set-up Assistance.  Toileting from toilet with Supervision for hygiene and clothing management.   Supine to sit with [place].  Toilet transfer to toilet with Stand-by Assistance.                      History:     Past Medical History:   Diagnosis Date    Anemia     Anticoagulant long-term use     Ascites 12/10/2018    Biliary colic     Cataract     Chronic hepatitis C     Cirrhosis     COPD (chronic obstructive pulmonary disease)     Coronary artery disease     Dyspnea     Epistaxis     Esophageal varices without bleeding     Gallstones     GERD (gastroesophageal reflux disease)     HCC (hepatocellular carcinoma)     Hepatitis B antibody positive     Hyperglycemia     Hyperlipidemia     Hypertension     Hypopituitarism     Mobitz type 2 second degree atrioventricular block     Nuclear sclerosis, left     Pacemaker     Pituitary tumor     Portal hypertension     Pulmonary emphysema     PVT (paroxysmal ventricular tachycardia)     SA node dysfunction     Secondary adrenal insufficiency     Secondary male hypogonadism     SSS (sick sinus syndrome)     Superior mesenteric vein thrombosis     Vitreous hemorrhage, both eyes        Past Surgical History:   Procedure Laterality Date    CARDIAC PACEMAKER PLACEMENT      [facility]    CATARACT EXTRACTION  9/24/13    RT    COLONOSCOPY Left 1/4/2019    Performed by Emanuel Hannah [facility] at [facility] ENDO (4TH FLR)    CORONARY ANGIOPLASTY WITH STENT PLACEMENT      2 stents    EGD (ESOPHAGOGASTRODUODENOSCOPY) Left 2/19/2019    Performed by Tr[facility]    EGD (ESOPHAGOGASTRODUODENOSCOPY) Left 1/4/2019    Performed by Emanuel Hannah MD at [facility] ENDO (4TH FLR)    ESOPHAGOGASTRODUODENOSCOPY (EGD) Left 5/31/2018    Performed by Tr[facility] MD at [facility] ENDO (4TH FLR)    ESOPHAGOGASTRODUODENOSCOPY (EGD) N/A 11/30/2017    Performed by Tr[facility]unes MD at [facility] ENDO (4TH FLR)    ESOPHAGOGASTRODUODENOSCOPY  (EGD) Left 10/12/2017    Performed by Jody Humphries MD at [facility] ENDO (4TH FLR)    ESOPHAGOGASTRODUODENOSCOPY (EGD) N/A 8/17/2017    Performed by Trice Funes MD at [facility] ENDO (4TH FLR)    ESOPHAGOGASTRODUODENOSCOPY (EGD) N/A 7/14/2016    Performed by Trice Funes MD at [facility] ENDO (4TH FLR)    EYE SURGERY      INSERTION, IOL PROSTHESIS Right 9/24/2013    Performed by Ben Sparks MD at [facility] OR 1ST FLR    PARACENTESIS, ABDOMINAL N/A 4/16/2014    Performed by [facility] Provider at [facility] [place] CATH LAB    PHACOEMULSIFICATION, CATARACT Right 9/24/2013    Performed by Ben Sparks MD at [facility] OR 1ST FLR    s/p PPV/AFX/EL (od)  05/09/2012       Time Tracking:     OT Date of Treatment: 05/06/19  OT Start Time: 1043  OT Stop Time: 1107  OT Total Time (min): 24 min    Billable Minutes:Evaluation 24    MARY Cervantes  5/6/2019

## 2019-05-06 NOTE — PROCEDURES
Radiology Post-Procedure Note    Pre Op Diagnosis: Ascites  Post Op Diagnosis: Same    Procedure: Ultrasound Guided Paracentesis    Procedure performed by: Marce HONG, Thu     Written Informed Consent Obtained: Yes  Specimen Removed: YES kita fluid  Estimated Blood Loss: Minimal    Findings:   Successful paracentesis.  Albumin administered PRN per protocol.    Patient tolerated procedure well.    Thu Zheng, APRN, FNP  Interventional Radiology  [phone] [employer]

## 2019-05-06 NOTE — PT/OT/SLP EVAL
Physical Therapy Evaluation    Patient Name:  Fox Lyons Sr.   MRN:  [id number]    Recommendations:     Discharge Recommendations: Short stay skilled nursing facility, pending progress   Discharge Equipment Recommendations: Rolling walker, tub bench    Barriers to discharge: increased level of assistance required    Assessment:     Fox Lyons Sr. is a 73 y.o. male admitted with a medical diagnosis of Hepatic encephalopathy.  He presents with the following impairments/functional limitations:  weakness, impaired endurance, impaired self care skills, gait instability, impaired functional mobilty, impaired balance, impaired cardiopulmonary response to activity, decreased ROM. Anticipating pt to progress well with increased mobilization-- discharge recommendations are pending pt progress. Pt would benefit from skilled PT intervention to address listed functional deficits, reduce fall risk, and maximize (I) and safety with functional mobility.     Rehab Prognosis: Good; patient would benefit from acute skilled PT services to address these deficits and reach maximum level of function.      Recent Surgery: * No surgery found *      Plan:     During this hospitalization, patient to be seen 3 x/week to address the identified rehab impairments via gait training, therapeutic activities, therapeutic exercises, neuromuscular re-education and progress toward the following goals:    · Plan of Care Expires:  06/01/19    Subjective     Patient/Family Comments/goals: return to PLOF  Pain/Comfort:  · Pain Rating 1: 0/10  · Pain Rating Post-Intervention 1: 0/10    Patients cultural, spiritual, Mandaeism conflicts given the current situation: no    Patient History:     Living Environment: Pt lives with spouse in [facility] with no KHALIDA. Bathroom: tub/shower combo.    Prior Level of Function: (I) with mobility and ADLs; exercises frequently   DME owned: none  Caregiver Assistance: 24/7 assist from spouse; pt's son also available to check-in  as needed    Objective:     Communicated with RN prior to session.  Patient found supine with telemetry, oxygen  upon PT entry to room. Pt's spouse at bedside.     General Precautions: Standard, fall   Orthopedic Precautions:N/A   Braces: N/A     Exams:  · Cognitive Exam:  Patient is AOx4; able to follow commands appropriately   · Postural Exam:  Patient presented with the following abnormalities:    · -       Rounded shoulders  · -       Forward head  · -       Increased abdominal girth 2/2 ascites   · Sensation:    · -       Intact  · RLE ROM: WFL except ankle DF decreased  · RLE Strength: WFL except ankle DF 1/5   · LLE ROM: WFL  · LLE Strength: WFL    Functional Mobility:    Bed Mobility:   · Supine > Sit: stand by assistance   · Sit > Supine: N/T     Transfers:   · Sit <> Stand Transfer: contact guard assistance from EOB with RW                  Gait:  · Distance: ~15 ft.   · Assistance level: contact guard assistance  · Assistive Device: RW  · Gait Assessment: decreased step length, forward flexed posture, right drop foot, instability noted with no overt LOB    · *limited 2/2 fatigue       Therapeutic Activities and Exercises:   Pt and spouse educated on:  - Role of PT and POC/goals for therapy   - Safety with mobility and fall risk   - Recommendations for increasing OOB activity with therapy or nursing staff assistance   - Instructed to call nursing staff for assistance with mobility as needed 2/2 fall risk     Pt verbalized understanding and expressed no further concerns/questions.    AM-PAC 6 CLICK MOBILITY  Total Score:18     Patient left up in chair with all lines intact, call button in reach, RN notified and spouse present.    GOALS:   Multidisciplinary Problems     Physical Therapy Goals        Problem: Physical Therapy Goal    Goal Priority Disciplines Outcome Goal Variances Interventions   Physical Therapy Goal     PT, PT/OT Ongoing (interventions implemented as appropriate)     Description:  Goals to  be met by: 19    Patient will increase functional independence with mobility by performin. Sit to stand transfer with Modified [place]  2. Gait  x 200 feet with Supervision with or without using least restrictive AD.   3. Stand for 3 minutes with Supervision while performing dynamic balance activities to reduce fall risk   4. Lower extremity exercise program x30 reps per handout, with independence                      History:     Past Medical History:   Diagnosis Date    Anemia     Anticoagulant long-term use     Ascites 12/10/2018    Biliary colic     Cataract     Chronic hepatitis C     Cirrhosis     COPD (chronic obstructive pulmonary disease)     Coronary artery disease     Dyspnea     Epistaxis     Esophageal varices without bleeding     Gallstones     GERD (gastroesophageal reflux disease)     HCC (hepatocellular carcinoma)     Hepatitis B antibody positive     Hyperglycemia     Hyperlipidemia     Hypertension     Hypopituitarism     Mobitz type 2 second degree atrioventricular block     Nuclear sclerosis, left     Pacemaker     Pituitary tumor     Portal hypertension     Pulmonary emphysema     PVT (paroxysmal ventricular tachycardia)     SA node dysfunction     Secondary adrenal insufficiency     Secondary male hypogonadism     SSS (sick sinus syndrome)     Superior mesenteric vein thrombosis     Vitreous hemorrhage, both eyes        Past Surgical History:   Procedure Laterality Date    CARDIAC PACEMAKER PLACEMENT      st rochelle    CATARACT EXTRACTION  13    RT    COLONOSCOPY Left 2019    Performed by [facility]manuel [facility] at [facility] ENDO (4TH FLR)    CORONARY ANGIOPLASTY WITH STENT PLACEMENT      2 stents    EGD (ESOPHAGOGASTRODUODENOSCOPY) Left 2019    [facility]formed [facility] at [facility] ENDO    EGD (ESOPHAGOGASTRODUODENOSCOPY) Left 2019    Performed by Emanuel Hannah MD at [facility] ENDO (4TH FLR)    ESOPHAGOGASTRODUODENOSCOPY (EGD)  Left 5/31/2018    Performed by [facility]unes MD at [facility] ENDO (4TH FLR)    ESOPHAGOGASTRODUODENOSCOPY (EGD) N/A 11/30/2017    Performed by Trice Funes MD at [facility] ENDO (4TH FLR)    ESOPHAGOGASTRODUODENOSCOPY (EGD) Left 10/12/2017    Performed by Jody Humphries MD at [facility] ENDO (4TH FLR)    ESOPHAGOGASTRODUODENOSCOPY (EGD) N/A 8/17/2017    Performed by Trice Funes MD at [facility] ENDO (4TH FLR)    ESOPHAGOGASTRODUODENOSCOPY (EGD) N/A 7/14/2016    Performed by Trice Funes MD at [facility] ENDO (4TH FLR)    EYE SURGERY      INSERTION, IOL PROSTHESIS Right 9/24/2013    Performed by Ben Sparks MD at [facility] OR 1ST FLR    PARACENTESIS, ABDOMINAL N/A 4/16/2014    Performed by [facility] Diagnostic Provider at [facility] CATH LAB    PHACOEMULSIFICATION, CATARACT Right 9/24/2013    Performed by Ben Sparks MD at [facility] OR 1ST FLR    s/p PPV/AFX/EL (od)  05/09/2012       Time Tracking:     PT Received On: 05/06/19  PT Start Time: 1043     PT Stop Time: 1107  PT Total Time (min): 24 min     Billable Minutes: Evaluation 24 min      Jonna Ramos, PT, DPT   5/6/2019  Pager: [phone]

## 2019-05-06 NOTE — H&P
Inpatient Radiology Pre-procedure Note    History of Present Illness:  Fox Lyons Sr. is a 73 y.o. male who presents for ultrasound guided paracentesis.  Admission H&P reviewed.  Past Medical History:   Diagnosis Date    Anemia     Anticoagulant long-term use     Ascites 12/10/2018    Biliary colic     Cataract     Chronic hepatitis C     Cirrhosis     COPD (chronic obstructive pulmonary disease)     Coronary artery disease     Dyspnea     Epistaxis     Esophageal varices without bleeding     Gallstones     GERD (gastroesophageal reflux disease)     HCC (hepatocellular carcinoma)     Hepatitis B antibody positive     Hyperglycemia     Hyperlipidemia     Hypertension     Hypopituitarism     Mobitz type 2 second degree atrioventricular block     Nuclear sclerosis, left     Pacemaker     Pituitary tumor     Portal hypertension     Pulmonary emphysema     PVT (paroxysmal ventricular tachycardia)     SA node dysfunction     Secondary adrenal insufficiency     Secondary male hypogonadism     SSS (sick sinus syndrome)     Superior mesenteric vein thrombosis     Vitreous hemorrhage, both eyes      Past Surgical History:   Procedure Laterality Date    CARDIAC PACEMAKER PLACEMENT      st rochelle    CATARACT EXTRACTION  9/24/13    RT    COLONOSCOPY Left 1/4/2019    Performed by Emanuel Hannah MD at [facility] ENDO (4TH FLR)    CORONARY ANGIOPLASTY WITH STENT PLACEMENT      2 stents    EGD (ESOPHAGOGASTRODUODENOSCOPY) Left 2/19/2019    [facility] at [facility] ENDO    EGD (ESOPHAGOGASTRODUODENOSCOPY) Left 1/4/2019    Performed by Emanuel Hannah MD at [facility] ENDO (4TH FLR)    ESOPHAGOGASTRODUODENOSCOPY (EGD) Left 5/31/2018    Performed by Trice Funes MD at [facility] ENDO (4TH FLR)    ESOPHAGOGASTRODUODENOSCOPY (EGD) N/A 11/30/2017    Performed by Trice Funes MD at [facility] ENDO (4TH FLR)    ESOPHAGOGASTRODUODENOSCOPY (EGD) Left 10/12/2017    Performed by Jody LE  MD Kristel at [facility] ENDO (4TH FLR)    ESOPHAGOGASTRODUODENOSCOPY (EGD) N/A 8/17/2017    Performed by Trice Funes MD at [facility] ENDO (4TH FLR)    ESOPHAGOGASTRODUODENOSCOPY (EGD) N/A 7/14/2016    Performed by Trice Funes MD at [facility] ENDO (4TH FLR)    EYE SURGERY      INSERTION, IOL PROSTHESIS Right 9/24/2013    Performed by Ben Sparks MD at [facility] OR 1ST FLR    PARACENTESIS, ABDOMINAL N/A 4/16/2014    Performed by [facility] Provider at [facility] CATH LAB    PHACOEMULSIFICATION, CATARACT Right 9/24/2013    Performed by Ben Sparks MD at [facility] OR 1ST FLR    s/p PPV/AFX/EL (od)  05/09/2012       Review of Systems:   As documented in primary team H&P    Home Meds:   Prior to Admission medications    Medication Sig Start Date End Date Taking? Authorizing Provider   ciprofloxacin HCl (CIPRO) 500 MG tablet Take 1 tablet (500 mg total) by mouth once daily. 3/18/19  Yes Trice Funes MD   fluticasone-umeclidin-vilanter (TRELEGY ELLIPTA) 100-62.5-25 mcg DsDv Inhale 1 puff into the lungs once daily. 11/6/18  Yes Katelyn Ortiz MD   furosemide (LASIX) 40 MG tablet Take 0.5 tablets (20 mg total) by mouth 2 (two) times daily.  Patient taking differently: Take 40 mg by mouth 2 (two) times daily.  12/4/18 12/4/19 Yes Trice Funes MD   hydrocortisone (CORTEF) 10 MG Tab TAKE ONE AND ONE-HALF TABLETS BY MOUTH EVERY MORNING AND ONE-HALF TABLET EVERY EVENING./NO FURTHER REFILLS  NEED FOLLOW UP VISIT 4/11/19  Yes Teo Isbell MD   losartan (COZAAR) 100 MG tablet Take 1 tablet (100 mg total) by mouth once daily. 5/9/18  Yes Clover White MD   multivitamin-minerals-lutein (CENTRUM SILVER) Tab Take 1 tablet by mouth once daily.     Yes Historical Provider, MD   omeprazole (PRILOSEC) 40 MG capsule TAKE 1 CAPSULE(40 MG) BY MOUTH TWICE DAILY BEFORE MEALS 3/11/19  Yes Clover White MD   pravastatin (PRAVACHOL) 40 MG tablet TAKE 1 TABLET(40 MG) BY MOUTH EVERY EVENING 5/9/18  Yes Clover  BELLA White MD   spironolactone (ALDACTONE) 100 MG tablet Take 2 tablets (200 mg total) by mouth once daily. 4/19/19 4/18/20 Yes Randall Brooks MD   lactulose (CHRONULAC) 20 gram/30 mL Soln Take 30 mLs (20 g total) by mouth once daily. Please ensure you are having 3-4 bowels movements daily for 100 doses 4/18/19 7/27/19  Randall Brooks MD     Scheduled Meds:    ciprofloxacin HCl  500 mg Oral Daily    fluticasone furoate-vilanterol  1 puff Inhalation Daily    furosemide  40 mg Oral BID    hydrocortisone  10 mg Oral Daily    lactulose  30 g Oral TID    pantoprazole  40 mg Oral Daily    rifAXImin  550 mg Oral BID    [START ON 5/7/2019] spironolactone  200 mg Oral Daily    tiotropium  1 capsule Inhalation Daily     Continuous Infusions:   PRN Meds:dextrose 50%, dextrose 50%, glucagon (human recombinant), glucose, glucose, sodium chloride 0.9%, sodium chloride 0.9%  Anticoagulants/Antiplatelets: no anticoagulation    Allergies:   Review of patient's allergies indicates:   Allergen Reactions    Codeine Itching, Rash, Edema and Other (See Comments)     Other reaction(s): Itching     Sedation Hx: have not been any systemic reactions    Labs:  Recent Labs   Lab 05/05/19  0546   INR 1.5*       Recent Labs   Lab 05/06/19  0312   WBC 8.56   HGB 8.5*   HCT 26.3*   MCV 89   PLT 62*      Recent Labs   Lab 05/06/19  0312   *      K 4.2      CO2 20*   BUN 13   CREATININE 1.2   CALCIUM 8.9   MG 1.6   ALT 31   AST 68*   ALBUMIN 2.8*   BILITOT 1.1*         Vitals:  Temp: 98.1 °F (36.7 °C) (05/06/19 1201)  Pulse: 73 (05/06/19 1445)  Resp: 18 (05/06/19 1445)  BP: (!) 142/90 (05/06/19 1445)  SpO2: (!) 94 % (05/06/19 1445)     Physical Exam:  ASA: 3  Mallampati: n/a    General: no acute distress  Mental Status: alert and oriented to person, place and time  HEENT: normocephalic, atraumatic  Chest: unlabored breathing  Heart: regular heart rate  Abdomen: distended  Extremity: moves all  extremities    Plan: ultrasound guided paracentesis  Sedation Plan: local    VICKI Valentin, ARNOLDP  Interventional Radiology  [phone] [employer]

## 2019-05-06 NOTE — PROGRESS NOTES
Paracentesis complete, 4.3 L removed. Dressing applied to RLQ puncture site, CDI. No acute events. Pt resting comfortably, denies pain/discomfort. Specimen sent to lab by FIDELINA Gomez RN. Report called to JEET Ortega. Pt to ultrasound appt.

## 2019-05-06 NOTE — ASSESSMENT & PLAN NOTE
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 As per wife, she found him on the floor near his bed around 2AM. He was unresponsive for a couple of minutes however, regained consciousness and has been extremely lethargic since then. Although he takes his regular medications, he does not take his lactulose as it causes diarrhea. Admitted last month for acute decompensated liver liver; paracentesis ruled out SBP. MELD score 17; WBC 10.3, H/H 10.4/33.4, Plt 92, Crt 1.3, Tbil 2.4, AST/ALT 69/39, Alk phos 211, Mg 1.5, Lactate 2.3, Ammonia 76, INR 1.5, Trop 0.018, ETOH <10, . Patient currently afebrile.     Assessment   -Patient presents with an ammonia of 76. He did not properly take his medication which led him to become altered. Very low threshold for SBP as patient is afebrile and he does show any leukocytosis. He has been afebrile and is not complaining of any abdominal pain.   -Low likelihood that AMS is secondary to stroke as CT head is negative for any acute intracranial abnormalities.    -Patient not showing any signs of infection (no leukocytosis, afebrile and blood pressure is normal). UA and chest xray normal.  -Unlikely due to drugs as UDS was negative     Plan   - Lactulose 30 g QID   - Rifaximin 550 mg BID  - Blood cultures NGTD  - Ciprofloxacin 500 mg daily for SBP prophylaxis  - S/p diagnostic paracentesis, , Segs 35, PMN 44, no organisms seen on gram stain  - Discontinue Rocephin -> cipro 500mg bid (day 2)  - Uptitrate lasix to 40mg bid today  - Uptitrate spironolactone to 200mg today  - Plan for paracentesis today followed by liver US with doppler for further evaluation  -  Lactic acid 2.5 >> 4.0 >>3.9 >> 2.7 >> 3.1>>2.2

## 2019-05-06 NOTE — SUBJECTIVE & OBJECTIVE
Interval History: Patient alert and oriented this morning, reports frequent stools (6x yesterday). Lactic acid elevated to 3.2 overnight, given 500cc bolus.     Review of Systems   Constitutional: Positive for activity change, appetite change and fatigue. Negative for chills.   HENT: Negative for congestion and sore throat.    Eyes: Negative for photophobia and visual disturbance.   Respiratory: Negative for cough, shortness of breath and wheezing.    Cardiovascular: Negative for chest pain and palpitations.   Gastrointestinal: Positive for abdominal distention. Negative for abdominal pain, blood in stool, nausea and vomiting.   Endocrine: Negative for polyphagia and polyuria.   Genitourinary: Negative for difficulty urinating and dysuria.   Musculoskeletal: Positive for gait problem. Negative for arthralgias and back pain.   Skin: Negative for color change and pallor.   Neurological: Positive for weakness. Negative for dizziness, tremors, seizures and headaches.   Psychiatric/Behavioral: Negative for behavioral problems and confusion. The patient is not nervous/anxious.      Objective:     Vital Signs (Most Recent):  Temp: 98.3 °F (36.8 °C) (05/06/19 0805)  Pulse: 70 (05/06/19 0808)  Resp: 16 (05/06/19 0808)  BP: 109/78 (05/06/19 0805)  SpO2: (!) 94 % (05/06/19 0808) Vital Signs (24h Range):  Temp:  [97.8 °F (36.6 °C)-98.7 °F (37.1 °C)] 98.3 °F (36.8 °C)  Pulse:  [69-95] 70  Resp:  [16-18] 16  SpO2:  [92 %-94 %] 94 %  BP: (106-128)/(58-78) 109/78     Weight: 64.8 kg (142 lb 13.7 oz)  Body mass index is 19.92 kg/m².  No intake or output data in the 24 hours ending 05/06/19 0927   Physical Exam   Constitutional: He is oriented to person, place, and time. He appears well-developed and well-nourished. No distress.   HENT:   Head: Normocephalic and atraumatic.   Right Ear: External ear normal.   Left Ear: External ear normal.   Mouth/Throat: No oropharyngeal exudate.   Eyes: Pupils are equal, round, and reactive to  light. Conjunctivae and EOM are normal. Right eye exhibits no discharge. Left eye exhibits no discharge.   Neck: Normal range of motion. Neck supple. No JVD present.   Cardiovascular: Normal rate, regular rhythm and normal heart sounds.   No murmur heard.  Pulmonary/Chest: Effort normal and breath sounds normal. He has no wheezes.   Abdominal: Soft. Bowel sounds are normal. He exhibits distension. There is no tenderness. There is no guarding.   Musculoskeletal: Normal range of motion. He exhibits no edema, tenderness or deformity.   Neurological: He is alert and oriented to person, place, and time. No sensory deficit. He exhibits normal muscle tone. Coordination normal.   Skin: Skin is warm and dry. Capillary refill takes less than 2 seconds. No rash noted. He is not diaphoretic. No erythema.   Psychiatric: He has a normal mood and affect.   Nursing note and vitals reviewed.      Significant Labs:   CBC:   Recent Labs   Lab 05/05/19  0546 05/05/19  1126 05/06/19  0312   WBC 7.09 8.30 8.56   HGB 8.7* 8.8* 8.5*   HCT 28.2* 28.9* 26.3*   PLT 67* 80* 62*     CMP:   Recent Labs   Lab 05/04/19  2235 05/05/19  0546 05/06/19  0312    140 137   K 3.7 3.8 4.2    109 108   CO2 22* 23 20*   * 127* 124*   BUN 19 17 13   CREATININE 1.3 1.3 1.2   CALCIUM 9.1 9.6 8.9   PROT  --  7.1 6.4   ALBUMIN  --  3.5 2.8*   BILITOT  --  1.5* 1.1*   ALKPHOS  --  153* 168*   AST  --  50* 68*   ALT  --  27 31   ANIONGAP 10 8 9   EGFRNONAA 54.1* 54.1* 59.6*     Magnesium:   Recent Labs   Lab 05/05/19  0546 05/06/19  0312   MG 1.9 1.6       Significant Imaging: I have reviewed all pertinent imaging results/findings within the past 24 hours.     CT Head Without Contrast  Narrative: EXAMINATION:  CT HEAD WITHOUT CONTRAST    CLINICAL HISTORY:  Confusion/delirium, altered LOC, unexplained;Unwitnessed fall this AM;    TECHNIQUE:  Low dose axial images were obtained through the head.  Coronal and sagittal reformations were also  performed. Contrast was not administered.    COMPARISON:  04/07/2015.    FINDINGS:  No evidence of acute territorial infarct, hemorrhage, mass effect, or midline shift.  Pituitary macroadenoma is no longer identified and may have been resected.    Ventricles are normal in size and configuration.    No displaced calvarial fracture.    Visualized paranasal sinuses and mastoid air cells are clear.  Impression: No CT evidence of acute intracranial abnormality.    Electronically signed by: Renny Garay MD  Date:    05/03/2019  Time:    21:41  X-Ray Chest 1 View  Narrative: EXAMINATION:  XR CHEST 1 VIEW    CLINICAL HISTORY:  Altered mental status, unspecified    TECHNIQUE:  Single frontal view of the chest was performed.    COMPARISON:  Radiograph 04/16/2019.    FINDINGS:  Left chest wall pacemaker with cardiac leads projecting over the right atrial appendage.  Mediastinal structures are midline.  Hilar contours are unremarkable.  Cardiac silhouette is normal in size.  Lung volumes are low but symmetric.  No consolidation.  There is suspected subsegmental atelectasis within the left lung base.  No pneumothorax or pleural effusions.  No free air beneath the diaphragm.  No acute osseous abnormalities.  Impression: 1. Hypoventilatory lung changes without focal consolidation.    Electronically signed by: Jeronimo Toure MD  Date:    05/03/2019  Time:    18:37

## 2019-05-06 NOTE — PLAN OF CARE
Problem: Adult Inpatient Plan of Care  Goal: Plan of Care Review  No acute changes on shift. Pt AAOx4. Went for paracentesis. 4.3 L removed. Pt also went for liver US. IV mag replacement given. 3 BM's noted on shift. No other issues noted at this time. Pt and wife updated on POC. WCTM.

## 2019-05-06 NOTE — PLAN OF CARE
Problem: Fall Injury Risk  Goal: Absence of Fall and Fall-Related Injury  Outcome: Ongoing (interventions implemented as appropriate)  Patient remains free from falls.     Problem: Adult Inpatient Plan of Care  Goal: Plan of Care Review  Outcome: Outcome(s) achieved Date Met: 05/06/19  Patient lactic acid increased to 3.2. Additional 500mL NS bolus ordered and administered. No other acute events or changes overnight.

## 2019-05-06 NOTE — PLAN OF CARE
Problem: Physical Therapy Goal  Goal: Physical Therapy Goal  Goals to be met by: 19    Patient will increase functional independence with mobility by performin. Sit to stand transfer with Modified [place]  2. Gait  x 200 feet with Supervision with or without using least restrictive AD.   3. Stand for 3 minutes with Supervision while performing dynamic balance activities to reduce fall risk   4. Lower extremity exercise program x30 reps per handout, with independence    Outcome: Ongoing (interventions implemented as appropriate)  PT evaluation completed- see note for details. Goals established and POC initiated.     Jonna Ramos, PT, DPT   2019  Pager: [phone]

## 2019-05-06 NOTE — PLAN OF CARE
CM met with patients spouse to obtain discharge planning assessment.  Patient admitted with encephalopathy.  Planned discharge is home with family - Plan (A) or home with family and home health - Plan (B).    PCP:  Clover White MD     Payor:  Payor: HU[employer]NA MANAGED MEDICARE / Plan: HUMANA TOTAL CARE ADVANTAGE / Product Type: Medicare Advantage /      Pharmacy:    [facility] [address] [place] [address] AT [address] [address]  Phone: [phone] Fax: [phone]       05/06/19 1510   Discharge Assessment   Assessment Type Discharge Planning Assessment   Confirmed/corrected address and phone number on facesheet? Yes   Assessment information obtained from? Caregiver  (wife Cathryn [phone])   Communicated expected length of stay with patient/caregiver no   Prior to hospitilization cognitive status: Alert/Oriented   Prior to hospitalization functional status: Independent   Current cognitive status: Alert/Oriented   Current Functional Status: Independent   Lives With spouse   Able to Return to Prior Arrangements yes   Is patient able to care for self after discharge? Yes   Who are your caregiver(s) and their phone number(s)? Ronilsaia - spouse [phone]   Patient's perception of discharge disposition home or selfcare   Readmission Within the Last 30 Days previous discharge plan unsuccessful   Patient currently being followed by outpatient case management? No   Patient currently receives any other outside agency services? No   Equipment Currently Used at Home none   Do you have any problems affording any of your prescribed medications? No   Is the patient taking medications as prescribed? yes   Does the patient have transportation home? Yes   Transportation Anticipated family or friend will provide   Does the patient receive services at the [facility]? No   Discharge Plan A Home with family   Discharge Plan B Home with  family;Home Health   DME Needed Upon Discharge  none   Patient/Family in Agreement with Plan yes   Readmission Questionnaire   At the time of your discharge, did someone talk to you about what your health problems were? Yes   At the time of discharge, did someone talk to you about what to watch out for regarding worsening of your health problem? Yes   At the time of discharge, did someone talk to you about what to do if you experienced worsening of your health problem? Yes   At the time of discharge, did someone talk to you about which medication to take when you left the hospital and which ones to stop taking? Yes   At the time of discharge, did someone talk to you about when and where to follow up with a doctor after you left the hospital? Yes   What do you believe caused you to be sick enough to be re-admitted? hepatic encephalopathy   How often do you need to have someone help you when you read instructions, pamphlets, or other written material from your doctor or pharmacy? Sometimes   Do you have problems taking your medications as prescribed? No   Do you have any problems affording any of  your prescribed medications? No   Do you have problems obtaining/receiving your medications? No   Does the patient have transportation to healthcare appointments? Yes   Living Arrangements house   Does the patient have family/friends to help with healtcare needs after discharge? yes   Does your caregiver provide all the help you need? Yes

## 2019-05-06 NOTE — PLAN OF CARE
AIDA called in LOCET and faxed PASSR in case this turns into a nursing home SNF.    Shamika Light, [facility]  [phone]

## 2019-05-07 LAB
ALBUMIN SERPL BCP-MCNC: 3.1 G/DL (ref 3.5–5.2)
ALP SERPL-CCNC: 183 U/L (ref 55–135)
ALT SERPL W/O P-5'-P-CCNC: 32 U/L (ref 10–44)
ANION GAP SERPL CALC-SCNC: 8 MMOL/L (ref 8–16)
AST SERPL-CCNC: 71 U/L (ref 10–40)
BACTERIA SPEC AEROBE CULT: NO GROWTH
BASOPHILS # BLD AUTO: 0.01 K/UL (ref 0–0.2)
BASOPHILS NFR BLD: 0.1 % (ref 0–1.9)
BILIRUB SERPL-MCNC: 1.2 MG/DL (ref 0.1–1)
BUN SERPL-MCNC: 12 MG/DL (ref 8–23)
CALCIUM SERPL-MCNC: 9.5 MG/DL (ref 8.7–10.5)
CHLORIDE SERPL-SCNC: 106 MMOL/L (ref 95–110)
CO2 SERPL-SCNC: 22 MMOL/L (ref 23–29)
CREAT SERPL-MCNC: 1.2 MG/DL (ref 0.5–1.4)
DIFFERENTIAL METHOD: ABNORMAL
EOSINOPHIL # BLD AUTO: 0.1 K/UL (ref 0–0.5)
EOSINOPHIL NFR BLD: 1 % (ref 0–8)
ERYTHROCYTE [DISTWIDTH] IN BLOOD BY AUTOMATED COUNT: 19.2 % (ref 11.5–14.5)
EST. GFR  (AFRICAN AMERICAN): >60 ML/MIN/1.73 M^2
EST. GFR  (NON AFRICAN AMERICAN): 59.6 ML/MIN/1.73 M^2
GLUCOSE SERPL-MCNC: 107 MG/DL (ref 70–110)
HCT VFR BLD AUTO: 28.9 % (ref 40–54)
HGB BLD-MCNC: 8.9 G/DL (ref 14–18)
IMM GRANULOCYTES # BLD AUTO: 0.02 K/UL (ref 0–0.04)
IMM GRANULOCYTES NFR BLD AUTO: 0.2 % (ref 0–0.5)
LYMPHOCYTES # BLD AUTO: 0.7 K/UL (ref 1–4.8)
LYMPHOCYTES NFR BLD: 8.7 % (ref 18–48)
MAGNESIUM SERPL-MCNC: 1.7 MG/DL (ref 1.6–2.6)
MCH RBC QN AUTO: 27.2 PG (ref 27–31)
MCHC RBC AUTO-ENTMCNC: 30.8 G/DL (ref 32–36)
MCV RBC AUTO: 88 FL (ref 82–98)
MONOCYTES # BLD AUTO: 0.8 K/UL (ref 0.3–1)
MONOCYTES NFR BLD: 9.3 % (ref 4–15)
NEUTROPHILS # BLD AUTO: 6.8 K/UL (ref 1.8–7.7)
NEUTROPHILS NFR BLD: 80.7 % (ref 38–73)
NRBC BLD-RTO: 0 /100 WBC
PLATELET # BLD AUTO: 63 K/UL (ref 150–350)
PMV BLD AUTO: 10.2 FL (ref 9.2–12.9)
POTASSIUM SERPL-SCNC: 4 MMOL/L (ref 3.5–5.1)
PROT SERPL-MCNC: 6.8 G/DL (ref 6–8.4)
RBC # BLD AUTO: 3.27 M/UL (ref 4.6–6.2)
SODIUM SERPL-SCNC: 136 MMOL/L (ref 136–145)
WBC # BLD AUTO: 8.41 K/UL (ref 3.9–12.7)

## 2019-05-07 PROCEDURE — 25000003 PHARM REV CODE 250: Performed by: STUDENT IN AN ORGANIZED HEALTH CARE EDUCATION/TRAINING PROGRAM

## 2019-05-07 PROCEDURE — 25000003 PHARM REV CODE 250: Mod: HCNC | Performed by: STUDENT IN AN ORGANIZED HEALTH CARE EDUCATION/TRAINING PROGRAM

## 2019-05-07 PROCEDURE — 80053 COMPREHEN METABOLIC PANEL: CPT

## 2019-05-07 PROCEDURE — 25000242 PHARM REV CODE 250 ALT 637 W/ HCPCS: Mod: HCNC | Performed by: STUDENT IN AN ORGANIZED HEALTH CARE EDUCATION/TRAINING PROGRAM

## 2019-05-07 PROCEDURE — 63600175 PHARM REV CODE 636 W HCPCS: Mod: HCNC | Performed by: STUDENT IN AN ORGANIZED HEALTH CARE EDUCATION/TRAINING PROGRAM

## 2019-05-07 PROCEDURE — 20600001 HC STEP DOWN PRIVATE ROOM

## 2019-05-07 PROCEDURE — 85025 COMPLETE CBC W/AUTO DIFF WBC: CPT

## 2019-05-07 PROCEDURE — 99231 PR SUBSEQUENT HOSPITAL CARE,LEVL I: ICD-10-PCS | Mod: GC,,, | Performed by: HOSPITALIST

## 2019-05-07 PROCEDURE — 99231 SBSQ HOSP IP/OBS SF/LOW 25: CPT | Mod: GC,,, | Performed by: HOSPITALIST

## 2019-05-07 PROCEDURE — 83735 ASSAY OF MAGNESIUM: CPT

## 2019-05-07 PROCEDURE — 36415 COLL VENOUS BLD VENIPUNCTURE: CPT

## 2019-05-07 RX ORDER — MAGNESIUM SULFATE HEPTAHYDRATE 40 MG/ML
2 INJECTION, SOLUTION INTRAVENOUS ONCE
Status: COMPLETED | OUTPATIENT
Start: 2019-05-07 | End: 2019-05-07

## 2019-05-07 RX ADMIN — FUROSEMIDE 40 MG: 40 TABLET ORAL at 06:05

## 2019-05-07 RX ADMIN — LACTULOSE 30 G: 20 SOLUTION ORAL at 04:05

## 2019-05-07 RX ADMIN — RIFAXIMIN 550 MG: 550 TABLET ORAL at 09:05

## 2019-05-07 RX ADMIN — PANTOPRAZOLE SODIUM 40 MG: 40 TABLET, DELAYED RELEASE ORAL at 09:05

## 2019-05-07 RX ADMIN — FLUTICASONE FUROATE AND VILANTEROL TRIFENATATE 1 PUFF: 200; 25 POWDER RESPIRATORY (INHALATION) at 08:05

## 2019-05-07 RX ADMIN — SPIRONOLACTONE 200 MG: 100 TABLET, FILM COATED ORAL at 09:05

## 2019-05-07 RX ADMIN — CIPROFLOXACIN HYDROCHLORIDE 500 MG: 500 TABLET, FILM COATED ORAL at 09:05

## 2019-05-07 RX ADMIN — HYDROCORTISONE 10 MG: 5 TABLET ORAL at 09:05

## 2019-05-07 RX ADMIN — LACTULOSE 30 G: 20 SOLUTION ORAL at 09:05

## 2019-05-07 RX ADMIN — RIFAXIMIN 550 MG: 550 TABLET ORAL at 08:05

## 2019-05-07 RX ADMIN — TIOTROPIUM BROMIDE 18 MCG: 18 CAPSULE ORAL; RESPIRATORY (INHALATION) at 08:05

## 2019-05-07 RX ADMIN — FUROSEMIDE 40 MG: 40 TABLET ORAL at 09:05

## 2019-05-07 RX ADMIN — MAGNESIUM SULFATE IN WATER 2 G: 40 INJECTION, SOLUTION INTRAVENOUS at 08:05

## 2019-05-07 NOTE — PLAN OF CARE
AIDA spoke with Cathryn, [phone]) and she said if [facility] will not take pt then she will take him home with HH.    AIDA attached PASSR and 142 just in case.    Shamika Light, [facility]  [phone]

## 2019-05-07 NOTE — PROGRESS NOTES
[facility] Hospital Medicine  Progress Note    Patient Name: Fox Lyons Sr.  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 Around 2 AM last night, wife noticed patient had fallen out of bed. On her initial assessment, patient was unresponsive and not moving at all. A couple of minutes later he slowly regained consciousness but was extremely lethargic. He went back to bed and the next day patient was brought into the emergency room by his son. As per wife, patient will tend to skip his lactulose dosing as it gives him diarrhea. Patient denies headache, edema, nausea, fevers, palpitations or pain in joints. Denies dysuria however,compalins of very dark urine. Patient also complains of a 20 lb weight loss. Patient admitted last month for acute decompensated heart failure. Patient had diagnostic/therapeutic paracentesis last admission in which 3 L was removed showed portal HTN however, no SBP. He was then discharged on ciprofloxacin (for SBP prophylaxis), as well as lasix and spironolactone. Patient was a former smoker (quit many years ago). He does not drink or use IV drugs. Home medications include ciprofloxacin daily, furosemide 40 mg BID, hydrocortisone 10 mg daily, lactulose 20 g total daily, omeprazole 40 mg, pravastatin  40 mg, spironolactone 200 mg daily.     On arrival to the ED, patient was afebrile, with a blood pressure of 129/77. Work-up revealed WBC 10.3, H/H 10.4/33.4, Plt 92, Crt 1.3, Tbil 2.4, AST/ALT 69/39, Alk phos 211, Mg 1.5, Lactate 2.3, Ammonia 76, INR 1.5, Trop 0.018, ETOH <10, . MELD score 17. CT revealed no acute abnormality. EKG showed atrial paced rhythm at 79 bpm, with a RBBB (similar to previous). CXR shows no focal consolidations. Blood cultures pending. Patient was given a one time dose of lactulose. Magnesium was replaced.  Urinalysis and UDS shows no abnormalities.    On my physical examination, patient resting comfortably in bed. He is alert and oriented to person, place and time. He is able to follow all commands. Abdomen is distended however, soft. He is not complaining of any abdominal pain. Scleral icterus present.       Hospital Course:  Patient admitted for hepatic encephalopathy likely secondary to non-compliance with lactulose. Original concern for SBP. Patient with elevated Lactic acid with a peak of 4.0, patient was given 2.5L IVF with downtrend to 2.7. Patient was started on Rocephin. S/p diagnostic paracentesis with results not concerning for SBP. Patient originally with ERNESTINA, however renal function has improved since admission. Will resume spironolactone and lasix and titrate up to home dose. Patient stable for discharge with  PT/OT.    Interval History: Patient awake and alert today, ate breakfast fine. He has been ambulating with the walker fine. Fewer bowel movements during past day with change in lactulose frequency.    Review of Systems   Constitutional: Positive for activity change and fatigue. Negative for appetite change and chills.   HENT: Negative for congestion and sore throat.    Eyes: Negative for photophobia and visual disturbance.   Respiratory: Negative for cough, shortness of breath and wheezing.    Cardiovascular: Negative for chest pain and palpitations.    Gastrointestinal: Positive for abdominal distention (improved). Negative for abdominal pain, blood in stool, nausea and vomiting.   Endocrine: Negative for polyphagia and polyuria.   Genitourinary: Negative for difficulty urinating and dysuria.   Musculoskeletal: Positive for gait problem. Negative for arthralgias and back pain.   Skin: Negative for color change and pallor.   Neurological: Positive for weakness. Negative for dizziness, tremors, seizures and headaches.   Psychiatric/Behavioral: Negative for behavioral problems and confusion. The patient is not nervous/anxious.      Objective:     Vital Signs (Most Recent):  Temp: 98.6 °F (37 °C) (05/07/19 1545)  Pulse: 85 (05/07/19 1545)  Resp: 18 (05/07/19 1545)  BP: 104/62 (05/07/19 1545)  SpO2: (!) 92 % (05/07/19 1545) Vital Signs (24h Range):  Temp:  [98.2 °F (36.8 °C)-99.1 °F (37.3 °C)] 98.6 °F (37 °C)  Pulse:  [71-88] 85  Resp:  [16-18] 18  SpO2:  [92 %-99 %] 92 %  BP: (100-121)/(57-67) 104/62     Weight: 64.8 kg (142 lb 13.7 oz)  Body mass index is 19.92 kg/m².    Intake/Output Summary (Last 24 hours) at 5/7/2019 1547  Last data filed at 5/7/2019 1200  Gross per 24 hour   Intake 900 ml   Output 0 ml   Net 900 ml      Physical Exam   Constitutional: He is oriented to person, place, and time. He appears well-developed and well-nourished. No distress.   HENT:   Head: Normocephalic and atraumatic.   Right Ear: External ear normal.   Left Ear: External ear normal.   Mouth/Throat: No oropharyngeal exudate.   Eyes: Conjunctivae and EOM are normal. Right eye exhibits no discharge. Left eye exhibits no discharge.   Neck: Normal range of motion. Neck supple.   Cardiovascular: Normal rate, regular rhythm and normal heart sounds.   No murmur heard.  Pulmonary/Chest: Effort normal and breath sounds normal. He has no wheezes.   Abdominal: Soft. Bowel sounds are normal. He exhibits distension (improved). There is no tenderness. There is no guarding.   Musculoskeletal:  Normal range of motion. He exhibits no edema, tenderness or deformity.   Neurological: He is alert and oriented to person, place, and time. No sensory deficit. He exhibits normal muscle tone. Coordination normal.   Skin: Skin is warm and dry. Capillary refill takes less than 2 seconds. No rash noted. He is not diaphoretic. No erythema.   Psychiatric: He has a normal mood and affect.   Nursing note and vitals reviewed.      Significant Labs:   CBC:   Recent Labs   Lab 05/06/19 0312 05/07/19 0514   WBC 8.56 8.41   HGB 8.5* 8.9*   HCT 26.3* 28.9*   PLT 62* 63*     CMP:   Recent Labs   Lab 05/06/19 0312 05/07/19 0514    136   K 4.2 4.0    106   CO2 20* 22*   * 107   BUN 13 12   CREATININE 1.2 1.2   CALCIUM 8.9 9.5   PROT 6.4 6.8   ALBUMIN 2.8* 3.1*   BILITOT 1.1* 1.2*   ALKPHOS 168* 183*   AST 68* 71*   ALT 31 32   ANIONGAP 9 8   EGFRNONAA 59.6* 59.6*     Magnesium:   Recent Labs   Lab 05/06/19 0312 05/07/19 0514   MG 1.6 1.7       Significant Imaging: I have reviewed all pertinent imaging results/findings within the past 24 hours.     IR Paracentesis with Imaging  Narrative: EXAMINATION:  Ultrasound-guided paracentesis    Procedural Personnel    Attending physician(s): Boby Toure MD    Fellow physician(s): None    Resident physician(s): None    Advanced practice provider(s): VICKI Valentin, GABBIE    Pre-procedure diagnosis: Ascites    Post-procedure diagnosis: Same    Complications: No immediate complications.    CLINICAL HISTORY:  Recurrent Ascites    TECHNIQUE:  - Ultrasound-guided paracentesis    COMPARISON:  Paracentesis 4/17/2019    FINDINGS:  Pre-procedure    Consent: Informed consent for the procedure was obtained and time-out was performed prior to the procedure.    Preparation: The site was prepared and draped using maximal sterile barrier technique including cutaneous antisepsis.    Anesthesia/sedation    Level of anesthesia/sedation: No sedation    Anesthesia/sedation  administered by: Not applicable    Total intra-service sedation time (minutes): 0    Limited abdominal ultrasound    Limited abdominal ultrasound was performed.    Moderate ascites. A safe window for paracentesis was identified.    Paracentesis    Local anesthesia was administered. The peritoneal cavity was accessed on the right lower quadrant and fluid return confirmed position. Ascites was drained.    Paracentesis access technique: Real-time ultrasound guidance    Catheter placed: 5F Yueh    Closure    The catheter was removed. A sterile bandage was applied.    Post-drainage ultrasound: Not performed    Additional Details    Additional description of procedure: None    Equipment details: None    Specimens removed: Abdominal fluid    Estimated blood loss (mL): Less than 10    Standardized report: [email or web address]_[email or web address]    Attestation    Signer name: Boby Toure MD    I attest that I reviewed the stored images and agree with the report as written.  Impression: Ultrasound-guided paracentesis with drainage of 4300 mL of serosanguinous fluid.    _______________________________________________________________    Electronically signed by resident: Thu Zheng NP  Date:    05/07/2019  Time:    12:57    Electronically signed by: Boby Toure MD  Date:    05/07/2019  Time:    13:35        Assessment/Plan:      * Hepatic encephalopathy  74 y/o male with a medical history of HCV-induced cirrhosis (2/2 IVDU in 1995), esophageal varices (2/19 EGD non-bleeding small (< 5 mm) esophageal varices), COPD, CAD (s/p RCA and OM2 stents in 2011), permanent pacemaker placement in 2011 (2/2 SSS/RBBB), and adrenal insufficiency 2/2 pituitary tumor s/p gamma knife, presents to the ED with altered mental status. As per wife, she found him on the floor near his bed around 2AM. He was unresponsive for a couple of minutes however, regained consciousness and has been extremely lethargic since then. Although he takes his regular  medications, he does not take his lactulose as it causes diarrhea. Admitted last month for acute decompensated liver liver; paracentesis ruled out SBP. MELD score 17; WBC 10.3, H/H 10.4/33.4, Plt 92, Crt 1.3, Tbil 2.4, AST/ALT 69/39, Alk phos 211, Mg 1.5, Lactate 2.3, Ammonia 76, INR 1.5, Trop 0.018, ETOH <10, . Patient currently afebrile.     Assessment   -Patient presents with an ammonia of 76. He did not properly take his medication which led him to become altered. Very low threshold for SBP as patient is afebrile and he does show any leukocytosis. He has been afebrile and is not complaining of any abdominal pain.   -Low likelihood that AMS is secondary to stroke as CT head is negative for any acute intracranial abnormalities.    -Patient not showing any signs of infection (no leukocytosis, afebrile and blood pressure is normal). UA and chest xray normal.  -Unlikely due to drugs as UDS was negative   - s/p therapeutic paracentesis with 4.3L removed  - US liver stable from 2016    Plan   - Lactulose 30 g QID   - Rifaximin 550 mg BID  - Blood cultures NGTD  - Ciprofloxacin 500 mg daily for SBP prophylaxis  - S/p diagnostic paracentesis, , Segs 35, PMN 44, no organisms seen on gram stain  - Discontinue Rocephin -> cipro 500mg bid (day 2)  - Continue lasix 40mg po bid (home dose)  - Continue spironolactone 200mg (home dose)  - Lactic acid 2.5 >> 4.0 >>3.9 >> 2.7 >> 3.1>>2.2        Debility  PT/OT  - Will be discharged with home health PT/OT and walker       Hypomagnesemia  Replace as needed      Thrombocytopenia  2/2 cirrhosis       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 Echo from three years ago shows an EF of  55% with elevated PAP    Hyperlipidemia  Hold statin in the setting of hepatic encephalopathy   - Restart on DC    Hypertension  Hold cozaar as patient's blood pressure has been running on the lower end.        VTE Risk Mitigation (From admission, onward)        Ordered     IP VTE HIGH RISK PATIENT  Once      05/03/19 2214     Place UMANG hose  Until discontinued      05/03/19 2214              Khadra Moy MD  Department of Hospital Medicine   [facility]

## 2019-05-07 NOTE — ASSESSMENT & PLAN NOTE
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 As per wife, she found him on the floor near his bed around 2AM. He was unresponsive for a couple of minutes however, regained consciousness and has been extremely lethargic since then. Although he takes his regular medications, he does not take his lactulose as it causes diarrhea. Admitted last month for acute decompensated liver liver; paracentesis ruled out SBP. MELD score 17; WBC 10.3, H/H 10.4/33.4, Plt 92, Crt 1.3, Tbil 2.4, AST/ALT 69/39, Alk phos 211, Mg 1.5, Lactate 2.3, Ammonia 76, INR 1.5, Trop 0.018, ETOH <10, . Patient currently afebrile.     Assessment   -Patient presents with an ammonia of 76. He did not properly take his medication which led him to become altered. Very low threshold for SBP as patient is afebrile and he does show any leukocytosis. He has been afebrile and is not complaining of any abdominal pain.   -Low likelihood that AMS is secondary to stroke as CT head is negative for any acute intracranial abnormalities.    -Patient not showing any signs of infection (no leukocytosis, afebrile and blood pressure is normal). UA and chest xray normal.  -Unlikely due to drugs as UDS was negative   - s/p therapeutic paracentesis with 4.3L removed  - US liver stable from 2016    Plan   - Lactulose 30 g QID   - Rifaximin 550 mg BID  - Blood cultures NGTD  - Ciprofloxacin 500 mg daily for SBP prophylaxis  - S/p diagnostic paracentesis, , Segs 35, PMN 44, no organisms seen on gram stain  - Discontinue Rocephin -> cipro 500mg bid (day 2)  - Continue lasix 40mg po bid (home dose)  - Continue spironolactone 200mg (home dose)  -  Lactic acid 2.5 >> 4.0 >>3.9 >> 2.7 >> 3.1>>2.2

## 2019-05-07 NOTE — SUBJECTIVE & OBJECTIVE
Interval History: Patient awake and alert today, ate breakfast fine. He has been ambulating with the walker fine. Fewer bowel movements during past day with change in lactulose frequency.    Review of Systems   Constitutional: Positive for activity change and fatigue. Negative for appetite change and chills.   HENT: Negative for congestion and sore throat.    Eyes: Negative for photophobia and visual disturbance.   Respiratory: Negative for cough, shortness of breath and wheezing.    Cardiovascular: Negative for chest pain and palpitations.   Gastrointestinal: Positive for abdominal distention (improved). Negative for abdominal pain, blood in stool, nausea and vomiting.   Endocrine: Negative for polyphagia and polyuria.   Genitourinary: Negative for difficulty urinating and dysuria.   Musculoskeletal: Positive for gait problem. Negative for arthralgias and back pain.   Skin: Negative for color change and pallor.   Neurological: Positive for weakness. Negative for dizziness, tremors, seizures and headaches.   Psychiatric/Behavioral: Negative for behavioral problems and confusion. The patient is not nervous/anxious.      Objective:     Vital Signs (Most Recent):  Temp: 98.6 °F (37 °C) (05/07/19 1545)  Pulse: 85 (05/07/19 1545)  Resp: 18 (05/07/19 1545)  BP: 104/62 (05/07/19 1545)  SpO2: (!) 92 % (05/07/19 1545) Vital Signs (24h Range):  Temp:  [98.2 °F (36.8 °C)-99.1 °F (37.3 °C)] 98.6 °F (37 °C)  Pulse:  [71-88] 85  Resp:  [16-18] 18  SpO2:  [92 %-99 %] 92 %  BP: (100-121)/(57-67) 104/62     Weight: 64.8 kg (142 lb 13.7 oz)  Body mass index is 19.92 kg/m².    Intake/Output Summary (Last 24 hours) at 5/7/2019 1547  Last data filed at 5/7/2019 1200  Gross per 24 hour   Intake 900 ml   Output 0 ml   Net 900 ml      Physical Exam   Constitutional: He is oriented to person, place, and time. He appears well-developed and well-nourished. No distress.   HENT:   Head: Normocephalic and atraumatic.   Right Ear: External ear  normal.   Left Ear: External ear normal.   Mouth/Throat: No oropharyngeal exudate.   Eyes: Conjunctivae and EOM are normal. Right eye exhibits no discharge. Left eye exhibits no discharge.   Neck: Normal range of motion. Neck supple.   Cardiovascular: Normal rate, regular rhythm and normal heart sounds.   No murmur heard.  Pulmonary/Chest: Effort normal and breath sounds normal. He has no wheezes.   Abdominal: Soft. Bowel sounds are normal. He exhibits distension (improved). There is no tenderness. There is no guarding.   Musculoskeletal: Normal range of motion. He exhibits no edema, tenderness or deformity.   Neurological: He is alert and oriented to person, place, and time. No sensory deficit. He exhibits normal muscle tone. Coordination normal.   Skin: Skin is warm and dry. Capillary refill takes less than 2 seconds. No rash noted. He is not diaphoretic. No erythema.   Psychiatric: He has a normal mood and affect.   Nursing note and vitals reviewed.      Significant Labs:   CBC:   Recent Labs   Lab 05/06/19 0312 05/07/19  0514   WBC 8.56 8.41   HGB 8.5* 8.9*   HCT 26.3* 28.9*   PLT 62* 63*     CMP:   Recent Labs   Lab 05/06/19 0312 05/07/19  0514    136   K 4.2 4.0    106   CO2 20* 22*   * 107   BUN 13 12   CREATININE 1.2 1.2   CALCIUM 8.9 9.5   PROT 6.4 6.8   ALBUMIN 2.8* 3.1*   BILITOT 1.1* 1.2*   ALKPHOS 168* 183*   AST 68* 71*   ALT 31 32   ANIONGAP 9 8   EGFRNONAA 59.6* 59.6*     Magnesium:   Recent Labs   Lab 05/06/19 0312 05/07/19  0514   MG 1.6 1.7       Significant Imaging: I have reviewed all pertinent imaging results/findings within the past 24 hours.     IR Paracentesis with Imaging  Narrative: EXAMINATION:  Ultrasound-guided paracentesis    Procedural Personnel    Attending physician(s): Boby Toure MD    Fellow physician(s): None    Resident physician(s): None    Advanced practice provider(s): Thu Zheng, VICKI, ARNOLDP    Pre-procedure diagnosis: Ascites    Post-procedure  diagnosis: Same    Complications: No immediate complications.    CLINICAL HISTORY:  Recurrent Ascites    TECHNIQUE:  - Ultrasound-guided paracentesis    COMPARISON:  Paracentesis 4/17/2019    FINDINGS:  Pre-procedure    Consent: Informed consent for the procedure was obtained and time-out was performed prior to the procedure.    Preparation: The site was prepared and draped using maximal sterile barrier technique including cutaneous antisepsis.    Anesthesia/sedation    Level of anesthesia/sedation: No sedation    Anesthesia/sedation administered by: Not applicable    Total intra-service sedation time (minutes): 0    Limited abdominal ultrasound    Limited abdominal ultrasound was performed.    Moderate ascites. A safe window for paracentesis was identified.    Paracentesis    Local anesthesia was administered. The peritoneal cavity was accessed on the right lower quadrant and fluid return confirmed position. Ascites was drained.    Paracentesis access technique: Real-time ultrasound guidance    Catheter placed: 5F Yueh    Closure    The catheter was removed. A sterile bandage was applied.    Post-drainage ultrasound: Not performed    Additional Details    Additional description of procedure: None    Equipment details: None    Specimens removed: Abdominal fluid    Estimated blood loss (mL): Less than 10    Standardized report: [email or web address]_Paracentesis_v2    Attestation    Signer name: Boby Toure MD    I attest that I reviewed the stored images and agree with the report as written.  Impression: Ultrasound-guided paracentesis with drainage of 4300 mL of serosanguinous fluid.    _______________________________________________________________    Electronically signed by resident: Thu Zheng NP  Date:    05/07/2019  Time:    12:57    Electronically signed by: Boby Toure MD  Date:    05/07/2019  Time:    13:35

## 2019-05-07 NOTE — PLAN OF CARE
Problem: Adult Inpatient Plan of Care  Goal: Plan of Care Review  Outcome: Ongoing (interventions implemented as appropriate)  POC reviewed patient and spouse. Address questions and concerns. AAOX4. VVS. No acute changes. IV Mag replacement given. BM X3. OSNF no bed until thur or Friday.  Avasys Cameras continue for safety. Will continue to monitor.

## 2019-05-07 NOTE — PHYSICIAN QUERY
"PT Name: Fox Lyons Sr.  MR #: [id number]    Physician Query Form - Heart  Condition Clarification     Earlene Galeano RN, CCDS  Desk # [phone]; lyle # [phone] [email or web address]    This form is a permanent document in the medical record.     Query Date: May 7, 2019    By submitting this query, we are merely seeking further clarification of documentation. Please utilize your independent clinical judgment when addressing the question(s) below.    The medical record contains the following   Indicators     Supporting Clinical Findings Location in Medical Record   x BNP 36 on 5/3 Lab   x EF 55  H&P   x Radiology findings  Cardiac silhouette is normal in size.  Lung volumes are low but symmetric.  No consolidation.  There is suspected subsegmental atelectasis within the left lung base.  No pneumothorax or pleural effusions.   Hypoventilatory lung changes without focal consolidation.   CXR 5/3   x Echo Results Echo from three years ago shows an EF of 55% with elevated PAP  H&P   x "Ascites" documented ABDOMEN:  Distention noted with ascites. ED MD    "SOB" or "SALDIVAR" documented      "Hypoxia" documented     x Heart Failure   documented Patient admitted last month for acute decompensated heart failure H&P 5/4    "Edema" documented     x Diuretics/Meds Continue Lasix/Aldactone/Cipro for SBP px (home med).    - Uptitrate lasix to 40mg bid today  - Uptitrate spironolactone to 200mg today   Hosp PN 5/3    Hosp PN 5/6       x Treatment: Given albumin/IVF on admission with improvement in ERNESTINA. 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 It is generally further specificed as systolic, diastolic, or combined. Lastly, it is important to identify an underlying etiology if known or suspected.   Common clues to acute exacerbation:  Rapidly progressive symptoms (w/in 2 weeks of presentation), using IV diuretics to treat, using supplemental O2, pulmonary edema on Xray, MI w/in 4 weeks, and/or BNP >500  Systolic Heart Failure: is defined as chart documentation of a left ventricular ejection fraction (LVEF) less than 40%   Diastolic Heart Failure: is defined as a left ventricular ejection fraction (LVEF) greater than 40%   +      Evidence of diastolic dysfunction on echocardiography OR    Right heart catheterization wedge pressure above 12 mm Hg OR    Left heart catheterization left ventricular end diastolic pressure 18 mm Hg or above.  References: *American Heart Association    The clinical guidelines noted below are only system guidelines, and do not replace the providers clinical judgment.     Provider, please specify the diagnosis associated with above clinical findings    [   ] Acute on Chronic Diastolic Heart Failure -    Pre-existing diastoic HF diagnosis.  EF > 40%  and acute HF symptoms documented                                   [   ] Chronic Diastolic Heart Failure - Pre-existing diastolic HF diagnosis.  EF > 40%  without  acute HF symptoms documented    [   ] Other Type of Heart Failure (please specify type): _________________________    [ x  ] Other (please specify): __does not have heart failure_________________________________    [  ] Clinically Undetermined                          Please document in your progress notes daily for the duration of treatment until resolved and include in your discharge summary.

## 2019-05-07 NOTE — PHYSICIAN QUERY
"PT Name: Fox Lyons Sr.  MR #: [id number]     PHYSICIAN QUERY -  ACUITY OF CONDITION CLARIFICATION      Earlene Galeano RN, CCDS  Desk # [phone]; lyle # [phone] [email or web address]    This form is a permanent document in the medical record.     Query Date: May 7, 2019    By submitting this query, we are merely seeking further clarification of documentation to reflect the severity of illness of your patient. Please utilize your independent clinical judgment when addressing the question(s) below.    The Medical record reflects the following:     Indicators   Supporting Clinical Findings Location in Medical Record   x Documentation of condition AMS  Confusion  Hepatic Encephalopathy    Pt has not been taking lactulose the last few days.    H&P        ED MD   x Lab Value(s) Ammonia 76     Radiology Findings     x Treatment                                 Medication Restart Lactulose.  Start Rifaximin.   Continue Lasix/Aldactone/Cipro for SBP px (home med).      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

## 2019-05-07 NOTE — PLAN OF CARE
Problem: Fall Injury Risk  Goal: Absence of Fall and Fall-Related Injury  Outcome: Ongoing (interventions implemented as appropriate)  Patient remains free from falls.     Problem: Adult Inpatient Plan of Care  Goal: Plan of Care Review  Outcome: Ongoing (interventions implemented as appropriate)  No acute events or changes overnight. Patient awaiting placement to SNF or home with HH.

## 2019-05-07 NOTE — PLAN OF CARE
[facility] sent HH orders to [facility].      Shamika Light LCSW  [id number]     05/07/19 1347   Post-Acute Status   Post-Acute Authorization Home Health/Hospice  ([facility])   Home Health/Hospice Status Referrals Sent

## 2019-05-07 NOTE — PLAN OF CARE
[facility]    HOME HEALTH ORDERS  FACE TO FACE ENCOUNTER    Patient Name: Fox Lyons Sr.  YOB: 1945    PCP: Clover White MD   PCP Address: [address] / [place] [address]  PCP Phone Number: [phone]  PCP Fax: [phone]    Encounter Date: 05/07/2019    Admit to Home Health    Diagnoses:  Active Hospital Problems    Diagnosis  POA    *Hepatic encephalopathy [K72.90]  Yes    Debility [R53.81]  Yes    Hypomagnesemia [E83.42]  Yes    Thrombocytopenia [D69.6]  Yes    Secondary adrenal insufficiency [E27.49]  Yes    GERD (gastroesophageal reflux disease) [K21.9]  Yes    Cardiac pacemaker in situ [Z95.0]  Yes     Chronic     AAI-R.  [place] ( [id number])      Hypertension [I10]  Yes     Chronic    Hyperlipidemia [E78.5]  Yes     Chronic    Coronary artery disease [I25.10]  Yes     Chronic     5/30/11 cath mid LCX 50%, 2 stents patent        Resolved Hospital Problems   No resolved problems to display.       Future Appointments   Date Time Provider Department Center   5/8/2019 11:00 AM Clover White MD [facility]           I have seen and examined this patient face to face today. My clinical findings that support the need for the home health skilled services and home bound status are the following:  Weakness/numbness causing balance and gait disturbance due to Liver Disease and Weakness/Debility making it taxing to leave home.    Allergies:  Review of patient's allergies indicates:   Allergen Reactions    Codeine Itching, Rash, Edema and Other (See Comments)     Other reaction(s): Itching       Diet: cardiac diet    Activities: activity as tolerated    Nursing:   SN to complete comprehensive assessment including routine vital signs. Instruct on disease process and s/s of complications to report to MD. Review/verify medication list sent home with the patient at time of discharge  and instruct patient/caregiver as needed. Frequency  may be adjusted depending on start of care date.    Notify MD if SBP > 160 or < 90; DBP > 90 or < 50; HR > 120 or < 50; Temp > 101;       CONSULTS:    Physical Therapy to evaluate and treat. Evaluate for home safety and equipment needs; Establish/upgrade home exercise program. Perform / instruct on therapeutic exercises, gait training, transfer training, and Range of Motion.  Occupational Therapy to evaluate and treat. Evaluate home environment for safety and equipment needs. Perform/Instruct on transfers, ADL training, ROM, and therapeutic exercises.    Patient will require rolling walker and tub bench for home use.    MISCELLANEOUS CARE:  N/A    WOUND CARE ORDERS  n/a      Medications: Review discharge medications with patient and family and provide education.      Current Discharge Medication List      CONTINUE these medications which have NOT CHANGED    Details   ciprofloxacin HCl (CIPRO) 500 MG tablet Take 1 tablet (500 mg total) by mouth once daily.  Qty: 30 tablet, Refills: 11    Associated Diagnoses: Other ascites      fluticasone-umeclidin-vilanter (TRELEGY ELLIPTA) 100-62.5-25 mcg DsDv Inhale 1 puff into the lungs once daily.  Qty: 1 each, Refills: 12      furosemide (LASIX) 40 MG tablet Take 0.5 tablets (20 mg total) by mouth 2 (two) times daily.  Qty: 30 tablet, Refills: 11    Associated Diagnoses: Other ascites      hydrocortisone (CORTEF) 10 MG Tab TAKE ONE AND ONE-HALF TABLETS BY MOUTH EVERY MORNING AND ONE-HALF TABLET EVERY EVENING./NO FURTHER REFILLS  NEED FOLLOW UP VISIT  Qty: 180 tablet, Refills: 3    Associated Diagnoses: Secondary adrenal insufficiency      losartan (COZAAR) 100 MG tablet Take 1 tablet (100 mg total) by mouth once daily.  Qty: 90 tablet, Refills: 3      multivitamin-minerals-lutein (CENTRUM SILVER) Tab Take 1 tablet by mouth once daily.        omeprazole (PRILOSEC) 40 MG capsule TAKE 1 CAPSULE(40 MG) BY MOUTH TWICE DAILY BEFORE MEALS  Qty: 180 capsule, Refills: 0    Associated  Diagnoses: Gastroesophageal reflux disease without esophagitis      pravastatin (PRAVACHOL) 40 MG tablet TAKE 1 TABLET(40 MG) BY MOUTH EVERY EVENING  Qty: 90 tablet, Refills: 3      spironolactone (ALDACTONE) 100 MG tablet Take 2 tablets (200 mg total) by mouth once daily.  Qty: 60 tablet, Refills: 3      lactulose (CHRONULAC) 20 gram/30 mL Soln Take 30 mLs (20 g total) by mouth once daily. Please ensure you are having 3-4 bowels movements daily for 100 doses  Qty: 3000 mL, Refills: 11    Associated Diagnoses: Cirrhosis of liver without ascites, unspecified hepatic cirrhosis type             I certify that this patient is confined to his home and needs physical therapy and occupational therapy.

## 2019-05-08 VITALS
WEIGHT: 142.88 LBS | OXYGEN SATURATION: 96 % | TEMPERATURE: 99 F | BODY MASS INDEX: 20 KG/M2 | DIASTOLIC BLOOD PRESSURE: 62 MMHG | HEART RATE: 81 BPM | RESPIRATION RATE: 17 BRPM | SYSTOLIC BLOOD PRESSURE: 113 MMHG | HEIGHT: 71 IN

## 2019-05-08 LAB
ALBUMIN SERPL BCP-MCNC: 3.1 G/DL (ref 3.5–5.2)
ALP SERPL-CCNC: 212 U/L (ref 55–135)
ALT SERPL W/O P-5'-P-CCNC: 39 U/L (ref 10–44)
ANION GAP SERPL CALC-SCNC: 9 MMOL/L (ref 8–16)
AST SERPL-CCNC: 86 U/L (ref 10–40)
BACTERIA BLD CULT: NORMAL
BACTERIA BLD CULT: NORMAL
BACTERIA SPEC AEROBE CULT: NORMAL
BASOPHILS # BLD AUTO: 0.02 K/UL (ref 0–0.2)
BASOPHILS NFR BLD: 0.2 % (ref 0–1.9)
BILIRUB SERPL-MCNC: 1.4 MG/DL (ref 0.1–1)
BUN SERPL-MCNC: 13 MG/DL (ref 8–23)
CALCIUM SERPL-MCNC: 9.8 MG/DL (ref 8.7–10.5)
CHLORIDE SERPL-SCNC: 102 MMOL/L (ref 95–110)
CO2 SERPL-SCNC: 22 MMOL/L (ref 23–29)
CREAT SERPL-MCNC: 1.5 MG/DL (ref 0.5–1.4)
DIFFERENTIAL METHOD: ABNORMAL
EOSINOPHIL # BLD AUTO: 0.1 K/UL (ref 0–0.5)
EOSINOPHIL NFR BLD: 1 % (ref 0–8)
ERYTHROCYTE [DISTWIDTH] IN BLOOD BY AUTOMATED COUNT: 19.8 % (ref 11.5–14.5)
EST. GFR  (AFRICAN AMERICAN): 52.6 ML/MIN/1.73 M^2
EST. GFR  (NON AFRICAN AMERICAN): 45.5 ML/MIN/1.73 M^2
GLUCOSE SERPL-MCNC: 130 MG/DL (ref 70–110)
HCT VFR BLD AUTO: 28.9 % (ref 40–54)
HGB BLD-MCNC: 9.3 G/DL (ref 14–18)
IMM GRANULOCYTES # BLD AUTO: 0.05 K/UL (ref 0–0.04)
IMM GRANULOCYTES NFR BLD AUTO: 0.5 % (ref 0–0.5)
LYMPHOCYTES # BLD AUTO: 0.8 K/UL (ref 1–4.8)
LYMPHOCYTES NFR BLD: 8.2 % (ref 18–48)
MAGNESIUM SERPL-MCNC: 1.8 MG/DL (ref 1.6–2.6)
MCH RBC QN AUTO: 27.9 PG (ref 27–31)
MCHC RBC AUTO-ENTMCNC: 32.2 G/DL (ref 32–36)
MCV RBC AUTO: 87 FL (ref 82–98)
MONOCYTES # BLD AUTO: 0.8 K/UL (ref 0.3–1)
MONOCYTES NFR BLD: 8.4 % (ref 4–15)
NEUTROPHILS # BLD AUTO: 8 K/UL (ref 1.8–7.7)
NEUTROPHILS NFR BLD: 81.7 % (ref 38–73)
NRBC BLD-RTO: 0 /100 WBC
PLATELET # BLD AUTO: 69 K/UL (ref 150–350)
PMV BLD AUTO: 10.5 FL (ref 9.2–12.9)
POTASSIUM SERPL-SCNC: 4.5 MMOL/L (ref 3.5–5.1)
PROT SERPL-MCNC: 6.9 G/DL (ref 6–8.4)
RBC # BLD AUTO: 3.33 M/UL (ref 4.6–6.2)
SODIUM SERPL-SCNC: 133 MMOL/L (ref 136–145)
WBC # BLD AUTO: 9.85 K/UL (ref 3.9–12.7)

## 2019-05-08 PROCEDURE — 99239 PR HOSPITAL DISCHARGE DAY,>30 MIN: ICD-10-PCS | Mod: GC,,, | Performed by: HOSPITALIST

## 2019-05-08 PROCEDURE — 25000003 PHARM REV CODE 250: Performed by: STUDENT IN AN ORGANIZED HEALTH CARE EDUCATION/TRAINING PROGRAM

## 2019-05-08 PROCEDURE — 25000242 PHARM REV CODE 250 ALT 637 W/ HCPCS: Performed by: STUDENT IN AN ORGANIZED HEALTH CARE EDUCATION/TRAINING PROGRAM

## 2019-05-08 PROCEDURE — 36415 COLL VENOUS BLD VENIPUNCTURE: CPT

## 2019-05-08 PROCEDURE — 97110 THERAPEUTIC EXERCISES: CPT

## 2019-05-08 PROCEDURE — 97530 THERAPEUTIC ACTIVITIES: CPT

## 2019-05-08 PROCEDURE — 83735 ASSAY OF MAGNESIUM: CPT

## 2019-05-08 PROCEDURE — 80053 COMPREHEN METABOLIC PANEL: CPT

## 2019-05-08 PROCEDURE — 99239 HOSP IP/OBS DSCHRG MGMT >30: CPT | Mod: GC,,, | Performed by: HOSPITALIST

## 2019-05-08 PROCEDURE — 85025 COMPLETE CBC W/AUTO DIFF WBC: CPT

## 2019-05-08 RX ADMIN — HYDROCORTISONE 10 MG: 5 TABLET ORAL at 09:05

## 2019-05-08 RX ADMIN — PANTOPRAZOLE SODIUM 40 MG: 40 TABLET, DELAYED RELEASE ORAL at 09:05

## 2019-05-08 RX ADMIN — SPIRONOLACTONE 200 MG: 100 TABLET, FILM COATED ORAL at 09:05

## 2019-05-08 RX ADMIN — CIPROFLOXACIN HYDROCHLORIDE 500 MG: 500 TABLET, FILM COATED ORAL at 09:05

## 2019-05-08 RX ADMIN — FUROSEMIDE 40 MG: 40 TABLET ORAL at 09:05

## 2019-05-08 RX ADMIN — RIFAXIMIN 550 MG: 550 TABLET ORAL at 09:05

## 2019-05-08 RX ADMIN — FLUTICASONE FUROATE AND VILANTEROL TRIFENATATE 1 PUFF: 200; 25 POWDER RESPIRATORY (INHALATION) at 09:05

## 2019-05-08 RX ADMIN — LACTULOSE 30 G: 20 SOLUTION ORAL at 09:05

## 2019-05-08 RX ADMIN — TIOTROPIUM BROMIDE 18 MCG: 18 CAPSULE ORAL; RESPIRATORY (INHALATION) at 09:05

## 2019-05-08 NOTE — DISCHARGE SUMMARY
[facility] Hospital Medicine  Discharge Summary      Patient Name: Fox Lyons Sr.  MRN: [id number]  Admission Date: 5/3/2019  Hospital Length of Stay: 5 days  Discharge Date and Time:  05/08/2019 10:09 AM  Attending Physician: Makeda Olson MD   Discharging Provider: Khadra Moy MD  Primary Care Provider: Clover White MD  [facility] Medicine Team: [facility]OSP MED 3 Khadra Moy MD    HPI:   72 y/o male with a history of portal hypertension from HCV-induced cirrhosis (2/2 IV drug use in 1995), esophageal varices (2/19 EGD non-bleeding small (< 5 mm) esophageal varices), COPD, CAD (s/p RCA and OM2 stents in 2011), permanent pacemaker placement in 2011 (2/2 SSS/RBBB), and adrenal insufficiency 2/2 pituitary tumor s/p gamma knife, presents to the ED with a chief complaint of increasing lethargy. Around 2 AM last night, wife noticed patient had fallen out of bed. On her initial assessment, patient was unresponsive and not moving at all. A couple of minutes later he slowly regained consciousness but was extremely lethargic. He went back to bed and the next day patient was brought into the emergency room by his son. As per wife, patient will tend to skip his lactulose dosing as it gives him diarrhea. Patient denies headache, edema, nausea, fevers, palpitations or pain in joints. Denies dysuria however,compalins of very dark urine. Patient also complains of a 20 lb weight loss. Patient admitted last month for acute decompensated heart failure. Patient had diagnostic/therapeutic paracentesis last admission in which 3 L was removed showed portal HTN however, no SBP. He was then discharged on ciprofloxacin (for SBP prophylaxis), as well as lasix and spironolactone. Patient was a former smoker (quit many years ago). He does not drink or use IV drugs. Home medications include ciprofloxacin daily, furosemide 40 mg BID, hydrocortisone 10 mg daily, lactulose 20 g total daily,  omeprazole 40 mg, pravastatin 40 mg, spironolactone 200 mg daily.     On arrival to the ED, patient was afebrile, with a blood pressure of 129/77. Work-up revealed WBC 10.3, H/H 10.4/33.4, Plt 92, Crt 1.3, Tbil 2.4, AST/ALT 69/39, Alk phos 211, Mg 1.5, Lactate 2.3, Ammonia 76, INR 1.5, Trop 0.018, ETOH <10, . MELD score 17. CT revealed no acute abnormality. EKG showed atrial paced rhythm at 79 bpm, with a RBBB (similar to previous). CXR shows no focal consolidations. Blood cultures pending. Patient was given a one time dose of lactulose. Magnesium was replaced.  Urinalysis and UDS shows no abnormalities.    On my physical examination, patient resting comfortably in bed. He is alert and oriented to person, place and time. He is able to follow all commands. Abdomen is distended however, soft. He is not complaining of any abdominal pain. Scleral icterus present.       * No surgery found *      Hospital Course:   Patient admitted for hepatic encephalopathy likely secondary to non-compliance with lactulose. Original concern for SBP. Patient with elevated Lactic acid with a peak of 4.0, patient was given 2.5L IVF with downtrend to 2.7. Patient was started on Rocephin. S/p diagnostic paracentesis with results not concerning for SBP. Patient originally with ERNESTINA, however renal function has improved since admission. Will resume spironolactone and lasix and titrate up to home dose. Patient stable for discharge with HH PT/OT.     Physical Exam   Constitutional: He is oriented to person, place, and time. He appears well-developed and well-nourished. No distress.   HENT:   Head: Normocephalic and atraumatic.   Right Ear: External ear normal.   Left Ear: External ear normal.   Mouth/Throat: No oropharyngeal exudate.   Eyes: Conjunctivae and EOM are normal. Right eye exhibits no discharge. Left eye exhibits no discharge.   Neck: Normal range of motion. Neck supple.   Cardiovascular: Normal rate, regular rhythm and normal  heart sounds.   No murmur heard.  Pulmonary/Chest: Effort normal and breath sounds normal. He has no wheezes.   Abdominal: Soft. Bowel sounds are normal. He exhibits distension (improved). There is no tenderness. There is no guarding.   Musculoskeletal: Normal range of motion. He exhibits no edema, tenderness or deformity.   Neurological: He is alert and oriented to person, place, and time. No sensory deficit. He exhibits normal muscle tone. Coordination normal.   Skin: Skin is warm and dry. Capillary refill takes less than 2 seconds. No rash noted. He is not diaphoretic. No erythema.   Psychiatric: He has a normal mood and affect.   Nursing note and vitals reviewed.     Consults:   Consults (From admission, onward)        Status Ordering Provider     Inpatient consult to Interventional Radiology  Once     Provider:  (Not yet assigned)    Completed ONI JOSHI     Inpatient consult to PICC team ([facility])  Once     Provider:  (Not yet assigned)    Completed BREANNA MACKEY     Inpatient consult to [facility]  Once     Provider:  (Not yet assigned)    Acknowledged ELDON CR [facility]          * Hepatic encephalopathy  74 y/o male with a medical history of HCV-induced cirrhosis (2/2 IVDU in 1995), esophageal varices (2/19 EGD non-bleeding small (< 5 mm) esophageal varices), COPD, CAD (s/p RCA and OM2 stents in 2011), permanent pacemaker placement in 2011 (2/2 SSS/RBBB), and adrenal insufficiency 2/2 pituitary tumor s/p gamma knife, presents to the ED with altered mental status. As per wife, she found him on the floor near his bed around 2AM. He was unresponsive for a couple of minutes however, regained consciousness and has been extremely lethargic since then. Although he takes his regular medications, he does not take his lactulose as it causes diarrhea. Admitted last month for acute decompensated liver liver; paracentesis ruled out SBP. MELD score 17; WBC 10.3, H/H 10.4/33.4, Plt 92, Crt 1.3, Tbil 2.4, AST/ALT  69/39, Alk phos 211, Mg 1.5, Lactate 2.3, Ammonia 76, INR 1.5, Trop 0.018, ETOH <10, . Patient currently afebrile.     Assessment   -Patient presents with an ammonia of 76. He did not properly take his medication which led him to become altered. Very low threshold for SBP as patient is afebrile and he does show any leukocytosis. He has been afebrile and is not complaining of any abdominal pain.   -Low likelihood that AMS is secondary to stroke as CT head is negative for any acute intracranial abnormalities.    -Patient not showing any signs of infection (no leukocytosis, afebrile and blood pressure is normal). UA and chest xray normal.  -Unlikely due to drugs as UDS was negative   - s/p therapeutic paracentesis with 4.3L removed  - US liver stable from 2016    Plan   - Lactulose 30 g QID   - Rifaximin 550 mg BID  - Blood cultures NGTD  - Ciprofloxacin 500 mg daily for SBP prophylaxis  - S/p diagnostic paracentesis, , Segs 35, PMN 44, no organisms seen on gram stain  - Discontinue Rocephin -> cipro 500mg bid (day 2)  - Continue lasix 40mg po bid (home dose)  - Continue spironolactone 200mg (home dose)  - Lactic acid 2.5 >> 4.0 >>3.9 >> 2.7 >> 3.1>>2.2        Debility  PT/OT  - Will be discharged with home health PT/OT and walker       Hypomagnesemia  Replace as needed      Thrombocytopenia  2/2 cirrhosis       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 Echo from three years ago shows an EF of 55% with elevated PAP    Hyperlipidemia  Hold statin in the setting of hepatic encephalopathy   - Restart on DC    Hypertension  Hold cozaar as patient's blood pressure has been running on the lower end.        Final Active  "Diagnoses:    Diagnosis Date Noted POA    PRINCIPAL PROBLEM:  Hepatic encephalopathy [K72.90] 05/03/2019 Yes    Debility [R53.81] 05/05/2019 Yes    Hypomagnesemia [E83.42] 05/04/2019 Yes    Thrombocytopenia [D69.6] 05/04/2016 Yes    Secondary adrenal insufficiency [E27.49] 03/31/2015 Yes    GERD (gastroesophageal reflux disease) [K21.9] 10/06/2014 Yes    Cardiac pacemaker in situ [Z95.0] 06/28/2013 Yes     Chronic    Hypertension [I10]  Yes     Chronic    Hyperlipidemia [E78.5]  Yes     Chronic    Coronary artery disease [I25.10]  Yes     Chronic      Problems Resolved During this Admission:       Discharged Condition: good    Disposition: Home or Self Care    Follow Up:  Follow-up Information     Clover White MD On 5/15/2019.    Specialty:  Family Medicine  Why:  Hospital Follow-Up: 5/15 at 9:00 am  Contact information:  [address]  [address]  [facility] [address]  [phone]                 Patient Instructions:      WALKER FOR HOME USE     Order Specific Question Answer Comments   Type of Walker: Adult (5'4"-6'6")    With wheels? Yes    Height: 5' 11" (1.803 m)    Weight: 64.8 kg (142 lb 13.7 oz)    Length of need (1-99 months): 99    Does patient have medical equipment at home? none    Please check all that apply: Patient is unable to safely ambulate without equipment.      BATH/SHOWER CHAIR FOR HOME USE     Order Specific Question Answer Comments   Height: 5' 11" (1.803 m)    Weight: 64.8 kg (142 lb 13.7 oz)    Does patient have medical equipment at home? none    Length of need (1-99 months): 99    Type: With back      Diet Cardiac     Notify your health care provider if you experience any of the following:  temperature >100.4     Notify your health care provider if you experience any of the following:  persistent nausea and vomiting or diarrhea     Notify your health care provider if you experience any of the following:  severe uncontrolled pain     Notify your health care provider if " you experience any of the following:  redness, tenderness, or signs of infection (pain, swelling, redness, odor or green/yellow discharge around incision site)     Notify your health care provider if you experience any of the following:  difficulty breathing or increased cough     Notify your health care provider if you experience any of the following:  severe persistent headache     Notify your health care provider if you experience any of the following:  worsening rash     Notify your health care provider if you experience any of the following:  persistent dizziness, light-headedness, or visual disturbances     Notify your health care provider if you experience any of the following:  increased confusion or weakness     Activity as tolerated       Significant Diagnostic Studies: Labs:   CMP   Recent Labs   Lab 05/07/19  0514 05/08/19  0359    133*   K 4.0 4.5    102   CO2 22* 22*    130*   BUN 12 13   CREATININE 1.2 1.5*   CALCIUM 9.5 9.8   PROT 6.8 6.9   ALBUMIN 3.1* 3.1*   BILITOT 1.2* 1.4*   ALKPHOS 183* 212*   AST 71* 86*   ALT 32 39   ANIONGAP 8 9   ESTGFRAFRICA >60.0 52.6*   EGFRNONAA 59.6* 45.5*    and CBC   Recent Labs   Lab 05/07/19  0514 05/08/19  0359   WBC 8.41 9.85   HGB 8.9* 9.3*   HCT 28.9* 28.9*   PLT 63* 69*       Pending Diagnostic Studies:     None         IR Paracentesis with Imaging  Narrative: EXAMINATION:  Ultrasound-guided paracentesis    Procedural Personnel    Attending physician(s): Boby Toure MD    Fellow physician(s): None    Resident physician(s): None    Advanced practice provider(s): VICKI Valentin, GABBIE    Pre-procedure diagnosis: Ascites    Post-procedure diagnosis: Same    Complications: No immediate complications.    CLINICAL HISTORY:  Recurrent Ascites    TECHNIQUE:  - Ultrasound-guided paracentesis    COMPARISON:  Paracentesis 4/17/2019    FINDINGS:  Pre-procedure    Consent: Informed consent for the procedure was obtained and time-out was performed  prior to the procedure.    Preparation: The site was prepared and draped using maximal sterile barrier technique including cutaneous antisepsis.    Anesthesia/sedation    Level of anesthesia/sedation: No sedation    Anesthesia/sedation administered by: Not applicable    Total intra-service sedation time (minutes): 0    Limited abdominal ultrasound    Limited abdominal ultrasound was performed.    Moderate ascites. A safe window for paracentesis was identified.    Paracentesis    Local anesthesia was administered. The peritoneal cavity was accessed on the right lower quadrant and fluid return confirmed position. Ascites was drained.    Paracentesis access technique: Real-time ultrasound guidance    Catheter placed: 5F Yueh    Closure    The catheter was removed. A sterile bandage was applied.    Post-drainage ultrasound: Not performed    Additional Details    Additional description of procedure: None    Equipment details: None    Specimens removed: Abdominal fluid    Estimated blood loss (mL): Less than 10    Standardized report: SIR_Paracentesis_v2    Attestation    Signer name: Boby Toure MD    I attest that I reviewed the stored images and agree with the report as written.  Impression: Ultrasound-guided paracentesis with drainage of 4300 mL of serosanguinous fluid.    _______________________________________________________________    Electronically signed by resident: Thu Zheng NP  Date:    05/07/2019  Time:    12:57    Electronically signed by: Boby Toure MD  Date:    05/07/2019  Time:    13:35        Medications:  Reconciled Home Medications:      Medication List      CHANGE how you take these medications    furosemide 40 MG tablet  Commonly known as:  LASIX  Take 0.5 tablets (20 mg total) by mouth 2 (two) times daily.  What changed:  how much to take        CONTINUE taking these medications    CENTRUM SILVER Tab  Generic drug:  multivitamin-minerals-lutein  Take 1 tablet by mouth once daily.      ciprofloxacin HCl 500 MG tablet  Commonly known as:  CIPRO  Take 1 tablet (500 mg total) by mouth once daily.     fluticasone-umeclidin-vilanter 100-62.5-25 mcg Dsdv  Commonly known as:  TRELEGY ELLIPTA  Inhale 1 puff into the lungs once daily.     hydrocortisone 10 MG Tab  Commonly known as:  CORTEF  TAKE ONE AND ONE-HALF TABLETS BY MOUTH EVERY MORNING AND ONE-HALF TABLET EVERY EVENING./NO FURTHER REFILLS  NEED FOLLOW UP VISIT     lactulose 20 gram/30 mL Soln  Commonly known as:  CHRONULAC  Take 30 mLs (20 g total) by mouth once daily. Please ensure you are having 3-4 bowels movements daily for 100 doses     losartan 100 MG tablet  Commonly known as:  COZAAR  Take 1 tablet (100 mg total) by mouth once daily.     omeprazole 40 MG capsule  Commonly known as:  PRILOSEC  TAKE 1 CAPSULE(40 MG) BY MOUTH TWICE DAILY BEFORE MEALS     pravastatin 40 MG tablet  Commonly known as:  PRAVACHOL  TAKE 1 TABLET(40 MG) BY MOUTH EVERY EVENING     spironolactone 100 MG tablet  Commonly known as:  ALDACTONE  Take 2 tablets (200 mg total) by mouth once daily.            Indwelling Lines/Drains at time of discharge:   Lines/Drains/Airways          None          Time spent on the discharge of patient: 35 minutes  Patient was seen and examined on the date of discharge and determined to be suitable for discharge.         Khadra Moy MD  Department of Hospital Medicine  [facility]

## 2019-05-08 NOTE — PT/OT/SLP PROGRESS
Physical Therapy   Discharge    Fox Lyons Sr.   MRN: [id number]     Hospital discharge today with PT eval only completed; therefore, no goals met. Pt. being discharged home with home health today.    Enrique Russell, PT  5/8/2019

## 2019-05-08 NOTE — ASSESSMENT & PLAN NOTE
74 y/o male with a medical history of HCV-induced cirrhosis (2/2 IVDU in 1995), esophageal varices (2/19 EGD non-bleeding small (< 5 mm) esophageal varices), COPD, CAD (s/p RCA and OM2 stents in 2011), permanent pacemaker placement in 2011 (2/2 SSS/RBBB), and adrenal insufficiency 2/2 pituitary tumor s/p gamma knife, presents to the ED with altered mental status. As per wife, she found him on the floor near his bed around 2AM. He was unresponsive for a couple of minutes however, regained consciousness and has been extremely lethargic since then. Although he takes his regular medications, he does not take his lactulose as it causes diarrhea. Admitted last month for acute decompensated liver liver; paracentesis ruled out SBP. MELD score 17; WBC 10.3, H/H 10.4/33.4, Plt 92, Crt 1.3, Tbil 2.4, AST/ALT 69/39, Alk phos 211, Mg 1.5, Lactate 2.3, Ammonia 76, INR 1.5, Trop 0.018, ETOH <10, . Patient currently afebrile.     Assessment   -Patient presents with an ammonia of 76. He did not properly take his medication which led him to become altered. Very low threshold for SBP as patient is afebrile and he does show any leukocytosis. He has been afebrile and is not complaining of any abdominal pain.   -Low likelihood that AMS is secondary to stroke as CT head is negative for any acute intracranial abnormalities.    -Patient not showing any signs of infection (no leukocytosis, afebrile and blood pressure is normal). UA and chest xray normal.  -Unlikely due to drugs as UDS was negative   - s/p therapeutic paracentesis with 4.3L removed  - US liver stable from 2016    Plan   - Lactulose 30 g QID   - Rifaximin 550 mg BID  - Blood cultures NGTD  - Ciprofloxacin 500 mg daily for SBP prophylaxis  - S/p diagnostic paracentesis, , Segs 35, PMN 44, no organisms seen on gram stain  - Discontinue Rocephin -> cipro 500mg bid (day 2)  - Continue lasix 40mg po bid (home dose)  - Continue spironolactone 200mg (home dose)  -  Lactic acid 2.5 >> 4.0 >>3.9 >> 2.7 >> 3.1>>2.2

## 2019-05-08 NOTE — PT/OT/SLP PROGRESS
Occupational Therapy   Treatment    Name: Fox Lyons Sr.  MRN: [id number]  Admitting Diagnosis:  Hepatic encephalopathy       Recommendations:     Discharge Recommendations: nursing facility, skilled  Discharge Equipment Recommendations:  walker, rolling, commode  Barriers to discharge:  None    Assessment:      Fox Lyons Sr. is a 73 y.o. male with a medical diagnosis of Hepatic encephalopathy. Pt/wife are choosing to go home with HH vs SS-SNF. Discussed any D/C needs. Recommend HHOT and RW / 3in1 commode. Performance deficits affecting function are weakness, impaired functional mobilty, gait instability, impaired endurance, impaired balance, impaired self care skills.     Rehab Prognosis:  Good; patient would benefit from acute skilled OT services to address these deficits and reach maximum level of function.       Plan:     Patient to be seen 3 x/week to address the above listed problems via self-care/home management, therapeutic activities, therapeutic exercises  · Plan of Care Expires: 06/05/19  · Plan of Care Reviewed with: patient, spouse    Subjective     Pain/Comfort:  · Pain Rating 1: 0/10    Objective:     Communicated with: rn prior to session.  Patient found supine with   upon OT entry to room.    General Precautions: Standard, fall   Orthopedic Precautions:    Braces:       Occupational Performance:     Bed Mobility:    · Patient completed Rolling/Turning to Left with  independence  · Patient completed Scooting/Bridging with independence  · Patient completed Supine to Sit with independence     Functional Mobility/Transfers:  · Patient completed Sit <> Stand Transfer with stand by assistance  with  rolling walker   · Patient completed Toilet Transfer Step Transfer technique with supervision with  rolling walker  · Functional Mobility: SBA with RW to bathroom    AMPAC 6 Click ADL: 21    Treatment & Education:  From chair level, pt completed BUE/LE therex (x 20 reps each) with 3# dowel including:  -  lat pulls  - bicep curls  - resistive tricep extension  - shld presses  - hor Abd/Add    Education provided on RW management and hand placement during t/fs and sit<>stand.    Patient left EOB with call button in reachEducation:      GOALS:   Multidisciplinary Problems     Occupational Therapy Goals     Not on file          Multidisciplinary Problems (Resolved)        Problem: Occupational Therapy Goal    Goal Priority Disciplines Outcome Interventions   Occupational Therapy Goal   (Resolved)     OT, PT/OT Outcome(s) achieved    Description:  Goals to be met by: 5/13/19    Patient will increase functional independence with ADLs by performing:    Grooming while standing at sink with Set-up Assistance.  Toileting from toilet with Supervision for hygiene and clothing management.   Supine to sit with [place].  Toilet transfer to toilet with Stand-by Assistance.                      Time Tracking:     OT Date of Treatment: 05/08/19  OT Start Time: 1043  OT Stop Time: 1107  OT Total Time (min): 24 min    Billable Minutes:Therapeutic Activity 12  Therapeutic Exercise 12    MARY Cervantes  5/8/2019

## 2019-05-08 NOTE — NURSING
Patient being discharge and removal of Tele monitor and IV access. Discharge instructions given to wife.

## 2019-05-09 ENCOUNTER — PATIENT OUTREACH (OUTPATIENT)
Dept: ADMINISTRATIVE | Facility: CLINIC | Age: 74
End: 2019-05-09

## 2019-05-09 NOTE — PATIENT INSTRUCTIONS
Ammonia  Does this test have other names?  Blood ammonia test, NH3  What is this test?  This test checks the level of ammonia in your blood. The test helps find out why you may have changes in consciousness and also helps diagnose a liver disease called hepatic encephalopathy. This disease affects how your brain works, because of excess toxins, or poisons, in your body.  Your liver may not work properly if you have high levels of ammonia in your blood. Ammonia is a chemical made by bacteria in your intestines and your body's cells while you process protein. Your body treats ammonia as a waste product. It turns it into an amino acid called glutamine and a chemical compound called urea. Your bloodstream moves the urea to your kidneys, where it is eliminated in your urine.  But ammonia will build up in your body if you can't get rid of urea. This can sometimes happen if you have kidney or liver failure. It can also happen if you have a urea cycle disorder, a genetic disorder that means your body is missing one of the enzymes that remove ammonia from the blood. The ammonia blood test is the gold standard for diagnosing urea cycle disorders.  Too much ammonia in your body can cause psychological problems like confusion, tiredness, and possibly coma or death.  A child's reaction to too much ammonia can include seizures, breathing difficulties, and potentially death.  Why do I need this test?  You might have this test if you have abnormal neurological changes or you enter a coma unexpectedly. You might also have this test if your health care provider suspects that you have a urea cycle disorder or Reye's syndrome, a potentially fatal disease that affects the brain and liver. Children may have this test if they frequently vomit or are very tired within a week after a virus-related illness.  What other tests might I have along with this test?  If your health care provider suspects that you have a urea cycle disorder, he or  she may order other tests that look at ammonia levels in your body.   What do my test results mean?  Many things may affect your lab test results. These include the method each lab uses to do the test. Even if your test results are different from the normal value, you may not have a problem. To learn what the results mean for you, talk with your health care provider.  Test results are given in micrograms per deciliter (mcg/dL).  Normal ranges are:  · Age 0 to 10 days (enzymatic): 170 to 341 mcg/dL  · Infants and toddlers, from 10 days to 2 years old (enzymatic): 68 to 136 mcg/dL  · Children, older than 2 years (enzymatic): 19 to 60 mcg/dL  · Adults: 10 to 80 mcg/dL  If your test results are higher than normal, it can mean that you have:  · Liver disease  · Reye's syndrome  People who have a portacaval shunt in their liver to treat high blood pressure may also have higher levels of ammonia.  Levels that are lower than normal can mean that your kidneys aren't removing waste as they should.  How is this test done?  The test requires a blood sample, which is drawn through a needle from a vein in your arm.  Does this test pose any risks?  Taking a blood sample with a needle carries risks that include bleeding, infection, bruising, or feeling dizzy. When the needle pricks your arm, you may feel a slight stinging sensation or pain. Afterward, the site may be slightly sore.  What might affect my test results?  Medications such as polymyxin B, diuretics, valproic acid and methicillin can cause higher-than-normal results. Other medications, including tetracycline, lactulose, monoamine oxidase inhibitors, or neomycin, can cause results that are lower than normal.  How do I get ready for this test?  You should avoid exercising or smoking cigarettes before this test, but no other preparation is needed. Be sure your health care provider knows about all medicines, herbs, vitamins, and supplements you are taking. This includes  medicines that don't need a prescription and any illicit drugs you may use.  © 5987-1426 [employer]. [address] [place] [address], [place] [address]. All rights reserved. This information is not intended as a substitute for professional medical care. Always follow your healthcare professional's instructions.

## 2019-05-13 LAB
BACTERIA SPEC ANAEROBE CULT: NORMAL
BACTERIA SPEC ANAEROBE CULT: NORMAL

## 2019-05-13 NOTE — PLAN OF CARE
Pt discharged home with [facility], shower/bath bench & RW.     Follow-up Information     Clover White MD On 5/15/2019.    Specialty:  Family Medicine  Why:  Hospital Follow-Up: 5/15 at 9:00 am  Contact information:  [address]  [address]  [facility] [address]  [phone]                    05/13/19 1303   Final Note   Assessment Type Final Discharge Note   Anticipated Discharge Disposition Home-Health   Hospital Follow Up  Appt(s) scheduled? Yes   Discharge plans and expectations educations in teach back method with documentation complete? Yes   Right Care Referral Info   Post Acute Recommendation Home-care

## 2019-05-15 ENCOUNTER — OFFICE VISIT (OUTPATIENT)
Dept: FAMILY MEDICINE | Facility: CLINIC | Age: 74
DRG: 871 | End: 2019-05-15
Attending: FAMILY MEDICINE
Payer: MEDICARE

## 2019-05-15 VITALS
DIASTOLIC BLOOD PRESSURE: 60 MMHG | BODY MASS INDEX: 20.67 KG/M2 | SYSTOLIC BLOOD PRESSURE: 110 MMHG | HEIGHT: 71 IN | OXYGEN SATURATION: 97 % | WEIGHT: 147.63 LBS | HEART RATE: 86 BPM

## 2019-05-15 DIAGNOSIS — R18.8 ASCITES OF LIVER: ICD-10-CM

## 2019-05-15 DIAGNOSIS — I10 HTN (HYPERTENSION), BENIGN: ICD-10-CM

## 2019-05-15 DIAGNOSIS — B18.2 CHRONIC HEPATITIS C WITH CIRRHOSIS: Primary | ICD-10-CM

## 2019-05-15 DIAGNOSIS — K74.60 CHRONIC HEPATITIS C WITH CIRRHOSIS: Primary | ICD-10-CM

## 2019-05-15 PROCEDURE — 99214 PR OFFICE/OUTPT VISIT, EST, LEVL IV, 30-39 MIN: ICD-10-PCS | Mod: HCNC,S$GLB,, | Performed by: FAMILY MEDICINE

## 2019-05-15 PROCEDURE — 1101F PT FALLS ASSESS-DOCD LE1/YR: CPT | Mod: HCNC,CPTII,S$GLB, | Performed by: FAMILY MEDICINE

## 2019-05-15 PROCEDURE — 1101F PR PT FALLS ASSESS DOC 0-1 FALLS W/OUT INJ PAST YR: ICD-10-PCS | Mod: HCNC,CPTII,S$GLB, | Performed by: FAMILY MEDICINE

## 2019-05-15 PROCEDURE — 3078F DIAST BP <80 MM HG: CPT | Mod: HCNC,CPTII,S$GLB, | Performed by: FAMILY MEDICINE

## 2019-05-15 PROCEDURE — 99999 PR PBB SHADOW E&M-EST. PATIENT-LVL III: ICD-10-PCS | Mod: PBBFAC,,, | Performed by: FAMILY MEDICINE

## 2019-05-15 PROCEDURE — 99999 PR PBB SHADOW E&M-EST. PATIENT-LVL III: CPT | Mod: PBBFAC,,, | Performed by: FAMILY MEDICINE

## 2019-05-15 PROCEDURE — 99214 OFFICE O/P EST MOD 30 MIN: CPT | Mod: HCNC,S$GLB,, | Performed by: FAMILY MEDICINE

## 2019-05-15 PROCEDURE — 3074F PR MOST RECENT SYSTOLIC BLOOD PRESSURE < 130 MM HG: ICD-10-PCS | Mod: HCNC,CPTII,S$GLB, | Performed by: FAMILY MEDICINE

## 2019-05-15 PROCEDURE — 99499 RISK ADDL DX/OHS AUDIT: ICD-10-PCS | Mod: S$GLB,,, | Performed by: FAMILY MEDICINE

## 2019-05-15 PROCEDURE — 99499 UNLISTED E&M SERVICE: CPT | Mod: S$GLB,,, | Performed by: FAMILY MEDICINE

## 2019-05-15 PROCEDURE — 3078F PR MOST RECENT DIASTOLIC BLOOD PRESSURE < 80 MM HG: ICD-10-PCS | Mod: HCNC,CPTII,S$GLB, | Performed by: FAMILY MEDICINE

## 2019-05-15 PROCEDURE — 3074F SYST BP LT 130 MM HG: CPT | Mod: HCNC,CPTII,S$GLB, | Performed by: FAMILY MEDICINE

## 2019-05-15 RX ORDER — ONDANSETRON HYDROCHLORIDE 8 MG/1
8 TABLET, FILM COATED ORAL EVERY 8 HOURS PRN
Qty: 60 TABLET | Refills: 0 | Status: SHIPPED | OUTPATIENT
Start: 2019-05-15

## 2019-05-15 NOTE — PATIENT INSTRUCTIONS
Fox,     We are always striving for excellence. Should you receive a patient experience survey in the mail, we would appreciate if you would take a few moments to give us your feedback. These surveys let us know our strengths as well as areas of opportunity for improvement to better serve you.    Thank you for your time,  Mervin Gray MA    Your test results will be communicated to you via : [facility], Telephone or Letter.   If you have not received your test results in one week, please contact the clinic at [phone].

## 2019-05-15 NOTE — PROGRESS NOTES
"Subjective:       Patient ID: Fox Lyons Sr. is a 73 y.o. male.    Chief Complaint: Hospital Follow Up    HPI   Pt is here for follow up of hospital stay pt has not been eating since he has been home very nauseated so he is not eating or drinking  his wife gives him ensure but pt declines  Pt has htn on arb lasix no sob/cp   Review of Systems   Constitutional: Positive for fatigue. Negative for chills and fever.   Respiratory: Negative for cough, chest tightness and shortness of breath.    Cardiovascular: Negative for chest pain and palpitations.   Gastrointestinal: Negative for abdominal distention and abdominal pain.       Objective:      Physical Exam   Constitutional: He appears well-developed and well-nourished. No distress.   Cardiovascular: Normal rate and regular rhythm. Exam reveals no gallop.   Pulmonary/Chest: Effort normal and breath sounds normal. No stridor. No respiratory distress.   Abdominal: Soft. Bowel sounds are normal. He exhibits distension. There is no tenderness.     labs discussed with pt and family  Assessment:       1. Chronic hepatitis C with cirrhosis    2. Ascites of liver      3. htn  Plan:     orders cmp cbc ammonia    start zofran  Cont meds  Encourage po  F/u liver clinic  rtc 3-6 months    "This note will not be shared with the patient."   "

## 2019-05-16 ENCOUNTER — HOSPITAL ENCOUNTER (INPATIENT)
Facility: HOSPITAL | Age: 74
LOS: 8 days | Discharge: HOSPICE | DRG: 871 | End: 2019-05-24
Attending: EMERGENCY MEDICINE | Admitting: INTERNAL MEDICINE
Payer: MEDICARE

## 2019-05-16 DIAGNOSIS — R18.8 OTHER ASCITES: ICD-10-CM

## 2019-05-16 DIAGNOSIS — Z51.5 PALLIATIVE CARE ENCOUNTER: ICD-10-CM

## 2019-05-16 DIAGNOSIS — K85.90 PANCREATITIS: ICD-10-CM

## 2019-05-16 DIAGNOSIS — A41.9 SEPSIS DUE TO UNDETERMINED ORGANISM: ICD-10-CM

## 2019-05-16 DIAGNOSIS — Z66 DNR (DO NOT RESUSCITATE): ICD-10-CM

## 2019-05-16 DIAGNOSIS — K85.90 ACUTE PANCREATITIS, UNSPECIFIED COMPLICATION STATUS, UNSPECIFIED PANCREATITIS TYPE: ICD-10-CM

## 2019-05-16 DIAGNOSIS — R65.21 SEPTIC SHOCK DUE TO UNDETERMINED ORGANISM: ICD-10-CM

## 2019-05-16 DIAGNOSIS — Z71.89 GOALS OF CARE, COUNSELING/DISCUSSION: ICD-10-CM

## 2019-05-16 DIAGNOSIS — E86.1 HYPOVOLEMIA: Primary | ICD-10-CM

## 2019-05-16 DIAGNOSIS — Z45.2 ENCOUNTER FOR CENTRAL LINE PLACEMENT: ICD-10-CM

## 2019-05-16 DIAGNOSIS — K74.60 CIRRHOSIS OF LIVER WITHOUT ASCITES, UNSPECIFIED HEPATIC CIRRHOSIS TYPE: ICD-10-CM

## 2019-05-16 DIAGNOSIS — K72.00 ISCHEMIC HEPATITIS: ICD-10-CM

## 2019-05-16 DIAGNOSIS — R15.9 FULL INCONTINENCE OF FECES: ICD-10-CM

## 2019-05-16 DIAGNOSIS — A41.9 SEPTIC SHOCK DUE TO UNDETERMINED ORGANISM: ICD-10-CM

## 2019-05-16 DIAGNOSIS — I95.9 HYPOTENSION: ICD-10-CM

## 2019-05-16 DIAGNOSIS — I25.10 CORONARY ARTERY DISEASE: ICD-10-CM

## 2019-05-16 PROBLEM — E87.6 HYPOKALEMIA: Status: RESOLVED | Noted: 2019-04-18 | Resolved: 2019-05-16

## 2019-05-16 PROBLEM — R04.0 EPISTAXIS: Status: RESOLVED | Noted: 2018-07-16 | Resolved: 2019-05-16

## 2019-05-16 LAB
ALBUMIN SERPL BCP-MCNC: 2.4 G/DL (ref 3.5–5.2)
ALBUMIN SERPL BCP-MCNC: 2.5 G/DL (ref 3.5–5.2)
ALP SERPL-CCNC: 254 U/L (ref 55–135)
ALP SERPL-CCNC: 291 U/L (ref 55–135)
ALT SERPL W/O P-5'-P-CCNC: 61 U/L (ref 10–44)
ALT SERPL W/O P-5'-P-CCNC: 79 U/L (ref 10–44)
AMMONIA PLAS-SCNC: 47 UMOL/L (ref 10–50)
ANION GAP SERPL CALC-SCNC: 11 MMOL/L (ref 8–16)
ANION GAP SERPL CALC-SCNC: 11 MMOL/L (ref 8–16)
ANION GAP SERPL CALC-SCNC: 13 MMOL/L (ref 8–16)
AST SERPL-CCNC: 127 U/L (ref 10–40)
AST SERPL-CCNC: 208 U/L (ref 10–40)
BASOPHILS # BLD AUTO: 0.01 K/UL (ref 0–0.2)
BASOPHILS NFR BLD: 0.1 % (ref 0–1.9)
BILIRUB SERPL-MCNC: 2.5 MG/DL (ref 0.1–1)
BILIRUB SERPL-MCNC: 3 MG/DL (ref 0.1–1)
BILIRUB UR QL STRIP: NEGATIVE
BUN SERPL-MCNC: 45 MG/DL (ref 8–23)
BUN SERPL-MCNC: 47 MG/DL (ref 6–30)
BUN SERPL-MCNC: 50 MG/DL (ref 8–23)
BUN SERPL-MCNC: 54 MG/DL (ref 8–23)
CALCIUM SERPL-MCNC: 10.8 MG/DL (ref 8.7–10.5)
CALCIUM SERPL-MCNC: 9.1 MG/DL (ref 8.7–10.5)
CALCIUM SERPL-MCNC: 9.5 MG/DL (ref 8.7–10.5)
CHLORIDE SERPL-SCNC: 100 MMOL/L (ref 95–110)
CHLORIDE SERPL-SCNC: 105 MMOL/L (ref 95–110)
CHLORIDE SERPL-SCNC: 107 MMOL/L (ref 95–110)
CHLORIDE SERPL-SCNC: 97 MMOL/L (ref 95–110)
CLARITY UR REFRACT.AUTO: ABNORMAL
CO2 SERPL-SCNC: 16 MMOL/L (ref 23–29)
CO2 SERPL-SCNC: 17 MMOL/L (ref 23–29)
CO2 SERPL-SCNC: 21 MMOL/L (ref 23–29)
COLOR UR AUTO: YELLOW
CREAT SERPL-MCNC: 2.4 MG/DL (ref 0.5–1.4)
CREAT SERPL-MCNC: 2.8 MG/DL (ref 0.5–1.4)
CREAT SERPL-MCNC: 2.8 MG/DL (ref 0.5–1.4)
CREAT SERPL-MCNC: 3.2 MG/DL (ref 0.5–1.4)
CREAT UR-MCNC: 79 MG/DL (ref 23–375)
DIFFERENTIAL METHOD: ABNORMAL
EOSINOPHIL # BLD AUTO: 0 K/UL (ref 0–0.5)
EOSINOPHIL NFR BLD: 0.1 % (ref 0–8)
ERYTHROCYTE [DISTWIDTH] IN BLOOD BY AUTOMATED COUNT: 20.6 % (ref 11.5–14.5)
EST. GFR  (AFRICAN AMERICAN): 21.1 ML/MIN/1.73 M^2
EST. GFR  (AFRICAN AMERICAN): 24.7 ML/MIN/1.73 M^2
EST. GFR  (AFRICAN AMERICAN): 29.8 ML/MIN/1.73 M^2
EST. GFR  (NON AFRICAN AMERICAN): 18.2 ML/MIN/1.73 M^2
EST. GFR  (NON AFRICAN AMERICAN): 21.4 ML/MIN/1.73 M^2
EST. GFR  (NON AFRICAN AMERICAN): 25.8 ML/MIN/1.73 M^2
GLUCOSE SERPL-MCNC: 133 MG/DL (ref 70–110)
GLUCOSE SERPL-MCNC: 144 MG/DL (ref 70–110)
GLUCOSE SERPL-MCNC: 216 MG/DL (ref 70–110)
GLUCOSE SERPL-MCNC: 218 MG/DL (ref 70–110)
GLUCOSE UR QL STRIP: NEGATIVE
HCT VFR BLD AUTO: 31.9 % (ref 40–54)
HCT VFR BLD CALC: 33 %PCV (ref 36–54)
HGB BLD-MCNC: 9.9 G/DL (ref 14–18)
HGB UR QL STRIP: NEGATIVE
IMM GRANULOCYTES # BLD AUTO: 0.1 K/UL (ref 0–0.04)
IMM GRANULOCYTES NFR BLD AUTO: 0.7 % (ref 0–0.5)
KETONES UR QL STRIP: NEGATIVE
LACTATE SERPL-SCNC: 3.7 MMOL/L (ref 0.5–2.2)
LACTATE SERPL-SCNC: 3.9 MMOL/L (ref 0.5–2.2)
LACTATE SERPL-SCNC: 6.7 MMOL/L (ref 0.5–2.2)
LEUKOCYTE ESTERASE UR QL STRIP: NEGATIVE
LIPASE SERPL-CCNC: 324 U/L (ref 4–60)
LYMPHOCYTES # BLD AUTO: 0.7 K/UL (ref 1–4.8)
LYMPHOCYTES NFR BLD: 4.8 % (ref 18–48)
MAGNESIUM SERPL-MCNC: 2.1 MG/DL (ref 1.6–2.6)
MCH RBC QN AUTO: 27.1 PG (ref 27–31)
MCHC RBC AUTO-ENTMCNC: 31 G/DL (ref 32–36)
MCV RBC AUTO: 87 FL (ref 82–98)
MONOCYTES # BLD AUTO: 0.9 K/UL (ref 0.3–1)
MONOCYTES NFR BLD: 6.5 % (ref 4–15)
NEUTROPHILS # BLD AUTO: 12.7 K/UL (ref 1.8–7.7)
NEUTROPHILS NFR BLD: 87.8 % (ref 38–73)
NITRITE UR QL STRIP: NEGATIVE
NRBC BLD-RTO: 0 /100 WBC
PH UR STRIP: 6 [PH] (ref 5–8)
PHOSPHATE SERPL-MCNC: 3.3 MG/DL (ref 2.7–4.5)
PLATELET # BLD AUTO: 94 K/UL (ref 150–350)
PMV BLD AUTO: 11.6 FL (ref 9.2–12.9)
POC IONIZED CALCIUM: 1.29 MMOL/L (ref 1.06–1.42)
POC TCO2 (MEASURED): 20 MMOL/L (ref 23–29)
POTASSIUM BLD-SCNC: 4.5 MMOL/L (ref 3.5–5.1)
POTASSIUM SERPL-SCNC: 4.4 MMOL/L (ref 3.5–5.1)
POTASSIUM SERPL-SCNC: 4.6 MMOL/L (ref 3.5–5.1)
POTASSIUM SERPL-SCNC: 5 MMOL/L (ref 3.5–5.1)
PROT SERPL-MCNC: 6.4 G/DL (ref 6–8.4)
PROT SERPL-MCNC: 7 G/DL (ref 6–8.4)
PROT UR QL STRIP: NEGATIVE
RBC # BLD AUTO: 3.65 M/UL (ref 4.6–6.2)
SAMPLE: ABNORMAL
SODIUM BLD-SCNC: 135 MMOL/L (ref 136–145)
SODIUM SERPL-SCNC: 133 MMOL/L (ref 136–145)
SODIUM SERPL-SCNC: 134 MMOL/L (ref 136–145)
SODIUM SERPL-SCNC: 134 MMOL/L (ref 136–145)
SP GR UR STRIP: 1.01 (ref 1–1.03)
TRIGL SERPL-MCNC: 55 MG/DL (ref 30–150)
URN SPEC COLLECT METH UR: ABNORMAL
UUN UR-MCNC: 700 MG/DL (ref 140–1050)
WBC # BLD AUTO: 14.41 K/UL (ref 3.9–12.7)

## 2019-05-16 PROCEDURE — 82962 GLUCOSE BLOOD TEST: CPT | Mod: HCNC

## 2019-05-16 PROCEDURE — 99292 CRITICAL CARE ADDL 30 MIN: CPT | Mod: HCNC

## 2019-05-16 PROCEDURE — 80053 COMPREHEN METABOLIC PANEL: CPT | Mod: HCNC

## 2019-05-16 PROCEDURE — 87040 BLOOD CULTURE FOR BACTERIA: CPT | Mod: HCNC

## 2019-05-16 PROCEDURE — 63600175 PHARM REV CODE 636 W HCPCS: Mod: HCNC | Performed by: STUDENT IN AN ORGANIZED HEALTH CARE EDUCATION/TRAINING PROGRAM

## 2019-05-16 PROCEDURE — 93010 EKG 12-LEAD: ICD-10-PCS | Mod: HCNC,,, | Performed by: INTERNAL MEDICINE

## 2019-05-16 PROCEDURE — 25000003 PHARM REV CODE 250: Mod: HCNC | Performed by: STUDENT IN AN ORGANIZED HEALTH CARE EDUCATION/TRAINING PROGRAM

## 2019-05-16 PROCEDURE — 80048 BASIC METABOLIC PNL TOTAL CA: CPT | Mod: HCNC

## 2019-05-16 PROCEDURE — 84540 ASSAY OF URINE/UREA-N: CPT | Mod: HCNC

## 2019-05-16 PROCEDURE — 96375 TX/PRO/DX INJ NEW DRUG ADDON: CPT | Mod: HCNC

## 2019-05-16 PROCEDURE — 63600175 PHARM REV CODE 636 W HCPCS: Mod: HCNC | Performed by: NURSE PRACTITIONER

## 2019-05-16 PROCEDURE — 51702 INSERT TEMP BLADDER CATH: CPT | Mod: HCNC

## 2019-05-16 PROCEDURE — 99291 CRITICAL CARE FIRST HOUR: CPT | Mod: HCNC

## 2019-05-16 PROCEDURE — 84478 ASSAY OF TRIGLYCERIDES: CPT | Mod: HCNC

## 2019-05-16 PROCEDURE — 99291 PR CRITICAL CARE, E/M 30-74 MINUTES: ICD-10-PCS | Mod: HCNC,,, | Performed by: NURSE PRACTITIONER

## 2019-05-16 PROCEDURE — 82140 ASSAY OF AMMONIA: CPT | Mod: HCNC

## 2019-05-16 PROCEDURE — 94761 N-INVAS EAR/PLS OXIMETRY MLT: CPT | Mod: HCNC

## 2019-05-16 PROCEDURE — 93010 ELECTROCARDIOGRAM REPORT: CPT | Mod: HCNC,,, | Performed by: INTERNAL MEDICINE

## 2019-05-16 PROCEDURE — 82570 ASSAY OF URINE CREATININE: CPT | Mod: HCNC

## 2019-05-16 PROCEDURE — 99291 CRITICAL CARE FIRST HOUR: CPT | Mod: HCNC,,, | Performed by: NURSE PRACTITIONER

## 2019-05-16 PROCEDURE — 87040 BLOOD CULTURE FOR BACTERIA: CPT | Mod: 59,HCNC

## 2019-05-16 PROCEDURE — 99292 CRITICAL CARE ADDL 30 MIN: CPT | Mod: ,,, | Performed by: EMERGENCY MEDICINE

## 2019-05-16 PROCEDURE — 81003 URINALYSIS AUTO W/O SCOPE: CPT | Mod: HCNC

## 2019-05-16 PROCEDURE — 83735 ASSAY OF MAGNESIUM: CPT | Mod: HCNC

## 2019-05-16 PROCEDURE — 99292 PR CRITICAL CARE, ADDL 30 MIN: ICD-10-PCS | Mod: ,,, | Performed by: EMERGENCY MEDICINE

## 2019-05-16 PROCEDURE — 85025 COMPLETE CBC W/AUTO DIFF WBC: CPT | Mod: HCNC

## 2019-05-16 PROCEDURE — 99291 CRITICAL CARE FIRST HOUR: CPT | Mod: 25,,, | Performed by: EMERGENCY MEDICINE

## 2019-05-16 PROCEDURE — 84300 ASSAY OF URINE SODIUM: CPT | Mod: HCNC

## 2019-05-16 PROCEDURE — 84100 ASSAY OF PHOSPHORUS: CPT | Mod: HCNC

## 2019-05-16 PROCEDURE — 25000003 PHARM REV CODE 250: Mod: HCNC

## 2019-05-16 PROCEDURE — 83605 ASSAY OF LACTIC ACID: CPT | Mod: HCNC

## 2019-05-16 PROCEDURE — 83605 ASSAY OF LACTIC ACID: CPT | Mod: 91,HCNC

## 2019-05-16 PROCEDURE — 83615 LACTATE (LD) (LDH) ENZYME: CPT | Mod: HCNC

## 2019-05-16 PROCEDURE — 93005 ELECTROCARDIOGRAM TRACING: CPT | Mod: HCNC

## 2019-05-16 PROCEDURE — 96376 TX/PRO/DX INJ SAME DRUG ADON: CPT | Mod: HCNC

## 2019-05-16 PROCEDURE — 96361 HYDRATE IV INFUSION ADD-ON: CPT | Mod: HCNC

## 2019-05-16 PROCEDURE — 84133 ASSAY OF URINE POTASSIUM: CPT | Mod: HCNC

## 2019-05-16 PROCEDURE — 82436 ASSAY OF URINE CHLORIDE: CPT | Mod: HCNC

## 2019-05-16 PROCEDURE — 83690 ASSAY OF LIPASE: CPT | Mod: HCNC

## 2019-05-16 PROCEDURE — 12000002 HC ACUTE/MED SURGE SEMI-PRIVATE ROOM: Mod: HCNC

## 2019-05-16 PROCEDURE — 99291 PR CRITICAL CARE, E/M 30-74 MINUTES: ICD-10-PCS | Mod: 25,,, | Performed by: EMERGENCY MEDICINE

## 2019-05-16 PROCEDURE — 25000003 PHARM REV CODE 250: Mod: HCNC | Performed by: NURSE PRACTITIONER

## 2019-05-16 RX ORDER — PANTOPRAZOLE SODIUM 40 MG/1
40 TABLET, DELAYED RELEASE ORAL DAILY
Status: DISCONTINUED | OUTPATIENT
Start: 2019-05-17 | End: 2019-05-18

## 2019-05-16 RX ORDER — ONDANSETRON 2 MG/ML
4 INJECTION INTRAMUSCULAR; INTRAVENOUS
Status: COMPLETED | OUTPATIENT
Start: 2019-05-16 | End: 2019-05-16

## 2019-05-16 RX ORDER — GLUCAGON 1 MG
1 KIT INJECTION
Status: DISCONTINUED | OUTPATIENT
Start: 2019-05-16 | End: 2019-05-24 | Stop reason: HOSPADM

## 2019-05-16 RX ORDER — HYDROCORTISONE 5 MG/1
15 TABLET ORAL DAILY
Status: DISCONTINUED | OUTPATIENT
Start: 2019-05-17 | End: 2019-05-18

## 2019-05-16 RX ORDER — HYDROCORTISONE 5 MG/1
5 TABLET ORAL
Status: COMPLETED | OUTPATIENT
Start: 2019-05-16 | End: 2019-05-16

## 2019-05-16 RX ORDER — IPRATROPIUM BROMIDE AND ALBUTEROL SULFATE 2.5; .5 MG/3ML; MG/3ML
3 SOLUTION RESPIRATORY (INHALATION) EVERY 6 HOURS PRN
Status: DISCONTINUED | OUTPATIENT
Start: 2019-05-16 | End: 2019-05-24 | Stop reason: HOSPADM

## 2019-05-16 RX ORDER — SODIUM CHLORIDE, SODIUM LACTATE, POTASSIUM CHLORIDE, CALCIUM CHLORIDE 600; 310; 30; 20 MG/100ML; MG/100ML; MG/100ML; MG/100ML
INJECTION, SOLUTION INTRAVENOUS CONTINUOUS
Status: DISCONTINUED | OUTPATIENT
Start: 2019-05-16 | End: 2019-05-17

## 2019-05-16 RX ORDER — CIPROFLOXACIN 500 MG/1
500 TABLET ORAL DAILY
Status: DISCONTINUED | OUTPATIENT
Start: 2019-05-17 | End: 2019-05-16

## 2019-05-16 RX ORDER — HYDROCORTISONE 5 MG/1
5 TABLET ORAL DAILY
Status: DISCONTINUED | OUTPATIENT
Start: 2019-05-17 | End: 2019-05-16

## 2019-05-16 RX ORDER — INSULIN ASPART 100 [IU]/ML
0-5 INJECTION, SOLUTION INTRAVENOUS; SUBCUTANEOUS EVERY 6 HOURS PRN
Status: DISCONTINUED | OUTPATIENT
Start: 2019-05-16 | End: 2019-05-24 | Stop reason: HOSPADM

## 2019-05-16 RX ORDER — HYDROCORTISONE 5 MG/1
5 TABLET ORAL NIGHTLY
Status: DISCONTINUED | OUTPATIENT
Start: 2019-05-17 | End: 2019-05-18

## 2019-05-16 RX ORDER — NOREPINEPHRINE BITARTRATE/D5W 4MG/250ML
PLASTIC BAG, INJECTION (ML) INTRAVENOUS
Status: COMPLETED
Start: 2019-05-16 | End: 2019-05-16

## 2019-05-16 RX ORDER — SODIUM CHLORIDE 0.9 % (FLUSH) 0.9 %
10 SYRINGE (ML) INJECTION
Status: DISCONTINUED | OUTPATIENT
Start: 2019-05-16 | End: 2019-05-24 | Stop reason: HOSPADM

## 2019-05-16 RX ORDER — NOREPINEPHRINE BITARTRATE/D5W 4MG/250ML
0.02 PLASTIC BAG, INJECTION (ML) INTRAVENOUS CONTINUOUS
Status: DISCONTINUED | OUTPATIENT
Start: 2019-05-17 | End: 2019-05-19

## 2019-05-16 RX ORDER — VANCOMYCIN HCL IN 5 % DEXTROSE 1.25 G/25
20 PLASTIC BAG, INJECTION (ML) INTRAVENOUS ONCE
Status: DISCONTINUED | OUTPATIENT
Start: 2019-05-16 | End: 2019-05-16

## 2019-05-16 RX ORDER — VANCOMYCIN 1.75 GRAM/500 ML IN 0.9 % SODIUM CHLORIDE INTRAVENOUS
1750 ONCE
Status: COMPLETED | OUTPATIENT
Start: 2019-05-16 | End: 2019-05-17

## 2019-05-16 RX ORDER — DEXTROSE 50 % IN WATER (D50W) INTRAVENOUS SYRINGE
12.5
Status: DISCONTINUED | OUTPATIENT
Start: 2019-05-16 | End: 2019-05-24 | Stop reason: HOSPADM

## 2019-05-16 RX ORDER — LACTULOSE 10 G/15ML
20 SOLUTION ORAL DAILY
Status: DISCONTINUED | OUTPATIENT
Start: 2019-05-17 | End: 2019-05-19

## 2019-05-16 RX ORDER — ONDANSETRON 2 MG/ML
4 INJECTION INTRAMUSCULAR; INTRAVENOUS EVERY 8 HOURS PRN
Status: DISCONTINUED | OUTPATIENT
Start: 2019-05-16 | End: 2019-05-24 | Stop reason: HOSPADM

## 2019-05-16 RX ADMIN — HYDROCORTISONE 5 MG: 5 TABLET ORAL at 11:05

## 2019-05-16 RX ADMIN — Medication 0.02 MCG/KG/MIN: at 11:05

## 2019-05-16 RX ADMIN — ONDANSETRON 4 MG: 2 INJECTION INTRAMUSCULAR; INTRAVENOUS at 05:05

## 2019-05-16 RX ADMIN — SODIUM CHLORIDE 500 ML: 0.9 INJECTION, SOLUTION INTRAVENOUS at 07:05

## 2019-05-16 RX ADMIN — ONDANSETRON 4 MG: 2 INJECTION INTRAMUSCULAR; INTRAVENOUS at 08:05

## 2019-05-16 RX ADMIN — SODIUM CHLORIDE, SODIUM LACTATE, POTASSIUM CHLORIDE, AND CALCIUM CHLORIDE: .6; .31; .03; .02 INJECTION, SOLUTION INTRAVENOUS at 10:05

## 2019-05-16 RX ADMIN — SODIUM CHLORIDE 1000 ML: 0.9 INJECTION, SOLUTION INTRAVENOUS at 05:05

## 2019-05-16 RX ADMIN — PIPERACILLIN AND TAZOBACTAM 4.5 G: 4; .5 INJECTION, POWDER, LYOPHILIZED, FOR SOLUTION INTRAVENOUS; PARENTERAL at 11:05

## 2019-05-16 NOTE — ED NOTES
Pt resting in bed on cardiac, bp and o2 monitor. RR 14 even and unlabored. Wife and son at bedside and are aware of plan of care for pt.

## 2019-05-16 NOTE — ED TRIAGE NOTES
Pt presents to ed with bp 68/44. Per wife bp started dropping this morning but over the past few days he has been lethargic.

## 2019-05-16 NOTE — ED NOTES
LOC: The patient is awake, alert, and aware of environment. The patient is oriented x 3 and speaking appropriately. e.   SKIN: The skin is warm, dry.   RESPIRATORY: Airway is open and patent. Bilateral chest rise and fall. Respirations are spontaneous, even and unlabored. Normal effort and rate noted. No accessory muscle use noted.   CARDIAC: Patient has a normal rate and rhythm. Denies chest pain or SOB.   ABDOMEN: Abdominal distension noted. Hx of cirrhosis.   URINARY:  Voids independently.   MUSCULOSKELETAL: No obvious deformities noted.

## 2019-05-16 NOTE — ED PROVIDER NOTES
Encounter Date: 5/16/2019       History     Chief Complaint   Patient presents with    Hypotension     Pt told to come to ED by home health nurse-wife reports 57/38 approx 1 hr ago.      HPI     Mr. Lyons is a 73 y.o. 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 Patient with no complaints other than slight shortness of breath. Denies any abdominal pain, cough, chest pain, nausea/vomiting, fever/chills, urinary symptoms. Family reports that the patient has greater than 3 bowel movements a day on his lactulose regimen and has been noted to be fatigued over the last several days and is sleeping most of the day. Son reports very decreased PO intake.     Patient was admitted on 5/3/19 for hepatic encephalopathy in the setting of increasing lethargy. Patient was seen for his hospital follow-up visit yesterday and was found to have the same symptoms of weakness, fatigue, decreased PO intake and labs were ordered and patient was told to follow-up with his hepatologist.     Review of patient's allergies indicates:   Allergen Reactions    Codeine Itching, Rash, Edema and Other (See Comments)     Other reaction(s): Itching     Past Medical History:   Diagnosis Date    Anemia     Anticoagulant long-term use     Ascites 12/10/2018    Biliary colic     Cataract     Chronic hepatitis C     Cirrhosis     COPD (chronic obstructive pulmonary disease)     Coronary artery disease     Dyspnea     Epistaxis     Esophageal varices without bleeding     Gallstones     GERD (gastroesophageal reflux disease)     HCC (hepatocellular  carcinoma)     Hepatitis B antibody positive     Hyperglycemia     Hyperlipidemia     Hypertension     Hypopituitarism     Mobitz type 2 second degree atrioventricular block     Nuclear sclerosis, left     Pacemaker     Pituitary tumor     Portal hypertension     Pulmonary emphysema     PVT (paroxysmal ventricular tachycardia)     SA node dysfunction     Secondary adrenal insufficiency     Secondary male hypogonadism     SSS (sick sinus syndrome)     Superior mesenteric vein thrombosis     Vitreous hemorrhage, both eyes      Past Surgical History:   Procedure Laterality Date    CARDIAC PACEMAKER PLACEMENT      st rochelle    CATARACT EXTRACTION  9/24/13    RT    COLONOSCOPY Left 1/4/2019    Performed by Emanuel Hannah MD at [facility] ENDO (4TH FLR)    CORONARY ANGIOPLASTY WITH STENT PLACEMENT      2 stents    EGD (ESOPHAGOGASTRODUODENOSCOPY) Left 2/19/2019    Performed by Trice Funes [facility] at [facility]    EGD (ESOPHAGOGASTRODUODENOSCOPY) Left 1/4/2019    Performed by Emanuel Hannah MD at [facility] ENDO (4TH FLR)    ESOPHAGOGASTRODUODENOSCOPY (EGD) Left 5/31/2018    Performed by Trice Funes MD at [facility] ENDO (4TH FLR)    ESOPHAGOGASTRODUODENOSCOPY (EGD) N/A 11/30/2017    Performed by Trice Funes MD at [facility] ENDO (4TH FLR)    ESOPHAGOGASTRODUODENOSCOPY (EGD) Left 10/12/2017    Performed by Jody Humphries MD at [facility] ENDO (4TH FLR)    ESOPHAGOGASTRODUODENOSCOPY (EGD) N/A 8/17/2017    Performed by Trice Funes MD at [facility] ENDO (4TH FLR)    ESOPHAGOGASTRODUODENOSCOPY (EGD) N/A 7/14/2016    Performed by Trice Funes MD at [facility] ENDO (4TH FLR)    EYE SURGERY      INSERTION, IOL PROSTHESIS Right 9/24/2013    Performed by Ben Sparks MD at [facility] OR 1ST FLR    PARACENTESIS, ABDOMINAL N/A 4/16/2014    Performed by [facility] CATH LAB    PHACOEMULSIFICATION, CATARACT Right 9/24/2013    [facility]     s/p PPV/AFX/EL (od)  2012     Family History   Problem Relation Age of Onset    Diabetes Brother     Lung cancer Brother     Heart disease Mother         CHF    Cancer Father         Lung    Heart disease Sister     Cancer Sister         Pancreatic    Cancer Sister         Lung    No Known Problems Sister     Diabetes Sister     Cancer Brother         Lung    No Known Problems Brother     Amblyopia Neg Hx     Blindness Neg Hx     Cataracts Neg Hx     Glaucoma Neg Hx     Hypertension Neg Hx     Macular degeneration Neg Hx     Retinal detachment Neg Hx     Strabismus Neg Hx     Stroke Neg Hx     Thyroid disease Neg Hx      Social History     Tobacco Use    Smoking status: Former Smoker     Packs/day: 1.00     Years: 35.00     Pack years: 35.00     Types: Cigarettes     Last attempt to quit: 1995     Years since quittin.9    Smokeless tobacco: Never Used   Substance Use Topics    Alcohol use: No     Alcohol/week: 0.0 oz    Drug use: No     Review of Systems   Constitutional: Positive for fatigue. Negative for chills and fever.   HENT: Negative for congestion and nosebleeds.    Respiratory: Negative for cough and shortness of breath.    Cardiovascular: Negative for chest pain, palpitations and leg swelling.   Gastrointestinal: Negative for abdominal pain, nausea and vomiting.   Genitourinary: Negative for dysuria and hematuria.   Musculoskeletal: Negative for back pain and neck pain.   Skin: Negative for rash.   Neurological: Negative for dizziness, light-headedness and headaches.        Generalized weakness       Physical Exam     Initial Vitals [19 1503]   BP Pulse Resp Temp SpO2   (!) 62/37 71 16 97.3 °F (36.3 °C) 95 %      MAP       --         Physical Exam    Nursing note and vitals reviewed.  Constitutional: He appears well-developed.   Elderly chronically ill appearing  male appears as if he doesn't feel well, speaking in full sentences however slow  to respond to questions   HENT:   Head: Normocephalic and atraumatic.   Nose: Nose normal.   Oropharynx clear with dry mucous membranes   Eyes: Conjunctivae are normal. Pupils are equal, round, and reactive to light.   Neck: Normal range of motion. Neck supple.   Cardiovascular: Normal rate, regular rhythm, normal heart sounds and intact distal pulses.   Pulmonary/Chest: Breath sounds normal. He has no wheezes. He exhibits no tenderness.   CTAB with diminished breath sounds in bilateral bases, no wheezing, no increased work of breathing   Abdominal: Soft. He exhibits distension. There is no tenderness. There is no rebound and no guarding.   Soft, protuberant abdomen, hypoactive bowel sounds, distended but no tight, non-tender, no peritoneal signs, fluid wave appreciated   Musculoskeletal: Normal range of motion.   No calf tenderness, no lower extremity edema   Lymphadenopathy:     He has no cervical adenopathy.   Neurological: He is alert and oriented to person, place, and time.   Alert and oriented to self, place, year, president however is slow to respond, follows commands   Skin: Skin is warm and dry.         ED Course   Critical Care  Date/Time: 5/17/2019 1:58 PM  Performed by: Akira Clemente MD  Authorized by: Manuel Costa MD   Direct patient critical care time: 35 minutes  Additional history critical care time: 5 minutes  Ordering / reviewing critical care time: 15 minutes  Documentation critical care time: 15 minutes  Consulting other physicians critical care time: 5 minutes  Consult with family critical care time: 5 minutes  Total critical care time (exclusive of procedural time) : 80 minutes  Critical care was necessary to treat or prevent imminent or life-threatening deterioration of the following conditions: shock and sepsis.  Critical care was time spent personally by me on the following activities: evaluation of patient's response to treatment, ordering and performing treatments  and interventions, pulse oximetry, examination of patient, ordering and review of laboratory studies, re-evaluation of patient's condition, obtaining history from patient or surrogate, ordering and review of radiographic studies and review of old charts.        Labs Reviewed   CBC W/ AUTO DIFFERENTIAL - Abnormal; Notable for the following components:       Result Value    WBC 14.41 (*)     RBC 3.65 (*)     Hemoglobin 9.9 (*)     Hematocrit 31.9 (*)     Mean Corpuscular Hemoglobin Conc 31.0 (*)     RDW 20.6 (*)     Platelets 94 (*)     Immature Granulocytes 0.7 (*)     Gran # (ANC) 12.7 (*)     Immature Grans (Abs) 0.10 (*)     Lymph # 0.7 (*)     Gran% 87.8 (*)     Lymph% 4.8 (*)     All other components within normal limits   COMPREHENSIVE METABOLIC PANEL - Abnormal; Notable for the following components:    Sodium 134 (*)     CO2 21 (*)     Glucose 218 (*)     BUN, Bld 54 (*)     Creatinine 3.2 (*)     Calcium 10.8 (*)     Albumin 2.5 (*)     Total Bilirubin 3.0 (*)     Alkaline Phosphatase 291 (*)      (*)     ALT 61 (*)     eGFR if  21.1 (*)     eGFR if non  18.2 (*)     All other components within normal limits   LACTIC ACID, PLASMA - Abnormal; Notable for the following components:    Lactate (Lactic Acid) 6.7 (*)     All other components within normal limits   URINALYSIS, REFLEX TO URINE CULTURE - Abnormal; Notable for the following components:    Appearance, UA Hazy (*)     All other components within normal limits    Narrative:     YELLOW AND GRAY  Preferred Collection Type->Urine, Clean Catch   LACTIC ACID, PLASMA - Abnormal; Notable for the following components:    Lactate (Lactic Acid) 3.9 (*)     All other components within normal limits    Narrative:     Please repeat after 2nd liter.   COMPREHENSIVE METABOLIC PANEL - Abnormal; Notable for the following components:    Sodium 133 (*)     CO2 17 (*)     Glucose 144 (*)     BUN, Bld 50 (*)     Creatinine 2.8 (*)      Albumin 2.4 (*)     Total Bilirubin 2.5 (*)     Alkaline Phosphatase 254 (*)      (*)     ALT 79 (*)     eGFR if  24.7 (*)     eGFR if non  21.4 (*)     All other components within normal limits    Narrative:     Repeat after 2nd liter.   LIPASE - Abnormal; Notable for the following components:    Lipase 324 (*)     All other components within normal limits    Narrative:     Repeat after 2nd liter.  ADD-ON LIPAS #[id number] PER JAYLEN BARROSO MD 19:57    05/16/2019    LACTIC ACID, PLASMA - Abnormal; Notable for the following components:    Lactate (Lactic Acid) 3.7 (*)     All other components within normal limits   BASIC METABOLIC PANEL - Abnormal; Notable for the following components:    Sodium 134 (*)     CO2 16 (*)     Glucose 133 (*)     BUN, Bld 45 (*)     Creatinine 2.4 (*)     eGFR if  29.8 (*)     eGFR if non  25.8 (*)     All other components within normal limits   COMPREHENSIVE METABOLIC PANEL - Abnormal; Notable for the following components:    Sodium 135 (*)     CO2 17 (*)     Glucose 121 (*)     BUN, Bld 42 (*)     Creatinine 2.2 (*)     Albumin 2.2 (*)     Total Bilirubin 2.9 (*)     Alkaline Phosphatase 262 (*)      (*)      (*)     eGFR if  33.1 (*)     eGFR if non  28.7 (*)     All other components within normal limits   CBC W/ AUTO DIFFERENTIAL - Abnormal; Notable for the following components:    WBC 13.76 (*)     RBC 3.26 (*)     Hemoglobin 9.0 (*)     Hematocrit 28.8 (*)     Mean Corpuscular Hemoglobin Conc 31.3 (*)     RDW 20.0 (*)     Platelets 65 (*)     Gran # (ANC) 12.1 (*)     Immature Grans (Abs) 0.05 (*)     Lymph # 0.7 (*)     Gran% 87.8 (*)     Lymph% 4.9 (*)     All other components within normal limits   LACTIC ACID, PLASMA - Abnormal; Notable for the following components:    Lactate (Lactic Acid) 3.6 (*)     All other components within normal limits   LACTIC  ACID, PLASMA - Abnormal; Notable for the following components:    Lactate (Lactic Acid) 4.6 (*)     All other components within normal limits   LACTATE DEHYDROGENASE - Abnormal; Notable for the following components:     (*)     All other components within normal limits    Narrative:     ADD ON LDH [id number] PER DR. BROWN  05/17/2019  01:06    PROTIME-INR - Abnormal; Notable for the following components:    Prothrombin Time 19.0 (*)     INR 2.0 (*)     All other components within normal limits   WBC & DIFF,BODY FLUID - Abnormal; Notable for the following components:    Other Cells, Fluid 2 (*)     All other components within normal limits    Narrative:     To rule out SBP order labs: Aerobic culture [SMT697],  Culture, Anaerobic [QNJ988], Gram stain [PAG207], Albumin  [QIL613], Protein [BKW704], LDH [OSA339], WBC \T\ Dff  [HUA1534].   LACTIC ACID, PLASMA - Abnormal; Notable for the following components:    Lactate (Lactic Acid) 4.2 (*)     All other components within normal limits   POCT GLUCOSE - Abnormal; Notable for the following components:    POC Glucose 216 (*)     POC BUN 47 (*)     POC Creatinine 2.8 (*)     POC Sodium 135 (*)     POC TCO2 (MEASURED) 20 (*)     POC Hematocrit 33 (*)     All other components within normal limits   CULTURE, BLOOD    Narrative:     Aerobic and anaerobic   CULTURE, BLOOD    Narrative:     Aerobic and anaerobic   CULTURE, BLOOD   CULTURE, BLOOD   GRAM STAIN    Narrative:     To rule out SBP order labs: Aerobic culture [GZE317],  Culture, Anaerobic [FQR653], Gram stain [MVA765], Albumin  [SFN639], Protein [INI273], LDH [NBP991], WBC \T\ Dff  [RNO6295].   CULTURE, AEROBIC  (SPECIFY SOURCE)   CULTURE, ANAEROBIC   AMMONIA   LIPASE   TRIGLYCERIDES   MAGNESIUM   PHOSPHORUS   UREA NITROGEN, URINE, RANDOM   CREATININE, URINE, RANDOM   UREA NITROGEN, URINE, RANDOM    Narrative:     ADD ON URINE CREATININE AND UREA NITROGEN PER IMTIAZ BROWN, NP/ORDER#   [id number] AND [id number] @  23:25 5/16/19  YELLOW AND DUCKWORTH  Preferred Collection Type->Urine, Clean Catch   CREATININE, URINE, RANDOM    Narrative:     ADD ON URINE CREATININE AND UREA NITROGEN PER IMTIAZ BROWN, NP/ORDER#   [id number] AND [id number] @ 23:25 5/16/19  YELLOW AND DUCKWORTH  Preferred Collection Type->Urine, Clean Catch   TRIGLYCERIDES    Narrative:     Repeat after 2nd liter.  ADD-ON LIPAS #[id number] PER JAYLEN BARROSO MD 19:57    05/16/2019    MAGNESIUM    Narrative:     Repeat after 2nd liter.  ADD-ON LIPAS #[id number] PER JAYLEN BARROSO MD 19:57    05/16/2019    PHOSPHORUS    Narrative:     Repeat after 2nd liter.  ADD-ON LIPAS #[id number] PER JAYLEN BARROSO MD 19:57    05/16/2019    CHLORIDE, URINE, RANDOM   SODIUM, URINE, RANDOM   POTASSIUM, URINE, RANDOM   LACTATE DEHYDROGENASE   CHLORIDE, URINE, RANDOM    Narrative:     ADD ON CHLORIDE URINE [id number] SODIUM URINE [id number] AND POTASSIUM   URINE [id number] PER DR. BROWN 05/17/2019  01:02    SODIUM, URINE, RANDOM    Narrative:     ADD ON CHLORIDE URINE [id number] SODIUM URINE [id number] AND POTASSIUM   URINE [id number] PER DR. BROWN 05/17/2019  01:02    POTASSIUM, URINE, RANDOM    Narrative:     ADD ON CHLORIDE URINE [id number] SODIUM URINE [id number] AND POTASSIUM   URINE [id number] PER DR. BROWN 05/17/2019  01:02    MAGNESIUM   PHOSPHORUS   VANCOMYCIN, RANDOM   ALBUMIN, PERITONEAL, PLEURAL FLUID OR HANDY DRAINAGE, IN-HOUSE    Narrative:     To rule out SBP order labs: Aerobic culture [ZTB417],  Culture, Anaerobic [FPF707], Gram stain [LUO697], Albumin  [VOX052], Protein [RWN062], LDH [IWU349], WBC \T\ Dff  [ESY5136].   PROTEIN, PERITONEAL, PLEURAL FLUID OR HANDY DRAINAGE, IN-HOUSE    Narrative:     To rule out SBP order labs: Aerobic culture [UNV751],  Culture, Anaerobic [NUX424], Gram stain [KXK482], Albumin  [XIE643], Protein [NTD124], LDH [DOP585], WBC \T\ Dff  [VRS2857].   LDH, PERITONEAL, PLEURAL FLUID OR HANDY DRAINAGE, IN-HOUSE    Narrative:     To rule out SBP  order labs: Aerobic culture [ZZO282],  Culture, Anaerobic [GQZ752], Gram stain [XIC369], Albumin  [URG486], Protein [AOD522], LDH [FUK538], WBC \T\ Dff  [RCF6452].   CORTISOL, 8AM   PATHOLOGIST REVIEW, BODY FLUID    Narrative:     To rule out SBP order labs: Aerobic culture [FCM407],  Culture, Anaerobic [SGS561], Gram stain [KGG272], Albumin  [GIW614], Protein [GTH264], LDH [PER970], WBC \T\ Dff  [FDY0585].   LACTIC ACID, PLASMA   PROCALCITONIN   POCT GLUCOSE MONITORING CONTINUOUS        ECG Results          EKG 12-lead (Final result)  Result time 05/16/19 17:05:44    Final result by Interface, Lab In [facility] (05/16/19 17:05:44)                 Narrative:    Test Reason : I95.9,    Vent. Rate : 074 BPM     Atrial Rate : 074 BPM     P-R Int : 144 ms          QRS Dur : 128 ms      QT Int : 428 ms       P-R-T Axes : 000 028 082 degrees     QTc Int : 475 ms    Atrial-paced rhythm  Right bundle branch block  T wave abnormality, consider lateral ischemia  Abnormal ECG  When compared with ECG of 03-MAY-2019 18:20,  Electronic atrial pacemaker has replaced Normal sinus rhythm  Confirmed by Jairon COLIN MD, Allan SHERMAN ([id number]) on 5/16/2019 5:05:40 PM    Referred By: [email or web address]   SELF           Confirmed By:Allan De La O III, MD                            Imaging Results           CT Abdomen Pelvis  Without Contrast (Final result)  Result time 05/17/19 07:48:30    Final result by Kelsi Paredes MD (05/17/19 07:48:30)                 Impression:      1. Gallbladder is decompressed with significant volume of intraluminal high density material, likely representing multiple layering stones and/or biliary sludge.  There is gallbladder wall thickening, possibly reactive secondary to underlying hepatic dysfunction or abdominopelvic ascites.  If there is clinical concern for acute cholecystitis, further evaluation with HIDA scan can be performed.  2. Slight increased prominence of the pancreas could possibly represent early  interstitial pancreatitis noting evaluation is limited on today's noncontrast examination.  Diffuse nonspecific mesenteric stranding/haziness.  3. Findings in keeping with underlying cirrhosis and portal hypertension.  4. Trace left pleural fluid with adjacent basilar atelectatic/consolidative change.  5. Extensive calcific atherosclerosis involving the coronary arteries in a multivessel distribution.  6. Periaortic and mesenteric lymphadenopathy.  7. Additional findings as above.  This report was flagged in Epic as abnormal.    Electronically signed by resident: Derek Stallings  Date:    05/17/2019  Time:    06:35    Electronically signed by: Kelsi Paredes MD  Date:    05/17/2019  Time:    07:48             Narrative:    EXAMINATION:  CT ABDOMEN PELVIS WITHOUT CONTRAST    CLINICAL HISTORY:  abdominal pain, suspected gallstone pancreatitis, unable to see GB on RUQ;    TECHNIQUE:  Low dose axial images, sagittal and coronal reformations were obtained from the lung bases to the pubic symphysis without the use of intravenous or oral contrast.    COMPARISON:  Abdominal ultrasound 05/17/2019, chest radiograph 05/16/2019, CT abdomen pelvis 11/29/2018    FINDINGS:  Please note evaluation of solid organ parenchyma is limited due to noncontrast technique.    ABDOMEN:    - Heart: Normal in size without pericardial effusion.  Extensive calcific atherosclerosis involving the coronary arteries in a multivessel distribution.  Partially visualized transvenous pacing lead.    - Lung bases: Trace left-sided pleural effusion with associated compressive atelectasis of the adjacent lung.  Superimposed patchy consolidation in the left lower lobe which may represent atelectasis or developing infiltrate such as pneumonia.    - Liver: Heterogeneous hepatic parenchyma with nodular contour of the liver compatible with known hepatic cirrhosis.  No focal mass identified on this noncontrast examination.    - Gallbladder: Gallbladder is decompressed  with high density material throughout, likely representing numerous stones.    - Bile Ducts: No evidence of intra or extra hepatic biliary ductal dilation.    - Spleen: Negative.    - Kidneys: Normal in size and location without renal mass or hydronephrosis.  Proximal ureters demonstrate normal caliber however is difficult to follow the ureters in their entirety.  Urinary bladder appears well distended without focal abnormality.    - Adrenals: Nonspecific thickening of the left adrenal gland, right adrenal is unremarkable.    - Pancreas: Please note evaluation of pancreatic parenchyma is limited due to noncontrast technique.  When compared to prior examinations the pancreas appears slightly increased in size which could relate to underlying edematous interstitial change possibly relating to early pancreatitis.  No significant peripancreatic fluid identified.    - Retroperitoneum:  Periaortic and mesenteric lymphadenopathy.    - Vascular: Mild calcific atherosclerosis of the abdominal aorta with extension into the iliac vasculature bilaterally.  No abdominal aortic aneurysm.    - Abdominal wall:  No acute abnormality.    PELVIS:    Moderate/large volume of abdominopelvic ascites.  No pelvic lymphadenopathy.    BOWEL/MESENTERY:    Duodenal diverticulum present.  Colon demonstrates abnormal wall thickening which is likely a reactive process to the hepatic dysfunction/ascites.  No high-grade obstruction.    BONES:  No acute osseous abnormality and no suspicious lytic or blastic lesion.                               US Abdomen Limited (Final result)  Result time 05/17/19 04:18:32    Final result by Renny Garay MD (05/17/19 04:18:32)                 Impression:      Gallbladder not identified on today's exam.  However, multiple calcified gallstones were identified on the prior CT from 11/29/2018.    No biliary ductal dilatation.    Cirrhotic liver morphology with moderate volume ascites.    Electronically signed by  resident: Derek Stallings  Date:    05/17/2019  Time:    03:29    Electronically signed by: Renny Garay MD  Date:    05/17/2019  Time:    04:18             Narrative:    EXAMINATION:  US ABDOMEN LIMITED    CLINICAL HISTORY:  pancreatitis, rule out gallbladder disease;    TECHNIQUE:  Limited ultrasound of the right upper quadrant of the abdomen focused on the biliary system was performed.    COMPARISON:  Liver ultrasound 05/06/2019.  CT abdomen and pelvis, 11/29/2018.    FINDINGS:  Gallbladder: Not visualized on today's examination secondary to overlying bowel gas.    Biliary system: The common duct is not dilated, measuring 2 mm.  No intrahepatic ductal dilatation.    Pancreas: Obscured by overlying bowel gas.    Miscellaneous: Moderate volume of abdominal ascites.  Nodular contour of the liver which demonstrates diffusely heterogeneous echotexture compatible with known cirrhosis.  Spleen is mildly enlarged, measuring 14.1 cm in length.                               X-Ray Chest AP Portable (Final result)  Result time 05/16/19 16:08:46    Final result by Elier Damian MD (05/16/19 16:08:46)                 Impression:      1. Hypoventilatory exam noting left basilar subsegmental atelectasis or scarring.  No convincing large focal consolidation.      Electronically signed by: Elier Damian MD  Date:    05/16/2019  Time:    16:08             Narrative:    EXAMINATION:  XR CHEST AP PORTABLE    CLINICAL HISTORY:  Sepsis;    TECHNIQUE:  Single frontal view of the chest was performed.    COMPARISON:  05/03/2019    FINDINGS:  Left chest wall pacer noted.  There is elevation of the right hemidiaphragm.  The cardiomediastinal silhouette is not enlarged, magnified by technique..  There is no pleural effusion.  The trachea is midline.  The lungs are symmetrically expanded bilaterally with coarse interstitial attenuation and left basilar subsegmental atelectasis or scarring..  No large focal consolidation seen.  There is  no pneumothorax.  The osseous structures are remarkable for degenerative changes..                                 Medical Decision Making:   Initial Assessment:   73 y.o. 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   Differential Diagnosis:   Differential diagnosis includes hypovolemia 2/2 to decreased PO intake and lactulose use, hepatic encephalopathy, electrolyte abnormality, sepsis 2/2 to pneumonia, UTI, low suspicion for SBP with no abdominal pain, cardiogenic shock, hypovolemia 2/2 to GI bleed, anemia.   Clinical Tests:   Lab Tests: Ordered and Reviewed  The following lab test(s) were unremarkable: CBC, CMP, Troponin, BNP and Urinalysis  Radiological Study: Ordered and Reviewed  Medical Tests: Ordered and Reviewed  ED Management:  Patient presents with initial blood pressure of 62/37 however mentating appropriately. Will order bolus of IVF. Will order sepsis protocol with CBC, CMP, lactate, troponin, BNP, EKG, CXR, UA and cultures. With no abdominal pain or other infectious symptoms will hold off on antibiotics at this time.     17:06 Patient remains oriented. Patient's blood pressure improved to 80s systolic after 750cc. Will order additional fluid in the setting of likely hypovolemia from his lactulose and GI losses. Will give another liter bolus and reassess. Patient will likely need to come in either to floor vs ICU.     19:23 Patient's blood pressures in the 90s systolic. Giving  additional 500cc to complete 2L resuscitation. Patient's lactate improving from 6.7 to 3.9. Will discuss with hospital medicine about admission for hypovolemia.     20:02 Patient's blood pressure still in the 90s systolic. Hospital medicine would like improvement to at least 100s systolic before taking to the floor. Ordering additional fluid bolus and will reassess.     21:29 Patient's blood pressure remain in the  systolics. Lipase 350s. RUQ ultrasound added as patient's LFTs continue to climb despite fluid resuscitation and history of gallstones on previous CT abd. Will consult ICU in the setting of persistent hypotension despite 3L of fluid.     21:44 ICU in evaluating patient.     21:49 Patient's care signed out to Dr. Berrios pending ICU evaluation. If ICU not going to take patient will talk to hospital medicine about admission.     22:00 Patient to be admitted to the ICU for persistent hypotension in the setting of likely pancreatitis.               Attending Attestation:   Physician Attestation Statement for Resident:  As the supervising MD   Physician Attestation Statement: I have personally seen and examined this patient.   I agree with the above history. -:   As the supervising MD I agree with the above PE.    As the supervising MD I agree with the above treatment, course, plan, and disposition.  I have reviewed and agree with the residents interpretation of the following: lab data, x-rays and EKG.  I have reviewed the following: old records at this facility.            Attending ED Notes:   Patient presenting to the ED because there is concern for hypotension patient has significant past medical history with liver problems in particular he presents today being somewhat lethargic presented with a blood pressure systolic of 60; patient maintained his airway though was somewhat lethargic.  Patient evaluated in the ED to IVs were started patient was given fluids lab work was obtained it was noted the  patient had a lactic acid of 6.6.  We do feel that the difficulty at this time was possible sepsis and there was also concern for a GI bleed. Patient was given IV antibiotics. Patient's blood pressure improved up to a systolic of 90 but then would not go any further and at times would drop down to 80.  Patient required multiple re-evaluations while in the ED and because of this continued hypotension the Critical Care team was consulted and the patient was admitted to the ICU.             Clinical Impression:       ICD-10-CM ICD-9-CM   1. Hypovolemia E86.1 276.52   2. Hypotension I95.9 458.9   3. Pancreatitis K85.90 577.0   4. Acute pancreatitis, unspecified complication status, unspecified pancreatitis type K85.90 577.0   5. Sepsis due to undetermined organism A41.9 038.9   6. Coronary artery disease I25.10 414.00         Disposition:   Disposition: Admitted  Condition: Critical                        Earlene Rhodes MD  Resident  05/17/19 0859       Akira Clemente MD  05/17/19 6318

## 2019-05-17 ENCOUNTER — TELEPHONE (OUTPATIENT)
Dept: SURGERY | Facility: CLINIC | Age: 74
End: 2019-05-17

## 2019-05-17 PROBLEM — E83.42 HYPOMAGNESEMIA: Status: RESOLVED | Noted: 2019-05-04 | Resolved: 2019-05-17

## 2019-05-17 PROBLEM — E87.20 METABOLIC ACIDOSIS, NORMAL ANION GAP (NAG): Status: ACTIVE | Noted: 2019-05-17

## 2019-05-17 PROBLEM — A41.9 SEPSIS DUE TO UNDETERMINED ORGANISM: Status: ACTIVE | Noted: 2019-05-17

## 2019-05-17 LAB
ALBUMIN FLD-MCNC: 0.5 G/DL
ALBUMIN SERPL BCP-MCNC: 2.2 G/DL (ref 3.5–5.2)
ALP SERPL-CCNC: 262 U/L (ref 55–135)
ALT SERPL W/O P-5'-P-CCNC: 218 U/L (ref 10–44)
ANION GAP SERPL CALC-SCNC: 11 MMOL/L (ref 8–16)
ANION GAP SERPL CALC-SCNC: 12 MMOL/L (ref 8–16)
APPEARANCE FLD: CLEAR
ASCENDING AORTA: 2.9 CM
AST SERPL-CCNC: 656 U/L (ref 10–40)
BASOPHILS # BLD AUTO: 0 K/UL (ref 0–0.2)
BASOPHILS NFR BLD: 0 % (ref 0–1.9)
BILIRUB SERPL-MCNC: 2.9 MG/DL (ref 0.1–1)
BODY FLD TYPE: ABNORMAL
BODY FLUID SOURCE, LDH: NORMAL
BSA FOR ECHO PROCEDURE: 1.83 M2
BUN SERPL-MCNC: 42 MG/DL (ref 8–23)
BUN SERPL-MCNC: 43 MG/DL (ref 8–23)
CALCIUM SERPL-MCNC: 9.2 MG/DL (ref 8.7–10.5)
CALCIUM SERPL-MCNC: 9.3 MG/DL (ref 8.7–10.5)
CHLORIDE SERPL-SCNC: 105 MMOL/L (ref 95–110)
CHLORIDE SERPL-SCNC: 107 MMOL/L (ref 95–110)
CHLORIDE UR-SCNC: 40 MMOL/L (ref 25–200)
CO2 SERPL-SCNC: 17 MMOL/L (ref 23–29)
CO2 SERPL-SCNC: 18 MMOL/L (ref 23–29)
COLOR FLD: YELLOW
CORTIS SERPL-MCNC: 11.8 UG/DL (ref 4.3–22.4)
CREAT SERPL-MCNC: 2.2 MG/DL (ref 0.5–1.4)
CREAT SERPL-MCNC: 2.5 MG/DL (ref 0.5–1.4)
CV ECHO LV RWT: 0.54 CM
DIFFERENTIAL METHOD: ABNORMAL
DOP CALC LVOT AREA: 3.2 CM2
DOP CALC LVOT DIAMETER: 2.02 CM
DOP CALC LVOT PEAK VEL: 1.14 M/S
DOP CALC LVOT STROKE VOLUME: 50.96 CM3
DOP CALCLVOT PEAK VEL VTI: 15.91 CM
E WAVE DECELERATION TIME: 440.43 MSEC
E/A RATIO: 0.53
E/E' RATIO: 5.2
ECHO LV POSTERIOR WALL: 1.19 CM (ref 0.6–1.1)
EOSINOPHIL # BLD AUTO: 0.1 K/UL (ref 0–0.5)
EOSINOPHIL NFR BLD: 0.4 % (ref 0–8)
ERYTHROCYTE [DISTWIDTH] IN BLOOD BY AUTOMATED COUNT: 20 % (ref 11.5–14.5)
EST. GFR  (AFRICAN AMERICAN): 28.4 ML/MIN/1.73 M^2
EST. GFR  (AFRICAN AMERICAN): 33.1 ML/MIN/1.73 M^2
EST. GFR  (NON AFRICAN AMERICAN): 24.6 ML/MIN/1.73 M^2
EST. GFR  (NON AFRICAN AMERICAN): 28.7 ML/MIN/1.73 M^2
FRACTIONAL SHORTENING: 40 % (ref 28–44)
GLUCOSE SERPL-MCNC: 121 MG/DL (ref 70–110)
GLUCOSE SERPL-MCNC: 139 MG/DL (ref 70–110)
GRAM STN SPEC: NORMAL
GRAM STN SPEC: NORMAL
HCT VFR BLD AUTO: 28.8 % (ref 40–54)
HGB BLD-MCNC: 9 G/DL (ref 14–18)
IMM GRANULOCYTES # BLD AUTO: 0.05 K/UL (ref 0–0.04)
IMM GRANULOCYTES NFR BLD AUTO: 0.4 % (ref 0–0.5)
INR PPP: 2 (ref 0.8–1.2)
INTERVENTRICULAR SEPTUM: 1.09 CM (ref 0.6–1.1)
LA MAJOR: 5.4 CM
LA MINOR: 4.03 CM
LA WIDTH: 2.88 CM
LACTATE SERPL-SCNC: 3.6 MMOL/L (ref 0.5–2.2)
LACTATE SERPL-SCNC: 4.2 MMOL/L (ref 0.5–2.2)
LACTATE SERPL-SCNC: 4.4 MMOL/L (ref 0.5–2.2)
LACTATE SERPL-SCNC: 4.6 MMOL/L (ref 0.5–2.2)
LACTATE SERPL-SCNC: 4.8 MMOL/L (ref 0.5–2.2)
LDH FLD L TO P-CCNC: 115 U/L
LDH SERPL L TO P-CCNC: 529 U/L (ref 110–260)
LEFT ATRIUM SIZE: 3.54 CM
LEFT ATRIUM VOLUME INDEX: 21.7 ML/M2
LEFT ATRIUM VOLUME: 40 CM3
LEFT INTERNAL DIMENSION IN SYSTOLE: 2.61 CM (ref 2.1–4)
LEFT VENTRICLE DIASTOLIC VOLUME INDEX: 46.8 ML/M2
LEFT VENTRICLE DIASTOLIC VOLUME: 86.2 ML
LEFT VENTRICLE MASS INDEX: 95.5 G/M2
LEFT VENTRICLE SYSTOLIC VOLUME INDEX: 13.5 ML/M2
LEFT VENTRICLE SYSTOLIC VOLUME: 24.91 ML
LEFT VENTRICULAR INTERNAL DIMENSION IN DIASTOLE: 4.37 CM (ref 3.5–6)
LEFT VENTRICULAR MASS: 175.84 G
LV LATERAL E/E' RATIO: 4
LV SEPTAL E/E' RATIO: 7.43
LYMPHOCYTES # BLD AUTO: 0.7 K/UL (ref 1–4.8)
LYMPHOCYTES NFR BLD: 4.9 % (ref 18–48)
LYMPHOCYTES NFR FLD MANUAL: 16 %
MAGNESIUM SERPL-MCNC: 1.6 MG/DL (ref 1.6–2.6)
MCH RBC QN AUTO: 27.6 PG (ref 27–31)
MCHC RBC AUTO-ENTMCNC: 31.3 G/DL (ref 32–36)
MCV RBC AUTO: 88 FL (ref 82–98)
MESOTHL CELL NFR FLD MANUAL: 1 %
MONOCYTES # BLD AUTO: 0.9 K/UL (ref 0.3–1)
MONOCYTES NFR BLD: 6.5 % (ref 4–15)
MONOS+MACROS NFR FLD MANUAL: 46 %
MV PEAK A VEL: 0.99 M/S
MV PEAK E VEL: 0.52 M/S
NEUTROPHILS # BLD AUTO: 12.1 K/UL (ref 1.8–7.7)
NEUTROPHILS NFR BLD: 87.8 % (ref 38–73)
NEUTROPHILS NFR FLD MANUAL: 35 %
NRBC BLD-RTO: 0 /100 WBC
OTHER CELLS FLD MANUAL: 2 %
PATH INTERP FLD-IMP: NORMAL
PHOSPHATE SERPL-MCNC: 3.1 MG/DL (ref 2.7–4.5)
PISA TR MAX VEL: 2.53 M/S
PLATELET # BLD AUTO: 65 K/UL (ref 150–350)
PMV BLD AUTO: 10.9 FL (ref 9.2–12.9)
POTASSIUM SERPL-SCNC: 4.3 MMOL/L (ref 3.5–5.1)
POTASSIUM SERPL-SCNC: 4.7 MMOL/L (ref 3.5–5.1)
POTASSIUM UR-SCNC: 37 MMOL/L (ref 15–95)
PROCALCITONIN SERPL IA-MCNC: 5.49 NG/ML
PROT FLD-MCNC: 1 G/DL
PROT SERPL-MCNC: 6.1 G/DL (ref 6–8.4)
PROTHROMBIN TIME: 19 SEC (ref 9–12.5)
RA MAJOR: 4.09 CM
RA WIDTH: 2.67 CM
RBC # BLD AUTO: 3.26 M/UL (ref 4.6–6.2)
RIGHT VENTRICULAR END-DIASTOLIC DIMENSION: 3.05 CM
SINUS: 3.62 CM
SODIUM SERPL-SCNC: 135 MMOL/L (ref 136–145)
SODIUM SERPL-SCNC: 135 MMOL/L (ref 136–145)
SODIUM UR-SCNC: 29 MMOL/L (ref 20–250)
SPECIMEN SOURCE: NORMAL
SPECIMEN SOURCE: NORMAL
STJ: 3.6 CM
TDI LATERAL: 0.13
TDI SEPTAL: 0.07
TDI: 0.1
TR MAX PG: 25.6 MMHG
TRICUSPID ANNULAR PLANE SYSTOLIC EXCURSION: 1.49 CM
VANCOMYCIN SERPL-MCNC: 34 UG/ML
WBC # BLD AUTO: 13.76 K/UL (ref 3.9–12.7)
WBC # FLD: 83 /CU MM

## 2019-05-17 PROCEDURE — 84157 ASSAY OF PROTEIN OTHER: CPT | Mod: HCNC

## 2019-05-17 PROCEDURE — 80202 ASSAY OF VANCOMYCIN: CPT | Mod: HCNC

## 2019-05-17 PROCEDURE — 25000003 PHARM REV CODE 250: Mod: HCNC | Performed by: NURSE PRACTITIONER

## 2019-05-17 PROCEDURE — 87205 SMEAR GRAM STAIN: CPT | Mod: HCNC

## 2019-05-17 PROCEDURE — 12000002 HC ACUTE/MED SURGE SEMI-PRIVATE ROOM: Mod: HCNC

## 2019-05-17 PROCEDURE — 82533 TOTAL CORTISOL: CPT | Mod: HCNC

## 2019-05-17 PROCEDURE — 83605 ASSAY OF LACTIC ACID: CPT | Mod: 91,HCNC

## 2019-05-17 PROCEDURE — 25000003 PHARM REV CODE 250: Mod: HCNC | Performed by: INTERNAL MEDICINE

## 2019-05-17 PROCEDURE — 63600175 PHARM REV CODE 636 W HCPCS: Mod: HCNC | Performed by: NURSE PRACTITIONER

## 2019-05-17 PROCEDURE — 87070 CULTURE OTHR SPECIMN AEROBIC: CPT | Mod: HCNC

## 2019-05-17 PROCEDURE — 84145 PROCALCITONIN (PCT): CPT | Mod: HCNC

## 2019-05-17 PROCEDURE — 20000000 HC ICU ROOM: Mod: HCNC

## 2019-05-17 PROCEDURE — 99291 PR CRITICAL CARE, E/M 30-74 MINUTES: ICD-10-PCS | Mod: 25,HCNC,, | Performed by: NURSE PRACTITIONER

## 2019-05-17 PROCEDURE — 80048 BASIC METABOLIC PNL TOTAL CA: CPT | Mod: HCNC

## 2019-05-17 PROCEDURE — 87075 CULTR BACTERIA EXCEPT BLOOD: CPT | Mod: HCNC

## 2019-05-17 PROCEDURE — 80053 COMPREHEN METABOLIC PANEL: CPT | Mod: HCNC

## 2019-05-17 PROCEDURE — 84100 ASSAY OF PHOSPHORUS: CPT | Mod: HCNC

## 2019-05-17 PROCEDURE — 96366 THER/PROPH/DIAG IV INF ADDON: CPT | Mod: HCNC

## 2019-05-17 PROCEDURE — 83735 ASSAY OF MAGNESIUM: CPT | Mod: HCNC

## 2019-05-17 PROCEDURE — 49082 PR ABD PARACENTESIS: ICD-10-PCS | Mod: HCNC,,, | Performed by: NURSE PRACTITIONER

## 2019-05-17 PROCEDURE — 82042 OTHER SOURCE ALBUMIN QUAN EA: CPT | Mod: HCNC

## 2019-05-17 PROCEDURE — 85610 PROTHROMBIN TIME: CPT | Mod: HCNC

## 2019-05-17 PROCEDURE — 49082 ABD PARACENTESIS: CPT | Mod: HCNC,,, | Performed by: NURSE PRACTITIONER

## 2019-05-17 PROCEDURE — 85025 COMPLETE CBC W/AUTO DIFF WBC: CPT | Mod: HCNC

## 2019-05-17 PROCEDURE — 89051 BODY FLUID CELL COUNT: CPT | Mod: HCNC

## 2019-05-17 PROCEDURE — 99291 CRITICAL CARE FIRST HOUR: CPT | Mod: 25,HCNC,, | Performed by: NURSE PRACTITIONER

## 2019-05-17 PROCEDURE — 63600175 PHARM REV CODE 636 W HCPCS: Mod: HCNC | Performed by: INTERNAL MEDICINE

## 2019-05-17 PROCEDURE — 83615 LACTATE (LD) (LDH) ENZYME: CPT | Mod: HCNC

## 2019-05-17 PROCEDURE — 96365 THER/PROPH/DIAG IV INF INIT: CPT | Mod: HCNC

## 2019-05-17 RX ORDER — FLUTICASONE FUROATE AND VILANTEROL 100; 25 UG/1; UG/1
1 POWDER RESPIRATORY (INHALATION) DAILY
Status: DISCONTINUED | OUTPATIENT
Start: 2019-05-17 | End: 2019-05-24 | Stop reason: HOSPADM

## 2019-05-17 RX ORDER — MORPHINE SULFATE 2 MG/ML
3 INJECTION, SOLUTION INTRAMUSCULAR; INTRAVENOUS ONCE
Status: DISCONTINUED | OUTPATIENT
Start: 2019-05-17 | End: 2019-05-18

## 2019-05-17 RX ORDER — SODIUM CHLORIDE, SODIUM LACTATE, POTASSIUM CHLORIDE, CALCIUM CHLORIDE 600; 310; 30; 20 MG/100ML; MG/100ML; MG/100ML; MG/100ML
INJECTION, SOLUTION INTRAVENOUS CONTINUOUS
Status: ACTIVE | OUTPATIENT
Start: 2019-05-17 | End: 2019-05-18

## 2019-05-17 RX ORDER — MORPHINE SULFATE 2 MG/ML
2 INJECTION, SOLUTION INTRAMUSCULAR; INTRAVENOUS
Status: COMPLETED | OUTPATIENT
Start: 2019-05-17 | End: 2019-05-17

## 2019-05-17 RX ORDER — TIOTROPIUM BROMIDE 18 UG/1
1 CAPSULE ORAL; RESPIRATORY (INHALATION) DAILY
Status: DISCONTINUED | OUTPATIENT
Start: 2019-05-17 | End: 2019-05-24 | Stop reason: HOSPADM

## 2019-05-17 RX ADMIN — LACTULOSE 20 G: 20 SOLUTION ORAL at 09:05

## 2019-05-17 RX ADMIN — MORPHINE SULFATE 2 MG: 2 INJECTION, SOLUTION INTRAMUSCULAR; INTRAVENOUS at 02:05

## 2019-05-17 RX ADMIN — ONDANSETRON 4 MG: 2 INJECTION INTRAMUSCULAR; INTRAVENOUS at 12:05

## 2019-05-17 RX ADMIN — HYDROCORTISONE 15 MG: 5 TABLET ORAL at 11:05

## 2019-05-17 RX ADMIN — Medication 0.14 MCG/KG/MIN: at 01:05

## 2019-05-17 RX ADMIN — ONDANSETRON 4 MG: 2 INJECTION INTRAMUSCULAR; INTRAVENOUS at 05:05

## 2019-05-17 RX ADMIN — PIPERACILLIN AND TAZOBACTAM 4.5 G: 4; .5 INJECTION, POWDER, LYOPHILIZED, FOR SOLUTION INTRAVENOUS; PARENTERAL at 11:05

## 2019-05-17 RX ADMIN — VANCOMYCIN HYDROCHLORIDE 1750 MG: 100 INJECTION, POWDER, LYOPHILIZED, FOR SOLUTION INTRAVENOUS at 12:05

## 2019-05-17 RX ADMIN — HYDROCORTISONE 5 MG: 5 TABLET ORAL at 09:05

## 2019-05-17 RX ADMIN — SODIUM CHLORIDE, SODIUM LACTATE, POTASSIUM CHLORIDE, AND CALCIUM CHLORIDE: .6; .31; .03; .02 INJECTION, SOLUTION INTRAVENOUS at 07:05

## 2019-05-17 RX ADMIN — SODIUM CHLORIDE, SODIUM LACTATE, POTASSIUM CHLORIDE, AND CALCIUM CHLORIDE: .6; .31; .03; .02 INJECTION, SOLUTION INTRAVENOUS at 04:05

## 2019-05-17 RX ADMIN — Medication 0.2 MCG/KG/MIN: at 07:05

## 2019-05-17 RX ADMIN — PIPERACILLIN AND TAZOBACTAM 4.5 G: 4; .5 INJECTION, POWDER, LYOPHILIZED, FOR SOLUTION INTRAVENOUS; PARENTERAL at 04:05

## 2019-05-17 RX ADMIN — PANTOPRAZOLE SODIUM 40 MG: 40 TABLET, DELAYED RELEASE ORAL at 09:05

## 2019-05-17 RX ADMIN — PIPERACILLIN AND TAZOBACTAM 4.5 G: 4; .5 INJECTION, POWDER, LYOPHILIZED, FOR SOLUTION INTRAVENOUS; PARENTERAL at 07:05

## 2019-05-17 NOTE — ED NOTES
The patient is awake, alert and cooperative with a calm affect, patient is aware of environment. Airway is open and patent, respirations are spontaneous, normal respiratory effort and rate noted, skin warm and dry, moves all extremities well, appearance: no apparent distress noted. Pt updated on plan of care. Family at bedside.

## 2019-05-17 NOTE — ED NOTES
"Spoke with Yudelka Pierce NP concerning pt becoming hypotensive post paracentesis and having to titrate levophed up to a dose of 0.08mcg/kg/min and pt c/o nausea and "not feeling well". States she will come see patient.   "

## 2019-05-17 NOTE — ASSESSMENT & PLAN NOTE
MELD-Na score: 14 at 5/7/2019  5:14 AM  MELD score: 13 at 5/7/2019  5:14 AM  Calculated from:  Serum Creatinine: 1.2 mg/dL at 5/7/2019  5:14 AM  Serum Sodium: 136 mmol/L at 5/7/2019  5:14 AM  Total Bilirubin: 1.2 mg/dL at 5/7/2019  5:14 AM  INR(ratio): 1.5 at 5/5/2019  5:46 AM  Age: 73 years   --hep C cirrhosis with h/o small non-bleeding esophageal varices  --ammonia WNL, continue home lactulose daily  --holding home furosemide & aldactone in setting of sepsis & borderline BPs  --low concern for SBP as pt does not have abd pain; hold home cipro ppx due to starting zosyn for concern for sepsis

## 2019-05-17 NOTE — ED NOTES
Central line consent signed and at bedside. Pt sleeping in bed, awakens to voice. No acute distress noted. Respirations appear even and unlabored. Pt appears comfortable in bed, wife at bedside agrees, will hold morphine 3mg IVP. Side rails up x2. Call light within reach. Will continue to monitor.

## 2019-05-17 NOTE — ASSESSMENT & PLAN NOTE
--leukocytosis of 14, initial lactic 6.7  --possibly due to pancreatitis, however cannot rule out alternate etiology  --CXR without focal consolidation, UA neg for infection  --f/u 2 sets Bcx  --trending lactic; currently staying ~ 3.6  --continue vanc and zosyn  --f/u diagnostic para labs  --continue levo for MAP > 65

## 2019-05-17 NOTE — ED NOTES
The patient is awake, alert and cooperative with a calm affect, patient is aware of environment. Airway is open and patent, respirations are spontaneous, normal respiratory effort and rate noted, skin warm and dry, moves all extremities well, appearance: no apparent distress noted.   

## 2019-05-17 NOTE — ED NOTES
Diagnostic paracentesis performed at bedside by Yudelka Pierce NP. 60 cc fluid removed from R abdomen.

## 2019-05-17 NOTE — TELEPHONE ENCOUNTER
Called the patient to determine if the patient would like to reschedule the cancelled previously scheduled Colonoscopy, left message

## 2019-05-17 NOTE — SUBJECTIVE & OBJECTIVE
Past Medical History:   Diagnosis Date    Anemia     Anticoagulant long-term use     Ascites 12/10/2018    Biliary colic     Cataract     Chronic hepatitis C     Cirrhosis     COPD (chronic obstructive pulmonary disease)     Coronary artery disease     Dyspnea     Epistaxis     Esophageal varices without bleeding     Gallstones     GERD (gastroesophageal reflux disease)     HCC (hepatocellular carcinoma)     Hepatitis B antibody positive     Hyperglycemia     Hyperlipidemia     Hypertension     Hypopituitarism     Mobitz type 2 second degree atrioventricular block     Nuclear sclerosis, left     Pacemaker     Pituitary tumor     Portal hypertension     Pulmonary emphysema     PVT (paroxysmal ventricular tachycardia)     SA node dysfunction     Secondary adrenal insufficiency     Secondary male hypogonadism     SSS (sick sinus syndrome)     Superior mesenteric vein thrombosis     Vitreous hemorrhage, both eyes        Past Surgical History:   Procedure Laterality Date    CARDIAC PACEMAKER PLACEMENT      [facility]    CATARACT EXTRACTION  9/24/13    RT    COLONOSCOPY Left 1/4/2019    Performed by [facility] at [facility] (4TH FLR)    CORONARY ANGIOPLASTY WITH STENT PLACEMENT      2 stents    EGD (ESOPHAGOGASTRODUODENOSCOPY) Left 2/19/2019    [facility] [place]    EGD (ESOPHAGOGASTRODUODENOSCOPY) Left 1/4/2019    Performed by [facility]manuel [facility] at [facility] (4TH FLR)    ESOPHAGOGASTRODUODENOSCOPY (EGD) Left 5/31/2018    Performed by [facility] at [facility] END[facility] (4TH FLR)    ESOPHAGOGASTRODUODENOSCOPY (EGD) N/A 11/30/2017    Performed by Tr[facility] at [facility] (4TH FLR)    ESOPHAGOGASTRODUODENOSCOPY (EGD) Left 10/12/2017    Performed by Jody Humphries MD at [facility] END[facility] (4TH FLR)    ESOPHAGOGASTRODUODENOSCOPY (EGD) N/A 8/17/2017    Performed by [facility] at [facility] (4TH FLR)    ESOPHAGOGASTRODUODENOSCOPY (EGD)  N/A 2016    Performed by [facility] at [facility] ENDO (4TH FLR)    EYE SURGERY      INSERTION, IOL PROSTHESIS Right 2013    Performed by Ben Sparks MD at [facility] OR 1ST FLR    PARACENTESIS, ABDOMINAL N/A 2014    Performed by [facility] CATH LAB    PHACOEMULSIFICATION, CATARACT Right 2013    Performed by Ben Sparks MD at [facility] OR 1ST FLR    s/p PPV/AFX/EL (od)  2012       Review of patient's allergies indicates:   Allergen Reactions    Codeine Itching, Rash, Edema and Other (See Comments)     Other reaction(s): Itching       Family History     Problem Relation (Age of Onset)    Cancer Father, Sister, Sister, Brother    Diabetes Brother, Sister    Heart disease Mother, Sister    Lung cancer Brother    No Known Problems Sister, Brother        Tobacco Use    Smoking status: Former Smoker     Packs/day: 1.00     Years: 35.00     Pack years: 35.00     Types: Cigarettes     Last attempt to quit: 1995     Years since quittin.9    Smokeless tobacco: Never Used   Substance and Sexual Activity    Alcohol use: No     Alcohol/week: 0.0 oz    Drug use: No    Sexual activity: Not Currently      Review of Systems   Constitutional: Positive for fatigue. Negative for chills, diaphoresis and fever.   HENT: Negative for nosebleeds, sinus pain and trouble swallowing.    Eyes: Negative for pain, discharge and itching.   Respiratory: Negative for cough, shortness of breath and wheezing.    Cardiovascular: Negative for chest pain, palpitations and leg swelling.   Gastrointestinal: Positive for abdominal distention and nausea. Negative for abdominal pain, blood in stool, diarrhea and vomiting.   Endocrine: Negative for cold intolerance, heat intolerance and polydipsia.   Genitourinary: Negative for dysuria, flank pain and hematuria.   Musculoskeletal: Negative for back pain, myalgias and neck stiffness.   Skin: Negative for pallor, rash and wound.    Neurological: Positive for weakness. Negative for seizures and headaches.   Psychiatric/Behavioral: Negative for agitation and hallucinations. The patient is not nervous/anxious.    Objective:     Vital Signs (Most Recent):  Temp: 98 °F (36.7 °C) (05/16/19 2309)  Pulse: 69 (05/17/19 0516)  Resp: 16 (05/16/19 1503)  BP: (!) 97/56 (05/17/19 0516)  SpO2: 97 % (05/17/19 0516) Vital Signs (24h Range):  Temp:  [97.3 °F (36.3 °C)-98 °F (36.7 °C)] 98 °F (36.7 °C)  Pulse:  [61-78] 69  Resp:  [16] 16  SpO2:  [95 %-100 %] 97 %  BP: ()/(37-68) 97/56   Weight: 67.6 kg (149 lb)  Body mass index is 20.78 kg/m².      Intake/Output Summary (Last 24 hours) at 5/17/2019 0535  Last data filed at 5/17/2019 0403  Gross per 24 hour   Intake 1750 ml   Output 700 ml   Net 1050 ml       Physical Exam   Constitutional: He is oriented to person, place, and time. He appears well-developed and well-nourished. No distress.   HENT:   Head: Normocephalic and atraumatic.   Right Ear: External ear normal.   Left Ear: External ear normal.   Nose: Nose normal.   Mouth/Throat: Oropharynx is clear and moist.   Eyes: Pupils are equal, round, and reactive to light. Conjunctivae and EOM are normal.   Neck: Normal range of motion. Neck supple.   Cardiovascular: Normal rate, regular rhythm, normal heart sounds and intact distal pulses.   Pulmonary/Chest: Effort normal and breath sounds normal. No respiratory distress. He has no wheezes.   Abdominal: Soft. Bowel sounds are normal. He exhibits distension. There is no tenderness.   Musculoskeletal: Normal range of motion. He exhibits no edema, tenderness or deformity.   Neurological: He is alert and oriented to person, place, and time.   Skin: Skin is warm and dry. Capillary refill takes less than 2 seconds. He is not diaphoretic.   Psychiatric: He has a normal mood and affect. His behavior is normal. Judgment and thought content normal.   Nursing note and vitals reviewed.    Past Medical History:    Diagnosis Date    Anemia     Anticoagulant long-term use     Ascites 12/10/2018    Biliary colic     Cataract     Chronic hepatitis C     Cirrhosis     COPD (chronic obstructive pulmonary disease)     Coronary artery disease     Dyspnea     Epistaxis     Esophageal varices without bleeding     Gallstones     GERD (gastroesophageal reflux disease)     HCC (hepatocellular carcinoma)     Hepatitis B antibody positive     Hyperglycemia     Hyperlipidemia     Hypertension     Hypopituitarism     Mobitz type 2 second degree atrioventricular block     Nuclear sclerosis, left     Pacemaker     Pituitary tumor     Portal hypertension     Pulmonary emphysema     PVT (paroxysmal ventricular tachycardia)     SA node dysfunction     Secondary adrenal insufficiency     Secondary male hypogonadism     SSS (sick sinus syndrome)     Superior mesenteric vein thrombosis     Vitreous hemorrhage, both eyes        Past Surgical History:   Procedure Laterality Date    CARDIAC PACEMAKER PLACEMENT      [facility]    CATARACT EXTRACTION  9/24/13    RT    COLONOSCOPY Left 1/4/2019    Performed by [facility] at [facility] ENDO (4TH FLR)    CORONARY ANGIOPLASTY WITH STENT PLACEMENT      2 stents    EGD (ESOPHAGOGASTRODUODENOSCOPY) Left 2/19/2019    Performed by [facility] at [facility] END[facility]    EGD (ESOPHAGOGASTRODUODENOSCOPY) Left 1/4/2019    Performed by Emanuel Hannah MD at [facility] ENDO (4TH FLR)    ESOPHAGOGASTRODUODENOSCOPY (EGD) Left 5/31/2018    Performed by Trice Funes MD at [facility] ENDO (4TH FLR)    ESOPHAGOGASTRODUODENOSCOPY (EGD) N/A 11/30/2017    Performed by Trice Funes MD at [facility] ENDO (4TH FLR)    ESOPHAGOGASTRODUODENOSCOPY (EGD) Left 10/12/2017    Performed by Jody Humphries MD at [facility] ENDO (4TH FLR)    ESOPHAGOGASTRODUODENOSCOPY (EGD) N/A 8/17/2017    Performed by Tr[facility]unes MD at [facility] ENDO (4TH FLR)    ESOPHAGOGASTRODUODENOSCOPY (EGD) N/A 7/14/2016     Performed by Tr[facility]unes MD at [facility] ENDO (4TH FLR)    EYE SURGERY      INSERTION, IOL PROSTHESIS Right 2013    Performed by Ben Sparks MD at [facility] OR 1ST FLR    PARACENTESIS, ABDOMINAL N/A 2014    Performed by [facility] at [facility] CATH LAB    PHACOEMULSIFICATION, CATARACT Right 2013    Performed by Ben Sparks MD at [facility] OR 1ST FLR    s/p PPV/AFX/EL (od)  2012       Review of patient's allergies indicates:   Allergen Reactions    Codeine Itching, Rash, Edema and Other (See Comments)     Other reaction(s): Itching       Family History     Problem Relation (Age of Onset)    Cancer Father, Sister, Sister, Brother    Diabetes Brother, Sister    Heart disease Mother, Sister    Lung cancer Brother    No Known Problems Sister, Brother        Tobacco Use    Smoking status: Former Smoker     Packs/day: 1.00     Years: 35.00     Pack years: 35.00     Types: Cigarettes     Last attempt to quit: 1995     Years since quittin.9    Smokeless tobacco: Never Used   Substance and Sexual Activity    Alcohol use: No     Alcohol/week: 0.0 oz    Drug use: No    Sexual activity: Not Currently      Review of Systems   Constitutional: Positive for chills and fatigue. Negative for diaphoresis and fever.   HENT: Negative for nosebleeds, sinus pain and trouble swallowing.    Eyes: Negative for pain, discharge and itching.   Respiratory: Negative for cough, shortness of breath and wheezing.    Cardiovascular: Negative for chest pain, palpitations and leg swelling.   Gastrointestinal: Positive for abdominal distention, abdominal pain and nausea. Negative for blood in stool and diarrhea.   Endocrine: Negative for cold intolerance, heat intolerance and polydipsia.   Genitourinary: Negative for dysuria, flank pain and hematuria.   Musculoskeletal: Negative for back pain, myalgias and neck stiffness.   Skin: Negative for pallor, rash and wound.   Neurological:  Positive for weakness. Negative for seizures and headaches.   Psychiatric/Behavioral: Negative for agitation and hallucinations. The patient is not nervous/anxious.      Objective:     Vital Signs (Most Recent):  Temp: 98 °F (36.7 °C) (05/16/19 2309)  Pulse: 69 (05/17/19 0516)  Resp: 16 (05/16/19 1503)  BP: (!) 97/56 (05/17/19 0516)  SpO2: 97 % (05/17/19 0516) Vital Signs (24h Range):  Temp:  [97.3 °F (36.3 °C)-98 °F (36.7 °C)] 98 °F (36.7 °C)  Pulse:  [61-78] 69  Resp:  [16] 16  SpO2:  [95 %-100 %] 97 %  BP: ()/(37-68) 97/56   Weight: 67.6 kg (149 lb)  Body mass index is 20.78 kg/m².      Intake/Output Summary (Last 24 hours) at 5/17/2019 0536  Last data filed at 5/17/2019 0403  Gross per 24 hour   Intake 1750 ml   Output 700 ml   Net 1050 ml       Physical Exam    Vents:     Lines/Drains/Airways     Peripheral Intravenous Line                 Peripheral IV - Single Lumen 05/07/19 1530 20 G;1 3/4 in Right Forearm 9 days         Peripheral IV - Single Lumen 05/16/19 1513 20 G Left Antecubital less than 1 day         Peripheral IV - Single Lumen 05/16/19 1514 18 G Right Antecubital less than 1 day              Significant Labs:    CBC/Anemia Profile:  Recent Labs   Lab 05/15/19  1034 05/16/19  1520 05/16/19  1529 05/17/19  0415   WBC 15.39*  --  14.41* 13.76*   HGB 11.0*  --  9.9* 9.0*   HCT 35.3* 33* 31.9* 28.8*   PLT 90*  --  94* 65*   MCV 87  --  87 88   RDW 20.8*  --  20.6* 20.0*        Chemistries:  Recent Labs   Lab 05/15/19  1034 05/16/19  1529 05/16/19  1843 05/16/19  2235   * 134* 133* 134*   K 4.5 4.6 5.0 4.4   CL 99 100 105 107   CO2 23 21* 17* 16*   BUN 52* 54* 50* 45*   CREATININE 2.2* 3.2* 2.8* 2.4*   CALCIUM 11.0* 10.8* 9.5 9.1   ALBUMIN 3.2* 2.5* 2.4*  --    PROT 8.1 7.0 6.4  --    BILITOT 3.4* 3.0* 2.5*  --    ALKPHOS 285* 291* 254*  --    ALT 60* 61* 79*  --    * 127* 208*  --    MG  --   --  2.1  --    PHOS  --   --  3.3  --        All pertinent labs within the past 24 hours have  been reviewed.    Significant Imaging: I have reviewed and interpreted all pertinent imaging results/findings within the past 24 hours.    Vents:     Lines/Drains/Airways     Peripheral Intravenous Line                 Peripheral IV - Single Lumen 05/07/19 1530 20 G;1 3/4 in Right Forearm 9 days         Peripheral IV - Single Lumen 05/16/19 1513 20 G Left Antecubital less than 1 day         Peripheral IV - Single Lumen 05/16/19 1514 18 G Right Antecubital less than 1 day              Significant Labs:    CBC/Anemia Profile:  Recent Labs   Lab 05/15/19  1034 05/16/19  1520 05/16/19  1529 05/17/19  0415   WBC 15.39*  --  14.41* 13.76*   HGB 11.0*  --  9.9* 9.0*   HCT 35.3* 33* 31.9* 28.8*   PLT 90*  --  94* 65*   MCV 87  --  87 88   RDW 20.8*  --  20.6* 20.0*        Chemistries:  Recent Labs   Lab 05/15/19  1034 05/16/19  1529 05/16/19  1843 05/16/19  2235   * 134* 133* 134*   K 4.5 4.6 5.0 4.4   CL 99 100 105 107   CO2 23 21* 17* 16*   BUN 52* 54* 50* 45*   CREATININE 2.2* 3.2* 2.8* 2.4*   CALCIUM 11.0* 10.8* 9.5 9.1   ALBUMIN 3.2* 2.5* 2.4*  --    PROT 8.1 7.0 6.4  --    BILITOT 3.4* 3.0* 2.5*  --    ALKPHOS 285* 291* 254*  --    ALT 60* 61* 79*  --    * 127* 208*  --    MG  --   --  2.1  --    PHOS  --   --  3.3  --        All pertinent labs within the past 24 hours have been reviewed.    Significant Imaging: I have reviewed and interpreted all pertinent imaging results/findings within the past 24 hours.

## 2019-05-17 NOTE — ED NOTES
The patient is asleep and responds to voice,  with a calm affect, patient is aware of environment. Airway is open and patent, respirations are spontaneous, normal respiratory effort and rate noted, skin warm and dry, moves all extremities well, appearance: no apparent distress noted.

## 2019-05-17 NOTE — ASSESSMENT & PLAN NOTE
--holding home ARB, lasix & aldactone in setting of borderline hypotension & sepsis  --restart when clinically appropriate

## 2019-05-17 NOTE — PROGRESS NOTES
[facility]  Critical Care Medicine  Progress Note    Patient Name: Fox Lyons Sr.  MRN: [id number]  Admission Date: 5/16/2019  Hospital Length of Stay: 1 days  Code Status: DNR  Attending Provider: Manuel Costa*  Primary Care Provider: Clover White MD   Principal Problem: Sepsis due to undetermined organism    Subjective:     HPI:  Patient is a 73 y.o. 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 Patient denies fever, headache, chest pain, SOB, urinary symptoms and blood in stool.     Work up in the ED revealed leukocytosis of 14, hgb 9.9 (at baseline), initial creatinine of 3.2 (up from baseline ~ 1.5), transaminitis, lipase elevated at 324 and lactic 6.7 (which improved to 3.9 following 1.5L fluid resuscitation). On presentation pt was hypotensive to 60s/30s which improved to 90s/50s following volume resuscitation. Critical Care Medicine has been consulted for sepsis.         Hospital/ICU Course:  Patient admitted to [facility] evening of 5/16 for pancreatitis and sepsis of unclear etiology. Shortly after admission patient developed hypotension requiring initiation of vasopressors. RUQ performed and gallbladder was unable to be visualized. Diagnostic paracentesis performed.     Past Medical History:   Diagnosis Date    Anemia     Anticoagulant long-term use     Ascites 12/10/2018    Biliary colic     Cataract     Chronic hepatitis C     Cirrhosis     COPD (chronic obstructive pulmonary disease)     Coronary artery disease     Dyspnea      Epistaxis     Esophageal varices without bleeding     Gallstones     GERD (gastroesophageal reflux disease)     HCC (hepatocellular carcinoma)     Hepatitis B antibody positive     Hyperglycemia     Hyperlipidemia     Hypertension     Hypopituitarism     Mobitz type 2 second degree atrioventricular block     Nuclear sclerosis, left     Pacemaker     Pituitary tumor     Portal hypertension     Pulmonary emphysema     PVT (paroxysmal ventricular tachycardia)     SA node dysfunction     Secondary adrenal insufficiency     Secondary male hypogonadism     SSS (sick sinus syndrome)     Superior mesenteric vein thrombosis     Vitreous hemorrhage, both eyes        Past Surgical History:   Procedure Laterality Date    CARDIAC PACEMAKER PLACEMENT      [facility]    CATARACT EXTRACTION  9/24/13    RT    COLONOSCOPY Left 1/4/2019    Performed by Emanuel Hannah MD at [facility] ENDO (4TH FLR)    CORONARY ANGIOPLASTY WITH STENT PLACEMENT      2 stents    EGD (ESOPHAGOGASTRODUODENOSCOPY) Left 2/19/2019    [facility] by [facility] at [facility]    EGD (ESOPHAGOGASTRODUODENOSCOPY) Left 1/4/2019    Performed by Emanuel Hannah MD at [facility] ENDO (4TH FLR)    ESOPHAGOGASTRODUODENOSCOPY (EGD) Left 5/31/2018    Performed by Trice Funes MD at [facility] ENDO (4TH FLR)    ESOPHAGOGASTRODUODENOSCOPY (EGD) N/A 11/30/2017    Performed by Trice Funes MD at [facility] ENDO (4TH FLR)    ESOPHAGOGASTRODUODENOSCOPY (EGD) Left 10/12/2017    Performed by Jody Humphries MD at [facility] ENDO (4TH FLR)    ESOPHAGOGASTRODUODENOSCOPY (EGD) N/A 8/17/2017    Performed by Trice Funes MD at [facility] ENDO (4TH FLR)    ESOPHAGOGASTRODUODENOSCOPY (EGD) N/A 7/14/2016    Performed by Trice Funes MD at [facility] ENDO (4TH FLR)    EYE SURGERY      INSERTION, IOL PROSTHESIS Right 9/24/2013    Performed by Ben Sparks MD at [facility] OR 1ST FLR    PARACENTESIS, ABDOMINAL N/A 4/16/2014    Performed by [facility]  Provider at [facility] CATH LAB    PHACOEMULSIFICATION, CATARACT Right 2013    Performed by Ben [facility]ks MD at [facility] OR [facility] FLR    s/p PPV/AFX/EL (od)  2012       Review of patient's allergies indicates:   Allergen Reactions    Codeine Itching, Rash, Edema and Other (See Comments)     Other reaction(s): Itching       Family History     Problem Relation (Age of Onset)    Cancer Father, Sister, Sister, Brother    Diabetes Brother, Sister    Heart disease Mother, Sister    Lung cancer Brother    No Known Problems Sister, Brother        Tobacco Use    Smoking status: Former Smoker     Packs/day: 1.00     Years: 35.00     Pack years: 35.00     Types: Cigarettes     Last attempt to quit: 1995     Years since quittin.9    Smokeless tobacco: Never Used   Substance and Sexual Activity    Alcohol use: No     Alcohol/week: 0.0 oz    Drug use: No    Sexual activity: Not Currently      Review of Systems   Constitutional: Positive for fatigue. Negative for chills, diaphoresis and fever.   HENT: Negative for nosebleeds, sinus pain and trouble swallowing.    Eyes: Negative for pain, discharge and itching.   Respiratory: Negative for cough, shortness of breath and wheezing.    Cardiovascular: Negative for chest pain, palpitations and leg swelling.   Gastrointestinal: Positive for abdominal distention and nausea. Negative for abdominal pain, blood in stool, diarrhea and vomiting.   Endocrine: Negative for cold intolerance, heat intolerance and polydipsia.   Genitourinary: Negative for dysuria, flank pain and hematuria.   Musculoskeletal: Negative for back pain, myalgias and neck stiffness.   Skin: Negative for pallor, rash and wound.   Neurological: Positive for weakness. Negative for seizures and headaches.   Psychiatric/Behavioral: Negative for agitation and hallucinations. The patient is not nervous/anxious.    Objective:     Vital Signs (Most Recent):  Temp: 98 °F (36.7 °C) (19 2309)  Pulse:  69 (05/17/19 0516)  Resp: 16 (05/16/19 1503)  BP: (!) 97/56 (05/17/19 0516)  SpO2: 97 % (05/17/19 0516) Vital Signs (24h Range):  Temp:  [97.3 °F (36.3 °C)-98 °F (36.7 °C)] 98 °F (36.7 °C)  Pulse:  [61-78] 69  Resp:  [16] 16  SpO2:  [95 %-100 %] 97 %  BP: ()/(37-68) 97/56   Weight: 67.6 kg (149 lb)  Body mass index is 20.78 kg/m².      Intake/Output Summary (Last 24 hours) at 5/17/2019 0535  Last data filed at 5/17/2019 0403  Gross per 24 hour   Intake 1750 ml   Output 700 ml   Net 1050 ml       Physical Exam   Constitutional: He is oriented to person, place, and time. He appears well-developed and well-nourished. No distress.   HENT:   Head: Normocephalic and atraumatic.   Right Ear: External ear normal.   Left Ear: External ear normal.   Nose: Nose normal.   Mouth/Throat: Oropharynx is clear and moist.   Eyes: Pupils are equal, round, and reactive to light. Conjunctivae and EOM are normal.   Neck: Normal range of motion. Neck supple.   Cardiovascular: Normal rate, regular rhythm, normal heart sounds and intact distal pulses.   Pulmonary/Chest: Effort normal and breath sounds normal. No respiratory distress. He has no wheezes.   Abdominal: Soft. Bowel sounds are normal. He exhibits distension. There is no tenderness.   Musculoskeletal: Normal range of motion. He exhibits no edema, tenderness or deformity.   Neurological: He is alert and oriented to person, place, and time.   Skin: Skin is warm and dry. Capillary refill takes less than 2 seconds. He is not diaphoretic.   Psychiatric: He has a normal mood and affect. His behavior is normal. Judgment and thought content normal.   Nursing note and vitals reviewed.    Past Medical History:   Diagnosis Date    Anemia     Anticoagulant long-term use     Ascites 12/10/2018    Biliary colic     Cataract     Chronic hepatitis C     Cirrhosis     COPD (chronic obstructive pulmonary disease)     Coronary artery disease     Dyspnea     Epistaxis     Esophageal  varices without bleeding     Gallstones     GERD (gastroesophageal reflux disease)     HCC (hepatocellular carcinoma)     Hepatitis B antibody positive     Hyperglycemia     Hyperlipidemia     Hypertension     Hypopituitarism     Mobitz type 2 second degree atrioventricular block     Nuclear sclerosis, left     Pacemaker     Pituitary tumor     Portal hypertension     Pulmonary emphysema     PVT (paroxysmal ventricular tachycardia)     SA node dysfunction     Secondary adrenal insufficiency     Secondary male hypogonadism     SSS (sick sinus syndrome)     Superior mesenteric vein thrombosis     Vitreous hemorrhage, both eyes        Past Surgical History:   Procedure Laterality Date    CARDIAC PACEMAKER PLACEMENT      [facility]    CATARACT EXTRACTION  9/24/13    RT    COLONOSCOPY Left 1/4/2019    Performed by Emanuel Hannah MD at [facility] ENDO (4TH FLR)    CORONARY ANGIOPLASTY WITH STENT PLACEMENT      2 stents    EGD (ESOPHAGOGASTRODUODENOSCOPY) Left 2/19/2019    Performed by [facility] at [facility] END[facility]    EGD (ESOPHAGOGASTRODUODENOSCOPY) Left 1/4/2019    Performed by Emanuel Hannah MD at [facility] ENDO (4TH FLR)    ESOPHAGOGASTRODUODENOSCOPY (EGD) Left 5/31/2018    Performed by Trice Funes MD at [facility] ENDO (4TH FLR)    ESOPHAGOGASTRODUODENOSCOPY (EGD) N/A 11/30/2017    Performed by Trice Funes MD at [facility] ENDO (4TH FLR)    ESOPHAGOGASTRODUODENOSCOPY (EGD) Left 10/12/2017    Performed by Jody Humphries MD at [facility] ENDO (4TH FLR)    ESOPHAGOGASTRODUODENOSCOPY (EGD) N/A 8/17/2017    Performed by Trice Funes MD at [facility] ENDO (4TH FLR)    ESOPHAGOGASTRODUODENOSCOPY (EGD) N/A 7/14/2016    Performed by Trice Funes MD at [facility] ENDO (4TH FLR)    EYE SURGERY      INSERTION, IOL PROSTHESIS Right 9/24/2013    Performed by Ben Sparks MD at [facility] OR 1ST FLR    PARACENTESIS, ABDOMINAL N/A 4/16/2014    Performed by [facility] Provider at [facility] CATH LAB     PHACOEMULSIFICATION, CATARACT Right 2013    Performed by [facility] at [facility] OR [facility] FLR    s/p PPV/AFX/EL (od)  2012       Review of patient's allergies indicates:   Allergen Reactions    Codeine Itching, Rash, Edema and Other (See Comments)     Other reaction(s): Itching       Family History     Problem Relation (Age of Onset)    Cancer Father, Sister, Sister, Brother    Diabetes Brother, Sister    Heart disease Mother, Sister    Lung cancer Brother    No Known Problems Sister, Brother        Tobacco Use    Smoking status: Former Smoker     Packs/day: 1.00     Years: 35.00     Pack years: 35.00     Types: Cigarettes     Last attempt to quit: 1995     Years since quittin.9    Smokeless tobacco: Never Used   Substance and Sexual Activity    Alcohol use: No     Alcohol/week: 0.0 oz    Drug use: No    Sexual activity: Not Currently      Review of Systems   Constitutional: Positive for chills and fatigue. Negative for diaphoresis and fever.   HENT: Negative for nosebleeds, sinus pain and trouble swallowing.    Eyes: Negative for pain, discharge and itching.   Respiratory: Negative for cough, shortness of breath and wheezing.    Cardiovascular: Negative for chest pain, palpitations and leg swelling.   Gastrointestinal: Positive for abdominal distention, abdominal pain and nausea. Negative for blood in stool and diarrhea.   Endocrine: Negative for cold intolerance, heat intolerance and polydipsia.   Genitourinary: Negative for dysuria, flank pain and hematuria.   Musculoskeletal: Negative for back pain, myalgias and neck stiffness.   Skin: Negative for pallor, rash and wound.   Neurological: Positive for weakness. Negative for seizures and headaches.   Psychiatric/Behavioral: Negative for agitation and hallucinations. The patient is not nervous/anxious.      Objective:     Vital Signs (Most Recent):  Temp: 98 °F (36.7 °C) (19 2309)  Pulse: 69 (19 0516)  Resp: 16  (05/16/19 1503)  BP: (!) 97/56 (05/17/19 0516)  SpO2: 97 % (05/17/19 0516) Vital Signs (24h Range):  Temp:  [97.3 °F (36.3 °C)-98 °F (36.7 °C)] 98 °F (36.7 °C)  Pulse:  [61-78] 69  Resp:  [16] 16  SpO2:  [95 %-100 %] 97 %  BP: ()/(37-68) 97/56   Weight: 67.6 kg (149 lb)  Body mass index is 20.78 kg/m².      Intake/Output Summary (Last 24 hours) at 5/17/2019 0536  Last data filed at 5/17/2019 0403  Gross per 24 hour   Intake 1750 ml   Output 700 ml   Net 1050 ml       Physical Exam    Vents:     Lines/Drains/Airways     Peripheral Intravenous Line                 Peripheral IV - Single Lumen 05/07/19 1530 20 G;1 3/4 in Right Forearm 9 days         Peripheral IV - Single Lumen 05/16/19 1513 20 G Left Antecubital less than 1 day         Peripheral IV - Single Lumen 05/16/19 1514 18 G Right Antecubital less than 1 day              Significant Labs:    CBC/Anemia Profile:  Recent Labs   Lab 05/15/19  1034 05/16/19  1520 05/16/19  1529 05/17/19  0415   WBC 15.39*  --  14.41* 13.76*   HGB 11.0*  --  9.9* 9.0*   HCT 35.3* 33* 31.9* 28.8*   PLT 90*  --  94* 65*   MCV 87  --  87 88   RDW 20.8*  --  20.6* 20.0*        Chemistries:  Recent Labs   Lab 05/15/19  1034 05/16/19  1529 05/16/19  1843 05/16/19  2235   * 134* 133* 134*   K 4.5 4.6 5.0 4.4   CL 99 100 105 107   CO2 23 21* 17* 16*   BUN 52* 54* 50* 45*   CREATININE 2.2* 3.2* 2.8* 2.4*   CALCIUM 11.0* 10.8* 9.5 9.1   ALBUMIN 3.2* 2.5* 2.4*  --    PROT 8.1 7.0 6.4  --    BILITOT 3.4* 3.0* 2.5*  --    ALKPHOS 285* 291* 254*  --    ALT 60* 61* 79*  --    * 127* 208*  --    MG  --   --  2.1  --    PHOS  --   --  3.3  --        All pertinent labs within the past 24 hours have been reviewed.    Significant Imaging: I have reviewed and interpreted all pertinent imaging results/findings within the past 24 hours.    Vents:     Lines/Drains/Airways     Peripheral Intravenous Line                 Peripheral IV - Single Lumen 05/07/19 1530 20 G;1 3/4 in Right  Forearm 9 days         Peripheral IV - Single Lumen 05/16/19 1513 20 G Left Antecubital less than 1 day         Peripheral IV - Single Lumen 05/16/19 1514 18 G Right Antecubital less than 1 day              Significant Labs:    CBC/Anemia Profile:  Recent Labs   Lab 05/15/19  1034 05/16/19  1520 05/16/19  1529 05/17/19  0415   WBC 15.39*  --  14.41* 13.76*   HGB 11.0*  --  9.9* 9.0*   HCT 35.3* 33* 31.9* 28.8*   PLT 90*  --  94* 65*   MCV 87  --  87 88   RDW 20.8*  --  20.6* 20.0*        Chemistries:  Recent Labs   Lab 05/15/19  1034 05/16/19  1529 05/16/19  1843 05/16/19  2235   * 134* 133* 134*   K 4.5 4.6 5.0 4.4   CL 99 100 105 107   CO2 23 21* 17* 16*   BUN 52* 54* 50* 45*   CREATININE 2.2* 3.2* 2.8* 2.4*   CALCIUM 11.0* 10.8* 9.5 9.1   ALBUMIN 3.2* 2.5* 2.4*  --    PROT 8.1 7.0 6.4  --    BILITOT 3.4* 3.0* 2.5*  --    ALKPHOS 285* 291* 254*  --    ALT 60* 61* 79*  --    * 127* 208*  --    MG  --   --  2.1  --    PHOS  --   --  3.3  --        All pertinent labs within the past 24 hours have been reviewed.    Significant Imaging: I have reviewed and interpreted all pertinent imaging results/findings within the past 24 hours.      ABG  No results for input(s): PH, PO2, PCO2, HCO3, BE in the last 168 hours.  Assessment/Plan:     Pulmonary  Chronic obstructive pulmonary disease (COPD)  --no evidence of acute exacerbation  --continue home trelegy ellipta with duo nebs prn    Cardiac/Vascular  Sick sinus syndrome  --s/p pacemaker placement  --currently in atrial paced rhythm on EKG    Coronary artery disease  --s/p stent x2 2011  --holding statin in acute setting; restart when clinically appropriate    Hyperlipidemia  --holding home statin in acute setting  --restart when clinically appropriate    Hypertension  --holding home ARB, lasix & aldactone in setting of shock  --restart when clinically appropriate    Renal/  Metabolic acidosis, normal anion gap (NAG)  --nothing in hpi to suggest  diarrhea  --urine anion gap positive at 26, consistent with RTA    Chronic kidney disease, stage III (moderate)  --ERNESTINA on CKD, creatinine on admission 3.2, up from baseline ~ 1.5   --suspect prerenal vs ATN from hypovolemia/hypotension  --FeUrea 49.6% suggestive of intrinsic renal disease  --creatinine improving with fluid administration    ID  * Sepsis due to undetermined organism  --leukocytosis of 14, initial lactic 6.7  --possibly due to pancreatitis, however cannot rule out alternate etiology  --CXR without focal consolidation, UA neg for infection  --f/u 2 sets Bcx  --trending lactic; currently staying ~ 3.6  --continue vanc and zosyn  --f/u diagnostic para labs  --continue levo for MAP > 65    History of hepatitis C  MELD-Na score: 27 at 5/17/2019  2:06 AM  MELD score: 26 at 5/17/2019  2:06 AM  Calculated from:  Serum Creatinine: 2.4 mg/dL at 5/16/2019 10:35 PM  Serum Sodium: 134 mmol/L at 5/16/2019 10:35 PM  Total Bilirubin: 2.5 mg/dL at 5/16/2019  6:43 PM  INR(ratio): 2.0 at 5/17/2019  2:06 AM  Age: 73 years   --hep C cirrhosis with h/o small non-bleeding esophageal varices  --ammonia WNL, continue home lactulose daily  --holding home furosemide & aldactone in setting of sepsis & shock  --f/u paracentesis labs    Endocrine  Secondary adrenal insufficiency  --s/p pituitary adenoma resection  --pt endorses compliance with home hydrocortisone  --continue hydrocortisone 5mg Qam & 15mg Qpm  --checking 8a cortisol level    GI  Pancreatitis  --lipase 324 on admit, h/o gallstones on prior imagaing  --unable to visual gallbladder on RUQ US secondary to overlying bowel gas  --triglyceride level WNL  --IVF hydration & supportive care  --checking CT abdomen as transaminases worsening and RUQ US non-diagnostic. Pending results may benefit from MRCP      GERD (gastroesophageal reflux disease)  --continue home ppi     Critical Care Daily Checklist:    A: Awake: RASS Goal/Actual Goal:    Actual:     B: Spontaneous  Breathing Trial Performed?  n/a   C: SAT & SBT Coordinated?  n/a                      D: Delirium: CAM-ICU     E: Early Mobility Performed? Yes   F: Feeding Goal:    Status:     Current Diet Order   Procedures    Diet NPO Except for: Medication, Ice Chips, Sips with Medication     Order Specific Question:   Except for     Answer:   Medication     Order Specific Question:   Except for     Answer:   Ice Chips     Order Specific Question:   Except for     Answer:   Sips with Medication      AS: Analgesia/Sedation    T: Thromboembolic Prophylaxis scds   H: HOB > 300 Yes   U: Stress Ulcer Prophylaxis (if needed) ppi   G: Glucose Control accuchecks Q6   B: Bowel Function     I: Indwelling Catheter (Lines & Forbes) Necessity PIV   D: De-escalation of Antimicrobials/Pharmacotherapies Vanc, zosyn    Plan for the day/ETD W/u septic shock    Code Status:  Family/Goals of Care: DNR       Patient to be evaluated by and further recommendations per Dr. Costa    Critical Care Time: 45 minutes  Critical secondary to Patient has a condition that poses threat to life and bodily function: septic shock      Critical care was time spent personally by me on the following activities: development of treatment plan with patient or surrogate and bedside caregivers, discussions with consultants, evaluation of patient's response to treatment, examination of patient, ordering and performing treatments and interventions, ordering and review of laboratory studies, ordering and review of radiographic studies, pulse oximetry, re-evaluation of patient's condition. This critical care time did not overlap with that of any other provider or involve time for any procedures.     Yudelka Pierce, NP  Critical Care Medicine  [facility]

## 2019-05-17 NOTE — ASSESSMENT & PLAN NOTE
--lipase 324 on admit, h/o gallstones on prior imagaing  --unable to visual gallbladder on RUQ US secondary to overlying bowel gas  --triglyceride level WNL  --IVF hydration & supportive care  --checking CT abdomen as transaminases worsening and RUQ US non-diagnostic. Pending results may benefit from MRCP

## 2019-05-17 NOTE — ED NOTES
Pt's bladder was scanned and showed approximately 300 ml of urine. Pt does have ascites. Pt attempted to urinate, but was not able to do so. Will notify team.

## 2019-05-17 NOTE — ED NOTES
The patient is awake, alert and cooperative with a calm affect, patient is aware of environment. Airway is open and patent, respirations are spontaneous, normal respiratory effort and rate noted, skin warm and dry, moves all extremities well, appearance: no apparent distress noted.

## 2019-05-17 NOTE — H&P
[facility]  Critical Care Medicine  History & Physical    Patient Name: Fox Lyons Sr.  MRN: [id number]  Admission Date: 5/16/2019  Hospital Length of Stay: 1 days  Code Status: DNR  Attending Physician: Manuel Costa*   Primary Care Provider: Clover White MD   Principal Problem: Sepsis due to undetermined organism    Subjective:     HPI:  Patient is a 73 y.o. 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 Patient denies fever, headache, chest pain, SOB, urinary symptoms and blood in stool.     Work up in the ED revealed leukocytosis of 14, hgb 9.9 (at baseline), initial creatinine of 3.2 (up from baseline ~ 1.5), transaminitis, lipase elevated at 324 and lactic 6.7 (which improved to 3.9 following 1.5L fluid resuscitation). On presentation pt was hypotensive to 60s/30s which improved to 90s/50s following volume resuscitation. Critical Care Medicine has been consulted for sepsis.         Hospital/ICU Course:  No notes on file     Past Medical History:   Diagnosis Date    Anemia     Anticoagulant long-term use     Ascites 12/10/2018    Biliary colic     Cataract     Chronic hepatitis C     Cirrhosis     COPD (chronic obstructive pulmonary disease)     Coronary artery disease     Dyspnea     Epistaxis     Esophageal varices without bleeding     Gallstones     GERD (gastroesophageal reflux disease)     HCC (hepatocellular carcinoma)     Hepatitis B antibody positive     Hyperglycemia     Hyperlipidemia     Hypertension     Hypopituitarism      Mobitz type 2 second degree atrioventricular block     Nuclear sclerosis, left     Pacemaker     Pituitary tumor     Portal hypertension     Pulmonary emphysema     PVT (paroxysmal ventricular tachycardia)     SA node dysfunction     Secondary adrenal insufficiency     Secondary male hypogonadism     SSS (sick sinus syndrome)     Superior mesenteric vein thrombosis     Vitreous hemorrhage, both eyes        Past Surgical History:   Procedure Laterality Date    CARDIAC PACEMAKER PLACEMENT      st rochelle    CATARACT EXTRACTION  9/24/13    RT    COLONOSCOPY Left 1/4/2019    Performed by [facility] at [facility] ENDO (4TH FLR)    CORONARY ANGIOPLASTY WITH STENT PLACEMENT      2 stents    EGD (ESOPHAGOGASTRODUODENOSCOPY) Left 2/19/2019    [facility] END[facility]    EGD (ESOPHAGOGASTRODUODENOSCOPY) Left 1/4/2019    Performed by Emanuel Hannah [facility] at [facility] ENDO (4TH FLR)    ESOPHAGOGASTRODUODENOSCOPY (EGD) Left 5/31/2018    Performed by [facility] at [facility] ENDO (4TH FLR)    ESOPHAGOGASTRODUODENOSCOPY (EGD) N/A 11/30/2017    Performed by Tr[facility] at [facility] ENDO (4TH FLR)    ESOPHAGOGASTRODUODENOSCOPY (EGD) Left 10/12/2017    Performed by Jody Humphries MD at [facility] ENDO (4TH FLR)    ESOPHAGOGASTRODUODENOSCOPY (EGD) N/A 8/17/2017    Performed by [facility] at [facility] ENDO (4TH FLR)    ESOPHAGOGASTRODUODENOSCOPY (EGD) N/A 7/14/2016    Performed by [facility] at [facility] ([facility] FLR)    EYE SURGERY      INSERTION, IOL PROSTHESIS Right 9/24/2013    Performed by Ben Sparks MD at [facility] OR 1ST FLR    PARACENTESIS, ABDOMINAL N/A 4/16/2014    Performed by [facility] Provider at [facility] CATH LAB    PHACOEMULSIFICATION, CATARACT Right 9/24/2013    Performed by Ben Sparks MD at [facility] OR 1ST FLR    s/p PPV/AFX/EL (od)  05/09/2012       Review of patient's allergies indicates:   Allergen Reactions    Codeine Itching,  Rash, Edema and Other (See Comments)     Other reaction(s): Itching       Family History     Problem Relation (Age of Onset)    Cancer Father, Sister, Sister, Brother    Diabetes Brother, Sister    Heart disease Mother, Sister    Lung cancer Brother    No Known Problems Sister, Brother        Tobacco Use    Smoking status: Former Smoker     Packs/day: 1.00     Years: 35.00     Pack years: 35.00     Types: Cigarettes     Last attempt to quit: 1995     Years since quittin.8    Smokeless tobacco: Never Used   Substance and Sexual Activity    Alcohol use: No     Alcohol/week: 0.0 oz    Drug use: No    Sexual activity: Not Currently      Review of Systems   Constitutional: Positive for chills and fatigue. Negative for diaphoresis and fever.   HENT: Negative for nosebleeds, sinus pain and trouble swallowing.    Eyes: Negative for pain, discharge and itching.   Respiratory: Negative for cough, shortness of breath and wheezing.    Cardiovascular: Negative for chest pain, palpitations and leg swelling.   Gastrointestinal: Positive for abdominal distention, abdominal pain and nausea. Negative for blood in stool and diarrhea.   Endocrine: Negative for cold intolerance, heat intolerance and polydipsia.   Genitourinary: Negative for dysuria, flank pain and hematuria.   Musculoskeletal: Negative for back pain, myalgias and neck stiffness.   Skin: Negative for pallor, rash and wound.   Neurological: Positive for weakness. Negative for seizures and headaches.   Psychiatric/Behavioral: Negative for agitation and hallucinations. The patient is not nervous/anxious.      Objective:     Vital Signs (Most Recent):  Temp: 97.3 °F (36.3 °C) (19 1503)  Pulse: 66 (19 2117)  Resp: 16 (19 1503)  BP: 101/60 (19)  SpO2: 98 % (19) Vital Signs (24h Range):  Temp:  [97.3 °F (36.3 °C)] 97.3 °F (36.3 °C)  Pulse:  [62-78] 66  Resp:  [16] 16  SpO2:  [95 %-100 %] 98 %  BP: ()/(37-68) 101/60    Weight: 67.6 kg (149 lb)  Body mass index is 20.78 kg/m².      Intake/Output Summary (Last 24 hours) at 5/16/2019 2238  Last data filed at 5/16/2019 2016  Gross per 24 hour   Intake 750 ml   Output --   Net 750 ml       Physical Exam  Constitutional: He is oriented to person, place, and time. He appears well-developed and well-nourished. No distress.   HENT:   Head: Normocephalic and atraumatic.   Right Ear: External ear normal.   Left Ear: External ear normal.   Nose: Nose normal.   Mouth/Throat: Oropharynx is clear and moist.   Eyes: Pupils are equal, round, and reactive to light. Conjunctivae and EOM are normal.   Neck: Normal range of motion. Neck supple.   Cardiovascular: Normal rate, regular rhythm, normal heart sounds and intact distal pulses.   Pulmonary/Chest: Effort normal and breath sounds normal. No respiratory distress. He has no wheezes.   Abdominal: Soft. Bowel sounds are normal. He exhibits distension. There is no tenderness.   Musculoskeletal: Normal range of motion. He exhibits no edema, tenderness or deformity.   Neurological: He is alert and oriented to person, place, and time.   Skin: Skin is warm and dry. Capillary refill takes less than 2 seconds. He is not diaphoretic.   Psychiatric: He has a normal mood and affect. His behavior is normal. Judgment and thought content normal.   Nursing note and vitals reviewed.    Vents:     Lines/Drains/Airways     Peripheral Intravenous Line                 Peripheral IV - Single Lumen 05/07/19 1530 20 G;1 3/4 in Right Forearm 9 days         Peripheral IV - Single Lumen 05/16/19 1513 20 G Left Antecubital less than 1 day         Peripheral IV - Single Lumen 05/16/19 1514 18 G Right Antecubital less than 1 day              Significant Labs:    CBC/Anemia Profile:  Recent Labs   Lab 05/15/19  1034 05/16/19  1520 05/16/19  1529   WBC 15.39*  --  14.41*   HGB 11.0*  --  9.9*   HCT 35.3* 33* 31.9*   PLT 90*  --  94*   MCV 87  --  87   RDW 20.8*  --  20.6*         Chemistries:  Recent Labs   Lab 05/15/19  1034 05/16/19  1529 05/16/19  1843   * 134* 133*   K 4.5 4.6 5.0   CL 99 100 105   CO2 23 21* 17*   BUN 52* 54* 50*   CREATININE 2.2* 3.2* 2.8*   CALCIUM 11.0* 10.8* 9.5   ALBUMIN 3.2* 2.5* 2.4*   PROT 8.1 7.0 6.4   BILITOT 3.4* 3.0* 2.5*   ALKPHOS 285* 291* 254*   ALT 60* 61* 79*   * 127* 208*       All pertinent labs within the past 24 hours have been reviewed.    Significant Imaging: I have reviewed and interpreted all pertinent imaging results/findings within the past 24 hours.    Assessment/Plan:     Pulmonary  Chronic obstructive pulmonary disease (COPD)  --no evidence of acute exacerbation  --continue home trelegy ellipta with duo nebs prn    Cardiac/Vascular  Sick sinus syndrome  --s/p pacemaker placement  --currently in atrial paced rhythm on EKG    Coronary artery disease  --s/p stent x2 2011  --holding statin in acute setting; restart when clinically appropriate    Hyperlipidemia  --holding home statin in acute setting  --restart when clinically appropriate    Hypertension  --holding home ARB, lasix & aldactone in setting of borderline hypotension & sepsis  --restart when clinically appropriate    Renal/  Chronic kidney disease, stage III (moderate)  --ERNESTINA on CKD, creatinine on admission 3.2, up from baseline ~ 1.5   --suspect prerenal vs ATN from hypovolemia/hypotension  --checking FeUrea labs  --creatinine improving with fluid administration    ID  * Sepsis due to undetermined organism  --leukocytosis of 14, initial lactic 6.7  --possibly due to pancreatitis, however cannot rule out alternate etiology  --CXR without focal consolidation, UA neg for infection  --checking 2 sets Bcx  --trending lactic  --start vanc/zosyn  --will look for pocket of ascitic fluid to perform paracentesis    History of hepatitis C  MELD-Na score: 14 at 5/7/2019  5:14 AM  MELD score: 13 at 5/7/2019  5:14 AM  Calculated from:  Serum Creatinine: 1.2 mg/dL at 5/7/2019   5:14 AM  Serum Sodium: 136 mmol/L at 5/7/2019  5:14 AM  Total Bilirubin: 1.2 mg/dL at 5/7/2019  5:14 AM  INR(ratio): 1.5 at 5/5/2019  5:46 AM  Age: 73 years   --hep C cirrhosis with h/o small non-bleeding esophageal varices  --ammonia WNL, continue home lactulose daily  --holding home furosemide & aldactone in setting of sepsis & borderline BPs  --low concern for SBP as pt does not have abd pain; hold home cipro ppx due to starting zosyn for concern for sepsis    Endocrine  Secondary adrenal insufficiency  --s/p pituitary adenoma resection  --pt endorses compliance with home hydrocortisone  --continue hydrocortisone 5mg Qam & 15mg Qpm  --checking 8a cortisol level    GI  Pancreatitis  --lipase 324 on admit, h/o gallstones on prior imagaing  --RUQ US pending  --checking triglyceride level  --IVF hydration    GERD (gastroesophageal reflux disease)  --continue home ppi      Critical Care Daily Checklist:    A: Awake: RASS Goal/Actual Goal:    Actual:     B: Spontaneous Breathing Trial Performed?  n/a   C: SAT & SBT Coordinated?  n/a                      D: Delirium: CAM-ICU     E: Early Mobility Performed? Yes   F: Feeding Goal:    Status:     Current Diet Order   Procedures    Diet NPO Except for: Medication, Ice Chips, Sips with Medication     Order Specific Question:   Except for     Answer:   Medication     Order Specific Question:   Except for     Answer:   Ice Chips     Order Specific Question:   Except for     Answer:   Sips with Medication      AS: Analgesia/Sedation    T: Thromboembolic Prophylaxis scds   H: HOB > 300 Yes   U: Stress Ulcer Prophylaxis (if needed) ppi   G: Glucose Control accuchecks Q6 with ssi   B: Bowel Function     I: Indwelling Catheter (Lines & Forbes) Necessity PIV   D: De-escalation of Antimicrobials/Pharmacotherapies Vanc, zosyn    Plan for the day/ETD S/u pancreatitis and sepsis    Code Status:  Family/Goals of Care: DNR       Case discussed with Dr. Ayana Samano.     Critical Care Time:  60 minutes  Critical secondary to Patient has a condition that poses threat to life and bodily function: sepsis     Critical care was time spent personally by me on the following activities: development of treatment plan with patient or surrogate and bedside caregivers, discussions with consultants, evaluation of patient's response to treatment, examination of patient, ordering and performing treatments and interventions, ordering and review of laboratory studies, ordering and review of radiographic studies, pulse oximetry, re-evaluation of patient's condition. This critical care time did not overlap with that of any other provider or involve time for any procedures.     Yudelka Pierce, NP  Critical Care Medicine  [facility]

## 2019-05-17 NOTE — PROGRESS NOTES
Pharmacokinetic Initial Assessment: IV Vancomycin    Assessment/Plan: for sepsis concern   · Initiate intravenous vancomycin with loading dose of 1750 mg once - dosing by levels  · Subsequent doses when random concentrations are less than 25 mcg/ml  · Draw vancomycin random level on 5/17 at AM lab draw.  · Pharmacy will continue to follow and monitor vancomycin.      Please contact pharmacy at extension [phone] with any questions regarding this assessment.     Thank you for the consult,   Ashley Phil     Patient brief summary:  Fox Lyons Sr. is a 73 y.o. male initiated on antimicrobial therapy with IV Vancomycin for treatment of suspected sepsis            Drug Allergies:   Review of patient's allergies indicates:   Allergen Reactions    Codeine Itching, Rash, Edema and Other (See Comments)     Other reaction(s): Itching       Actual Body Weight:   67.6 kg    Renal Function:   Estimated Creatinine Clearance: 22.5 mL/min (A) (based on SCr of 2.8 mg/dL (H)).,     Dialysis Method (if applicable):  none    CBC (last 72 hours):  Recent Labs   Lab Result Units 05/15/19  1034 05/16/19  1529   WBC K/uL 15.39* 14.41*   Hemoglobin g/dL 11.0* 9.9*   Hematocrit % 35.3* 31.9*   Platelets K/uL 90* 94*   Gran% % 89.8* 87.8*   Lymph% % 4.6* 4.8*   Mono% % 4.9 6.5   Eosinophil% % 0.1 0.1   Basophil% % 0.1 0.1   Differential Method  Automated Automated       Metabolic Panel (last 72 hours):  Recent Labs   Lab Result Units 05/15/19  1034 05/16/19  1529 05/16/19  1724 05/16/19  1843   Sodium mmol/L 135* 134*  --  133*   Potassium mmol/L 4.5 4.6  --  5.0   Chloride mmol/L 99 100  --  105   CO2 mmol/L 23 21*  --  17*   Glucose mg/dL 150* 218*  --  144*   Glucose, UA   --   --  Negative  --    BUN, Bld mg/dL 52* 54*  --  50*   Creatinine mg/dL 2.2* 3.2*  --  2.8*   Albumin g/dL 3.2* 2.5*  --  2.4*   Total Bilirubin mg/dL 3.4* 3.0*  --  2.5*   Alkaline Phosphatase U/L 285* 291*  --  254*   AST U/L 117* 127*  --  208*   ALT U/L  60* 61*  --  79*       Drug levels (last 3 results):  No results for input(s): VANCOMYCINRA, VANCOMYCINPE, VANCOMYCINTR in the last 72 hours.    Microbiologic Results:  Microbiology Results (last 7 days)     Procedure Component Value Units Date/Time    Blood culture [[id number]] Collected:  05/16/19 2235    Order Status:  Sent Specimen:  Blood from Peripheral, Antecubital, Right Updated:  05/16/19 2235    Blood culture [[id number]] Collected:  05/16/19 2234    Order Status:  Sent Specimen:  Blood from Peripheral, Antecubital, Left Updated:  05/16/19 2235    Blood culture x two cultures. Draw prior to antibiotics. [[id number]] Collected:  05/16/19 1530    Order Status:  Sent Specimen:  Blood from Peripheral, Antecubital, Right Updated:  05/16/19 1543    Blood culture x two cultures. Draw prior to antibiotics. [[id number]] Collected:  05/16/19 1508    Order Status:  Sent Specimen:  Blood from Peripheral, Antecubital, Left Updated:  05/16/19 1542

## 2019-05-17 NOTE — SUBJECTIVE & OBJECTIVE
Past Medical History:   Diagnosis Date    Anemia     Anticoagulant long-term use     Ascites 12/10/2018    Biliary colic     Cataract     Chronic hepatitis C     Cirrhosis     COPD (chronic obstructive pulmonary disease)     Coronary artery disease     Dyspnea     Epistaxis     Esophageal varices without bleeding     Gallstones     GERD (gastroesophageal reflux disease)     HCC (hepatocellular carcinoma)     Hepatitis B antibody positive     Hyperglycemia     Hyperlipidemia     Hypertension     Hypopituitarism     Mobitz type 2 second degree atrioventricular block     Nuclear sclerosis, left     Pacemaker     Pituitary tumor     Portal hypertension     Pulmonary emphysema     PVT (paroxysmal ventricular tachycardia)     SA node dysfunction     Secondary adrenal insufficiency     Secondary male hypogonadism     SSS (sick sinus syndrome)     Superior mesenteric vein thrombosis     Vitreous hemorrhage, both eyes        Past Surgical History:   Procedure Laterality Date    CARDIAC PACEMAKER PLACEMENT      st rochelle    CATARACT EXTRACTION  9/24/13    RT    COLONOSCOPY Left 1/4/2019    Performed by Emanuel Hannah MD at [facility] ENDO (4TH FLR)    CORONARY ANGIOPLASTY WITH STENT PLACEMENT      2 stents    EGD (ESOPHAGOGASTRODUODENOSCOPY) Left 2/19/2019    [facility]    EGD (ESOPHAGOGASTRODUODENOSCOPY) Left 1/4/2019    Performed by Emanuel Hannah MD at [facility] ENDO (4TH FLR)    ESOPHAGOGASTRODUODENOSCOPY (EGD) Left 5/31/2018    Performed by Trice Funes MD at [facility] ENDO (4TH FLR)    ESOPHAGOGASTRODUODENOSCOPY (EGD) N/A 11/30/2017    Performed by Trice Funes [facility] at [facility] ENDO (4TH FLR)    ESOPHAGOGASTRODUODENOSCOPY (EGD) Left 10/12/2017    Performed by Jody Humphries MD at [facility] ENDO (4TH FLR)    ESOPHAGOGASTRODUODENOSCOPY (EGD) N/A 8/17/2017    Performed by Trice [facility] at [facility] ENDO (4TH FLR)    ESOPHAGOGASTRODUODENOSCOPY (EGD)  N/A 2016    Performed by [facility] at [facility] ENDO (4TH FLR)    EYE SURGERY      INSERTION, IOL PROSTHESIS Right 2013    Performed by Ben Sparks MD at [facility] OR 1ST FLR    PARACENTESIS, ABDOMINAL N/A 2014    Performed by [facility] Diagnostic Provider at [facility] CATH LAB    PHACOEMULSIFICATION, CATARACT Right 2013    Performed by Ben [facility]ks MD at [facility] OR 1ST FLR    s/p PPV/AFX/EL (od)  2012       Review of patient's allergies indicates:   Allergen Reactions    Codeine Itching, Rash, Edema and Other (See Comments)     Other reaction(s): Itching       Family History     Problem Relation (Age of Onset)    Cancer Father, Sister, Sister, Brother    Diabetes Brother, Sister    Heart disease Mother, Sister    Lung cancer Brother    No Known Problems Sister, Brother        Tobacco Use    Smoking status: Former Smoker     Packs/day: 1.00     Years: 35.00     Pack years: 35.00     Types: Cigarettes     Last attempt to quit: 1995     Years since quittin.8    Smokeless tobacco: Never Used   Substance and Sexual Activity    Alcohol use: No     Alcohol/week: 0.0 oz    Drug use: No    Sexual activity: Not Currently      Review of Systems   Constitutional: Positive for chills and fatigue. Negative for diaphoresis and fever.   HENT: Negative for nosebleeds, sinus pain and trouble swallowing.    Eyes: Negative for pain, discharge and itching.   Respiratory: Negative for cough, shortness of breath and wheezing.    Cardiovascular: Negative for chest pain, palpitations and leg swelling.   Gastrointestinal: Positive for abdominal distention, abdominal pain and nausea. Negative for blood in stool and diarrhea.   Endocrine: Negative for cold intolerance, heat intolerance and polydipsia.   Genitourinary: Negative for dysuria, flank pain and hematuria.   Musculoskeletal: Negative for back pain, myalgias and neck stiffness.   Skin: Negative for pallor, rash and wound.    Neurological: Positive for weakness. Negative for seizures and headaches.   Psychiatric/Behavioral: Negative for agitation and hallucinations. The patient is not nervous/anxious.      Objective:     Vital Signs (Most Recent):  Temp: 97.3 °F (36.3 °C) (05/16/19 1503)  Pulse: 66 (05/16/19 2117)  Resp: 16 (05/16/19 1503)  BP: 101/60 (05/16/19 2117)  SpO2: 98 % (05/16/19 2117) Vital Signs (24h Range):  Temp:  [97.3 °F (36.3 °C)] 97.3 °F (36.3 °C)  Pulse:  [62-78] 66  Resp:  [16] 16  SpO2:  [95 %-100 %] 98 %  BP: ()/(37-68) 101/60   Weight: 67.6 kg (149 lb)  Body mass index is 20.78 kg/m².      Intake/Output Summary (Last 24 hours) at 5/16/2019 2238  Last data filed at 5/16/2019 2016  Gross per 24 hour   Intake 750 ml   Output --   Net 750 ml       Physical Exam    Vents:     Lines/Drains/Airways     Peripheral Intravenous Line                 Peripheral IV - Single Lumen 05/07/19 1530 20 G;1 3/4 in Right Forearm 9 days         Peripheral IV - Single Lumen 05/16/19 1513 20 G Left Antecubital less than 1 day         Peripheral IV - Single Lumen 05/16/19 1514 18 G Right Antecubital less than 1 day              Significant Labs:    CBC/Anemia Profile:  Recent Labs   Lab 05/15/19  1034 05/16/19  1520 05/16/19  1529   WBC 15.39*  --  14.41*   HGB 11.0*  --  9.9*   HCT 35.3* 33* 31.9*   PLT 90*  --  94*   MCV 87  --  87   RDW 20.8*  --  20.6*        Chemistries:  Recent Labs   Lab 05/15/19  1034 05/16/19  1529 05/16/19  1843   * 134* 133*   K 4.5 4.6 5.0   CL 99 100 105   CO2 23 21* 17*   BUN 52* 54* 50*   CREATININE 2.2* 3.2* 2.8*   CALCIUM 11.0* 10.8* 9.5   ALBUMIN 3.2* 2.5* 2.4*   PROT 8.1 7.0 6.4   BILITOT 3.4* 3.0* 2.5*   ALKPHOS 285* 291* 254*   ALT 60* 61* 79*   * 127* 208*       All pertinent labs within the past 24 hours have been reviewed.    Significant Imaging: I have reviewed and interpreted all pertinent imaging results/findings within the past 24 hours.

## 2019-05-17 NOTE — PROGRESS NOTES
Pharmacokinetic Assessment Follow Up: IV Vancomycin    Vancomycin serum concentration assessment(s):    Vancomycin random level ordered by provider resulted at 34 mcg/mL approximately 4 hours after loading dose was administered. Patient with acute kidney injury and elevated serum creatinine level.     Vancomycin Regimen Plan:    Continue to hold vancomycin IV and redose when the random level is less than 20 mcg/mL. Next random level to be drawn with morning labs on 5/18.    Pharmacy will continue to follow and monitor vancomycin.    Please contact pharmacy at extension [phone] for questions regarding this assessment.    Thank you for the consult,   Evelyn Ga, PharmD           Patient brief summary:  Fox Lyons Sr. is a 73 y.o. male initiated on antimicrobial therapy with IV vancomycin for empiric treatment of unknown source.    Drug Allergies:   Review of patient's allergies indicates:   Allergen Reactions    Codeine Itching, Rash, Edema and Other (See Comments)     Other reaction(s): Itching       Actual Body Weight:   67.6 kg     Renal Function:   Estimated Creatinine Clearance: 28.6 mL/min (A) (based on SCr of 2.2 mg/dL (H)).    CBC (last 72 hours):  Recent Labs   Lab Result Units 05/15/19  1034 05/16/19  1529 05/17/19  0415   WBC K/uL 15.39* 14.41* 13.76*   Hemoglobin g/dL 11.0* 9.9* 9.0*   Hematocrit % 35.3* 31.9* 28.8*   Platelets K/uL 90* 94* 65*   Gran% % 89.8* 87.8* 87.8*   Lymph% % 4.6* 4.8* 4.9*   Mono% % 4.9 6.5 6.5   Eosinophil% % 0.1 0.1 0.4   Basophil% % 0.1 0.1 0.0   Differential Method  Automated Automated Automated       Metabolic Panel (last 72 hours):  Recent Labs   Lab Result Units 05/15/19  1034 05/16/19  1529 05/16/19  1724 05/16/19  1843 05/16/19  2235 05/17/19  0415   Sodium mmol/L 135* 134*  --  133* 134* 135*   Sodium Urine Random mmol/L  --   --  29  --   --   --    Potassium mmol/L 4.5 4.6  --  5.0 4.4 4.3   Potassium Urine Random mmol/L  --   --  37  --   --   --    Chloride  mmol/L 99 100  --  105 107 107   Chloride, Rand Ur mmol/L  --   --  40  --   --   --    CO2 mmol/L 23 21*  --  17* 16* 17*   Glucose mg/dL 150* 218*  --  144* 133* 121*   Glucose, UA   --   --  Negative  --   --   --    BUN, Bld mg/dL 52* 54*  --  50* 45* 42*   Creatinine mg/dL 2.2* 3.2*  --  2.8* 2.4* 2.2*   Creatinine, Random Ur mg/dL  --   --  79.0  --   --   --    Albumin g/dL 3.2* 2.5*  --  2.4*  --  2.2*   Total Bilirubin mg/dL 3.4* 3.0*  --  2.5*  --  2.9*   Alkaline Phosphatase U/L 285* 291*  --  254*  --  262*   AST U/L 117* 127*  --  208*  --  656*   ALT U/L 60* 61*  --  79*  --  218*   Magnesium mg/dL  --   --   --  2.1  --  1.6   Phosphorus mg/dL  --   --   --  3.3  --  3.1       Vancomycin Administrations:  vancomycin given in the last 96 hours                   vancomycin 1750 mg in 0.9% sodium chloride 500 mL IVPB (mg) 1,750 mg New Bag 05/17/19 0020                Drug levels (last 3 results):  Recent Labs   Lab Result Units 05/17/19  0415   Vancomycin, Random ug/mL 34.0       Microbiologic Results:  Microbiology Results (last 7 days)     Procedure Component Value Units Date/Time    Blood culture x two cultures. Draw prior to antibiotics. [[id number]] Collected:  05/16/19 1530    Order Status:  Completed Specimen:  Blood from Peripheral, Antecubital, Right Updated:  05/17/19 1612     Blood Culture, Routine No Growth to date     Blood Culture, Routine No Growth to date    Narrative:       Aerobic and anaerobic    Blood culture x two cultures. Draw prior to antibiotics. [[id number]] Collected:  05/16/19 1508    Order Status:  Completed Specimen:  Blood from Peripheral, Antecubital, Left Updated:  05/17/19 1612     Blood Culture, Routine No Growth to date     Blood Culture, Routine No Growth to date    Narrative:       Aerobic and anaerobic    (rule out SBP) Gram stain [[id number]] Collected:  05/17/19 0512    Order Status:  Completed Specimen:  Body Fluid from Peritoneal Fluid Updated:  05/17/19 1111      Gram Stain Result No WBC's      No organisms seen    Narrative:       To rule out SBP order labs: Aerobic culture [[id number]],  Culture, Anaerobic [[id number]], Gram stain [BDP908], Albumin  [QIN979], Protein [KXH427], LDH [IOL537], WBC \T\ Dff  [[id number]].    Blood culture [[id number]] Collected:  05/16/19 2235    Order Status:  Completed Specimen:  Blood from Peripheral, Antecubital, Right Updated:  05/17/19 0715     Blood Culture, Routine No Growth to date    Blood culture [[id number]] Collected:  05/16/19 2234    Order Status:  Completed Specimen:  Blood from Peripheral, Antecubital, Left Updated:  05/17/19 0715     Blood Culture, Routine No Growth to date    (rule out SBP) Aerobic culture [[id number]] Collected:  05/17/19 0512    Order Status:  Sent Specimen:  Body Fluid from Peritoneal Fluid Updated:  05/17/19 0534    (rule out SBP) Culture, Anaerobic [[id number]] Collected:  05/17/19 0512    Order Status:  Sent Specimen:  Body Fluid from Peritoneal Fluid Updated:  05/17/19 0533

## 2019-05-17 NOTE — ASSESSMENT & PLAN NOTE
--lipase 324 on admit, h/o gallstones on prior imagaing  --RUQ US pending  --checking triglyceride level  --IVF hydration

## 2019-05-17 NOTE — ED NOTES
Pt's first and last name and birthday confirmed.   LOC: The patient is awake, alert and aware of environment with an appropriate affect, the patient is oriented x 3 and speaking appropriately. Pt is drowsy.   APPEARANCE: Patient resting comfortably and in no acute distress, patient is clean and well groomed. Pt is thin and frail.   SKIN: The skin is pale, warm and dry, patient has normal skin turgor and moist mucus membranes, skin intact, no breakdown or brusing noted. Paracentesis dressing is CDI.   MUSKULOSKELETAL: Patient moving all extremities well, no obvious swelling or deformities noted. Pt has generalized weakness.   RESPIRATORY: Airway is open and patent, respirations are spontaneous, patient has a normal effort and rate. Breath sounds are clear and equal bilaterally.  CARDIAC: Normal heart sounds. No peripheral edema.  ABDOMEN: Soft and non tender to palpation, distention noted. Unable to auscultate bowel sounds.   NEURO: No neuro deficits, hand grasp equal, no drift noted, no facial droop noted. Speech is clear.

## 2019-05-17 NOTE — ASSESSMENT & PLAN NOTE
--leukocytosis of 14, initial lactic 6.7  --possibly due to pancreatitis, however cannot rule out alternate etiology  --CXR without focal consolidation, UA neg for infection  --checking 2 sets Bcx  --trending lactic  --start vanc/zosyn  --will look for pocket of ascitic fluid to perform paracentesis

## 2019-05-17 NOTE — ASSESSMENT & PLAN NOTE
--ERNESTINA on CKD, creatinine on admission 3.2, up from baseline ~ 1.5   --suspect prerenal vs ATN from hypovolemia/hypotension  --FeUrea 49.6% suggestive of intrinsic renal disease  --creatinine improving with fluid administration

## 2019-05-17 NOTE — CONSULTS
Patient evaluated by and admitted to Critical Care Medicine. Full H&P to follow.    Yudelka Pierce NP  Critical Care Medicine

## 2019-05-17 NOTE — PROCEDURES
"Fox Lyons Sr. is a 73 y.o. male patient.    Temp: 98 °F (36.7 °C) (19 2309)  Pulse: 69 (19 05)  Resp: 16 (19 1503)  BP: (!) 97/56 (19 05)  SpO2: 97 % (19 05)  Weight: 67.6 kg (149 lb) (19 1503)  Height: 5' 11" (180.3 cm) (19 1503)       Paracentesis  Date/Time: 2019 5:30 AM  Location procedure was performed: [facility] CRITICAL CARE MEDICINE  Performed by: Ayana Samano MD  Authorized by: Ayana Samano MD   Assisting provider: Yudelka Pierce NP  Consent Done: Yes  Consent: Written consent obtained.  Consent given by: patient  Patient understanding: patient states understanding of the procedure being performed  Patient consent: the patient's understanding of the procedure matches consent given  Procedure consent: procedure consent matches procedure scheduled  Relevant documents: relevant documents present and verified  Test results: test results available and properly labeled  Site marked: the operative site was marked  Imaging studies: imaging studies available  Required items: required blood products, implants, devices, and special equipment available  Patient identity confirmed: , name and MRN  Time out: Immediately prior to procedure a "time out" was called to verify the correct patient, procedure, equipment, support staff and site/side marked as required.  Initial or subsequent exam: initial  Procedure purpose: diagnostic  Indications: suspected peritonitis  Anesthesia: local infiltration    Anesthesia:  Local Anesthetic: lidocaine 1% without epinephrine  Anesthetic total: 5 mL  Patient sedated: no  Preparation: Patient was prepped and draped in the usual sterile fashion.  Needle gauge: 18  Ultrasound guidance: yes  Puncture site: right lower quadrant  Fluid removed: 60(ml)  Fluid appearance: clear  Complications: No  Specimens: No  Implants: No  Patient tolerance: Patient tolerated the procedure well with no immediate " complications          Ayana Samano  5/17/2019

## 2019-05-17 NOTE — ED NOTES
Patient requesting something for 8/10 abdominal pain. Critical care made aware, no new orders received.

## 2019-05-17 NOTE — ASSESSMENT & PLAN NOTE
MELD-Na score: 27 at 5/17/2019  2:06 AM  MELD score: 26 at 5/17/2019  2:06 AM  Calculated from:  Serum Creatinine: 2.4 mg/dL at 5/16/2019 10:35 PM  Serum Sodium: 134 mmol/L at 5/16/2019 10:35 PM  Total Bilirubin: 2.5 mg/dL at 5/16/2019  6:43 PM  INR(ratio): 2.0 at 5/17/2019  2:06 AM  Age: 73 years   --hep C cirrhosis with h/o small non-bleeding esophageal varices  --ammonia WNL, continue home lactulose daily  --holding home furosemide & aldactone in setting of sepsis & shock  --f/u paracentesis labs

## 2019-05-17 NOTE — TELEPHONE ENCOUNTER
----- Message from Shana Benton CST sent at 5/17/2019  1:11 PM CDT -----  Regarding: Cancellation  [place] this patient wife called and stated that the patient needs to reschedule. We informed them to call the office to reschedule. Thanks

## 2019-05-17 NOTE — ASSESSMENT & PLAN NOTE
--ERNESTINA on CKD, creatinine on admission 3.2, up from baseline ~ 1.5   --suspect prerenal vs ATN from hypovolemia/hypotension  --checking FeUrea labs  --creatinine improving with fluid administration

## 2019-05-17 NOTE — HOSPITAL COURSE
Mr. Lyons was admitted to [facility] evening of 5/16 for pancreatitis and sepsis of unclear etiology. Shortly after admission patient developed hypotension requiring initiation of vasopressors. RUQ performed and gallbladder was unable to be visualized. Diagnostic paracentesis performed which was negative for SBP. HIDA scan showing patent portal flow. The am of 05/19, his lactate remains elevated and vasopressin added overnight. Shock liver worsening. Therapeutic paracentesis on 5/18 with 6.4 L removed, negative for SBP; cultures pending.  Blood cultures remain NGTD. MRCP considered but pacemaker present. Hepatology following for cirrhosis, not a current transplant candidate.  US liver with doppler revealed expansion with complete occlusive thrombus of superior mesenteric vein with unchanged thrombus in the main portal vein and right anterior portal vein. Anticoagulation not initiated due to coagulopathy and thrombocytopenia. Right hepatic mass 1.4 x 2 x 1.3 cm also noted that was not seen on CT abdomen/pelvis w/o contrast from 5/17. AFP wnl. CEA elevated. Midodrine added on 5/19, weaned off vasopressors on 5/20.  Completed 5 days of vancomycin and pip/tazo.  No clear source of infection with cultures NGTD; however, suspicious for intra abdominal source. Continue fluconazole for 5 days for oral thrush. Increase midodrine and began weaning stress dose steroids to home regimen.  Code status changed to DNR per conversation with wife and patient.  Wife understands that Mr. Lyons has ESLD and is not a transplant candidate. Wife also in agreement with home hospice.  Appreciate palliative care.

## 2019-05-17 NOTE — HPI
Mr. Fox Lyons is a 73 y.o. 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 Patient denies fever, headache, chest pain, SOB, urinary symptoms and blood in stool.     Work up in the ED revealed leukocytosis of 14, hgb 9.9 (at baseline), initial creatinine of 3.2 (up from baseline ~ 1.5), transaminitis, lipase elevated at 324 and lactic 6.7 (which improved to 3.9 following 1.5L fluid resuscitation). On presentation pt was hypotensive to 60s/30s which improved to 90s/50s following volume resuscitation. Critical Care Medicine has been consulted for sepsis.

## 2019-05-17 NOTE — ASSESSMENT & PLAN NOTE
--s/p pituitary adenoma resection  --pt endorses compliance with home hydrocortisone  --continue hydrocortisone 5mg Qam & 15mg Qpm  --checking 8a cortisol level

## 2019-05-18 PROBLEM — K72.00 ISCHEMIC HEPATITIS: Status: ACTIVE | Noted: 2019-05-18

## 2019-05-18 PROBLEM — R65.21 SEPTIC SHOCK DUE TO UNDETERMINED ORGANISM: Status: ACTIVE | Noted: 2019-05-17

## 2019-05-18 LAB
ALBUMIN FLD-MCNC: 0.4 G/DL
ALBUMIN SERPL BCP-MCNC: 1.9 G/DL (ref 3.5–5.2)
ALBUMIN SERPL BCP-MCNC: 1.9 G/DL (ref 3.5–5.2)
ALP SERPL-CCNC: 421 U/L (ref 55–135)
ALP SERPL-CCNC: 451 U/L (ref 55–135)
ALT SERPL W/O P-5'-P-CCNC: 897 U/L (ref 10–44)
ALT SERPL W/O P-5'-P-CCNC: 915 U/L (ref 10–44)
AMYLASE, BODY FLUID: 56 U/L
ANION GAP SERPL CALC-SCNC: 12 MMOL/L (ref 8–16)
ANION GAP SERPL CALC-SCNC: 12 MMOL/L (ref 8–16)
APPEARANCE FLD: CLEAR
AST SERPL-CCNC: 1817 U/L (ref 10–40)
AST SERPL-CCNC: 2141 U/L (ref 10–40)
BASOPHILS # BLD AUTO: 0 K/UL (ref 0–0.2)
BASOPHILS # BLD AUTO: 0.01 K/UL (ref 0–0.2)
BASOPHILS NFR BLD: 0 % (ref 0–1.9)
BASOPHILS NFR BLD: 0.1 % (ref 0–1.9)
BILIRUB FLD-MCNC: 0.6 MG/DL
BILIRUB SERPL-MCNC: 3 MG/DL (ref 0.1–1)
BILIRUB SERPL-MCNC: 3.7 MG/DL (ref 0.1–1)
BODY FLD TYPE: NORMAL
BODY FLUID SOURCE AMYLASE: NORMAL
BODY FLUID SOURCE, BILIRUBIN: NORMAL
BODY FLUID SOURCE, CREATININE: NORMAL
BODY FLUID SOURCE, LDH: NORMAL
BUN SERPL-MCNC: 37 MG/DL (ref 8–23)
BUN SERPL-MCNC: 40 MG/DL (ref 8–23)
CALCIUM SERPL-MCNC: 8.9 MG/DL (ref 8.7–10.5)
CALCIUM SERPL-MCNC: 8.9 MG/DL (ref 8.7–10.5)
CHLORIDE SERPL-SCNC: 100 MMOL/L (ref 95–110)
CHLORIDE SERPL-SCNC: 104 MMOL/L (ref 95–110)
CO2 SERPL-SCNC: 15 MMOL/L (ref 23–29)
CO2 SERPL-SCNC: 15 MMOL/L (ref 23–29)
COLOR FLD: YELLOW
CREAT FLD-MCNC: 2.1 MG/DL
CREAT SERPL-MCNC: 2.1 MG/DL (ref 0.5–1.4)
CREAT SERPL-MCNC: 2.3 MG/DL (ref 0.5–1.4)
DIFFERENTIAL METHOD: ABNORMAL
DIFFERENTIAL METHOD: ABNORMAL
EOSINOPHIL # BLD AUTO: 0 K/UL (ref 0–0.5)
EOSINOPHIL # BLD AUTO: 0 K/UL (ref 0–0.5)
EOSINOPHIL NFR BLD: 0 % (ref 0–8)
EOSINOPHIL NFR BLD: 0.2 % (ref 0–8)
ERYTHROCYTE [DISTWIDTH] IN BLOOD BY AUTOMATED COUNT: 19.6 % (ref 11.5–14.5)
ERYTHROCYTE [DISTWIDTH] IN BLOOD BY AUTOMATED COUNT: 20 % (ref 11.5–14.5)
EST. GFR  (AFRICAN AMERICAN): 31.4 ML/MIN/1.73 M^2
EST. GFR  (AFRICAN AMERICAN): 35 ML/MIN/1.73 M^2
EST. GFR  (NON AFRICAN AMERICAN): 27.2 ML/MIN/1.73 M^2
EST. GFR  (NON AFRICAN AMERICAN): 30.3 ML/MIN/1.73 M^2
FIBRINOGEN PPP-MCNC: 181 MG/DL (ref 182–366)
GLUCOSE FLD-MCNC: 240 MG/DL
GLUCOSE SERPL-MCNC: 147 MG/DL (ref 70–110)
GLUCOSE SERPL-MCNC: 284 MG/DL (ref 70–110)
HAPTOGLOB SERPL-MCNC: 21 MG/DL (ref 30–250)
HCT VFR BLD AUTO: 27.8 % (ref 40–54)
HCT VFR BLD AUTO: 30.7 % (ref 40–54)
HGB BLD-MCNC: 8.8 G/DL (ref 14–18)
HGB BLD-MCNC: 9.3 G/DL (ref 14–18)
IMM GRANULOCYTES # BLD AUTO: 0.12 K/UL (ref 0–0.04)
IMM GRANULOCYTES # BLD AUTO: 0.12 K/UL (ref 0–0.04)
IMM GRANULOCYTES NFR BLD AUTO: 0.7 % (ref 0–0.5)
IMM GRANULOCYTES NFR BLD AUTO: 0.8 % (ref 0–0.5)
INR PPP: 3.3 (ref 0.8–1.2)
LACTATE SERPL-SCNC: 5.1 MMOL/L (ref 0.5–2.2)
LACTATE SERPL-SCNC: 5.7 MMOL/L (ref 0.5–2.2)
LACTATE SERPL-SCNC: 5.9 MMOL/L (ref 0.5–2.2)
LACTATE SERPL-SCNC: 6 MMOL/L (ref 0.5–2.2)
LACTATE SERPL-SCNC: 6.7 MMOL/L (ref 0.5–2.2)
LACTATE SERPL-SCNC: 6.7 MMOL/L (ref 0.5–2.2)
LDH FLD L TO P-CCNC: 503 U/L
LDH SERPL L TO P-CCNC: 3025 U/L (ref 110–260)
LIPASE SERPL-CCNC: 48 U/L (ref 4–60)
LYMPHOCYTES # BLD AUTO: 0.4 K/UL (ref 1–4.8)
LYMPHOCYTES # BLD AUTO: 0.5 K/UL (ref 1–4.8)
LYMPHOCYTES NFR BLD: 2.1 % (ref 18–48)
LYMPHOCYTES NFR BLD: 3.3 % (ref 18–48)
LYMPHOCYTES NFR FLD MANUAL: 14 %
MAGNESIUM SERPL-MCNC: 1.4 MG/DL (ref 1.6–2.6)
MCH RBC QN AUTO: 27 PG (ref 27–31)
MCH RBC QN AUTO: 27.6 PG (ref 27–31)
MCHC RBC AUTO-ENTMCNC: 30.3 G/DL (ref 32–36)
MCHC RBC AUTO-ENTMCNC: 31.7 G/DL (ref 32–36)
MCV RBC AUTO: 87 FL (ref 82–98)
MCV RBC AUTO: 89 FL (ref 82–98)
MESOTHL CELL NFR FLD MANUAL: 4 %
MONOCYTES # BLD AUTO: 0.9 K/UL (ref 0.3–1)
MONOCYTES # BLD AUTO: 1.1 K/UL (ref 0.3–1)
MONOCYTES NFR BLD: 6 % (ref 4–15)
MONOCYTES NFR BLD: 6.5 % (ref 4–15)
MONOS+MACROS NFR FLD MANUAL: 27 %
NEUTROPHILS # BLD AUTO: 12.7 K/UL (ref 1.8–7.7)
NEUTROPHILS # BLD AUTO: 16.6 K/UL (ref 1.8–7.7)
NEUTROPHILS NFR BLD: 89.4 % (ref 38–73)
NEUTROPHILS NFR BLD: 90.9 % (ref 38–73)
NEUTROPHILS NFR FLD MANUAL: 55 %
NRBC BLD-RTO: 0 /100 WBC
NRBC BLD-RTO: 0 /100 WBC
PHOSPHATE SERPL-MCNC: 4.8 MG/DL (ref 2.7–4.5)
PLATELET # BLD AUTO: 68 K/UL (ref 150–350)
PLATELET # BLD AUTO: 72 K/UL (ref 150–350)
PMV BLD AUTO: 10.6 FL (ref 9.2–12.9)
PMV BLD AUTO: 10.7 FL (ref 9.2–12.9)
POCT GLUCOSE: 146 MG/DL (ref 70–110)
POCT GLUCOSE: 184 MG/DL (ref 70–110)
POCT GLUCOSE: 287 MG/DL (ref 70–110)
POTASSIUM SERPL-SCNC: 4.7 MMOL/L (ref 3.5–5.1)
POTASSIUM SERPL-SCNC: 4.7 MMOL/L (ref 3.5–5.1)
PROT FLD-MCNC: <1 G/DL
PROT SERPL-MCNC: 5.6 G/DL (ref 6–8.4)
PROT SERPL-MCNC: 5.9 G/DL (ref 6–8.4)
PROTHROMBIN TIME: 32.3 SEC (ref 9–12.5)
RBC # BLD AUTO: 3.19 M/UL (ref 4.6–6.2)
RBC # BLD AUTO: 3.44 M/UL (ref 4.6–6.2)
RETICS/RBC NFR AUTO: 4.3 % (ref 0.4–2)
SODIUM SERPL-SCNC: 127 MMOL/L (ref 136–145)
SODIUM SERPL-SCNC: 131 MMOL/L (ref 136–145)
SPECIMEN SOURCE: NORMAL
VANCOMYCIN SERPL-MCNC: 10.2 UG/ML
WBC # BLD AUTO: 14.17 K/UL (ref 3.9–12.7)
WBC # BLD AUTO: 18.2 K/UL (ref 3.9–12.7)
WBC # FLD: 64 /CU MM

## 2019-05-18 PROCEDURE — 82570 ASSAY OF URINE CREATININE: CPT | Mod: HCNC

## 2019-05-18 PROCEDURE — 84100 ASSAY OF PHOSPHORUS: CPT | Mod: HCNC

## 2019-05-18 PROCEDURE — 83690 ASSAY OF LIPASE: CPT | Mod: HCNC

## 2019-05-18 PROCEDURE — 84443 ASSAY THYROID STIM HORMONE: CPT | Mod: HCNC

## 2019-05-18 PROCEDURE — 99222 1ST HOSP IP/OBS MODERATE 55: CPT | Mod: HCNC,,, | Performed by: INTERNAL MEDICINE

## 2019-05-18 PROCEDURE — P9045 ALBUMIN (HUMAN), 5%, 250 ML: HCPCS | Mod: HCNC,JG | Performed by: NURSE PRACTITIONER

## 2019-05-18 PROCEDURE — 99291 CRITICAL CARE FIRST HOUR: CPT | Mod: 25,HCNC,, | Performed by: NURSE PRACTITIONER

## 2019-05-18 PROCEDURE — 82042 OTHER SOURCE ALBUMIN QUAN EA: CPT | Mod: HCNC

## 2019-05-18 PROCEDURE — 94761 N-INVAS EAR/PLS OXIMETRY MLT: CPT | Mod: HCNC

## 2019-05-18 PROCEDURE — 63600175 PHARM REV CODE 636 W HCPCS: Mod: HCNC | Performed by: INTERNAL MEDICINE

## 2019-05-18 PROCEDURE — 80321 ALCOHOLS BIOMARKERS 1OR 2: CPT | Mod: HCNC

## 2019-05-18 PROCEDURE — 63600175 PHARM REV CODE 636 W HCPCS: Mod: HCNC | Performed by: NURSE PRACTITIONER

## 2019-05-18 PROCEDURE — 82945 GLUCOSE OTHER FLUID: CPT | Mod: HCNC

## 2019-05-18 PROCEDURE — 63600175 PHARM REV CODE 636 W HCPCS: Mod: HCNC,JG | Performed by: NURSE PRACTITIONER

## 2019-05-18 PROCEDURE — 25000003 PHARM REV CODE 250: Mod: HCNC | Performed by: NURSE PRACTITIONER

## 2019-05-18 PROCEDURE — 36556 INSERT NON-TUNNEL CV CATH: CPT | Mod: HCNC,,, | Performed by: NURSE PRACTITIONER

## 2019-05-18 PROCEDURE — 83615 LACTATE (LD) (LDH) ENZYME: CPT | Mod: 91,HCNC

## 2019-05-18 PROCEDURE — 83735 ASSAY OF MAGNESIUM: CPT | Mod: HCNC

## 2019-05-18 PROCEDURE — 83605 ASSAY OF LACTIC ACID: CPT | Mod: HCNC

## 2019-05-18 PROCEDURE — 25000003 PHARM REV CODE 250: Mod: HCNC | Performed by: STUDENT IN AN ORGANIZED HEALTH CARE EDUCATION/TRAINING PROGRAM

## 2019-05-18 PROCEDURE — 85610 PROTHROMBIN TIME: CPT | Mod: HCNC

## 2019-05-18 PROCEDURE — 85045 AUTOMATED RETICULOCYTE COUNT: CPT | Mod: HCNC

## 2019-05-18 PROCEDURE — 87075 CULTR BACTERIA EXCEPT BLOOD: CPT | Mod: HCNC

## 2019-05-18 PROCEDURE — 83615 LACTATE (LD) (LDH) ENZYME: CPT | Mod: HCNC

## 2019-05-18 PROCEDURE — 99222 PR INITIAL HOSPITAL CARE,LEVL II: ICD-10-PCS | Mod: HCNC,,, | Performed by: INTERNAL MEDICINE

## 2019-05-18 PROCEDURE — 80053 COMPREHEN METABOLIC PANEL: CPT | Mod: HCNC

## 2019-05-18 PROCEDURE — 80053 COMPREHEN METABOLIC PANEL: CPT | Mod: 91,HCNC

## 2019-05-18 PROCEDURE — 63600175 PHARM REV CODE 636 W HCPCS: Mod: HCNC

## 2019-05-18 PROCEDURE — 83010 ASSAY OF HAPTOGLOBIN QUANT: CPT | Mod: HCNC

## 2019-05-18 PROCEDURE — 87070 CULTURE OTHR SPECIMN AEROBIC: CPT | Mod: HCNC

## 2019-05-18 PROCEDURE — 63600175 PHARM REV CODE 636 W HCPCS: Mod: HCNC | Performed by: STUDENT IN AN ORGANIZED HEALTH CARE EDUCATION/TRAINING PROGRAM

## 2019-05-18 PROCEDURE — 85384 FIBRINOGEN ACTIVITY: CPT | Mod: HCNC

## 2019-05-18 PROCEDURE — 49082 PR ABD PARACENTESIS: ICD-10-PCS | Mod: 59,HCNC,, | Performed by: NURSE PRACTITIONER

## 2019-05-18 PROCEDURE — 99291 PR CRITICAL CARE, E/M 30-74 MINUTES: ICD-10-PCS | Mod: 25,HCNC,, | Performed by: NURSE PRACTITIONER

## 2019-05-18 PROCEDURE — 25000003 PHARM REV CODE 250: Mod: HCNC

## 2019-05-18 PROCEDURE — 36556 PR INSERT NON-TUNNEL CV CATH 5+ YRS OLD: ICD-10-PCS | Mod: HCNC,,, | Performed by: NURSE PRACTITIONER

## 2019-05-18 PROCEDURE — 27000221 HC OXYGEN, UP TO 24 HOURS: Mod: HCNC

## 2019-05-18 PROCEDURE — 80202 ASSAY OF VANCOMYCIN: CPT | Mod: HCNC

## 2019-05-18 PROCEDURE — 36625 ARTERIAL LINE: ICD-10-PCS | Mod: HCNC,,, | Performed by: INTERNAL MEDICINE

## 2019-05-18 PROCEDURE — 20000000 HC ICU ROOM: Mod: HCNC

## 2019-05-18 PROCEDURE — 36620 INSERTION CATHETER ARTERY: CPT | Mod: HCNC

## 2019-05-18 PROCEDURE — 83605 ASSAY OF LACTIC ACID: CPT | Mod: 91,HCNC

## 2019-05-18 PROCEDURE — 89051 BODY FLUID CELL COUNT: CPT | Mod: HCNC

## 2019-05-18 PROCEDURE — 82150 ASSAY OF AMYLASE: CPT | Mod: HCNC

## 2019-05-18 PROCEDURE — 36625 INSERTION CATHETER ARTERY: CPT | Mod: HCNC,,, | Performed by: INTERNAL MEDICINE

## 2019-05-18 PROCEDURE — 25000003 PHARM REV CODE 250: Mod: HCNC | Performed by: INTERNAL MEDICINE

## 2019-05-18 PROCEDURE — 49082 ABD PARACENTESIS: CPT | Mod: 59,HCNC,, | Performed by: NURSE PRACTITIONER

## 2019-05-18 PROCEDURE — 36415 COLL VENOUS BLD VENIPUNCTURE: CPT | Mod: HCNC

## 2019-05-18 PROCEDURE — 82247 BILIRUBIN TOTAL: CPT | Mod: HCNC

## 2019-05-18 PROCEDURE — 87205 SMEAR GRAM STAIN: CPT | Mod: HCNC

## 2019-05-18 PROCEDURE — 84157 ASSAY OF PROTEIN OTHER: CPT | Mod: HCNC

## 2019-05-18 PROCEDURE — 85025 COMPLETE CBC W/AUTO DIFF WBC: CPT | Mod: 91,HCNC

## 2019-05-18 RX ORDER — ALBUMIN HUMAN 50 G/1000ML
25 SOLUTION INTRAVENOUS ONCE
Status: COMPLETED | OUTPATIENT
Start: 2019-05-18 | End: 2019-05-18

## 2019-05-18 RX ORDER — ALBUMIN HUMAN 50 G/1000ML
SOLUTION INTRAVENOUS
Status: DISPENSED
Start: 2019-05-18 | End: 2019-05-19

## 2019-05-18 RX ORDER — MORPHINE SULFATE 2 MG/ML
2 INJECTION, SOLUTION INTRAMUSCULAR; INTRAVENOUS ONCE
Status: COMPLETED | OUTPATIENT
Start: 2019-05-18 | End: 2019-05-18

## 2019-05-18 RX ORDER — LIDOCAINE HYDROCHLORIDE 10 MG/ML
1 INJECTION INFILTRATION; PERINEURAL ONCE
Status: COMPLETED | OUTPATIENT
Start: 2019-05-18 | End: 2019-05-18

## 2019-05-18 RX ORDER — FAMOTIDINE 20 MG/1
20 TABLET, FILM COATED ORAL DAILY
Status: DISCONTINUED | OUTPATIENT
Start: 2019-05-18 | End: 2019-05-24 | Stop reason: HOSPADM

## 2019-05-18 RX ORDER — LIDOCAINE HYDROCHLORIDE 10 MG/ML
INJECTION, SOLUTION EPIDURAL; INFILTRATION; INTRACAUDAL; PERINEURAL
Status: COMPLETED
Start: 2019-05-18 | End: 2019-05-18

## 2019-05-18 RX ORDER — FENTANYL CITRATE 50 UG/ML
12.5 INJECTION, SOLUTION INTRAMUSCULAR; INTRAVENOUS ONCE
Status: COMPLETED | OUTPATIENT
Start: 2019-05-18 | End: 2019-05-18

## 2019-05-18 RX ORDER — FENTANYL CITRATE 50 UG/ML
INJECTION, SOLUTION INTRAMUSCULAR; INTRAVENOUS
Status: COMPLETED
Start: 2019-05-18 | End: 2019-05-18

## 2019-05-18 RX ORDER — FLUDROCORTISONE ACETATE 0.1 MG/1
100 TABLET ORAL DAILY
Status: DISCONTINUED | OUTPATIENT
Start: 2019-05-18 | End: 2019-05-21

## 2019-05-18 RX ORDER — VANCOMYCIN HCL IN 5 % DEXTROSE 1G/250ML
15 PLASTIC BAG, INJECTION (ML) INTRAVENOUS ONCE
Status: COMPLETED | OUTPATIENT
Start: 2019-05-18 | End: 2019-05-18

## 2019-05-18 RX ORDER — SODIUM CHLORIDE, SODIUM LACTATE, POTASSIUM CHLORIDE, CALCIUM CHLORIDE 600; 310; 30; 20 MG/100ML; MG/100ML; MG/100ML; MG/100ML
INJECTION, SOLUTION INTRAVENOUS CONTINUOUS
Status: DISCONTINUED | OUTPATIENT
Start: 2019-05-18 | End: 2019-05-18

## 2019-05-18 RX ADMIN — HYDROCORTISONE SODIUM SUCCINATE 100 MG: 100 INJECTION, POWDER, FOR SOLUTION INTRAMUSCULAR; INTRAVENOUS at 01:05

## 2019-05-18 RX ADMIN — Medication 0.24 MCG/KG/MIN: at 07:05

## 2019-05-18 RX ADMIN — FENTANYL CITRATE 12.5 MCG: 50 INJECTION INTRAMUSCULAR; INTRAVENOUS at 06:05

## 2019-05-18 RX ADMIN — PIPERACILLIN AND TAZOBACTAM 4.5 G: 4; .5 INJECTION, POWDER, LYOPHILIZED, FOR SOLUTION INTRAVENOUS; PARENTERAL at 11:05

## 2019-05-18 RX ADMIN — PIPERACILLIN AND TAZOBACTAM 4.5 G: 4; .5 INJECTION, POWDER, LYOPHILIZED, FOR SOLUTION INTRAVENOUS; PARENTERAL at 04:05

## 2019-05-18 RX ADMIN — Medication 0.24 MCG/KG/MIN: at 04:05

## 2019-05-18 RX ADMIN — Medication 0.26 MCG/KG/MIN: at 06:05

## 2019-05-18 RX ADMIN — FENTANYL CITRATE 12.5 MCG: 50 INJECTION, SOLUTION INTRAMUSCULAR; INTRAVENOUS at 06:05

## 2019-05-18 RX ADMIN — VASOPRESSIN 0.04 UNITS/MIN: 20 INJECTION INTRAVENOUS at 02:05

## 2019-05-18 RX ADMIN — Medication 0.25 MCG/KG/MIN: at 12:05

## 2019-05-18 RX ADMIN — SODIUM CHLORIDE, SODIUM LACTATE, POTASSIUM CHLORIDE, AND CALCIUM CHLORIDE: .6; .31; .03; .02 INJECTION, SOLUTION INTRAVENOUS at 12:05

## 2019-05-18 RX ADMIN — INSULIN ASPART 3 UNITS: 100 INJECTION, SOLUTION INTRAVENOUS; SUBCUTANEOUS at 04:05

## 2019-05-18 RX ADMIN — ALBUMIN HUMAN 25 G: 0.05 INJECTION, SOLUTION INTRAVENOUS at 07:05

## 2019-05-18 RX ADMIN — SODIUM CHLORIDE, SODIUM LACTATE, POTASSIUM CHLORIDE, AND CALCIUM CHLORIDE: .6; .31; .03; .02 INJECTION, SOLUTION INTRAVENOUS at 02:05

## 2019-05-18 RX ADMIN — LACTULOSE 20 G: 20 SOLUTION ORAL at 10:05

## 2019-05-18 RX ADMIN — Medication 0.28 MCG/KG/MIN: at 12:05

## 2019-05-18 RX ADMIN — MORPHINE SULFATE 2 MG: 2 INJECTION, SOLUTION INTRAMUSCULAR; INTRAVENOUS at 02:05

## 2019-05-18 RX ADMIN — HYDROCORTISONE SODIUM SUCCINATE 100 MG: 100 INJECTION, POWDER, FOR SOLUTION INTRAMUSCULAR; INTRAVENOUS at 02:05

## 2019-05-18 RX ADMIN — FLUDROCORTISONE ACETATE 100 MCG: 0.1 TABLET ORAL at 01:05

## 2019-05-18 RX ADMIN — SODIUM CHLORIDE, SODIUM LACTATE, POTASSIUM CHLORIDE, AND CALCIUM CHLORIDE: .6; .31; .03; .02 INJECTION, SOLUTION INTRAVENOUS at 05:05

## 2019-05-18 RX ADMIN — FAMOTIDINE 20 MG: 20 TABLET, FILM COATED ORAL at 10:05

## 2019-05-18 RX ADMIN — LIDOCAINE HYDROCHLORIDE 1 ML: 10 INJECTION, SOLUTION EPIDURAL; INFILTRATION; INTRACAUDAL; PERINEURAL at 06:05

## 2019-05-18 RX ADMIN — HYDROCORTISONE SODIUM SUCCINATE 100 MG: 100 INJECTION, POWDER, FOR SOLUTION INTRAMUSCULAR; INTRAVENOUS at 09:05

## 2019-05-18 RX ADMIN — MAGNESIUM SULFATE HEPTAHYDRATE 3 G: 500 INJECTION, SOLUTION INTRAMUSCULAR; INTRAVENOUS at 05:05

## 2019-05-18 RX ADMIN — VASOPRESSIN 0.04 UNITS/MIN: 20 INJECTION INTRAVENOUS at 07:05

## 2019-05-18 RX ADMIN — LIDOCAINE HYDROCHLORIDE 1 ML: 10 INJECTION INFILTRATION; PERINEURAL at 06:05

## 2019-05-18 RX ADMIN — VASOPRESSIN 0.04 UNITS/MIN: 20 INJECTION INTRAVENOUS at 10:05

## 2019-05-18 RX ADMIN — PIPERACILLIN AND TAZOBACTAM 4.5 G: 4; .5 INJECTION, POWDER, LYOPHILIZED, FOR SOLUTION INTRAVENOUS; PARENTERAL at 07:05

## 2019-05-18 RX ADMIN — VANCOMYCIN HYDROCHLORIDE 1000 MG: 1 INJECTION, POWDER, LYOPHILIZED, FOR SOLUTION INTRAVENOUS at 07:05

## 2019-05-18 NOTE — HPI
This is a 74 yo M with hx of HCV cirrhosis (s/p treatment with IFN and SVR) c/b varices, HE, ascites, COPD, CAD s/p stents in 2011, pacemaker, and adrenal insufficiency secondary to pituitary tumor treated with radiation, presented to the ED on 5/16 for hypotension. Found to be severely hypotensive by his home health nurse with SBP in the 50s. He was hypotensive in the ED with SBP in the 60s. BP improved with fluids, however patient now in the ICU on pressors and broad spectrum antibiotics. Concerned for sepsis however no source identified. Hepatology is consulted for concerns for shock liver. Patient is somnolent but reports feeling ok. He was in his usual state of health prior to this, and still working as a . He underwent diagnostic paracentesis yesterday that was negative for SBP. He was found to have elevated liver enzymes, CK, and lactic acid levels.

## 2019-05-18 NOTE — PROCEDURES
"Fox Lyons Sr. is a 73 y.o. male patient.    Temp: 98.5 °F (36.9 °C) (19 0301)  Pulse: 80 (19)  Resp: 12 (19)  BP: (!) 101/58 (19)  SpO2: (!) 94 % (19)  Weight: 73.2 kg (161 lb 6 oz) (19)  Height: 5' 10" (177.8 cm) (19 1402)       Arterial Line  Date/Time: 2019 6:52 AM  Location procedure was performed: [facility] CRITICAL CARE MEDICINE  Performed by: Ayana Samano MD  Authorized by: Ayana Samano MD   Consent Done: Yes  Consent: Written consent obtained.  Risks and benefits: risks, benefits and alternatives were discussed  Consent given by: spouse  Required items: required blood products, implants, devices, and special equipment available  Patient identity confirmed:  and MRN  Time out: Immediately prior to procedure a "time out" was called to verify the correct patient, procedure, equipment, support staff and site/side marked as required.  Preparation: Patient was prepped and draped in the usual sterile fashion.  Indications: hemodynamic monitoring  Location: right radial  Anesthesia: local infiltration    Anesthesia:  Local Anesthetic: lidocaine 1% without epinephrine  Patient sedated: no  Needle gauge: 18  Seldinger technique: Seldinger technique used  Cutdown: cutdown required  Number of attempts: 2  Complications: No  Specimens: No  Implants: No  Post-procedure: line sutured  Post-procedure CMS: normal          Ayana Samano  2019  "

## 2019-05-18 NOTE — SUBJECTIVE & OBJECTIVE
Interval History/Significant Events: worsening shock overnight, vasopressin added lactate remains elevated    Review of Systems   Unable to perform ROS: Intubated     Objective:     Vital Signs (Most Recent):  Temp: 98.4 °F (36.9 °C) (05/18/19 0715)  Pulse: 74 (05/18/19 1200)  Resp: 13 (05/18/19 1200)  BP: (!) 119/57 (05/18/19 1200)  SpO2: 96 % (05/18/19 1200) Vital Signs (24h Range):  Temp:  [98.2 °F (36.8 °C)-99.1 °F (37.3 °C)] 98.4 °F (36.9 °C)  Pulse:  [73-96] 74  Resp:  [12-28] 13  SpO2:  [91 %-100 %] 96 %  BP: ()/(49-63) 119/57  Arterial Line BP: ()/(49-60) 116/59   Weight: 73.2 kg (161 lb 6 oz)  Body mass index is 23.16 kg/m².      Intake/Output Summary (Last 24 hours) at 5/18/2019 1244  Last data filed at 5/18/2019 1200  Gross per 24 hour   Intake 5714.69 ml   Output 2570 ml   Net 3144.69 ml       Physical Exam   Constitutional: He is oriented to person, place, and time. Vital signs are normal. He appears lethargic. He is easily aroused. He has a sickly appearance. Nasal cannula in place.   HENT:   Head: Normocephalic and atraumatic.   Eyes: Pupils are equal, round, and reactive to light. EOM are normal. No scleral icterus.   Neck: Normal range of motion. Neck supple.   Cardiovascular: Normal rate, regular rhythm, normal heart sounds and intact distal pulses.   No murmur heard.  Pulmonary/Chest: Effort normal. No respiratory distress. He has no wheezes. He has no rales.   Abdominal: He exhibits shifting dullness and distension. There is tenderness.   Neurological: He is oriented to person, place, and time and easily aroused. He appears lethargic. No cranial nerve deficit. GCS eye subscore is 3. GCS verbal subscore is 5. GCS motor subscore is 6.   THAKKAR, follows commands, no focal deficits to note   Skin: Petechiae (noted to left thigh) noted.   Psychiatric: His speech is normal and behavior is normal.   Nursing note and vitals reviewed.      Vents:     Lines/Drains/Airways     Central Venous  Catheter Line                 Percutaneous Central Line Insertion/Assessment - triple lumen  05/17/19 1723 right internal jugular less than 1 day          Drain                 Urethral Catheter 05/17/19 1246 Latex 16 Fr. less than 1 day          Arterial Line                 Arterial Line 05/18/19 0110 Right Radial less than 1 day          Peripheral Intravenous Line                 Peripheral IV - Single Lumen 05/16/19 1513 20 G Left Antecubital 1 day         Peripheral IV - Single Lumen 05/16/19 1514 18 G Right Antecubital 1 day         Peripheral IV - Single Lumen 05/17/19 1335 22 G Left Hand less than 1 day              Significant Labs:    CBC/Anemia Profile:  Recent Labs   Lab 05/16/19  1529 05/17/19  0415 05/18/19  0410 05/18/19  0812   WBC 14.41* 13.76* 18.20*  --    HGB 9.9* 9.0* 9.3*  --    HCT 31.9* 28.8* 30.7*  --    PLT 94* 65* 72*  --    MCV 87 88 89  --    RDW 20.6* 20.0* 20.0*  --    RETIC  --   --   --  4.3*        Chemistries:  Recent Labs   Lab 05/16/19  1843  05/17/19  0415 05/17/19  1721 05/18/19  0410   *   < > 135* 135* 131*   K 5.0   < > 4.3 4.7 4.7      < > 107 105 104   CO2 17*   < > 17* 18* 15*   BUN 50*   < > 42* 43* 40*   CREATININE 2.8*   < > 2.2* 2.5* 2.3*   CALCIUM 9.5   < > 9.2 9.3 8.9   ALBUMIN 2.4*  --  2.2*  --  1.9*   PROT 6.4  --  6.1  --  5.6*   BILITOT 2.5*  --  2.9*  --  3.0*   ALKPHOS 254*  --  262*  --  421*   ALT 79*  --  218*  --  897*   *  --  656*  --  2,141*   MG 2.1  --  1.6  --  1.4*   PHOS 3.3  --  3.1  --  4.8*    < > = values in this interval not displayed.       All pertinent labs within the past 24 hours have been reviewed.    Significant Imaging:  I have reviewed all pertinent imaging results/findings within the past 24 hours.

## 2019-05-18 NOTE — ASSESSMENT & PLAN NOTE
--lipase 324 on admit, h/o gallstones on prior imagaing  --unable to visual gallbladder on RUQ US secondary to overlying bowel gas  --triglyceride level WNL  --IVF hydration & supportive care  --checking CT abdomen as transaminases worsening and RUQ US non-diagnostic.   --HIDA scan with patent cystic and common bile duct  --Pacemaker prevents MRCP

## 2019-05-18 NOTE — PROGRESS NOTES
Pharmacokinetic Assessment Follow Up: IV Vancomycin    Assessment/Plan    · Vanc AM random 10.2 mcg/mL  · Goal trough 15-20 mcg/mL  · Patient with ERNESTINA, elevated SCr, still making urine. Estimated CrCl is 29.5 mL/min  · Continue dosing vancomycin by level- give 1000 mg x 1 today  · Draw AM random 5/19 with AM labs (ordered)  · Pharmacy will continue to follow and monitor vancomycin     Please contact pharmacy with any questions regarding this assessment.    Thank you for the consult,   Poly Bone, PharmD, [facility]  Neurocritical Care Pharmacist  [phone]       Patient brief summary:  Fox Lyons Sr. is a 73 y.o. male initiated on antimicrobial therapy with IV vancomycin for empiric treatment of unknown source.    Drug Allergies:   Review of patient's allergies indicates:   Allergen Reactions    Codeine Itching, Rash, Edema and Other (See Comments)     Other reaction(s): Itching       Actual Body Weight:   67.6 kg     Renal Function:   Estimated Creatinine Clearance: 29.5 mL/min (A) (based on SCr of 2.3 mg/dL (H)).    CBC (last 72 hours):  Recent Labs   Lab Result Units 05/15/19  1034 05/16/19  1529 05/17/19  0415 05/18/19  0410   WBC K/uL 15.39* 14.41* 13.76* 18.20*   Hemoglobin g/dL 11.0* 9.9* 9.0* 9.3*   Hematocrit % 35.3* 31.9* 28.8* 30.7*   Platelets K/uL 90* 94* 65* 72*   Gran% % 89.8* 87.8* 87.8* 90.9*   Lymph% % 4.6* 4.8* 4.9* 2.1*   Mono% % 4.9 6.5 6.5 6.0   Eosinophil% % 0.1 0.1 0.4 0.2   Basophil% % 0.1 0.1 0.0 0.1   Differential Method  Automated Automated Automated Automated       Metabolic Panel (last 72 hours):  Recent Labs   Lab Result Units 05/15/19  1034 05/16/19  1529 05/16/19  1724 05/16/19  1843 05/16/19  2235 05/17/19  0415 05/17/19  1721 05/18/19  0410   Sodium mmol/L 135* 134*  --  133* 134* 135* 135* 131*   Sodium Urine Random mmol/L  --   --  29  --   --   --   --   --    Potassium mmol/L 4.5 4.6  --  5.0 4.4 4.3 4.7 4.7   Potassium Urine Random mmol/L  --   --  37  --   --   --   --   --     Chloride mmol/L 99 100  --  105 107 107 105 104   Chloride, Rand Ur mmol/L  --   --  40  --   --   --   --   --    CO2 mmol/L 23 21*  --  17* 16* 17* 18* 15*   Glucose mg/dL 150* 218*  --  144* 133* 121* 139* 147*   Glucose, UA   --   --  Negative  --   --   --   --   --    BUN, Bld mg/dL 52* 54*  --  50* 45* 42* 43* 40*   Creatinine mg/dL 2.2* 3.2*  --  2.8* 2.4* 2.2* 2.5* 2.3*   Creatinine, Random Ur mg/dL  --   --  79.0  --   --   --   --   --    Albumin g/dL 3.2* 2.5*  --  2.4*  --  2.2*  --  1.9*   Total Bilirubin mg/dL 3.4* 3.0*  --  2.5*  --  2.9*  --  3.0*   Alkaline Phosphatase U/L 285* 291*  --  254*  --  262*  --  421*   AST U/L 117* 127*  --  208*  --  656*  --  2,141*   ALT U/L 60* 61*  --  79*  --  218*  --  897*   Magnesium mg/dL  --   --   --  2.1  --  1.6  --  1.4*   Phosphorus mg/dL  --   --   --  3.3  --  3.1  --  4.8*       Vancomycin Administrations:  vancomycin given in the last 96 hours                   vancomycin 1750 mg in 0.9% sodium chloride 500 mL IVPB (mg) 1,750 mg New Bag 05/17/19 0020                Drug levels (last 3 results):  Recent Labs   Lab Result Units 05/17/19  0415 05/18/19  0410   Vancomycin, Random ug/mL 34.0 10.2       Microbiologic Results:  Microbiology Results (last 7 days)     Procedure Component Value Units Date/Time    (rule out SBP) Aerobic culture [[id number]] Collected:  05/17/19 0512    Order Status:  Completed Specimen:  Body Fluid from Peritoneal Fluid Updated:  05/18/19 0749     Aerobic Bacterial Culture No growth    Narrative:       To rule out SBP order labs: Aerobic culture [[id number]],  Culture, Anaerobic [[id number]], Gram stain [UEC303], Albumin  [JYU942], Protein [JMP671], LDH [VFP345], WBC \T\ Dff  [WEX8384].    Blood culture [[id number]] Collected:  05/16/19 2235    Order Status:  Completed Specimen:  Blood from Peripheral, Antecubital, Right Updated:  05/18/19 0612     Blood Culture, Routine No Growth to date     Blood Culture, Routine No Growth to date     Blood culture [[id number]] Collected:  05/16/19 2234    Order Status:  Completed Specimen:  Blood from Peripheral, Antecubital, Left Updated:  05/18/19 0612     Blood Culture, Routine No Growth to date     Blood Culture, Routine No Growth to date    Blood culture x two cultures. Draw prior to antibiotics. [[id number]] Collected:  05/16/19 1530    Order Status:  Completed Specimen:  Blood from Peripheral, Antecubital, Right Updated:  05/17/19 1612     Blood Culture, Routine No Growth to date     Blood Culture, Routine No Growth to date    Narrative:       Aerobic and anaerobic    Blood culture x two cultures. Draw prior to antibiotics. [[id number]] Collected:  05/16/19 1508    Order Status:  Completed Specimen:  Blood from Peripheral, Antecubital, Left Updated:  05/17/19 1612     Blood Culture, Routine No Growth to date     Blood Culture, Routine No Growth to date    Narrative:       Aerobic and anaerobic    (rule out SBP) Gram stain [[id number]] Collected:  05/17/19 0512    Order Status:  Completed Specimen:  Body Fluid from Peritoneal Fluid Updated:  05/17/19 1111     Gram Stain Result No WBC's      No organisms seen    Narrative:       To rule out SBP order labs: Aerobic culture [[id number]],  Culture, Anaerobic [PGD200], Gram stain [KDH848], Albumin  [ZZZ873], Protein [VGA442], LDH [WCK750], WBC \T\ Dff  [CEQ9702].    (rule out SBP) Culture, Anaerobic [[id number]] Collected:  05/17/19 0512    Order Status:  Sent Specimen:  Body Fluid from Peritoneal Fluid Updated:  05/17/19 0533

## 2019-05-18 NOTE — ASSESSMENT & PLAN NOTE
MELD-Na score: 29 at 5/18/2019  4:10 AM  MELD score: 26 at 5/18/2019  4:10 AM  Calculated from:  Serum Creatinine: 2.3 mg/dL at 5/18/2019  4:10 AM  Serum Sodium: 131 mmol/L at 5/18/2019  4:10 AM  Total Bilirubin: 3.0 mg/dL at 5/18/2019  4:10 AM  INR(ratio): 2.0 at 5/17/2019  2:06 AM  Age: 73 years   --hep C cirrhosis with h/o small non-bleeding esophageal varices  --ammonia WNL, continue home lactulose daily  --holding home furosemide & aldactone in setting of sepsis & shock  --f/u paracentesis labs

## 2019-05-18 NOTE — CONSULTS
[facility]  Hepatology  Consult Note    Patient Name: Fox Lyons Sr.  MRN: [id number]  Admission Date: 5/16/2019  Hospital Length of Stay: 2 days  Attending Provider: Kay Barnhart MD   Primary Care Physician: Clover White MD  Principal Problem:Sepsis due to undetermined organism    Inpatient consult to Hepatology  Consult performed by: Terrence Kearney MD  Consult ordered by: Emanuel Ingram NP        Subjective:     Transplant status: No    HPI:  This is a 74 yo M with hx of HCV cirrhosis (s/p treatment with IFN and SVR) c/b varices, HE, ascites, COPD, CAD s/p stents in 2011, pacemaker, and adrenal insufficiency secondary to pituitary tumor treated with radiation, presented to the ED on 5/16 for hypotension. Found to be severely hypotensive by his home health nurse with SBP in the 50s. He was hypotensive in the ED with SBP in the 60s. BP improved with fluids, however patient now in the ICU on pressors and broad spectrum antibiotics. Concerned for sepsis however no source identified. Hepatology is consulted for concerns for shock liver. Patient is somnolent but reports feeling ok. He was in his usual state of health prior to this, and still working as a . He underwent diagnostic paracentesis yesterday that was negative for SBP. He was found to have elevated liver enzymes, CK, and lactic acid levels.    Review of Systems   Constitutional: Positive for activity change and appetite change. Negative for chills and fever.   HENT: Negative for sore throat and trouble swallowing.    Respiratory: Negative for cough and shortness of breath.    Cardiovascular: Negative for chest pain and leg swelling.   Gastrointestinal: Positive for abdominal distention. Negative for abdominal pain, blood in stool, constipation, diarrhea, nausea and vomiting.   Musculoskeletal: Negative for arthralgias and back pain.   Skin: Negative for color change and pallor.   Neurological: Positive for weakness.  Negative for dizziness and light-headedness.   Psychiatric/Behavioral: Negative for agitation and confusion.       Past Medical History:   Diagnosis Date    Anemia     Anticoagulant long-term use     Ascites 12/10/2018    Biliary colic     Cataract     Chronic hepatitis C     Cirrhosis     COPD (chronic obstructive pulmonary disease)     Coronary artery disease     Dyspnea     Epistaxis     Esophageal varices without bleeding     Gallstones     GERD (gastroesophageal reflux disease)     HCC (hepatocellular carcinoma)     Hepatitis B antibody positive     Hyperglycemia     Hyperlipidemia     Hypertension     Hypopituitarism     Mobitz type 2 second degree atrioventricular block     Nuclear sclerosis, left     Pacemaker     Pituitary tumor     Portal hypertension     Pulmonary emphysema     PVT (paroxysmal ventricular tachycardia)     SA node dysfunction     Secondary adrenal insufficiency     Secondary male hypogonadism     SSS (sick sinus syndrome)     Superior mesenteric vein thrombosis     Vitreous hemorrhage, both eyes        Past Surgical History:   Procedure Laterality Date    CARDIAC PACEMAKER PLACEMENT      st rochelle    CATARACT EXTRACTION  9/24/13    RT    COLONOSCOPY Left 1/4/2019    Performed by Emanuel Hannah MD at [facility] ENDO (4TH FLR)    CORONARY ANGIOPLASTY WITH STENT PLACEMENT      2 stents    EGD (ESOPHAGOGASTRODUODENOSCOPY) Left 2/19/2019    Performed by Tr[facility]unes [facility] at [facility]    EGD (ESOPHAGOGASTRODUODENOSCOPY) Left 1/4/2019    Performed by Emanuel Hannah MD at [facility] ENDO (4TH FLR)    ESOPHAGOGASTRODUODENOSCOPY (EGD) Left 5/31/2018    Performed by Tr[facility] F[facility] at [facility] ENDO (4TH FLR)    ESOPHAGOGASTRODUODENOSCOPY (EGD) N/A 11/30/2017    Performed by Trice Funes [facility] at [facility] ENDO (4TH FLR)    ESOPHAGOGASTRODUODENOSCOPY (EGD) Left 10/12/2017    Performed by Jody Humphries MD at [facility] ENDO (4TH FLR)    ESOPHAGOGASTRODUODENOSCOPY (EGD)  N/A 2017    Performed by [facility] at [facility] ENDO (4TH FLR)    ESOPHAGOGASTRODUODENOSCOPY (EGD) N/A 2016    Performed by Trice Funes MD at [facility] ENDO (4TH FLR)    EYE SURGERY      INSERTION, IOL PROSTHESIS Right 2013    Performed by Ben Sparks MD at [facility] OR 1ST FLR    PARACENTESIS, ABDOMINAL N/A 2014    Performed by [facility] CATH LAB    PHACOEMULSIFICATION, CATARACT Right 2013    Performed by Ben Sparks MD at [facility] OR 1ST FLR    s/p PPV/AFX/EL (od)  2012       Family history of liver disease: No    Review of patient's allergies indicates:   Allergen Reactions    Codeine Itching, Rash, Edema and Other (See Comments)     Other reaction(s): Itching       Tobacco Use    Smoking status: Former Smoker     Packs/day: 1.00     Years: 35.00     Pack years: 35.00     Types: Cigarettes     Last attempt to quit: 1995     Years since quittin.9    Smokeless tobacco: Never Used   Substance and Sexual Activity    Alcohol use: No     Alcohol/week: 0.0 oz    Drug use: No    Sexual activity: Not Currently       Medications Prior to Admission   Medication Sig Dispense Refill Last Dose    ciprofloxacin HCl (CIPRO) 500 MG tablet Take 1 tablet (500 mg total) by mouth once daily. 30 tablet 11 2019    fluticasone-umeclidin-vilanter (TRELEGY ELLIPTA) 100-62.5-25 mcg DsDv Inhale 1 puff into the lungs once daily. 1 each 12 2019 at Unknown time    furosemide (LASIX) 40 MG tablet Take 0.5 tablets (20 mg total) by mouth 2 (two) times daily. (Patient taking differently: Take 40 mg by mouth 2 (two) times daily. ) 30 tablet 11 2019    hydrocortisone (CORTEF) 10 MG Tab TAKE ONE AND ONE-HALF TABLETS BY MOUTH EVERY MORNING AND ONE-HALF TABLET EVERY EVENING./NO FURTHER REFILLS  NEED FOLLOW UP VISIT 180 tablet 3 2019    lactulose (CHRONULAC) 20 gram/30 mL Soln Take 30 mLs (20 g total) by mouth once daily. Please  ensure you are having 3-4 bowels movements daily for 100 doses 3000 mL 11 5/16/2019    losartan (COZAAR) 100 MG tablet Take 1 tablet (100 mg total) by mouth once daily. 90 tablet 3 5/16/2019    multivitamin-minerals-lutein (CENTRUM SILVER) Tab Take 1 tablet by mouth once daily.     5/16/2019    omeprazole (PRILOSEC) 40 MG capsule TAKE 1 CAPSULE(40 MG) BY MOUTH TWICE DAILY BEFORE MEALS 180 capsule 0 5/16/2019    pravastatin (PRAVACHOL) 40 MG tablet TAKE 1 TABLET(40 MG) BY MOUTH EVERY EVENING 90 tablet 3 5/16/2019    spironolactone (ALDACTONE) 100 MG tablet Take 2 tablets (200 mg total) by mouth once daily. 60 tablet 3 5/16/2019    ondansetron (ZOFRAN) 8 MG tablet Take 1 tablet (8 mg total) by mouth every 8 (eight) hours as needed for Nausea. 60 tablet 0        Objective:     Vital Signs (Most Recent):  Temp: 98.4 °F (36.9 °C) (05/18/19 0715)  Pulse: 80 (05/18/19 1116)  Resp: 16 (05/18/19 1116)  BP: (!) 100/58 (05/18/19 1000)  SpO2: 95 % (05/18/19 1116) Vital Signs (24h Range):  Temp:  [98.2 °F (36.8 °C)-99.1 °F (37.3 °C)] 98.4 °F (36.9 °C)  Pulse:  [73-96] 80  Resp:  [12-28] 16  SpO2:  [91 %-100 %] 95 %  BP: ()/(49-63) 100/58  Arterial Line BP: ()/(50-56) 95/51     Weight: 73.2 kg (161 lb 6 oz) (05/18/19 0601)  Body mass index is 23.16 kg/m².    Physical Exam   Constitutional: He is oriented to person, place, and time. No distress.   HENT:   Mouth/Throat: Oropharynx is clear and moist.   Eyes: No scleral icterus.   Cardiovascular: Normal rate and regular rhythm.   Pulmonary/Chest: Effort normal and breath sounds normal.   Abdominal: He exhibits distension. There is no tenderness.   Musculoskeletal: He exhibits no edema.   Neurological: He is alert and oriented to person, place, and time.   Somewhat somnolent but answers all questions appropriately   Skin: Skin is warm and dry.   Psychiatric: He has a normal mood and affect.   Vitals reviewed.      MELD-Na score: 29 at 5/18/2019  4:10 AM  MELD score:  26 at 5/18/2019  4:10 AM  Calculated from:  Serum Creatinine: 2.3 mg/dL at 5/18/2019  4:10 AM  Serum Sodium: 131 mmol/L at 5/18/2019  4:10 AM  Total Bilirubin: 3.0 mg/dL at 5/18/2019  4:10 AM  INR(ratio): 2.0 at 5/17/2019  2:06 AM  Age: 73 years    Significant Labs:  CBC:   Recent Labs   Lab 05/18/19  0410   WBC 18.20*   RBC 3.44*   HGB 9.3*   HCT 30.7*   PLT 72*     CMP:   Recent Labs   Lab 05/18/19  0410   *   CALCIUM 8.9   ALBUMIN 1.9*   PROT 5.6*   *   K 4.7   CO2 15*      BUN 40*   CREATININE 2.3*   ALKPHOS 421*   *   AST 2,141*   BILITOT 3.0*     Coagulation:   Recent Labs   Lab 05/17/19  0206   INR 2.0*     Assessment/Plan:     Ischemic hepatitis  74 yo M with hx of HCV cirrhosis (s/p treatment with IFN and SVR) c/b varices, HE, ascites, COPD, CAD s/p stents in 2011, pacemaker, and adrenal insufficiency secondary to pituitary tumor treated with radiation, presented to the ED on 5/16 for hypotension. He was severely hypotensive and remains hypotensive despite being on two pressors. His liver test elevations are likely related to shock liver from low blood pressors. Recommend supportive care for this.    Recommendations:  - Therapeutic paracentesis if safe pocket  - Supportive care per ICU to increase MAPs  - Daily CBC, CMP, INR        Thank you for your consult. I will follow-up with patient. Please contact us if you have any additional questions.    Terrence Keraney MD  Hepatology  [facility]

## 2019-05-18 NOTE — NURSING
Notified Dr. Chang with Critical Care pt more lethargic than previous assessment. Arousing to shaking, moaning only, will not answer questions. VS unchanged otherwise. Unable to assess if in pain , No acute distress noted. Provider to come to bedside to assess.

## 2019-05-18 NOTE — ASSESSMENT & PLAN NOTE
--suspect abdominal source, possible cholangitis, pancreatitis, however cannot rule out alternate etiology  --leukocytosis and lactate remains elevated  --procal 5.49  --CXR without focal consolidation, UA neg for infection  --diagnostic para negative for SBP  --Blood culture NGTD  --trending lactic q4  --continue vanc and zosyn  --continue levo and vaso for MAP > 65

## 2019-05-18 NOTE — ASSESSMENT & PLAN NOTE
74 yo M with hx of HCV cirrhosis (s/p treatment with IFN and SVR) c/b varices, HE, ascites, COPD, CAD s/p stents in 2011, pacemaker, and adrenal insufficiency secondary to pituitary tumor treated with radiation, presented to the ED on 5/16 for hypotension. He was severely hypotensive and remains hypotensive despite being on two pressors. His liver test elevations are likely related to shock liver from low blood pressors. Recommend supportive care for this.    Recommendations:  - Therapeutic paracentesis if safe pocket  - Supportive care per ICU to increase MAPs  - Daily CBC, CMP, INR

## 2019-05-18 NOTE — ASSESSMENT & PLAN NOTE
--s/p pituitary adenoma resection  --pt endorses compliance with home hydrocortisone  --continue hydrocortisone 5mg Qam & 15mg Qpm  --8a cortisol level 11.8 (normal)

## 2019-05-18 NOTE — PLAN OF CARE
Problem: Adult Inpatient Plan of Care  Goal: Plan of Care Review  Outcome: Ongoing (interventions implemented as appropriate)    No acute events throughout day. See vital signs and assessments in flowsheets. See below for updates on today's progress.     Pulmonary: Pt on 2 L NC, sats maintained >90%. BS clear and diminished    Cardiovascular: HR 80-90s NSR paced. SBP per amy /50s, MAP maintined >65. Levo titrated to maintain SBP >90. Pulses 2+/2+    Neurological: AAOx4 but drowsy. Pt follows commands and moves extremities. TMAX 99.1. PERRL 2mm    Gastrointestinal: Stomach distended, firm and rounded. NPO expect with meds. BS active, no BM on shift    Genitourinary: Forbes UOP 1275cc/shift, kita    Endocrine:  no coverage needed    Skin/Bath: bruise to R hip, foam dressings in place  Date of last CHG bath given:     Infusions: levo @ 0.24 mcg/kg/min, LR @ 200ml/hr, vaso @ 0.04 U/min    Mag 1.4 replaced with 3G IVPB    Patient progressing towards goals as tolerated, plan of care communicated and reviewed with Fox Lyons Sr. and family. All concerns addressed. Will continue to monitor.

## 2019-05-18 NOTE — PROCEDURES
"Fox Lyons Sr. is a 73 y.o. male patient.    Temp: 98.4 °F (36.9 °C) (19 0715)  Pulse: 73 (19 0730)  Resp: 12 (19)  BP: (!) 103/55 (19 07)  SpO2: 95 % (19)  Weight: 73.2 kg (161 lb 6 oz) (19 0601)  Height: 5' 10" (177.8 cm) (19 1402)       Arterial Line  Date/Time: 2019 5:00 AM  Performed by: Dexter Ariza DO  Authorized by: Dexter Ariza DO   Pre-op Diagnosis: Hypotension  Consent Done: Yes  Consent: Written consent obtained.  Risks and benefits: risks, benefits and alternatives were discussed  Consent given by: spouse  Patient understanding: patient states understanding of the procedure being performed  Patient consent: the patient's understanding of the procedure matches consent given  Patient identity confirmed: , MRN and name  Time out: Immediately prior to procedure a "time out" was called to verify the correct patient, procedure, equipment, support staff and site/side marked as required.  Preparation: Patient was prepped and draped in the usual sterile fashion.  Indications: hemodynamic monitoring  Location: right radial  Layo's test normal: yes  Needle gauge: 20  Seldinger technique: Seldinger technique used  Number of attempts: 3  Complications: No  Specimens: No  Implants: No  Post-procedure: line sutured and dressing applied          Dexter Ariza  2019  "

## 2019-05-18 NOTE — ASSESSMENT & PLAN NOTE
--ERNESTINA on CKD  --suspect prerenal vs ATN from hypovolemia/hypotension  --FeUrea 49.6% suggestive of intrinsic renal disease  --adequate urine output

## 2019-05-18 NOTE — PROGRESS NOTES
[facility]  Critical Care Medicine  Progress Note    Patient Name: Fox Lyons Sr.  MRN: [id number]  Admission Date: 5/16/2019  Hospital Length of Stay: 2 days  Code Status: DNR  Attending Provider: Kay Barnhart MD  Primary Care Provider: Clover White MD   Principal Problem: Sepsis due to undetermined organism    Subjective:     HPI:  Patient is a 73 y.o. 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 Patient denies fever, headache, chest pain, SOB, urinary symptoms and blood in stool.     Work up in the ED revealed leukocytosis of 14, hgb 9.9 (at baseline), initial creatinine of 3.2 (up from baseline ~ 1.5), transaminitis, lipase elevated at 324 and lactic 6.7 (which improved to 3.9 following 1.5L fluid resuscitation). On presentation pt was hypotensive to 60s/30s which improved to 90s/50s following volume resuscitation. Critical Care Medicine has been consulted for sepsis.         Hospital/ICU Course:  Patient admitted to [facility] evening of 5/16 for pancreatitis and sepsis of unclear etiology. Shortly after admission patient developed hypotension requiring initiation of vasopressors. RUQ performed and gallbladder was unable to be visualized. Diagnostic paracentesis performed which was negative for SBP. HIDA scan showing patent portal flow. The am of 05/19, his lactate remains elevated and vasopressin added overnight. Shock liver worsening. Therapeutic SBP planned for today. Cultures remain NGTD. MRCP considered but pacemaker present. Hepatology following for  cirrhosis.     Interval History/Significant Events: worsening shock overnight, vasopressin added lactate remains elevated    Review of Systems   Unable to perform ROS: Intubated     Objective:     Vital Signs (Most Recent):  Temp: 98.4 °F (36.9 °C) (05/18/19 0715)  Pulse: 74 (05/18/19 1200)  Resp: 13 (05/18/19 1200)  BP: (!) 119/57 (05/18/19 1200)  SpO2: 96 % (05/18/19 1200) Vital Signs (24h Range):  Temp:  [98.2 °F (36.8 °C)-99.1 °F (37.3 °C)] 98.4 °F (36.9 °C)  Pulse:  [73-96] 74  Resp:  [12-28] 13  SpO2:  [91 %-100 %] 96 %  BP: ()/(49-63) 119/57  Arterial Line BP: ()/(49-60) 116/59   Weight: 73.2 kg (161 lb 6 oz)  Body mass index is 23.16 kg/m².      Intake/Output Summary (Last 24 hours) at 5/18/2019 1244  Last data filed at 5/18/2019 1200  Gross per 24 hour   Intake 5714.69 ml   Output 2570 ml   Net 3144.69 ml       Physical Exam   Constitutional: He is oriented to person, place, and time. Vital signs are normal. He appears lethargic. He is easily aroused. He has a sickly appearance. Nasal cannula in place.   HENT:   Head: Normocephalic and atraumatic.   Eyes: Pupils are equal, round, and reactive to light. EOM are normal. No scleral icterus.   Neck: Normal range of motion. Neck supple.   Cardiovascular: Normal rate, regular rhythm, normal heart sounds and intact distal pulses.   No murmur heard.  Pulmonary/Chest: Effort normal. No respiratory distress. He has no wheezes. He has no rales.   Abdominal: He exhibits shifting dullness and distension. There is tenderness.   Neurological: He is oriented to person, place, and time and easily aroused. He appears lethargic. No cranial nerve deficit. GCS eye subscore is 3. GCS verbal subscore is 5. GCS motor subscore is 6.   THAKKAR, follows commands, no focal deficits to note   Skin: Petechiae (noted to left thigh) noted.   Psychiatric: His speech is normal and behavior is normal.   Nursing note and vitals reviewed.      Vents:     Lines/Drains/Airways      Central Venous Catheter Line                 Percutaneous Central Line Insertion/Assessment - triple lumen  05/17/19 1723 right internal jugular less than 1 day          Drain                 Urethral Catheter 05/17/19 1246 Latex 16 Fr. less than 1 day          Arterial Line                 Arterial Line 05/18/19 0110 Right Radial less than 1 day          Peripheral Intravenous Line                 Peripheral IV - Single Lumen 05/16/19 1513 20 G Left Antecubital 1 day         Peripheral IV - Single Lumen 05/16/19 1514 18 G Right Antecubital 1 day         Peripheral IV - Single Lumen 05/17/19 1335 22 G Left Hand less than 1 day              Significant Labs:    CBC/Anemia Profile:  Recent Labs   Lab 05/16/19  1529 05/17/19  0415 05/18/19  0410 05/18/19  0812   WBC 14.41* 13.76* 18.20*  --    HGB 9.9* 9.0* 9.3*  --    HCT 31.9* 28.8* 30.7*  --    PLT 94* 65* 72*  --    MCV 87 88 89  --    RDW 20.6* 20.0* 20.0*  --    RETIC  --   --   --  4.3*        Chemistries:  Recent Labs   Lab 05/16/19  1843  05/17/19  0415 05/17/19  1721 05/18/19  0410   *   < > 135* 135* 131*   K 5.0   < > 4.3 4.7 4.7      < > 107 105 104   CO2 17*   < > 17* 18* 15*   BUN 50*   < > 42* 43* 40*   CREATININE 2.8*   < > 2.2* 2.5* 2.3*   CALCIUM 9.5   < > 9.2 9.3 8.9   ALBUMIN 2.4*  --  2.2*  --  1.9*   PROT 6.4  --  6.1  --  5.6*   BILITOT 2.5*  --  2.9*  --  3.0*   ALKPHOS 254*  --  262*  --  421*   ALT 79*  --  218*  --  897*   *  --  656*  --  2,141*   MG 2.1  --  1.6  --  1.4*   PHOS 3.3  --  3.1  --  4.8*    < > = values in this interval not displayed.       All pertinent labs within the past 24 hours have been reviewed.    Significant Imaging:  I have reviewed all pertinent imaging results/findings within the past 24 hours.      ABG  No results for input(s): PH, PO2, PCO2, HCO3, BE in the last 168 hours.  Assessment/Plan:     Pulmonary  Chronic obstructive pulmonary disease (COPD)  --no evidence of acute  exacerbation  --continue home trelegy ellipta with duo nebs prn    Cardiac/Vascular  Sick sinus syndrome  --s/p pacemaker placement  --currently in atrial paced rhythm on EKG    Coronary artery disease  --s/p stent x2 2011  --holding statin in acute setting; restart when clinically appropriate    Hyperlipidemia  --holding home statin in acute setting  --restart when clinically appropriate    Hypertension  --holding home ARB, lasix & aldactone in setting of shock  --restart when clinically appropriate    Renal/  Metabolic acidosis, normal anion gap (NAG)  --nothing in hpi to suggest diarrhea  --urine anion gap positive at 26, consistent with RTA    Chronic kidney disease, stage III (moderate)  --ERNESTINA on CKD  --suspect prerenal vs ATN from hypovolemia/hypotension  --FeUrea 49.6% suggestive of intrinsic renal disease  --adequate urine output     ID  * Sepsis due to undetermined organism  --suspect abdominal source, possible cholangitis, pancreatitis, however cannot rule out alternate etiology  --leukocytosis and lactate remains elevated  --procal 5.49  --CXR without focal consolidation, UA neg for infection  --diagnostic para negative for SBP  --Blood culture NGTD  --trending lactic q4  --continue vanc and zosyn  --continue levo and vaso for MAP > 65    History of hepatitis C  MELD-Na score: 29 at 5/18/2019  4:10 AM  MELD score: 26 at 5/18/2019  4:10 AM  Calculated from:  Serum Creatinine: 2.3 mg/dL at 5/18/2019  4:10 AM  Serum Sodium: 131 mmol/L at 5/18/2019  4:10 AM  Total Bilirubin: 3.0 mg/dL at 5/18/2019  4:10 AM  INR(ratio): 2.0 at 5/17/2019  2:06 AM  Age: 73 years   --hep C cirrhosis with h/o small non-bleeding esophageal varices  --ammonia WNL, continue home lactulose daily  --holding home furosemide & aldactone in setting of sepsis & shock  --f/u paracentesis labs    Endocrine  Secondary adrenal insufficiency  --s/p pituitary adenoma resection  --pt endorses compliance with home hydrocortisone  --continue  hydrocortisone 5mg Qam & 15mg Qpm  --8a cortisol level 11.8 (normal)    GI  Pancreatitis  --lipase 324 on admit, h/o gallstones on prior imagaing  --unable to visual gallbladder on RUQ US secondary to overlying bowel gas  --triglyceride level WNL  --IVF hydration & supportive care  --checking CT abdomen as transaminases worsening and RUQ US non-diagnostic.   --HIDA scan with patent cystic and common bile duct  --Pacemaker prevents MRCP      GERD (gastroesophageal reflux disease)  --continue home ppi        Critical Care Time: 70 minutes  Critical secondary to Patient has a condition that poses threat to life and bodily function: Septic Shock      Critical care was time spent personally by me on the following activities: development of treatment plan with patient or surrogate and bedside caregivers, discussions with consultants, evaluation of patient's response to treatment, examination of patient, ordering and performing treatments and interventions, ordering and review of laboratory studies, ordering and review of radiographic studies, pulse oximetry, re-evaluation of patient's condition. This critical care time did not overlap with that of any other provider or involve time for any procedures.     Emanuel Ingram, NP  Critical Care Medicine  [facility]

## 2019-05-18 NOTE — SUBJECTIVE & OBJECTIVE
Review of Systems   Constitutional: Positive for activity change and appetite change. Negative for chills and fever.   HENT: Negative for sore throat and trouble swallowing.    Respiratory: Negative for cough and shortness of breath.    Cardiovascular: Negative for chest pain and leg swelling.   Gastrointestinal: Positive for abdominal distention. Negative for abdominal pain, blood in stool, constipation, diarrhea, nausea and vomiting.   Musculoskeletal: Negative for arthralgias and back pain.   Skin: Negative for color change and pallor.   Neurological: Positive for weakness. Negative for dizziness and light-headedness.   Psychiatric/Behavioral: Negative for agitation and confusion.       Past Medical History:   Diagnosis Date    Anemia     Anticoagulant long-term use     Ascites 12/10/2018    Biliary colic     Cataract     Chronic hepatitis C     Cirrhosis     COPD (chronic obstructive pulmonary disease)     Coronary artery disease     Dyspnea     Epistaxis     Esophageal varices without bleeding     Gallstones     GERD (gastroesophageal reflux disease)     HCC (hepatocellular carcinoma)     Hepatitis B antibody positive     Hyperglycemia     Hyperlipidemia     Hypertension     Hypopituitarism     Mobitz type 2 second degree atrioventricular block     Nuclear sclerosis, left     Pacemaker     Pituitary tumor     Portal hypertension     Pulmonary emphysema     PVT (paroxysmal ventricular tachycardia)     SA node dysfunction     Secondary adrenal insufficiency     Secondary male hypogonadism     SSS (sick sinus syndrome)     Superior mesenteric vein thrombosis     Vitreous hemorrhage, both eyes        Past Surgical History:   Procedure Laterality Date    CARDIAC PACEMAKER PLACEMENT      [facility]    CATARACT EXTRACTION  9/24/13    RT    COLONOSCOPY Left 1/4/2019    Performed by Emanuel Hannah MD at [facility] ENDO ([address] [place] [address])    CORONARY ANGIOPLASTY WITH STENT PLACEMENT      2 stents     EGD (ESOPHAGOGASTRODUODENOSCOPY) Left 2019    Performed by Trice Funes MD at [facility] END[facility]    EGD (ESOPHAGOGASTRODUODENOSCOPY) Left 2019    Performed by Emanuel Hannah MD at [facility] ENDO (4TH FLR)    ESOPHAGOGASTRODUODENOSCOPY (EGD) Left 2018    Performed by Trice Funes MD at [facility] ENDO (4TH FLR)    ESOPHAGOGASTRODUODENOSCOPY (EGD) N/A 2017    Performed by Trice Funes MD at [facility] ENDO (4TH FLR)    ESOPHAGOGASTRODUODENOSCOPY (EGD) Left 10/12/2017    Performed by Jody Humphries MD at [facility] ENDO (4TH FLR)    ESOPHAGOGASTRODUODENOSCOPY (EGD) N/A 2017    Performed by Trice Funes MD at [facility] ENDO (4TH FLR)    ESOPHAGOGASTRODUODENOSCOPY (EGD) N/A 2016    Performed by [facility] at [facility] (4TH FLR)    EYE SURGERY      INSERTION, IOL PROSTHESIS Right 2013    Performed by Ben Sparks MD at [facility] OR 1ST FLR    PARACENTESIS, ABDOMINAL N/A 2014    Performed by [facility] Diagnostic Provider at [facility] CATH LAB    PHACOEMULSIFICATION, CATARACT Right 2013    Performed by Ben Sparks MD at [facility] OR 1ST FLR    s/p PPV/AFX/EL (od)  2012       Family history of liver disease: No    Review of patient's allergies indicates:   Allergen Reactions    Codeine Itching, Rash, Edema and Other (See Comments)     Other reaction(s): Itching       Tobacco Use    Smoking status: Former Smoker     Packs/day: 1.00     Years: 35.00     Pack years: 35.00     Types: Cigarettes     Last attempt to quit: 1995     Years since quittin.9    Smokeless tobacco: Never Used   Substance and Sexual Activity    Alcohol use: No     Alcohol/week: 0.0 oz    Drug use: No    Sexual activity: Not Currently       Medications Prior to Admission   Medication Sig Dispense Refill Last Dose    ciprofloxacin HCl (CIPRO) 500 MG tablet Take 1 tablet (500 mg total) by mouth once daily. 30 tablet 11 2019    fluticasone-umeclidin-vilanter (TRELEGY  ELLIPTA) 100-62.5-25 mcg DsDv Inhale 1 puff into the lungs once daily. 1 each 12 5/16/2019 at Unknown time    furosemide (LASIX) 40 MG tablet Take 0.5 tablets (20 mg total) by mouth 2 (two) times daily. (Patient taking differently: Take 40 mg by mouth 2 (two) times daily. ) 30 tablet 11 5/16/2019    hydrocortisone (CORTEF) 10 MG Tab TAKE ONE AND ONE-HALF TABLETS BY MOUTH EVERY MORNING AND ONE-HALF TABLET EVERY EVENING./NO FURTHER REFILLS  NEED FOLLOW UP VISIT 180 tablet 3 5/16/2019    lactulose (CHRONULAC) 20 gram/30 mL Soln Take 30 mLs (20 g total) by mouth once daily. Please ensure you are having 3-4 bowels movements daily for 100 doses 3000 mL 11 5/16/2019    losartan (COZAAR) 100 MG tablet Take 1 tablet (100 mg total) by mouth once daily. 90 tablet 3 5/16/2019    multivitamin-minerals-lutein (CENTRUM SILVER) Tab Take 1 tablet by mouth once daily.     5/16/2019    omeprazole (PRILOSEC) 40 MG capsule TAKE 1 CAPSULE(40 MG) BY MOUTH TWICE DAILY BEFORE MEALS 180 capsule 0 5/16/2019    pravastatin (PRAVACHOL) 40 MG tablet TAKE 1 TABLET(40 MG) BY MOUTH EVERY EVENING 90 tablet 3 5/16/2019    spironolactone (ALDACTONE) 100 MG tablet Take 2 tablets (200 mg total) by mouth once daily. 60 tablet 3 5/16/2019    ondansetron (ZOFRAN) 8 MG tablet Take 1 tablet (8 mg total) by mouth every 8 (eight) hours as needed for Nausea. 60 tablet 0        Objective:     Vital Signs (Most Recent):  Temp: 98.4 °F (36.9 °C) (05/18/19 0715)  Pulse: 80 (05/18/19 1116)  Resp: 16 (05/18/19 1116)  BP: (!) 100/58 (05/18/19 1000)  SpO2: 95 % (05/18/19 1116) Vital Signs (24h Range):  Temp:  [98.2 °F (36.8 °C)-99.1 °F (37.3 °C)] 98.4 °F (36.9 °C)  Pulse:  [73-96] 80  Resp:  [12-28] 16  SpO2:  [91 %-100 %] 95 %  BP: ()/(49-63) 100/58  Arterial Line BP: ()/(50-56) 95/51     Weight: 73.2 kg (161 lb 6 oz) (05/18/19 0601)  Body mass index is 23.16 kg/m².    Physical Exam   Constitutional: He is oriented to person, place, and time. No  distress.   HENT:   Mouth/Throat: Oropharynx is clear and moist.   Eyes: No scleral icterus.   Cardiovascular: Normal rate and regular rhythm.   Pulmonary/Chest: Effort normal and breath sounds normal.   Abdominal: He exhibits distension. There is no tenderness.   Musculoskeletal: He exhibits no edema.   Neurological: He is alert and oriented to person, place, and time.   Somewhat somnolent but answers all questions appropriately   Skin: Skin is warm and dry.   Psychiatric: He has a normal mood and affect.   Vitals reviewed.      MELD-Na score: 29 at 5/18/2019  4:10 AM  MELD score: 26 at 5/18/2019  4:10 AM  Calculated from:  Serum Creatinine: 2.3 mg/dL at 5/18/2019  4:10 AM  Serum Sodium: 131 mmol/L at 5/18/2019  4:10 AM  Total Bilirubin: 3.0 mg/dL at 5/18/2019  4:10 AM  INR(ratio): 2.0 at 5/17/2019  2:06 AM  Age: 73 years    Significant Labs:  CBC:   Recent Labs   Lab 05/18/19  0410   WBC 18.20*   RBC 3.44*   HGB 9.3*   HCT 30.7*   PLT 72*     CMP:   Recent Labs   Lab 05/18/19  0410   *   CALCIUM 8.9   ALBUMIN 1.9*   PROT 5.6*   *   K 4.7   CO2 15*      BUN 40*   CREATININE 2.3*   ALKPHOS 421*   *   AST 2,141*   BILITOT 3.0*     Coagulation:   Recent Labs   Lab 05/17/19  0206   INR 2.0*

## 2019-05-18 NOTE — PROCEDURES
"Fox Lyons Sr. is a 73 y.o. male patient.    Temp: 98.4 °F (36.9 °C) (05/18/19 0715)  Pulse: 73 (05/18/19 0730)  Resp: 12 (05/18/19 0730)  BP: (!) 103/55 (05/18/19 0730)  SpO2: 95 % (05/18/19 0730)  Weight: 73.2 kg (161 lb 6 oz) (05/18/19 0601)  Height: 5' 10" (177.8 cm) (05/17/19 1402)       Central Line  Date/Time: 5/18/2019 8:09 AM  Location procedure was performed: [facility] EMERGENCY DEPARTMENT  Performed by: Emanuel Ingram NP  Consent Done: Yes  Time out: Immediately prior to procedure a "time out" was called to verify the correct patient, procedure, equipment, support staff and site/side marked as required.  Indications: med administration  Preparation: skin prepped with ChloraPrep  Skin prep agent dried: skin prep agent completely dried prior to procedure  Sterile barriers: all five maximum sterile barriers used - cap, mask, sterile gown, sterile gloves, and large sterile sheet  Hand hygiene: hand hygiene performed prior to central venous catheter insertion  Location details: right internal jugular  Catheter type: triple lumen  Catheter size: 7 Fr  Ultrasound guidance: yes  Vessel Caliber: small, patent, compressibility normal  Needle advanced into vessel with real time Ultrasound guidance.  Guidewire confirmed in vessel.  Sterile sheath used.  Manometry: Yes  Number of attempts: 1  Assessment: placement verified by x-ray,  no pneumothorax on x-ray,  tip termination and successful placement  Complications: none  Estimated blood loss (mL): 1  Post-procedure: line sutured,  chlorhexidine patch,  sterile dressing applied and blood return through all ports  Complications: No          Emanuel Ingram  5/18/2019  "

## 2019-05-19 PROBLEM — B37.0 ORAL THRUSH: Status: ACTIVE | Noted: 2019-05-19

## 2019-05-19 PROBLEM — D68.9 COAGULOPATHY: Status: ACTIVE | Noted: 2019-05-19

## 2019-05-19 LAB
AFP SERPL-MCNC: 0.9 NG/ML (ref 0–8.4)
ALBUMIN SERPL BCP-MCNC: 2.4 G/DL (ref 3.5–5.2)
ALBUMIN SERPL BCP-MCNC: 2.7 G/DL (ref 3.5–5.2)
ALP SERPL-CCNC: 317 U/L (ref 55–135)
ALP SERPL-CCNC: 333 U/L (ref 55–135)
ALT SERPL W/O P-5'-P-CCNC: 570 U/L (ref 10–44)
ALT SERPL W/O P-5'-P-CCNC: 630 U/L (ref 10–44)
ANION GAP SERPL CALC-SCNC: 11 MMOL/L (ref 8–16)
ANION GAP SERPL CALC-SCNC: 14 MMOL/L (ref 8–16)
ANISOCYTOSIS BLD QL SMEAR: SLIGHT
AST SERPL-CCNC: 1059 U/L (ref 10–40)
AST SERPL-CCNC: 740 U/L (ref 10–40)
BASOPHILS # BLD AUTO: 0 K/UL (ref 0–0.2)
BASOPHILS NFR BLD: 0 % (ref 0–1.9)
BILIRUB SERPL-MCNC: 3.1 MG/DL (ref 0.1–1)
BILIRUB SERPL-MCNC: 3.2 MG/DL (ref 0.1–1)
BUN SERPL-MCNC: 36 MG/DL (ref 8–23)
BUN SERPL-MCNC: 36 MG/DL (ref 8–23)
CALCIUM SERPL-MCNC: 8.6 MG/DL (ref 8.7–10.5)
CALCIUM SERPL-MCNC: 8.8 MG/DL (ref 8.7–10.5)
CEA SERPL-MCNC: 422 NG/ML (ref 0–5)
CHLORIDE SERPL-SCNC: 100 MMOL/L (ref 95–110)
CHLORIDE SERPL-SCNC: 100 MMOL/L (ref 95–110)
CO2 SERPL-SCNC: 17 MMOL/L (ref 23–29)
CO2 SERPL-SCNC: 19 MMOL/L (ref 23–29)
CREAT SERPL-MCNC: 1.6 MG/DL (ref 0.5–1.4)
CREAT SERPL-MCNC: 1.8 MG/DL (ref 0.5–1.4)
DIFFERENTIAL METHOD: ABNORMAL
EOSINOPHIL # BLD AUTO: 0 K/UL (ref 0–0.5)
EOSINOPHIL NFR BLD: 0 % (ref 0–8)
ERYTHROCYTE [DISTWIDTH] IN BLOOD BY AUTOMATED COUNT: 19.4 % (ref 11.5–14.5)
ERYTHROCYTE [DISTWIDTH] IN BLOOD BY AUTOMATED COUNT: 19.6 % (ref 11.5–14.5)
ERYTHROCYTE [DISTWIDTH] IN BLOOD BY AUTOMATED COUNT: 19.8 % (ref 11.5–14.5)
EST. GFR  (AFRICAN AMERICAN): 42.2 ML/MIN/1.73 M^2
EST. GFR  (AFRICAN AMERICAN): 48.7 ML/MIN/1.73 M^2
EST. GFR  (NON AFRICAN AMERICAN): 36.5 ML/MIN/1.73 M^2
EST. GFR  (NON AFRICAN AMERICAN): 42.1 ML/MIN/1.73 M^2
GLUCOSE SERPL-MCNC: 211 MG/DL (ref 70–110)
GLUCOSE SERPL-MCNC: 236 MG/DL (ref 70–110)
GRAM STN SPEC: NORMAL
GRAM STN SPEC: NORMAL
HCT VFR BLD AUTO: 23.9 % (ref 40–54)
HCT VFR BLD AUTO: 24.3 % (ref 40–54)
HCT VFR BLD AUTO: 24.4 % (ref 40–54)
HGB BLD-MCNC: 7.6 G/DL (ref 14–18)
HGB BLD-MCNC: 7.7 G/DL (ref 14–18)
HGB BLD-MCNC: 7.8 G/DL (ref 14–18)
HYPOCHROMIA BLD QL SMEAR: ABNORMAL
IMM GRANULOCYTES # BLD AUTO: 0.03 K/UL (ref 0–0.04)
IMM GRANULOCYTES # BLD AUTO: 0.03 K/UL (ref 0–0.04)
IMM GRANULOCYTES # BLD AUTO: 0.04 K/UL (ref 0–0.04)
IMM GRANULOCYTES NFR BLD AUTO: 0.4 % (ref 0–0.5)
IMM GRANULOCYTES NFR BLD AUTO: 0.5 % (ref 0–0.5)
IMM GRANULOCYTES NFR BLD AUTO: 0.5 % (ref 0–0.5)
INR PPP: 2.5 (ref 0.8–1.2)
INR PPP: 2.8 (ref 0.8–1.2)
LACTATE SERPL-SCNC: 4.3 MMOL/L (ref 0.5–2.2)
LACTATE SERPL-SCNC: 5.2 MMOL/L (ref 0.5–2.2)
LACTATE SERPL-SCNC: 6.4 MMOL/L (ref 0.5–2.2)
LACTATE SERPL-SCNC: 6.9 MMOL/L (ref 0.5–2.2)
LACTATE SERPL-SCNC: 7.2 MMOL/L (ref 0.5–2.2)
LYMPHOCYTES # BLD AUTO: 0.3 K/UL (ref 1–4.8)
LYMPHOCYTES # BLD AUTO: 0.4 K/UL (ref 1–4.8)
LYMPHOCYTES # BLD AUTO: 0.4 K/UL (ref 1–4.8)
LYMPHOCYTES NFR BLD: 4.5 % (ref 18–48)
LYMPHOCYTES NFR BLD: 4.7 % (ref 18–48)
LYMPHOCYTES NFR BLD: 5.4 % (ref 18–48)
MAGNESIUM SERPL-MCNC: 1.9 MG/DL (ref 1.6–2.6)
MCH RBC QN AUTO: 27 PG (ref 27–31)
MCH RBC QN AUTO: 27.2 PG (ref 27–31)
MCH RBC QN AUTO: 27.4 PG (ref 27–31)
MCHC RBC AUTO-ENTMCNC: 31.3 G/DL (ref 32–36)
MCHC RBC AUTO-ENTMCNC: 31.6 G/DL (ref 32–36)
MCHC RBC AUTO-ENTMCNC: 32.6 G/DL (ref 32–36)
MCV RBC AUTO: 84 FL (ref 82–98)
MCV RBC AUTO: 86 FL (ref 82–98)
MCV RBC AUTO: 87 FL (ref 82–98)
MONOCYTES # BLD AUTO: 0.4 K/UL (ref 0.3–1)
MONOCYTES # BLD AUTO: 0.5 K/UL (ref 0.3–1)
MONOCYTES # BLD AUTO: 0.5 K/UL (ref 0.3–1)
MONOCYTES NFR BLD: 5.7 % (ref 4–15)
MONOCYTES NFR BLD: 6.1 % (ref 4–15)
MONOCYTES NFR BLD: 7.2 % (ref 4–15)
NEUTROPHILS # BLD AUTO: 5.9 K/UL (ref 1.8–7.7)
NEUTROPHILS # BLD AUTO: 6.3 K/UL (ref 1.8–7.7)
NEUTROPHILS # BLD AUTO: 6.9 K/UL (ref 1.8–7.7)
NEUTROPHILS NFR BLD: 87.8 % (ref 38–73)
NEUTROPHILS NFR BLD: 88.5 % (ref 38–73)
NEUTROPHILS NFR BLD: 88.7 % (ref 38–73)
NRBC BLD-RTO: 0 /100 WBC
PHOSPHATE SERPL-MCNC: 4.2 MG/DL (ref 2.7–4.5)
PLATELET # BLD AUTO: 39 K/UL (ref 150–350)
PLATELET # BLD AUTO: 42 K/UL (ref 150–350)
PLATELET # BLD AUTO: 43 K/UL (ref 150–350)
PLATELET BLD QL SMEAR: ABNORMAL
PMV BLD AUTO: 10.5 FL (ref 9.2–12.9)
PMV BLD AUTO: 10.5 FL (ref 9.2–12.9)
PMV BLD AUTO: 10.7 FL (ref 9.2–12.9)
POCT GLUCOSE: 190 MG/DL (ref 70–110)
POCT GLUCOSE: 222 MG/DL (ref 70–110)
POCT GLUCOSE: 243 MG/DL (ref 70–110)
POCT GLUCOSE: 245 MG/DL (ref 70–110)
POLYCHROMASIA BLD QL SMEAR: ABNORMAL
POTASSIUM SERPL-SCNC: 3.7 MMOL/L (ref 3.5–5.1)
POTASSIUM SERPL-SCNC: 4.1 MMOL/L (ref 3.5–5.1)
PROT SERPL-MCNC: 5.6 G/DL (ref 6–8.4)
PROT SERPL-MCNC: 5.6 G/DL (ref 6–8.4)
PROTHROMBIN TIME: 24.1 SEC (ref 9–12.5)
PROTHROMBIN TIME: 26.9 SEC (ref 9–12.5)
RBC # BLD AUTO: 2.81 M/UL (ref 4.6–6.2)
RBC # BLD AUTO: 2.83 M/UL (ref 4.6–6.2)
RBC # BLD AUTO: 2.85 M/UL (ref 4.6–6.2)
SODIUM SERPL-SCNC: 130 MMOL/L (ref 136–145)
SODIUM SERPL-SCNC: 131 MMOL/L (ref 136–145)
TSH SERPL DL<=0.005 MIU/L-ACNC: 0.44 UIU/ML (ref 0.4–4)
VANCOMYCIN SERPL-MCNC: 10.7 UG/ML
WBC # BLD AUTO: 6.66 K/UL (ref 3.9–12.7)
WBC # BLD AUTO: 7.16 K/UL (ref 3.9–12.7)
WBC # BLD AUTO: 7.73 K/UL (ref 3.9–12.7)

## 2019-05-19 PROCEDURE — 94761 N-INVAS EAR/PLS OXIMETRY MLT: CPT | Mod: HCNC

## 2019-05-19 PROCEDURE — 83605 ASSAY OF LACTIC ACID: CPT | Mod: 91,HCNC

## 2019-05-19 PROCEDURE — 85025 COMPLETE CBC W/AUTO DIFF WBC: CPT | Mod: HCNC

## 2019-05-19 PROCEDURE — 63600175 PHARM REV CODE 636 W HCPCS: Mod: HCNC | Performed by: INTERNAL MEDICINE

## 2019-05-19 PROCEDURE — 80053 COMPREHEN METABOLIC PANEL: CPT | Mod: HCNC

## 2019-05-19 PROCEDURE — 63600175 PHARM REV CODE 636 W HCPCS: Mod: HCNC | Performed by: NURSE PRACTITIONER

## 2019-05-19 PROCEDURE — 82378 CARCINOEMBRYONIC ANTIGEN: CPT | Mod: HCNC

## 2019-05-19 PROCEDURE — 63600175 PHARM REV CODE 636 W HCPCS: Mod: HCNC | Performed by: STUDENT IN AN ORGANIZED HEALTH CARE EDUCATION/TRAINING PROGRAM

## 2019-05-19 PROCEDURE — 25000003 PHARM REV CODE 250: Mod: HCNC | Performed by: STUDENT IN AN ORGANIZED HEALTH CARE EDUCATION/TRAINING PROGRAM

## 2019-05-19 PROCEDURE — 80053 COMPREHEN METABOLIC PANEL: CPT | Mod: 91,HCNC

## 2019-05-19 PROCEDURE — 25000003 PHARM REV CODE 250: Mod: HCNC | Performed by: INTERNAL MEDICINE

## 2019-05-19 PROCEDURE — 20000000 HC ICU ROOM: Mod: HCNC

## 2019-05-19 PROCEDURE — 85610 PROTHROMBIN TIME: CPT | Mod: HCNC

## 2019-05-19 PROCEDURE — 25000003 PHARM REV CODE 250: Mod: HCNC | Performed by: NURSE PRACTITIONER

## 2019-05-19 PROCEDURE — 99291 CRITICAL CARE FIRST HOUR: CPT | Mod: HCNC,,, | Performed by: NURSE PRACTITIONER

## 2019-05-19 PROCEDURE — 84100 ASSAY OF PHOSPHORUS: CPT | Mod: HCNC

## 2019-05-19 PROCEDURE — 99291 PR CRITICAL CARE, E/M 30-74 MINUTES: ICD-10-PCS | Mod: HCNC,,, | Performed by: NURSE PRACTITIONER

## 2019-05-19 PROCEDURE — 83735 ASSAY OF MAGNESIUM: CPT | Mod: HCNC

## 2019-05-19 PROCEDURE — 25000242 PHARM REV CODE 250 ALT 637 W/ HCPCS: Mod: HCNC | Performed by: NURSE PRACTITIONER

## 2019-05-19 PROCEDURE — 80202 ASSAY OF VANCOMYCIN: CPT | Mod: HCNC

## 2019-05-19 PROCEDURE — 82105 ALPHA-FETOPROTEIN SERUM: CPT | Mod: HCNC

## 2019-05-19 PROCEDURE — 85610 PROTHROMBIN TIME: CPT | Mod: 91,HCNC

## 2019-05-19 RX ORDER — MIDODRINE HYDROCHLORIDE 5 MG/1
5 TABLET ORAL 3 TIMES DAILY
Status: DISCONTINUED | OUTPATIENT
Start: 2019-05-19 | End: 2019-05-21

## 2019-05-19 RX ORDER — NOREPINEPHRINE BITARTRATE/D5W 4MG/250ML
0.02 PLASTIC BAG, INJECTION (ML) INTRAVENOUS CONTINUOUS
Status: DISCONTINUED | OUTPATIENT
Start: 2019-05-19 | End: 2019-05-19

## 2019-05-19 RX ORDER — FLUCONAZOLE 100 MG/1
200 TABLET ORAL DAILY
Status: DISCONTINUED | OUTPATIENT
Start: 2019-05-19 | End: 2019-05-24 | Stop reason: HOSPADM

## 2019-05-19 RX ORDER — VANCOMYCIN HCL IN 5 % DEXTROSE 1G/250ML
1000 PLASTIC BAG, INJECTION (ML) INTRAVENOUS
Status: DISCONTINUED | OUTPATIENT
Start: 2019-05-19 | End: 2019-05-21

## 2019-05-19 RX ORDER — LACTULOSE 10 G/15ML
20 SOLUTION ORAL 3 TIMES DAILY
Status: DISCONTINUED | OUTPATIENT
Start: 2019-05-19 | End: 2019-05-20

## 2019-05-19 RX ORDER — FENTANYL CITRATE 50 UG/ML
12.5 INJECTION, SOLUTION INTRAMUSCULAR; INTRAVENOUS ONCE
Status: COMPLETED | OUTPATIENT
Start: 2019-05-19 | End: 2019-05-19

## 2019-05-19 RX ORDER — NOREPINEPHRINE BITARTRATE/D5W 4MG/250ML
0.02 PLASTIC BAG, INJECTION (ML) INTRAVENOUS CONTINUOUS
Status: DISCONTINUED | OUTPATIENT
Start: 2019-05-19 | End: 2019-05-20

## 2019-05-19 RX ORDER — ONDANSETRON 2 MG/ML
4 INJECTION INTRAMUSCULAR; INTRAVENOUS ONCE
Status: COMPLETED | OUTPATIENT
Start: 2019-05-19 | End: 2019-05-19

## 2019-05-19 RX ORDER — FLUCONAZOLE 2 MG/ML
200 INJECTION, SOLUTION INTRAVENOUS
Status: DISCONTINUED | OUTPATIENT
Start: 2019-05-19 | End: 2019-05-19

## 2019-05-19 RX ADMIN — Medication 0.05 MCG/KG/MIN: at 11:05

## 2019-05-19 RX ADMIN — Medication 0.06 MCG/KG/MIN: at 02:05

## 2019-05-19 RX ADMIN — LACTULOSE 20 G: 20 SOLUTION ORAL at 02:05

## 2019-05-19 RX ADMIN — HYDROCORTISONE SODIUM SUCCINATE 100 MG: 100 INJECTION, POWDER, FOR SOLUTION INTRAMUSCULAR; INTRAVENOUS at 09:05

## 2019-05-19 RX ADMIN — FENTANYL CITRATE 12.5 MCG: 50 INJECTION INTRAMUSCULAR; INTRAVENOUS at 07:05

## 2019-05-19 RX ADMIN — TIOTROPIUM BROMIDE 18 MCG: 18 CAPSULE ORAL; RESPIRATORY (INHALATION) at 09:05

## 2019-05-19 RX ADMIN — MIDODRINE HYDROCHLORIDE 5 MG: 5 TABLET ORAL at 09:05

## 2019-05-19 RX ADMIN — ONDANSETRON 4 MG: 2 INJECTION INTRAMUSCULAR; INTRAVENOUS at 11:05

## 2019-05-19 RX ADMIN — INSULIN ASPART 1 UNITS: 100 INJECTION, SOLUTION INTRAVENOUS; SUBCUTANEOUS at 12:05

## 2019-05-19 RX ADMIN — MIDODRINE HYDROCHLORIDE 5 MG: 5 TABLET ORAL at 02:05

## 2019-05-19 RX ADMIN — VASOPRESSIN 0.04 UNITS/MIN: 20 INJECTION INTRAVENOUS at 10:05

## 2019-05-19 RX ADMIN — FLUCONAZOLE 200 MG: 100 TABLET ORAL at 05:05

## 2019-05-19 RX ADMIN — LACTULOSE 20 G: 20 SOLUTION ORAL at 09:05

## 2019-05-19 RX ADMIN — VASOPRESSIN 0.04 UNITS/MIN: 20 INJECTION INTRAVENOUS at 02:05

## 2019-05-19 RX ADMIN — FLUDROCORTISONE ACETATE 100 MCG: 0.1 TABLET ORAL at 09:05

## 2019-05-19 RX ADMIN — HYDROCORTISONE SODIUM SUCCINATE 100 MG: 100 INJECTION, POWDER, FOR SOLUTION INTRAMUSCULAR; INTRAVENOUS at 02:05

## 2019-05-19 RX ADMIN — HYDROCORTISONE SODIUM SUCCINATE 100 MG: 100 INJECTION, POWDER, FOR SOLUTION INTRAMUSCULAR; INTRAVENOUS at 06:05

## 2019-05-19 RX ADMIN — VASOPRESSIN 0.04 UNITS/MIN: 20 INJECTION INTRAVENOUS at 07:05

## 2019-05-19 RX ADMIN — VANCOMYCIN HYDROCHLORIDE 1000 MG: 1 INJECTION, POWDER, LYOPHILIZED, FOR SOLUTION INTRAVENOUS at 09:05

## 2019-05-19 RX ADMIN — INSULIN ASPART 2 UNITS: 100 INJECTION, SOLUTION INTRAVENOUS; SUBCUTANEOUS at 11:05

## 2019-05-19 RX ADMIN — SODIUM CHLORIDE, SODIUM LACTATE, POTASSIUM CHLORIDE, AND CALCIUM CHLORIDE 500 ML: .6; .31; .03; .02 INJECTION, SOLUTION INTRAVENOUS at 09:05

## 2019-05-19 RX ADMIN — INSULIN ASPART 2 UNITS: 100 INJECTION, SOLUTION INTRAVENOUS; SUBCUTANEOUS at 06:05

## 2019-05-19 RX ADMIN — FLUTICASONE FUROATE AND VILANTEROL TRIFENATATE 1 PUFF: 100; 25 POWDER RESPIRATORY (INHALATION) at 09:05

## 2019-05-19 RX ADMIN — PIPERACILLIN AND TAZOBACTAM 4.5 G: 4; .5 INJECTION, POWDER, LYOPHILIZED, FOR SOLUTION INTRAVENOUS; PARENTERAL at 07:05

## 2019-05-19 RX ADMIN — FAMOTIDINE 20 MG: 20 TABLET, FILM COATED ORAL at 09:05

## 2019-05-19 RX ADMIN — MIDODRINE HYDROCHLORIDE 5 MG: 5 TABLET ORAL at 11:05

## 2019-05-19 RX ADMIN — PIPERACILLIN AND TAZOBACTAM 4.5 G: 4; .5 INJECTION, POWDER, LYOPHILIZED, FOR SOLUTION INTRAVENOUS; PARENTERAL at 04:05

## 2019-05-19 NOTE — PROGRESS NOTES
[facility]  Critical Care Medicine  Progress Note    Patient Name: Fox Lyons Sr.  MRN: [id number]  Admission Date: 5/16/2019  Hospital Length of Stay: 3 days  Code Status: Partial Code  Attending Provider: Kay Barnhart MD  Primary Care Provider: Clover White MD   Principal Problem: Septic shock due to undetermined organism    Subjective:     HPI:  Patient is a 73 y.o. 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 Patient denies fever, headache, chest pain, SOB, urinary symptoms and blood in stool.     Work up in the ED revealed leukocytosis of 14, hgb 9.9 (at baseline), initial creatinine of 3.2 (up from baseline ~ 1.5), transaminitis, lipase elevated at 324 and lactic 6.7 (which improved to 3.9 following 1.5L fluid resuscitation). On presentation pt was hypotensive to 60s/30s which improved to 90s/50s following volume resuscitation. Critical Care Medicine has been consulted for sepsis.         Hospital/ICU Course:  Mr. Lyons was admitted to [facility] evening of 5/16 for pancreatitis and sepsis of unclear etiology. Shortly after admission patient developed hypotension requiring initiation of vasopressors. RUQ performed and gallbladder was unable to be visualized. Diagnostic paracentesis performed which was negative for SBP. HIDA scan showing patent portal flow. The am of 05/19, his lactate remains elevated and vasopressin added overnight. Shock liver worsening. Therapeutic paracentesis on 5/18 with 6.4 L removed, negative for SBP; cultures pending.  Blood cultures  remain NGTD. MRCP considered but pacemaker present. Hepatology following for cirrhosis, not a current transplant candidate due to shock. US liver with doppler revealed new, complete occlusive thrombus of superior mesenteric vein with unchanged thrombus in the main portal vein and right anterior portal vein. Right hepatic mass 1.4 x 2 x 1.3 cm also noted that was not seen on CT abdomen/pelvis from 5/17.     Interval History/Significant Events: paracentesis on 5/18 with 6.4 L removed.  Decreasing vasopressor requirements.  Reports abdominal pain and n/v has resolved. Requesting apple juice.     Review of Systems   Constitutional: Negative for chills, diaphoresis and fever.   HENT: Negative for trouble swallowing.         Denies oral discomfort   Respiratory: Negative for shortness of breath.    Cardiovascular: Negative for chest pain and leg swelling.   Gastrointestinal: Negative for abdominal pain, blood in stool, nausea and vomiting.   Neurological: Negative for dizziness and headaches.     Objective:     Vital Signs (Most Recent):  Temp: 97.9 °F (36.6 °C) (05/19/19 0715)  Pulse: 74 (05/19/19 0700)  Resp: 11 (05/19/19 0700)  BP: (!) 113/56 (05/19/19 0700)  SpO2: 96 % (05/19/19 0700) Vital Signs (24h Range):  Temp:  [97.9 °F (36.6 °C)-98.1 °F (36.7 °C)] 97.9 °F (36.6 °C)  Pulse:  [59-80] 74  Resp:  [10-23] 11  SpO2:  [93 %-100 %] 96 %  BP: ()/(55-65) 113/56  Arterial Line BP: ()/(43-60) 95/45   Weight: 73.2 kg (161 lb 6 oz)  Body mass index is 23.16 kg/m².      Intake/Output Summary (Last 24 hours) at 5/19/2019 0840  Last data filed at 5/19/2019 0701  Gross per 24 hour   Intake 2273.94 ml   Output 8645 ml   Net -6371.06 ml       Physical Exam   Constitutional: He has a sickly appearance.   HENT:   Head: Normocephalic.   Mouth/Throat: He has dentures.   Thrush noted on tongue.     Eyes: Pupils are equal, round, and reactive to light. EOM are normal.   Neck: No JVD present.   Cardiovascular:   Pulses:        Dorsalis pedis pulses are 2+ on the right side, and 2+ on the left side.   Paced rhythm.  Warm extremities.  No peripheral edema.    Pulmonary/Chest: No accessory muscle usage. No tachypnea. No respiratory distress. He has no decreased breath sounds. He has no wheezes. He has no rhonchi. He has no rales.   Room air   Abdominal: Soft. Bowel sounds are normal. He exhibits distension. There is no tenderness.   Genitourinary:   Genitourinary Comments: Urine catheter with clear yellow drainage   Neurological: He is alert. No cranial nerve deficit or sensory deficit. GCS eye subscore is 4. GCS verbal subscore is 5. GCS motor subscore is 6.   Generalized, symmetrical  weakness in BLE.  States that he uses walker at home.    Skin: Skin is warm and dry. No ecchymosis and no rash noted.        Psychiatric: His speech is normal. His mood appears not anxious. His affect is not angry. He is withdrawn. He is not actively hallucinating.   Vitals reviewed.      Vents:     Lines/Drains/Airways     Central Venous Catheter Line                 Percutaneous Central Line Insertion/Assessment - triple lumen  05/17/19 1723 right internal jugular 1 day          Drain                 Urethral Catheter 05/17/19 1246 Latex 16 Fr. 1 day          Arterial Line                 Arterial Line 05/18/19 0110 Right Radial 1 day          Peripheral Intravenous Line                 Peripheral IV - Single Lumen 05/16/19 1513 20 G Left Antecubital 2 days         Peripheral IV - Single Lumen 05/16/19 1514 18 G Right Antecubital 2 days         Peripheral IV - Single Lumen 05/17/19 1335 22 G Left Hand 1 day              Significant Labs:    CBC/Anemia Profile:  Recent Labs   Lab 05/18/19  0410 05/18/19  0812 05/18/19  1949 05/19/19  0325   WBC 18.20*  --  14.17* 7.73   HGB 9.3*  --  8.8* 7.6*   HCT 30.7*  --  27.8* 24.3*   PLT 72*  --  68* 39*   MCV 89  --  87 87   RDW 20.0*  --  19.6* 19.4*   RETIC  --  4.3*  --   --         Chemistries:  Recent Labs   Lab  05/18/19  0410 05/18/19  1420 05/19/19  0325   * 127* 130*   K 4.7 4.7 4.1    100 100   CO2 15* 15* 19*   BUN 40* 37* 36*   CREATININE 2.3* 2.1* 1.8*   CALCIUM 8.9 8.9 8.6*   ALBUMIN 1.9* 1.9* 2.7*   PROT 5.6* 5.9* 5.6*   BILITOT 3.0* 3.7* 3.2*   ALKPHOS 421* 451* 333*   * 915* 630*   AST 2,141* 1,817* 1,059*   MG 1.4*  --  1.9   PHOS 4.8*  --  4.2       All pertinent labs within the past 24 hours have been reviewed.    Significant Imaging:  I have reviewed and interpreted all pertinent imaging results/findings within the past 24 hours.      ABG  No results for input(s): PH, PO2, PCO2, HCO3, BE in the last 168 hours.  Assessment/Plan:     Pulmonary  Chronic obstructive pulmonary disease (COPD)  --no evidence of acute exacerbation  --continue home trelegy ellipta with duo nebs prn  --on room air    Cardiac/Vascular  Sick sinus syndrome  --s/p pacemaker placement  --currently in atrial paced rhythm on EKG    Coronary artery disease  --s/p stent x2 2011  --holding statin in acute setting; restart when clinically appropriate    Hyperlipidemia  --holding home statin in acute setting  --restart when clinically appropriate    Hypertension  --holding home ARB, lasix & aldactone in setting of shock  --restart when clinically appropriate    Renal/  Metabolic acidosis, normal anion gap (NAG)  --improving, suspect secondary to RTA    ERNESTINA (acute kidney injury)  --ERNESTINA on CKD 3  --likely iATN from shock  --sCr improving, adequate urine output  --d/c urine catheter    ID  * Septic shock due to undetermined organism  --unclear etiology; concern for possible intraabdominal source along with a worsening, now completely occluded SMV  --leukocytosis resolved. Afebrile.   Lactate level remains elevated likely from ongoing SMV (now completely occluded) and PV thrombus.   --CXR notable for bilateral pleural effusions L > R, UA appearing noninfectious  --diagnostic para negative for SBP  --Blood culture NGTD  --continue  vancomycin and zosyn  --continue norepinephrine and vasopressin, wean as tolerated for SBP > 90 mm hg  --vasopressor requirements significantly decreased.   --midodrine added  --fluconazole added for oral thrush    Oral thrush  --nystatin on shortage.  Fluconazole orally added    History of hepatitis C  MELD-Na score: 31 at 5/19/2019  3:25 AM  MELD score: 28 at 5/19/2019  3:25 AM  Calculated from:  Serum Creatinine: 1.8 mg/dL at 5/19/2019  3:25 AM  Serum Sodium: 130 mmol/L at 5/19/2019  3:25 AM  Total Bilirubin: 3.2 mg/dL at 5/19/2019  3:25 AM  INR(ratio): 2.8 at 5/19/2019  3:25 AM  Age: 73 years   --HCV cirrhosis s/p treatment with negative viral load in 2016.   -- h/o small non-bleeding esophageal varices  --ammonia WNL, continue lactulose; increase dose given last bowel movement 4 days ago.   --holding home furosemide & aldactone in setting of sepsis & shock  --paracentesis labs negative for SBP on 5/18.     Hematology  Coagulopathy  --suspect secondary to liver injury  --monitor    Superior mesenteric vein thrombosis  --now completely occluded on ultrasound doppler.  --considered anticoagulation however given worsening thrombocytopenia and coagulopathy; will not initiate at this time.     Thrombocytopenia  --chronic, now worsening in the setting of septic shock.   --no signs of active bleeding.    Endocrine  Secondary adrenal insufficiency  --s/p gamma knife radiosurgery for pituitary adenoma   --continue stress dose with hydrocortisone and fludrocortisone      GI  Ischemic hepatitis  --in the setting of shock and expanding SMV thrombus.  LFTs downtrending following therapeutic paracentesis.   --hepatology following; appreciate recommendations.   --AFP sent given hepatic mass noted on ultrasound; however mass was not noted on recent CT imaging.     Pancreatitis  --lipase 324 on admit, h/o gallstones on prior imagaing  --unable to visual gallbladder on RUQ US secondary to overlying bowel gas  --triglyceride level  WNL  --IVF hydration & supportive care.   --HIDA scan with patent cystic and common bile duct  --Pacemaker prevents MRCP      GERD (gastroesophageal reflux disease)  --continue famotidine      DVT ppx:  SCDs, no pharmacological ppx given coagulopathy and thrombocytopenia    Critical Care Time: 45 minutes  Critical secondary to Patient has a condition that poses threat to life and bodily function: Shock on vasopressors.      Critical care was time spent personally by me on the following activities: development of treatment plan with patient or surrogate and bedside caregivers, discussions with consultants, evaluation of patient's response to treatment, examination of patient, ordering and performing treatments and interventions, ordering and review of laboratory studies, ordering and review of radiographic studies, pulse oximetry, re-evaluation of patient's condition. This critical care time did not overlap with that of any other provider or involve time for any procedures.     Josy Russell NP  Critical Care Medicine  [facility]

## 2019-05-19 NOTE — ASSESSMENT & PLAN NOTE
--s/p gamma knife radiosurgery for pituitary adenoma   --continue stress dose with hydrocortisone and fludrocortisone

## 2019-05-19 NOTE — PROCEDURES
"Fox Lyons Sr. is a 73 y.o. male patient.    Temp: 98.4 °F (36.9 °C) (19 0715)  Pulse: 67 (19 1800)  Resp: (!) 23 (19 1800)  BP: (!) 114/59 (19 1800)  SpO2: 100 % (19 1800)  Weight: 73.2 kg (161 lb 6 oz) (19 0601)  Height: 5' 10" (177.8 cm) (19 1402)       Paracentesis  Date/Time: 2019 7:17 PM  Location procedure was performed: [facility]ARD[facility] MED[facility] ICU ([facility])  Performed by: Emanuel Ingram NP  Authorized by: Emanuel Ingram NP   Pre-operative diagnosis: septic shock  Post-operative diagnosis: septic shock  Consent Done: Yes  Consent: Written consent obtained.  Consent given by: spouse  Patient identity confirmed: , MRN and name  Time out: Immediately prior to procedure a "time out" was called to verify the correct patient, procedure, equipment, support staff and site/side marked as required.  Initial or subsequent exam: subsequent  Procedure purpose: therapeutic  Indications: abdominal discomfort secondary to ascites and respiratory distress secondary to ascites    Anesthesia:  Local Anesthetic: lidocaine 1% without epinephrine  Needle gauge: 20  Puncture site: right lower quadrant  Fluid appearance: clear (tea colored)  Dressing: 4x4 sterile gauze  Complications: No  Estimated blood loss (mL): 1  Patient tolerance: Patient tolerated the procedure well with no immediate complications          Emanuel Ingram  2019  "

## 2019-05-19 NOTE — ASSESSMENT & PLAN NOTE
--lipase 324 on admit, h/o gallstones on prior imagaing  --unable to visual gallbladder on RUQ US secondary to overlying bowel gas  --triglyceride level WNL  --IVF hydration & supportive care.   --HIDA scan with patent cystic and common bile duct  --Pacemaker prevents MRCP

## 2019-05-19 NOTE — ASSESSMENT & PLAN NOTE
--now completely occluded on ultrasound doppler.  --considered anticoagulation however given worsening thrombocytopenia and coagulopathy; will not initiate at this time.

## 2019-05-19 NOTE — PLAN OF CARE
Problem: Adult Inpatient Plan of Care  Goal: Plan of Care Review  Outcome: Ongoing (interventions implemented as appropriate)  No acute events overnight. Pt able to follow simple commands, PERRL, afebrile. Levo, vaso gtts infusing per MD order. Titrating levo to maintain MAP >65. Weaned to room air from nc, sats good. NPO except meds, accuchecks q6. UO good, ho in place. ~6.5 L peritoneal fluid removed yesterday, sent for testing. No c/o abd pain or discomfort. Plan to wean pressor support as tolerated. Plan of care reviewed with Fox Lyons and his spouse at the bedside. All questions and concerns addressed. WCTM.

## 2019-05-19 NOTE — ASSESSMENT & PLAN NOTE
MELD-Na score: 31 at 5/19/2019  3:25 AM  MELD score: 28 at 5/19/2019  3:25 AM  Calculated from:  Serum Creatinine: 1.8 mg/dL at 5/19/2019  3:25 AM  Serum Sodium: 130 mmol/L at 5/19/2019  3:25 AM  Total Bilirubin: 3.2 mg/dL at 5/19/2019  3:25 AM  INR(ratio): 2.8 at 5/19/2019  3:25 AM  Age: 73 years   --HCV cirrhosis s/p treatment with negative viral load in 2016.   -- h/o small non-bleeding esophageal varices  --ammonia WNL, continue lactulose; increase dose given last bowel movement 4 days ago.   --holding home furosemide & aldactone in setting of sepsis & shock  --paracentesis labs negative for SBP on 5/18.

## 2019-05-19 NOTE — SUBJECTIVE & OBJECTIVE
Interval History/Significant Events: paracentesis on 5/18 with 6.4 L removed.  Decreasing vasopressor requirements.  Reports abdominal pain and n/v has resolved. Requesting apple juice.     Review of Systems   Constitutional: Negative for chills, diaphoresis and fever.   HENT: Negative for trouble swallowing.         Denies oral discomfort   Respiratory: Negative for shortness of breath.    Cardiovascular: Negative for chest pain and leg swelling.   Gastrointestinal: Negative for abdominal pain, blood in stool, nausea and vomiting.   Neurological: Negative for dizziness and headaches.     Objective:     Vital Signs (Most Recent):  Temp: 97.9 °F (36.6 °C) (05/19/19 0715)  Pulse: 74 (05/19/19 0700)  Resp: 11 (05/19/19 0700)  BP: (!) 113/56 (05/19/19 0700)  SpO2: 96 % (05/19/19 0700) Vital Signs (24h Range):  Temp:  [97.9 °F (36.6 °C)-98.1 °F (36.7 °C)] 97.9 °F (36.6 °C)  Pulse:  [59-80] 74  Resp:  [10-23] 11  SpO2:  [93 %-100 %] 96 %  BP: ()/(55-65) 113/56  Arterial Line BP: ()/(43-60) 95/45   Weight: 73.2 kg (161 lb 6 oz)  Body mass index is 23.16 kg/m².      Intake/Output Summary (Last 24 hours) at 5/19/2019 0840  Last data filed at 5/19/2019 0701  Gross per 24 hour   Intake 2273.94 ml   Output 8645 ml   Net -6371.06 ml       Physical Exam   Constitutional: He has a sickly appearance.   HENT:   Head: Normocephalic.   Mouth/Throat: He has dentures.   Thrush noted on tongue.     Eyes: Pupils are equal, round, and reactive to light. EOM are normal.   Neck: No JVD present.   Cardiovascular:   Pulses:       Dorsalis pedis pulses are 2+ on the right side, and 2+ on the left side.   Paced rhythm.  Warm extremities.  No peripheral edema.    Pulmonary/Chest: No accessory muscle usage. No tachypnea. No respiratory distress. He has no decreased breath sounds. He has no wheezes. He has no rhonchi. He has no rales.   Room air   Abdominal: Soft. Bowel sounds are normal. He exhibits distension. There is no tenderness.    Genitourinary:   Genitourinary Comments: Urine catheter with clear yellow drainage   Neurological: He is alert. No cranial nerve deficit or sensory deficit. GCS eye subscore is 4. GCS verbal subscore is 5. GCS motor subscore is 6.   Generalized, symmetrical  weakness in BLE.  States that he uses walker at home.    Skin: Skin is warm and dry. No ecchymosis and no rash noted.        Psychiatric: His speech is normal. His mood appears not anxious. His affect is not angry. He is withdrawn. He is not actively hallucinating.   Vitals reviewed.      Vents:     Lines/Drains/Airways     Central Venous Catheter Line                 Percutaneous Central Line Insertion/Assessment - triple lumen  05/17/19 1723 right internal jugular 1 day          Drain                 Urethral Catheter 05/17/19 1246 Latex 16 Fr. 1 day          Arterial Line                 Arterial Line 05/18/19 0110 Right Radial 1 day          Peripheral Intravenous Line                 Peripheral IV - Single Lumen 05/16/19 1513 20 G Left Antecubital 2 days         Peripheral IV - Single Lumen 05/16/19 1514 18 G Right Antecubital 2 days         Peripheral IV - Single Lumen 05/17/19 1335 22 G Left Hand 1 day              Significant Labs:    CBC/Anemia Profile:  Recent Labs   Lab 05/18/19  0410 05/18/19  0812 05/18/19  1949 05/19/19  0325   WBC 18.20*  --  14.17* 7.73   HGB 9.3*  --  8.8* 7.6*   HCT 30.7*  --  27.8* 24.3*   PLT 72*  --  68* 39*   MCV 89  --  87 87   RDW 20.0*  --  19.6* 19.4*   RETIC  --  4.3*  --   --         Chemistries:  Recent Labs   Lab 05/18/19  0410 05/18/19  1420 05/19/19  0325   * 127* 130*   K 4.7 4.7 4.1    100 100   CO2 15* 15* 19*   BUN 40* 37* 36*   CREATININE 2.3* 2.1* 1.8*   CALCIUM 8.9 8.9 8.6*   ALBUMIN 1.9* 1.9* 2.7*   PROT 5.6* 5.9* 5.6*   BILITOT 3.0* 3.7* 3.2*   ALKPHOS 421* 451* 333*   * 915* 630*   AST 2,141* 1,817* 1,059*   MG 1.4*  --  1.9   PHOS 4.8*  --  4.2       All pertinent labs within the  past 24 hours have been reviewed.    Significant Imaging:  I have reviewed and interpreted all pertinent imaging results/findings within the past 24 hours.

## 2019-05-19 NOTE — ASSESSMENT & PLAN NOTE
--unclear etiology; concern for possible intraabdominal source along with a worsening, now completely occluded SMV  --leukocytosis resolved. Afebrile.   Lactate level remains elevated likely from ongoing SMV (now completely occluded) and PV thrombus.   --CXR notable for bilateral pleural effusions L > R, UA appearing noninfectious  --diagnostic para negative for SBP  --Blood culture NGTD  --continue vancomycin and zosyn  --continue norepinephrine and vasopressin, wean as tolerated for SBP > 90 mm hg  --vasopressor requirements significantly decreased.   --midodrine added  --fluconazole added for oral thrush

## 2019-05-19 NOTE — ASSESSMENT & PLAN NOTE
--in the setting of shock and expanding SMV thrombus.  LFTs downtrending following therapeutic paracentesis.   --hepatology following; appreciate recommendations.   --AFP sent given hepatic mass noted on ultrasound; however mass was not noted on recent CT imaging.

## 2019-05-19 NOTE — ASSESSMENT & PLAN NOTE
--no evidence of acute exacerbation  --continue home trelegy ellipta with duo nebs prn  --on room air

## 2019-05-19 NOTE — ASSESSMENT & PLAN NOTE
--ERNESTINA on CKD 3  --likely iATN from shock  --sCr improving, adequate urine output  --d/c urine catheter

## 2019-05-20 LAB
ALBUMIN SERPL BCP-MCNC: 2.3 G/DL (ref 3.5–5.2)
ALP SERPL-CCNC: 291 U/L (ref 55–135)
ALT SERPL W/O P-5'-P-CCNC: 507 U/L (ref 10–44)
ANION GAP SERPL CALC-SCNC: 12 MMOL/L (ref 8–16)
AST SERPL-CCNC: 578 U/L (ref 10–40)
BACTERIA SPEC AEROBE CULT: NO GROWTH
BASOPHILS # BLD AUTO: 0 K/UL (ref 0–0.2)
BASOPHILS NFR BLD: 0 % (ref 0–1.9)
BILIRUB SERPL-MCNC: 3.1 MG/DL (ref 0.1–1)
BUN SERPL-MCNC: 42 MG/DL (ref 8–23)
CALCIUM SERPL-MCNC: 8.8 MG/DL (ref 8.7–10.5)
CHLORIDE SERPL-SCNC: 103 MMOL/L (ref 95–110)
CO2 SERPL-SCNC: 18 MMOL/L (ref 23–29)
CREAT SERPL-MCNC: 1.5 MG/DL (ref 0.5–1.4)
DIFFERENTIAL METHOD: ABNORMAL
EOSINOPHIL # BLD AUTO: 0 K/UL (ref 0–0.5)
EOSINOPHIL NFR BLD: 0 % (ref 0–8)
ERYTHROCYTE [DISTWIDTH] IN BLOOD BY AUTOMATED COUNT: 19.6 % (ref 11.5–14.5)
EST. GFR  (AFRICAN AMERICAN): 52.6 ML/MIN/1.73 M^2
EST. GFR  (NON AFRICAN AMERICAN): 45.5 ML/MIN/1.73 M^2
GLUCOSE SERPL-MCNC: 191 MG/DL (ref 70–110)
HCT VFR BLD AUTO: 24.2 % (ref 40–54)
HGB BLD-MCNC: 7.7 G/DL (ref 14–18)
IMM GRANULOCYTES # BLD AUTO: 0.02 K/UL (ref 0–0.04)
IMM GRANULOCYTES NFR BLD AUTO: 0.4 % (ref 0–0.5)
INR PPP: 2.2 (ref 0.8–1.2)
LACTATE SERPL-SCNC: 5.6 MMOL/L (ref 0.5–2.2)
LYMPHOCYTES # BLD AUTO: 0.3 K/UL (ref 1–4.8)
LYMPHOCYTES NFR BLD: 4.6 % (ref 18–48)
MAGNESIUM SERPL-MCNC: 1.7 MG/DL (ref 1.6–2.6)
MCH RBC QN AUTO: 27.5 PG (ref 27–31)
MCHC RBC AUTO-ENTMCNC: 31.8 G/DL (ref 32–36)
MCV RBC AUTO: 86 FL (ref 82–98)
MONOCYTES # BLD AUTO: 0.4 K/UL (ref 0.3–1)
MONOCYTES NFR BLD: 7 % (ref 4–15)
NEUTROPHILS # BLD AUTO: 4.8 K/UL (ref 1.8–7.7)
NEUTROPHILS NFR BLD: 88 % (ref 38–73)
NRBC BLD-RTO: 0 /100 WBC
PHOSPHATE SERPL-MCNC: 3.4 MG/DL (ref 2.7–4.5)
PLATELET # BLD AUTO: 40 K/UL (ref 150–350)
PMV BLD AUTO: 10.6 FL (ref 9.2–12.9)
POCT GLUCOSE: 145 MG/DL (ref 70–110)
POCT GLUCOSE: 212 MG/DL (ref 70–110)
POCT GLUCOSE: 220 MG/DL (ref 70–110)
POCT GLUCOSE: 226 MG/DL (ref 70–110)
POTASSIUM SERPL-SCNC: 3.7 MMOL/L (ref 3.5–5.1)
PROT SERPL-MCNC: 5.5 G/DL (ref 6–8.4)
PROTHROMBIN TIME: 21.2 SEC (ref 9–12.5)
RBC # BLD AUTO: 2.8 M/UL (ref 4.6–6.2)
SODIUM SERPL-SCNC: 133 MMOL/L (ref 136–145)
WBC # BLD AUTO: 5.44 K/UL (ref 3.9–12.7)

## 2019-05-20 PROCEDURE — 80053 COMPREHEN METABOLIC PANEL: CPT | Mod: HCNC

## 2019-05-20 PROCEDURE — 63600175 PHARM REV CODE 636 W HCPCS: Mod: HCNC | Performed by: INTERNAL MEDICINE

## 2019-05-20 PROCEDURE — 85610 PROTHROMBIN TIME: CPT | Mod: HCNC

## 2019-05-20 PROCEDURE — 25000003 PHARM REV CODE 250: Mod: HCNC | Performed by: INTERNAL MEDICINE

## 2019-05-20 PROCEDURE — 84100 ASSAY OF PHOSPHORUS: CPT | Mod: HCNC

## 2019-05-20 PROCEDURE — 63600175 PHARM REV CODE 636 W HCPCS: Mod: HCNC | Performed by: STUDENT IN AN ORGANIZED HEALTH CARE EDUCATION/TRAINING PROGRAM

## 2019-05-20 PROCEDURE — 63600175 PHARM REV CODE 636 W HCPCS: Mod: HCNC | Performed by: NURSE PRACTITIONER

## 2019-05-20 PROCEDURE — 85025 COMPLETE CBC W/AUTO DIFF WBC: CPT | Mod: HCNC

## 2019-05-20 PROCEDURE — 25000003 PHARM REV CODE 250: Mod: HCNC | Performed by: STUDENT IN AN ORGANIZED HEALTH CARE EDUCATION/TRAINING PROGRAM

## 2019-05-20 PROCEDURE — 25000242 PHARM REV CODE 250 ALT 637 W/ HCPCS: Mod: HCNC | Performed by: NURSE PRACTITIONER

## 2019-05-20 PROCEDURE — 25000003 PHARM REV CODE 250: Mod: HCNC | Performed by: NURSE PRACTITIONER

## 2019-05-20 PROCEDURE — 20000000 HC ICU ROOM: Mod: HCNC

## 2019-05-20 PROCEDURE — 99291 CRITICAL CARE FIRST HOUR: CPT | Mod: HCNC,,, | Performed by: NURSE PRACTITIONER

## 2019-05-20 PROCEDURE — 83605 ASSAY OF LACTIC ACID: CPT | Mod: HCNC

## 2019-05-20 PROCEDURE — 83735 ASSAY OF MAGNESIUM: CPT | Mod: HCNC

## 2019-05-20 PROCEDURE — 99291 PR CRITICAL CARE, E/M 30-74 MINUTES: ICD-10-PCS | Mod: HCNC,,, | Performed by: NURSE PRACTITIONER

## 2019-05-20 RX ORDER — LACTULOSE 10 G/15ML
20 SOLUTION ORAL DAILY
Status: DISCONTINUED | OUTPATIENT
Start: 2019-05-21 | End: 2019-05-23

## 2019-05-20 RX ADMIN — MIDODRINE HYDROCHLORIDE 5 MG: 5 TABLET ORAL at 09:05

## 2019-05-20 RX ADMIN — HYDROCORTISONE SODIUM SUCCINATE 100 MG: 100 INJECTION, POWDER, FOR SOLUTION INTRAMUSCULAR; INTRAVENOUS at 09:05

## 2019-05-20 RX ADMIN — PIPERACILLIN AND TAZOBACTAM 4.5 G: 4; .5 INJECTION, POWDER, LYOPHILIZED, FOR SOLUTION INTRAVENOUS; PARENTERAL at 03:05

## 2019-05-20 RX ADMIN — INSULIN ASPART 1 UNITS: 100 INJECTION, SOLUTION INTRAVENOUS; SUBCUTANEOUS at 12:05

## 2019-05-20 RX ADMIN — FAMOTIDINE 20 MG: 20 TABLET, FILM COATED ORAL at 08:05

## 2019-05-20 RX ADMIN — VASOPRESSIN 0.04 UNITS/MIN: 20 INJECTION INTRAVENOUS at 08:05

## 2019-05-20 RX ADMIN — VANCOMYCIN HYDROCHLORIDE 1000 MG: 1 INJECTION, POWDER, LYOPHILIZED, FOR SOLUTION INTRAVENOUS at 08:05

## 2019-05-20 RX ADMIN — INSULIN ASPART 2 UNITS: 100 INJECTION, SOLUTION INTRAVENOUS; SUBCUTANEOUS at 06:05

## 2019-05-20 RX ADMIN — PIPERACILLIN AND TAZOBACTAM 4.5 G: 4; .5 INJECTION, POWDER, LYOPHILIZED, FOR SOLUTION INTRAVENOUS; PARENTERAL at 11:05

## 2019-05-20 RX ADMIN — PIPERACILLIN AND TAZOBACTAM 4.5 G: 4; .5 INJECTION, POWDER, LYOPHILIZED, FOR SOLUTION INTRAVENOUS; PARENTERAL at 12:05

## 2019-05-20 RX ADMIN — FLUDROCORTISONE ACETATE 100 MCG: 0.1 TABLET ORAL at 08:05

## 2019-05-20 RX ADMIN — HYDROCORTISONE SODIUM SUCCINATE 100 MG: 100 INJECTION, POWDER, FOR SOLUTION INTRAMUSCULAR; INTRAVENOUS at 06:05

## 2019-05-20 RX ADMIN — TIOTROPIUM BROMIDE 18 MCG: 18 CAPSULE ORAL; RESPIRATORY (INHALATION) at 08:05

## 2019-05-20 RX ADMIN — FLUCONAZOLE 200 MG: 100 TABLET ORAL at 08:05

## 2019-05-20 RX ADMIN — PIPERACILLIN AND TAZOBACTAM 4.5 G: 4; .5 INJECTION, POWDER, LYOPHILIZED, FOR SOLUTION INTRAVENOUS; PARENTERAL at 07:05

## 2019-05-20 RX ADMIN — ONDANSETRON 4 MG: 2 INJECTION INTRAMUSCULAR; INTRAVENOUS at 08:05

## 2019-05-20 RX ADMIN — INSULIN ASPART 2 UNITS: 100 INJECTION, SOLUTION INTRAVENOUS; SUBCUTANEOUS at 11:05

## 2019-05-20 RX ADMIN — MIDODRINE HYDROCHLORIDE 5 MG: 5 TABLET ORAL at 02:05

## 2019-05-20 RX ADMIN — MIDODRINE HYDROCHLORIDE 5 MG: 5 TABLET ORAL at 08:05

## 2019-05-20 RX ADMIN — FLUTICASONE FUROATE AND VILANTEROL TRIFENATATE 1 PUFF: 100; 25 POWDER RESPIRATORY (INHALATION) at 08:05

## 2019-05-20 RX ADMIN — HYDROCORTISONE SODIUM SUCCINATE 100 MG: 100 INJECTION, POWDER, FOR SOLUTION INTRAMUSCULAR; INTRAVENOUS at 02:05

## 2019-05-20 NOTE — PLAN OF CARE
Patient lives with his wife Cathryn in a single story home (0 steps). Patient states that he is currently receiving home health services through [facility]. Patient uses home DME: walker, shower chair. CM name and number written on white board in patient's room, informed pt to call for any questions or concerns. CM will continue to follow for needs.     Clover White MD  [address] / [facility] [address]    [address] [place] [address] AT [facility] [address]  [address] [facility] [address]  Phone: [phone] Fax: [phone]    Payor: [employer] MEDICARE / Plan: [employer] TOTAL CARE ADVANTAGE / Product Type: Medicare Advantage /     Extended Emergency Contact Information  Primary Emergency Contact: Cathryn Lyons  Address: [address] [place] [address] [facility]  Home Phone: [phone]  Mobile Phone: [phone]  Relation: Spouse       05/20/19 1704   Discharge Assessment   Assessment Type Discharge Planning Assessment   Confirmed/corrected address and phone number on facesheet? Yes   Assessment information obtained from? Patient;Caregiver   Communicated expected length of stay with patient/caregiver no   Prior to hospitilization cognitive status: Alert/Oriented   Prior to hospitalization functional status: Independent   Current cognitive status: Alert/Oriented   Current Functional Status: Independent   Facility Arrived From: ED   Lives With spouse   Able to Return to Prior Arrangements yes   Is patient able to care for self after discharge? Unable to determine at this time (comments)   Who are your caregiver(s) and their phone number(s)? Cathryn Lyons (wife) [phone]    Patient's perception of discharge disposition home or selfcare   Readmission Within the Last 30 Days previous discharge plan unsuccessful   If yes, most recent facility name[facility]    Patient  currently being followed by outpatient case management? No   Patient currently receives any other outside agency services? Yes   Name and contact number of agency or person providing outside services [facility]    Is it the patient/care giver preference to resume care with the current outside agency? Yes   Equipment Currently Used at Home walker, rolling;shower chair   Do you have any problems affording any of your prescribed medications? No   Is the patient taking medications as prescribed? yes   Does the patient have transportation home? Yes   Transportation Anticipated family or friend will provide   Discharge Plan A Home Health;Home with family   Discharge Plan B Home with family   DME Needed Upon Discharge  other (see comments)  (Needs TBD)   Patient/Family in Agreement with Plan yes   Readmission Questionnaire   At the time of your discharge, did someone talk to you about what your health problems were? Yes   At the time of discharge, did someone talk to you about what to watch out for regarding worsening of your health problem? Yes   At the time of discharge, did someone talk to you about what to do if you experienced worsening of your health problem? Yes   At the time of discharge, did someone talk to you about which medication to take when you left the hospital and which ones to stop taking? Yes   At the time of discharge, did someone talk to you about when and where to follow up with a doctor after you left the hospital? Yes   Do you have problems taking your medications as prescribed? No   Do you have any problems affording any of  your prescribed medications? No   Do you have problems obtaining/receiving your medications? No   Does the patient have transportation to healthcare appointments? Yes   Living Arrangements house   Does the patient have family/friends to help with healtcare needs after discharge? yes     Brittnee Ahumada RN [facility]   Case Management     [phone]

## 2019-05-20 NOTE — NURSING
Notified Josy Russell NP, with Critical care of pt acute 10/10 L flank pain, unable to describe. VS stable. Provider to come to bedside to assess. No orders received.

## 2019-05-20 NOTE — PLAN OF CARE
Problem: Adult Inpatient Plan of Care  Goal: Plan of Care Review  Outcome: Ongoing (interventions implemented as appropriate)    No acute events throughout day. See vital signs and assessments in flowsheets. See below for updates on today's progress.     Pulmonary: Pt remains on RA, RR even and unlabored, spo2 %    Cardiovascular: Pt remains SR, HR 60-70s on monitor. Vaso weaned off this AM, BP normalized with midodrine administration. SBP 90-110s off of pressors.     Neurological: Lethargic but oriented x 4 when aroused    Gastrointestinal: Fecal management system in place. Lactulose discontinued due to multiple loose stools overnight. 40cc stool output noted.    Genitourinary: Condom cath in place, 230cc measured output. 1 unmeasured urine occurrence noted.    Endocrine: BG monitoring Q6hrs, sliding scale insulin coverage given x 1    Skin/Bath: skin intact, bath given this AM    Infusions: abx    Patient progressing towards goals as tolerated, plan of care communicated and reviewed with Fox Lyons Sr. and family. All concerns addressed. Will continue to monitor.

## 2019-05-20 NOTE — SUBJECTIVE & OBJECTIVE
Interval History/Significant Events: no acute events.  Weaned off vasopressors.     Review of Systems   Constitutional: Negative for chills, diaphoresis and fever.   HENT: Negative for trouble swallowing.         Denies oral discomfort   Respiratory: Negative for shortness of breath.    Cardiovascular: Negative for chest pain and leg swelling.   Gastrointestinal: Positive for nausea (intermittent). Negative for abdominal pain, blood in stool and vomiting.   Neurological: Negative for dizziness and headaches.     Objective:     Vital Signs (Most Recent):  Temp: 97.4 °F (36.3 °C) (05/20/19 1100)  Pulse: 67 (05/20/19 1200)  Resp: 20 (05/20/19 1200)  BP: (!) 93/55 (05/20/19 1200)  SpO2: 96 % (05/20/19 1200) Vital Signs (24h Range):  Temp:  [97.4 °F (36.3 °C)-98.1 °F (36.7 °C)] 97.4 °F (36.3 °C)  Pulse:  [57-77] 67  Resp:  [10-37] 20  SpO2:  [88 %-99 %] 96 %  BP: ()/(52-63) 93/55  Arterial Line BP: ()/(41-54) 95/44   Weight: 73.2 kg (161 lb 6 oz)  Body mass index is 23.16 kg/m².      Intake/Output Summary (Last 24 hours) at 5/20/2019 1255  Last data filed at 5/20/2019 1000  Gross per 24 hour   Intake 1293.39 ml   Output 585 ml   Net 708.39 ml       Physical Exam   Constitutional: He appears cachectic. He has a sickly appearance.   HENT:   Head: Normocephalic.   Mouth/Throat: He has dentures.   Thrush noted on tongue.     Eyes: Pupils are equal, round, and reactive to light. EOM are normal.   Neck: No JVD present.   Cardiovascular:   Pulses:       Radial pulses are 2+ on the right side, and 2+ on the left side.        Dorsalis pedis pulses are 2+ on the right side, and 2+ on the left side.   Paced rhythm.  Warm extremities.  No peripheral edema.    Pulmonary/Chest: No accessory muscle usage. No tachypnea. No respiratory distress. He has no decreased breath sounds. He has no wheezes. He has no rhonchi. He has no rales.   Room air   Abdominal: Soft. Bowel sounds are normal. He exhibits distension. There is no  tenderness.   Multiple loose light brown bowel movements overnight.    Genitourinary:   Genitourinary Comments: voiding   Neurological: No cranial nerve deficit or sensory deficit. GCS eye subscore is 3. GCS verbal subscore is 5. GCS motor subscore is 6.   Drowsy. Generalized, symmetrical  weakness in BLE.  States that he uses walker at home.    Skin: Skin is warm and dry. No ecchymosis and no rash noted.        Psychiatric: His speech is normal. His mood appears not anxious. His affect is not angry. He is withdrawn. He is not actively hallucinating.   Vitals reviewed.      Vents:     Lines/Drains/Airways     Central Venous Catheter Line                 Percutaneous Central Line Insertion/Assessment - triple lumen  05/17/19 1723 right internal jugular 2 days          Drain            Male External Urinary Catheter 05/19/19 1840 Medium less than 1 day          Arterial Line                 Arterial Line 05/18/19 0110 Right Radial 2 days          Peripheral Intravenous Line                 Peripheral IV - Single Lumen 05/16/19 1513 20 G Left Antecubital 3 days         Peripheral IV - Single Lumen 05/16/19 1514 18 G Right Antecubital 3 days         Peripheral IV - Single Lumen 05/17/19 1335 22 G Left Hand 2 days              Significant Labs:    CBC/Anemia Profile:  Recent Labs   Lab 05/19/19  0747 05/19/19  1848 05/20/19  0305   WBC 7.16 6.66 5.44   HGB 7.7* 7.8* 7.7*   HCT 24.4* 23.9* 24.2*   PLT 42* 43* 40*   MCV 86 84 86   RDW 19.6* 19.8* 19.6*        Chemistries:  Recent Labs   Lab 05/19/19  0325 05/19/19  1848 05/20/19  0305   * 131* 133*   K 4.1 3.7 3.7    100 103   CO2 19* 17* 18*   BUN 36* 36* 42*   CREATININE 1.8* 1.6* 1.5*   CALCIUM 8.6* 8.8 8.8   ALBUMIN 2.7* 2.4* 2.3*   PROT 5.6* 5.6* 5.5*   BILITOT 3.2* 3.1* 3.1*   ALKPHOS 333* 317* 291*   * 570* 507*   AST 1,059* 740* 578*   MG 1.9  --  1.7   PHOS 4.2  --  3.4       All pertinent labs within the past 24 hours have been  Detail Level: Zone reviewed.    Significant Imaging:  I have reviewed and interpreted all pertinent imaging results/findings within the past 24 hours.   Detail Level: Detailed

## 2019-05-20 NOTE — PROGRESS NOTES
[facility]  Critical Care Medicine  Progress Note    Patient Name: Fox Lyons Sr.  MRN: [id number]  Admission Date: 5/16/2019  Hospital Length of Stay: 4 days  Code Status: Partial Code  Attending Provider: Joan Carmen DO  Primary Care Provider: Clover White MD   Principal Problem: Septic shock due to undetermined organism    Subjective:     HPI:  Mr. Fox Lyons is a 73 y.o. 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 Patient denies fever, headache, chest pain, SOB, urinary symptoms and blood in stool.     Work up in the ED revealed leukocytosis of 14, hgb 9.9 (at baseline), initial creatinine of 3.2 (up from baseline ~ 1.5), transaminitis, lipase elevated at 324 and lactic 6.7 (which improved to 3.9 following 1.5L fluid resuscitation). On presentation pt was hypotensive to 60s/30s which improved to 90s/50s following volume resuscitation. Critical Care Medicine has been consulted for sepsis.         Hospital/ICU Course:  Mr. Lyons was admitted to [facility] evening of 5/16 for pancreatitis and sepsis of unclear etiology. Shortly after admission patient developed hypotension requiring initiation of vasopressors. RUQ performed and gallbladder was unable to be visualized. Diagnostic paracentesis performed which was negative for SBP. HIDA scan showing patent portal flow. The am of 05/19, his lactate remains elevated and vasopressin added overnight. Shock liver worsening. Therapeutic paracentesis on 5/18 with 6.4 L removed, negative for SBP; cultures pending.   Blood cultures remain NGTD. MRCP considered but pacemaker present. Hepatology following for cirrhosis, not a current transplant candidate due to shock. US liver with doppler revealed expansion with complete occlusive thrombus of superior mesenteric vein with unchanged thrombus in the main portal vein and right anterior portal vein. Right hepatic mass 1.4 x 2 x 1.3 cm also noted that was not seen on CT abdomen/pelvis w/o contrast from 5/17. AFP wnl. CEA elevated. Midodrine added on 5/19, weaned off vasopressors on 5/20.  Consulted on vancomycin and pip/tazo while awaiting culture results. Fluconazole added for oral thrush.     Interval History/Significant Events: no acute events.  Weaned off vasopressors.     Review of Systems   Constitutional: Negative for chills, diaphoresis and fever.   HENT: Negative for trouble swallowing.         Denies oral discomfort   Respiratory: Negative for shortness of breath.    Cardiovascular: Negative for chest pain and leg swelling.   Gastrointestinal: Positive for nausea (intermittent). Negative for abdominal pain, blood in stool and vomiting.   Neurological: Negative for dizziness and headaches.     Objective:     Vital Signs (Most Recent):  Temp: 97.4 °F (36.3 °C) (05/20/19 1100)  Pulse: 67 (05/20/19 1200)  Resp: 20 (05/20/19 1200)  BP: (!) 93/55 (05/20/19 1200)  SpO2: 96 % (05/20/19 1200) Vital Signs (24h Range):  Temp:  [97.4 °F (36.3 °C)-98.1 °F (36.7 °C)] 97.4 °F (36.3 °C)  Pulse:  [57-77] 67  Resp:  [10-37] 20  SpO2:  [88 %-99 %] 96 %  BP: ()/(52-63) 93/55  Arterial Line BP: ()/(41-54) 95/44   Weight: 73.2 kg (161 lb 6 oz)  Body mass index is 23.16 kg/m².      Intake/Output Summary (Last 24 hours) at 5/20/2019 1255  Last data filed at 5/20/2019 1000  Gross per 24 hour   Intake 1293.39 ml   Output 585 ml   Net 708.39 ml       Physical Exam   Constitutional: He appears cachectic. He has a sickly appearance.   HENT:   Head: Normocephalic.   Mouth/Throat: He has  dentures.   Thrush noted on tongue.     Eyes: Pupils are equal, round, and reactive to light. EOM are normal.   Neck: No JVD present.   Cardiovascular:   Pulses:       Radial pulses are 2+ on the right side, and 2+ on the left side.        Dorsalis pedis pulses are 2+ on the right side, and 2+ on the left side.   Paced rhythm.  Warm extremities.  No peripheral edema.    Pulmonary/Chest: No accessory muscle usage. No tachypnea. No respiratory distress. He has no decreased breath sounds. He has no wheezes. He has no rhonchi. He has no rales.   Room air   Abdominal: Soft. Bowel sounds are normal. He exhibits distension. There is no tenderness.   Multiple loose light brown bowel movements overnight.    Genitourinary:   Genitourinary Comments: voiding   Neurological: No cranial nerve deficit or sensory deficit. GCS eye subscore is 3. GCS verbal subscore is 5. GCS motor subscore is 6.   Drowsy. Generalized, symmetrical  weakness in BLE.  States that he uses walker at home.    Skin: Skin is warm and dry. No ecchymosis and no rash noted.        Psychiatric: His speech is normal. His mood appears not anxious. His affect is not angry. He is withdrawn. He is not actively hallucinating.   Vitals reviewed.      Vents:     Lines/Drains/Airways     Central Venous Catheter Line                 Percutaneous Central Line Insertion/Assessment - triple lumen  05/17/19 1723 right internal jugular 2 days          Drain            Male External Urinary Catheter 05/19/19 1840 Medium less than 1 day          Arterial Line                 Arterial Line 05/18/19 0110 Right Radial 2 days          Peripheral Intravenous Line                 Peripheral IV - Single Lumen 05/16/19 1513 20 G Left Antecubital 3 days         Peripheral IV - Single Lumen 05/16/19 1514 18 G Right Antecubital 3 days         Peripheral IV - Single Lumen 05/17/19 1335 22 G Left Hand 2 days              Significant Labs:    CBC/Anemia Profile:  Recent Labs   Lab  05/19/19  0747 05/19/19  1848 05/20/19  0305   WBC 7.16 6.66 5.44   HGB 7.7* 7.8* 7.7*   HCT 24.4* 23.9* 24.2*   PLT 42* 43* 40*   MCV 86 84 86   RDW 19.6* 19.8* 19.6*        Chemistries:  Recent Labs   Lab 05/19/19  0325 05/19/19  1848 05/20/19  0305   * 131* 133*   K 4.1 3.7 3.7    100 103   CO2 19* 17* 18*   BUN 36* 36* 42*   CREATININE 1.8* 1.6* 1.5*   CALCIUM 8.6* 8.8 8.8   ALBUMIN 2.7* 2.4* 2.3*   PROT 5.6* 5.6* 5.5*   BILITOT 3.2* 3.1* 3.1*   ALKPHOS 333* 317* 291*   * 570* 507*   AST 1,059* 740* 578*   MG 1.9  --  1.7   PHOS 4.2  --  3.4       All pertinent labs within the past 24 hours have been reviewed.    Significant Imaging:  I have reviewed and interpreted all pertinent imaging results/findings within the past 24 hours.      ABG  No results for input(s): PH, PO2, PCO2, HCO3, BE in the last 168 hours.  Assessment/Plan:     Pulmonary  Chronic obstructive pulmonary disease (COPD)  --no evidence of acute exacerbation  --continue home trelegy ellipta with duo nebs prn  --on room air    Cardiac/Vascular  Sick sinus syndrome  --s/p pacemaker placement  --currently in atrial paced rhythm on EKG    Coronary artery disease  --s/p stent x2 2011  --holding statin in acute setting; restart when clinically appropriate    Hyperlipidemia  --holding home statin in acute setting  --restart when clinically appropriate    Hypertension  --holding home ARB, lasix & aldactone in setting of shock  --restart when clinically appropriate    Renal/  Metabolic acidosis, normal anion gap (NAG)  --improving, suspect secondary to RTA    ERNESTINA (acute kidney injury)  --ERNESTINA on CKD 3  --likely iATN from shock  --sCr improving, voiding    ID  * Septic shock due to undetermined organism  --unclear etiology; concern for possible intraabdominal source along with worsening, now completely occluded SMV  --leukocytosis resolved. Afebrile.   Lactate level remains elevated likely from ongoing SMV (now completely occluded) and  PV thrombus and poor clearance due to cirrhosis.  --CXR notable for bilateral pleural effusions L > R, UA appearing noninfectious  --diagnostic para negative for SBP  --Blood culture NGTD  --continue vancomycin and zosyn; consider de-escalating on 5/21 if remains off vasopressors.  --wean off norepinephrine and vasopressin  --continue midodrine  --fluconazole for oral thrush    Oral thrush  --nystatin on shortage.  Fluconazole orally added    History of hepatitis C  MELD-Na score: 25 at 5/20/2019  3:05 AM  MELD score: 23 at 5/20/2019  3:05 AM  Calculated from:  Serum Creatinine: 1.5 mg/dL at 5/20/2019  3:05 AM  Serum Sodium: 133 mmol/L at 5/20/2019  3:05 AM  Total Bilirubin: 3.1 mg/dL at 5/20/2019  3:05 AM  INR(ratio): 2.2 at 5/20/2019  3:05 AM  Age: 73 years   --HCV cirrhosis s/p treatment with negative viral load in 2016.   -- h/o small non-bleeding esophageal varices  --ammonia WNL, continue lactulose; increase dose given last bowel movement 4 days ago.   --holding home furosemide & aldactone in setting of sepsis & shock  --paracentesis labs negative for SBP on 5/18.   --see ischemic hepatitis.    Hematology  Coagulopathy  --suspect secondary to liver injury  --monitor    Superior mesenteric vein thrombosis  --now completely occluded on ultrasound doppler.  --considered anticoagulation however given worsening thrombocytopenia and coagulopathy; will not initiate at this time.     Thrombocytopenia  --chronic, now worsening in the setting of septic shock.   --no signs of active bleeding.  --no recent heparin products    Endocrine  Secondary adrenal insufficiency  --s/p gamma knife radiosurgery for pituitary adenoma   --continue stress dose with hydrocortisone and fludrocortisone      GI  Ischemic hepatitis  --in the setting of shock and expanding SMV thrombus.  LFTs downtrending following therapeutic paracentesis.   --hepatology following; appreciate recommendations.   --AFP wnl, CEA elevated in the setting of  hepatic mass noted on ultrasound; however mass was not noted on recent CT imaging.     Pancreatitis  --lipase 324 on admit, h/o gallstones on prior imagaing  --unable to visual gallbladder and pancreas on CT imaging secondary to overlying bowel gas  --triglyceride level WNL  --IVF hydration & supportive care.   --HIDA scan with patent cystic and common bile duct  --Pacemaker prevents MRCP      GERD (gastroesophageal reflux disease)  --continue famotidine    DVT ppx: SCDs, no pharmacological ppx given thrombocytopenia and coagulopathy.     Wife updated at bedside who is aware of extent of liver disease and that Mr. Lyons is not a liver transplant candidate.  Will consult palliative care for assistance with goals of care and emotional support.     Discussed plan with Dr. Carmen.     Critical Care Time:  35 minutes  Critical secondary to Patient has a condition that poses threat to life and bodily function: shock requiring vasopressors.      Critical care was time spent personally by me on the following activities: development of treatment plan with patient or surrogate and bedside caregivers, discussions with consultants, evaluation of patient's response to treatment, examination of patient, ordering and performing treatments and interventions, ordering and review of laboratory studies, ordering and review of radiographic studies, pulse oximetry, re-evaluation of patient's condition. This critical care time did not overlap with that of any other provider or involve time for any procedures.     Josy Russell NP  Critical Care Medicine  [facility]

## 2019-05-20 NOTE — PROGRESS NOTES
Notified Dr. Ayala with Critical Care of critical lactic of 7.2.  MD states d/t state of liver, lactics will continue to be elevated. Order to be d/c'd. No additional orders received.  Will continue to monitor.

## 2019-05-20 NOTE — ASSESSMENT & PLAN NOTE
--chronic, now worsening in the setting of septic shock.   --no signs of active bleeding.  --no recent heparin products

## 2019-05-20 NOTE — ASSESSMENT & PLAN NOTE
--in the setting of shock and expanding SMV thrombus.  LFTs downtrending following therapeutic paracentesis.   --hepatology following; appreciate recommendations.   --AFP wnl, CEA elevated in the setting of hepatic mass noted on ultrasound; however mass was not noted on recent CT imaging.

## 2019-05-20 NOTE — ASSESSMENT & PLAN NOTE
--unclear etiology; concern for possible intraabdominal source along with worsening, now completely occluded SMV  --leukocytosis resolved. Afebrile.   Lactate level remains elevated likely from ongoing SMV (now completely occluded) and PV thrombus and poor clearance due to cirrhosis.  --CXR notable for bilateral pleural effusions L > R, UA appearing noninfectious  --diagnostic para negative for SBP  --Blood culture NGTD  --continue vancomycin and zosyn; consider de-escalating on 5/21 if remains off vasopressors.  --wean off norepinephrine and vasopressin  --continue midodrine  --fluconazole for oral thrush

## 2019-05-20 NOTE — ASSESSMENT & PLAN NOTE
MELD-Na score: 25 at 5/20/2019  3:05 AM  MELD score: 23 at 5/20/2019  3:05 AM  Calculated from:  Serum Creatinine: 1.5 mg/dL at 5/20/2019  3:05 AM  Serum Sodium: 133 mmol/L at 5/20/2019  3:05 AM  Total Bilirubin: 3.1 mg/dL at 5/20/2019  3:05 AM  INR(ratio): 2.2 at 5/20/2019  3:05 AM  Age: 73 years   --HCV cirrhosis s/p treatment with negative viral load in 2016.   -- h/o small non-bleeding esophageal varices  --ammonia WNL, continue lactulose; increase dose given last bowel movement 4 days ago.   --holding home furosemide & aldactone in setting of sepsis & shock  --paracentesis labs negative for SBP on 5/18.   --see ischemic hepatitis.

## 2019-05-20 NOTE — PLAN OF CARE
No acute events throughout day. See vital signs and assessments in flowsheets. See below for updates on today's progress.     Pulmonary: RA, lung sounds clear.    Cardiovascular: Sinus jasmin - NSR. HR 50s-70s    Neurological: AAOx4, afebrile. Generalized weakness in upper and lower extremities    Gastrointestinal: 7+ BM during shift. Team aware. Pt on lactulose.     Genitourinary: Voids spontaneously. 1 unmeasured urine output    Endocrine: Q6 hours. Coverage given per order.    Skin/Bath: skin intact      Infusions: vaso gtts.    Patient progressing towards goals as tolerated, plan of care communicated and reviewed with Fox Lyons Sr. and family. All concerns addressed. Will continue to monitor.

## 2019-05-21 PROBLEM — Z71.89 GOALS OF CARE, COUNSELING/DISCUSSION: Status: ACTIVE | Noted: 2019-05-21

## 2019-05-21 PROBLEM — K72.10 END-STAGE LIVER DISEASE: Status: ACTIVE | Noted: 2019-05-21

## 2019-05-21 PROBLEM — Z51.5 PALLIATIVE CARE ENCOUNTER: Status: ACTIVE | Noted: 2019-05-21

## 2019-05-21 LAB
ALBUMIN SERPL BCP-MCNC: 2.1 G/DL (ref 3.5–5.2)
ALP SERPL-CCNC: 279 U/L (ref 55–135)
ALT SERPL W/O P-5'-P-CCNC: 382 U/L (ref 10–44)
ANION GAP SERPL CALC-SCNC: 10 MMOL/L (ref 8–16)
AST SERPL-CCNC: 327 U/L (ref 10–40)
BACTERIA BLD CULT: NORMAL
BACTERIA BLD CULT: NORMAL
BASOPHILS # BLD AUTO: 0.01 K/UL (ref 0–0.2)
BASOPHILS NFR BLD: 0.1 % (ref 0–1.9)
BILIRUB SERPL-MCNC: 2.9 MG/DL (ref 0.1–1)
BUN SERPL-MCNC: 48 MG/DL (ref 8–23)
CALCIUM SERPL-MCNC: 8.9 MG/DL (ref 8.7–10.5)
CHLORIDE SERPL-SCNC: 104 MMOL/L (ref 95–110)
CO2 SERPL-SCNC: 20 MMOL/L (ref 23–29)
CREAT SERPL-MCNC: 1.5 MG/DL (ref 0.5–1.4)
DIFFERENTIAL METHOD: ABNORMAL
EOSINOPHIL # BLD AUTO: 0 K/UL (ref 0–0.5)
EOSINOPHIL NFR BLD: 0 % (ref 0–8)
ERYTHROCYTE [DISTWIDTH] IN BLOOD BY AUTOMATED COUNT: 20.1 % (ref 11.5–14.5)
EST. GFR  (AFRICAN AMERICAN): 52.6 ML/MIN/1.73 M^2
EST. GFR  (NON AFRICAN AMERICAN): 45.5 ML/MIN/1.73 M^2
GLUCOSE SERPL-MCNC: 142 MG/DL (ref 70–110)
HCT VFR BLD AUTO: 26.3 % (ref 40–54)
HGB BLD-MCNC: 8.3 G/DL (ref 14–18)
IMM GRANULOCYTES # BLD AUTO: 0.03 K/UL (ref 0–0.04)
IMM GRANULOCYTES NFR BLD AUTO: 0.4 % (ref 0–0.5)
INR PPP: 1.8 (ref 0.8–1.2)
LYMPHOCYTES # BLD AUTO: 0.3 K/UL (ref 1–4.8)
LYMPHOCYTES NFR BLD: 4.3 % (ref 18–48)
MAGNESIUM SERPL-MCNC: 2 MG/DL (ref 1.6–2.6)
MCH RBC QN AUTO: 27.1 PG (ref 27–31)
MCHC RBC AUTO-ENTMCNC: 31.6 G/DL (ref 32–36)
MCV RBC AUTO: 86 FL (ref 82–98)
MONOCYTES # BLD AUTO: 0.4 K/UL (ref 0.3–1)
MONOCYTES NFR BLD: 6.3 % (ref 4–15)
NEUTROPHILS # BLD AUTO: 6.2 K/UL (ref 1.8–7.7)
NEUTROPHILS NFR BLD: 88.9 % (ref 38–73)
NRBC BLD-RTO: 0 /100 WBC
PHOSPHATE SERPL-MCNC: 3.8 MG/DL (ref 2.7–4.5)
PLATELET # BLD AUTO: 46 K/UL (ref 150–350)
PMV BLD AUTO: 10.7 FL (ref 9.2–12.9)
POCT GLUCOSE: 155 MG/DL (ref 70–110)
POCT GLUCOSE: 165 MG/DL (ref 70–110)
POCT GLUCOSE: 323 MG/DL (ref 70–110)
POTASSIUM SERPL-SCNC: 3.4 MMOL/L (ref 3.5–5.1)
PROT SERPL-MCNC: 5.5 G/DL (ref 6–8.4)
PROTHROMBIN TIME: 17.6 SEC (ref 9–12.5)
RBC # BLD AUTO: 3.06 M/UL (ref 4.6–6.2)
SODIUM SERPL-SCNC: 134 MMOL/L (ref 136–145)
VANCOMYCIN TROUGH SERPL-MCNC: 14.5 UG/ML (ref 10–22)
WBC # BLD AUTO: 7.01 K/UL (ref 3.9–12.7)

## 2019-05-21 PROCEDURE — 25000003 PHARM REV CODE 250: Mod: HCNC | Performed by: INTERNAL MEDICINE

## 2019-05-21 PROCEDURE — 25000003 PHARM REV CODE 250: Mod: HCNC | Performed by: STUDENT IN AN ORGANIZED HEALTH CARE EDUCATION/TRAINING PROGRAM

## 2019-05-21 PROCEDURE — 25000003 PHARM REV CODE 250: Mod: HCNC | Performed by: NURSE PRACTITIONER

## 2019-05-21 PROCEDURE — 63600175 PHARM REV CODE 636 W HCPCS: Mod: HCNC | Performed by: STUDENT IN AN ORGANIZED HEALTH CARE EDUCATION/TRAINING PROGRAM

## 2019-05-21 PROCEDURE — 97116 GAIT TRAINING THERAPY: CPT | Mod: HCNC

## 2019-05-21 PROCEDURE — 99233 SBSQ HOSP IP/OBS HIGH 50: CPT | Mod: HCNC,,, | Performed by: NURSE PRACTITIONER

## 2019-05-21 PROCEDURE — 25000242 PHARM REV CODE 250 ALT 637 W/ HCPCS: Mod: HCNC | Performed by: NURSE PRACTITIONER

## 2019-05-21 PROCEDURE — 80053 COMPREHEN METABOLIC PANEL: CPT | Mod: HCNC

## 2019-05-21 PROCEDURE — 97161 PT EVAL LOW COMPLEX 20 MIN: CPT | Mod: HCNC

## 2019-05-21 PROCEDURE — 80202 ASSAY OF VANCOMYCIN: CPT | Mod: HCNC

## 2019-05-21 PROCEDURE — 85025 COMPLETE CBC W/AUTO DIFF WBC: CPT | Mod: HCNC

## 2019-05-21 PROCEDURE — 99223 1ST HOSP IP/OBS HIGH 75: CPT | Mod: HCNC,,, | Performed by: CLINICAL NURSE SPECIALIST

## 2019-05-21 PROCEDURE — 11000001 HC ACUTE MED/SURG PRIVATE ROOM: Mod: HCNC

## 2019-05-21 PROCEDURE — 63600175 PHARM REV CODE 636 W HCPCS: Mod: HCNC | Performed by: NURSE PRACTITIONER

## 2019-05-21 PROCEDURE — 99233 PR SUBSEQUENT HOSPITAL CARE,LEVL III: ICD-10-PCS | Mod: HCNC,,, | Performed by: NURSE PRACTITIONER

## 2019-05-21 PROCEDURE — 83735 ASSAY OF MAGNESIUM: CPT | Mod: HCNC

## 2019-05-21 PROCEDURE — 94761 N-INVAS EAR/PLS OXIMETRY MLT: CPT | Mod: HCNC

## 2019-05-21 PROCEDURE — 84100 ASSAY OF PHOSPHORUS: CPT | Mod: HCNC

## 2019-05-21 PROCEDURE — 85610 PROTHROMBIN TIME: CPT | Mod: HCNC

## 2019-05-21 PROCEDURE — 97165 OT EVAL LOW COMPLEX 30 MIN: CPT | Mod: HCNC

## 2019-05-21 PROCEDURE — 99223 PR INITIAL HOSPITAL CARE,LEVL III: ICD-10-PCS | Mod: HCNC,,, | Performed by: CLINICAL NURSE SPECIALIST

## 2019-05-21 PROCEDURE — 63600175 PHARM REV CODE 636 W HCPCS: Mod: HCNC | Performed by: INTERNAL MEDICINE

## 2019-05-21 RX ORDER — POTASSIUM CHLORIDE 750 MG/1
30 CAPSULE, EXTENDED RELEASE ORAL ONCE
Status: COMPLETED | OUTPATIENT
Start: 2019-05-21 | End: 2019-05-21

## 2019-05-21 RX ORDER — MIDODRINE HYDROCHLORIDE 5 MG/1
10 TABLET ORAL 3 TIMES DAILY
Status: DISCONTINUED | OUTPATIENT
Start: 2019-05-21 | End: 2019-05-24 | Stop reason: HOSPADM

## 2019-05-21 RX ADMIN — PIPERACILLIN AND TAZOBACTAM 4.5 G: 4; .5 INJECTION, POWDER, LYOPHILIZED, FOR SOLUTION INTRAVENOUS; PARENTERAL at 06:05

## 2019-05-21 RX ADMIN — FAMOTIDINE 20 MG: 20 TABLET, FILM COATED ORAL at 08:05

## 2019-05-21 RX ADMIN — FLUTICASONE FUROATE AND VILANTEROL TRIFENATATE 1 PUFF: 100; 25 POWDER RESPIRATORY (INHALATION) at 08:05

## 2019-05-21 RX ADMIN — FLUDROCORTISONE ACETATE 100 MCG: 0.1 TABLET ORAL at 08:05

## 2019-05-21 RX ADMIN — MIDODRINE HYDROCHLORIDE 5 MG: 5 TABLET ORAL at 08:05

## 2019-05-21 RX ADMIN — LACTULOSE 20 G: 20 SOLUTION ORAL at 08:05

## 2019-05-21 RX ADMIN — MIDODRINE HYDROCHLORIDE 10 MG: 5 TABLET ORAL at 09:05

## 2019-05-21 RX ADMIN — VANCOMYCIN HYDROCHLORIDE 1000 MG: 1 INJECTION, POWDER, LYOPHILIZED, FOR SOLUTION INTRAVENOUS at 08:05

## 2019-05-21 RX ADMIN — TIOTROPIUM BROMIDE 18 MCG: 18 CAPSULE ORAL; RESPIRATORY (INHALATION) at 08:05

## 2019-05-21 RX ADMIN — HYDROCORTISONE SODIUM SUCCINATE 50 MG: 100 INJECTION, POWDER, FOR SOLUTION INTRAMUSCULAR; INTRAVENOUS at 04:05

## 2019-05-21 RX ADMIN — HYDROCORTISONE SODIUM SUCCINATE 100 MG: 100 INJECTION, POWDER, FOR SOLUTION INTRAMUSCULAR; INTRAVENOUS at 05:05

## 2019-05-21 RX ADMIN — MIDODRINE HYDROCHLORIDE 10 MG: 5 TABLET ORAL at 04:05

## 2019-05-21 RX ADMIN — FLUCONAZOLE 200 MG: 100 TABLET ORAL at 08:05

## 2019-05-21 RX ADMIN — POTASSIUM CHLORIDE 30 MEQ: 750 CAPSULE, EXTENDED RELEASE ORAL at 06:05

## 2019-05-21 RX ADMIN — INSULIN ASPART 4 UNITS: 100 INJECTION, SOLUTION INTRAVENOUS; SUBCUTANEOUS at 05:05

## 2019-05-21 RX ADMIN — HYDROCORTISONE SODIUM SUCCINATE 50 MG: 100 INJECTION, POWDER, FOR SOLUTION INTRAMUSCULAR; INTRAVENOUS at 09:05

## 2019-05-21 NOTE — PROGRESS NOTES
Pharmacokinetic Assessment Follow Up: IV Vancomycin    Therapy with vancomycin IV complete and consult discontinued by provider.  Pharmacy will sign off, please re-consult as needed.    Evelyn Ga, PharmD  [phone]

## 2019-05-21 NOTE — ASSESSMENT & PLAN NOTE
--holding home ARB, lasix & aldactone in setting of recent shock  --restart when clinically appropriate

## 2019-05-21 NOTE — CONSULTS
[facility]  Palliative Medicine  Consult Note    Patient Name: Fox Lyons Sr.  MRN: [id number]  Admission Date: 5/16/2019  Hospital Length of Stay: 5 days  Code Status: DNR   Attending Provider: Joan Carmen DO  Consulting Provider: VICKI Rodriguez, CNS  Primary Care Physician: Clover White MD  Principal Problem:Septic shock due to undetermined organism    Patient information was obtained from patient, spouse/SO, relative(s), past medical records and CCS team.      Inpatient consult to Palliative Care  Consult performed by: VICKI Lopez, CNS  Consult ordered by: Josy Russell NP        Assessment/Plan:     Palliative care encounter  Impression:  73 yr old gentlemen with decline from ESLD with resolving sepsis with undetermined organism, ERNESTINA on CKD 3, metabolic acidosis - improving,coagulopathy, ischemic hepatitis with superior mesenteric vein thrombosis,with expanding thrombus, pancreatitis, and thrombocytopenia.   Per CCS team Code status changed to DNR per conversation with wife and patient.  Wife understands that Mr. Lyons has ESLD and is not a transplant candidate. Wife also in agreement with home hospice.       Recommendations:  1) Home hospice care.  Patient' s wife will need additional support, Pt's son aware and will assist with plan for this.    2) Per medical record paracentesis yielded  6.4L.  Will observe for reocccurance of ascites, would consider pleurex catheter if necessary    Family conference conducted by this APRN with the pt's wife and son to discuss pt's clinical status, prognosis, long term expected outcomes and goals of care. Pt participated but slept intermittently during discussion.  Pt's family verbalize good understanding and insight pertaining to the pt's clinical status. They have agreed to DNR status.   Hospice care discussed and pt's family asked numerous questions.  Patient's family have agreed to home hospice care.  Pt's son  is concerned about his mother needing additional  Assistance.  They will meet later today to discuss.            Thank you for your consult. I will follow-up with patient. Please contact us if you have any additional questions.    Subjective:     HPI:   73 y.o. 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    Pt was admitted to [facility] evening of 5/16 for pancreatitis and sepsis of unclear etiology. Shortly after admission patient developed hypotension requiring initiation of vasopressors. RUQ performed and gallbladder was unable to be visualized.Right hepatic mass 1.4 x 2 x 1.3 cm also noted that was not seen on CT abdomen/pelvis w/o contrast from 5/17.  DiagnosticTherapeutic paracentesis on 5/18 with 6.4 L removed, negative for SBP. Pt now continues with resolving sepsis with undetermined organism, ERNESTINA on CKD 3, metabolic acidosis - improving,coagulopathy,  ischemic hepatitis with superior mesenteric vein thrombosis,with expanding thrombus, pancreatitis, and thrombocytopenia.  Patient now off vasopressors.Hepatology following for cirrhosis, not a current transplant candidate.    Per CCS team Code status changed to DNR per conversation with wife and patient.  Wife understands that Mr. Lyons has ESLD and is not a transplant candidate. Wife also in agreement with home hospice.              Hospital Course:  No notes on file      Past Medical History:   Diagnosis Date    Anemia     Anticoagulant long-term use     Ascites 12/10/2018    Biliary colic     Cataract     Chronic hepatitis C     Cirrhosis     COPD (chronic obstructive pulmonary disease)     Coronary artery disease     Dyspnea     Epistaxis      Esophageal varices without bleeding     Gallstones     GERD (gastroesophageal reflux disease)     HCC (hepatocellular carcinoma)     Hepatitis B antibody positive     Hyperglycemia     Hyperlipidemia     Hypertension     Hypopituitarism     Mobitz type 2 second degree atrioventricular block     Nuclear sclerosis, left     Pacemaker     Pituitary tumor     Portal hypertension     Pulmonary emphysema     PVT (paroxysmal ventricular tachycardia)     SA node dysfunction     Secondary adrenal insufficiency     Secondary male hypogonadism     SSS (sick sinus syndrome)     Superior mesenteric vein thrombosis     Vitreous hemorrhage, both eyes        Past Surgical History:   Procedure Laterality Date    CARDIAC PACEMAKER PLACEMENT      st rochelle    CATARACT EXTRACTION  9/24/13    RT    COLONOSCOPY Left 1/4/2019    Performed by [facility]manuel Hannah [facility] at [facility] ENDO (4TH FLR)    CORONARY ANGIOPLASTY WITH STENT PLACEMENT      2 stents    EGD (ESOPHAGOGASTRODUODENOSCOPY) Left 2/19/2019    [facility]    EGD (ESOPHAGOGASTRODUODENOSCOPY) Left 1/4/2019    Performed by [facility]manuel [facility] at [facility] ENDO (4TH FLR)    ESOPHAGOGASTRODUODENOSCOPY (EGD) Left 5/31/2018    Performed by [facility] at [facility] ENDO (4TH FLR)    ESOPHAGOGASTRODUODENOSCOPY (EGD) N/A 11/30/2017    Performed by Tr[facility] at [facility] ENDO (4TH FLR)    ESOPHAGOGASTRODUODENOSCOPY (EGD) Left 10/12/2017    Performed by Jody Humphries MD at [facility] ENDO (4TH FLR)    ESOPHAGOGASTRODUODENOSCOPY (EGD) N/A 8/17/2017    Performed by Tr[facility] F[facility] at [facility] ENDO (4TH FLR)    ESOPHAGOGASTRODUODENOSCOPY (EGD) N/A 7/14/2016    Performed by Tr[facility] F[facility] at [facility] ENDO (4TH FLR)    EYE SURGERY      INSERTION, IOL PROSTHESIS Right 9/24/2013    Performed by Ben Sparks MD at [facility] OR 1ST FLR    PARACENTESIS, ABDOMINAL N/A 4/16/2014    Performed by [facility] Diagnostic Provider at  [facility] CATH LAB    PHACOEMULSIFICATION, CATARACT Right 2013    [facility]    s/p PPV/AFX/EL (od)  2012       Review of patient's allergies indicates:   Allergen Reactions    Codeine Itching, Rash, Edema and Other (See Comments)     Other reaction(s): Itching       Medications:  Continuous Infusions:  Scheduled Meds:   famotidine  20 mg Oral Daily    fluconazole  200 mg Oral Daily    fluticasone furoate-vilanterol  1 puff Inhalation Daily    hydrocortisone sodium succinate  50 mg Intravenous Q8H    lactulose  20 g Oral Daily    midodrine  10 mg Oral TID    tiotropium  1 capsule Inhalation Daily     PRN Meds:albuterol-ipratropium, dextrose 50%, glucagon (human recombinant), insulin aspart U-100, ondansetron, sodium chloride 0.9%    Family History     Problem Relation (Age of Onset)    Cancer Father, Sister, Sister, Brother    Diabetes Brother, Sister    Heart disease Mother, Sister    Lung cancer Brother    No Known Problems Sister, Brother        Tobacco Use    Smoking status: Former Smoker     Packs/day: 1.00     Years: 35.00     Pack years: 35.00     Types: Cigarettes     Last attempt to quit: 1995     Years since quittin.9    Smokeless tobacco: Never Used   Substance and Sexual Activity    Alcohol use: No     Alcohol/week: 0.0 oz    Drug use: No    Sexual activity: Not Currently       Review of Systems   Unable to perform ROS: Other   Endocrine: Negative for polydipsia.     Objective:     Vital Signs (Most Recent):  Temp: 97.5 °F (36.4 °C) (19 0701)  Pulse: 60 (19 1100)  Resp: (!) 27 (19 1100)  BP: (!) 90/54 (19 1100)  SpO2: 99 % (19 1100) Vital Signs (24h Range):  Temp:  [97.5 °F (36.4 °C)-98.1 °F (36.7 °C)] 97.5 °F (36.4 °C)  Pulse:  [60-75] 60  Resp:  [10-27] 27  SpO2:  [93 %-100 %] 99 %  BP: ()/(50-64) 90/54  Arterial Line BP: ()/(42-57) 98/43     Weight: 73.2 kg (161 lb 6 oz)  Body mass  index is 23.16 kg/m².    Review of Symptoms  Symptom Assessment (ESAS 0-10 scale)   ESAS 0 1 2 3 4 5 6 7 8 9 10   Pain x             Dyspnea x             Anxiety x             Nausea x             Depression               Anorexia              Fatigue              Insomnia              Restlessness  x             Agitation x             CAM / Delirium _x_ --  ___+   Constipation     _x_ --  ___+   Diarrhea           _x_ --  ___+  Bowel Management Plan (BMP): Yes    OME in 24 hours: 0    Performance Status: 30-40    Physical Exam   Constitutional: He is oriented to person, place, and time. He appears well-developed and well-nourished.   HENT:   Head: Normocephalic and atraumatic.   Cardiovascular: Normal rate and regular rhythm.   Pulmonary/Chest: Effort normal.   Abdominal: Soft. He exhibits no distension.   Neurological: He is alert and oriented to person, place, and time.   Skin: Skin is warm and dry.       Significant Labs: All pertinent labs within the past 24 hours have been reviewed.  CBC:   Recent Labs   Lab 05/21/19  0253   WBC 7.01   HGB 8.3*   HCT 26.3*   MCV 86   PLT 46*     BMP:  Recent Labs   Lab 05/21/19 0253   *   *   K 3.4*      CO2 20*   BUN 48*   CREATININE 1.5*   CALCIUM 8.9   MG 2.0     LFT:  Lab Results   Component Value Date     (H) 05/21/2019    ALKPHOS 279 (H) 05/21/2019    BILITOT 2.9 (H) 05/21/2019     Albumin:   Albumin   Date Value Ref Range Status   05/21/2019 2.1 (L) 3.5 - 5.2 g/dL Final   10/06/2014 3.9 3.6 - 5.1 g/dL Final     Comment:     @ Test Performed By:  [facility]  Gagan Heath M.D., FCAP.,   [address] [place] [address]  [facility] #[id number]       Protein:   Total Protein   Date Value Ref Range Status   05/21/2019 5.5 (L) 6.0 - 8.4 g/dL Final     Lactic acid:   Lab Results   Component Value Date    LACTATE 5.6 (HH) 05/20/2019    LACTATE 7.2 (HH) 05/19/2019       Advanced  Directives::  Living Will: No  LaPOST: No  Do Not Resuscitate Status: Yes  Medical Power of : No    Decision-Making Capacity: Patient answered questions, Family answered questions       Living Arrangements: Lives with spouse    Psychosocial/Cultural:  x 54 yrs to his wife with 1 adult son Fox who lives locally  Patient has 2 grandchildren.  Patient has large extended family including sisters,brothers, sister-in laws.  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      Spiritual:     F- Ning and Belief: Hinduism    I - Importance: yes   .  C - Community: local Hinduism [facility]    A - Address in Care:  visits.  Jainism family is supportive per his wife      > 50% of 70 min visit spent in chart review, face to face discussion of goals of care,  symptom assessment, coordination of care and emotional support.    VICKI Rodriguez, CNS, [facility]  Palliative Medicine  [facility]

## 2019-05-21 NOTE — NURSING TRANSFER
Nursing Transfer Note      5/21/2019     Transfer To: 1144    Transfer via bed    Transported by JEET Flores and Darius, PCT    Medicines sent: Spiriva and Breo inhalers. Insulin pen    Chart send with patient: Yes    Notified: family at bedside    Patient reassessed at: 5/21/19 @ 1633     Upon arrival to floor: cardiac monitor applied, patient oriented to room, call bell in reach and bed in lowest position

## 2019-05-21 NOTE — PLAN OF CARE
Problem: Physical Therapy Goal  Goal: Physical Therapy Goal  Goals to be met by: 2019     Patient will increase functional independence with mobility by performin. Supine to sit with Set-up [place]  2. Sit to stand transfer with Stand-by Assistance  3. Bed to chair transfer with Stand-by Assistance using Rolling Walker  4. Gait  x 50 feet with Stand-by Assistance using Rolling Walker.   5. Lower extremity exercise program x20 reps per handout, with assistance as needed    Outcome: Ongoing (interventions implemented as appropriate)  Pt evaluated and appropriate goals established.

## 2019-05-21 NOTE — PT/OT/SLP EVAL
Physical Therapy Evaluation    Patient Name:  Fox Lyons Sr.   MRN:  [id number]    Recommendations:     Discharge Recommendations:  nursing facility, skilled   Discharge Equipment Recommendations: walker, rolling   Barriers to discharge: Decreased caregiver support at current functional level     Assessment:     Fox Lyons Sr. is a 73 y.o. male admitted with a medical diagnosis of Septic shock due to undetermined organism.  He presents with the following impairments/functional limitations:  weakness, impaired functional mobilty, gait instability, impaired balance, impaired self care skills, impaired endurance, impaired coordination. Pt tolerated activity with increased assistance required for mobility 2/2 balance deficits. Pt with slow response time, required increased time to perform all mobility. Upon discharge, pt would benefit from continued skilled therapy intervention at [facility] nursing facility to progress toward more independent mobility, may progress during this admission. At this time, pt would continue to benefit from acute skilled therapy intervention to address deficits and progress toward prior level of function.       Rehab Prognosis: Good; patient would benefit from acute skilled PT services to address these deficits and reach maximum level of function.    Recent Surgery: * No surgery found *      Plan:     During this hospitalization, patient to be seen 4 x/week to address the identified rehab impairments via gait training, therapeutic activities, therapeutic exercises, neuromuscular re-education and progress toward the following goals:    · Plan of Care Expires:  06/21/19    Subjective     Chief Complaint: Pt with no complaints at this time   Patient/Family Comments/goals: to get better and return home   Pain/Comfort:  · Pain Rating 1: 0/10  · Pain Rating Post-Intervention 1: 0/10    Patients cultural, spiritual, Taoist conflicts given the current situation: no    Living Environment:  Pt  live with spouse in a H with threshold [place].   Prior to admission, patients level of function: Pt ambulated with use of RW, required assistance for all ADLs.  Equipment used at home: walker, rolling, shower chair.  DME owned (not currently used): none.  Upon discharge, patient will have assistance from spouse.    Objective:     Communicated with RN prior to session.  Patient found HOB elevated with arterial line, central line, blood pressure cuff, pulse ox (continuous), telemetry, Condom Catheter, bowel management system  upon PT entry to room.    General Precautions: Standard, fall   Orthopedic Precautions:N/A   Braces: N/A     Exams:  · Cognitive Exam:  Patient is AAOx4, followed all commands, communicates clearly and fluently  · Gross Motor Coordination:  WFL  · RLE ROM: WFL  · RLE Strength: WFL   · LLE ROM: WFL  · LLE Strength: WFL    Functional Mobility:  · Bed Mobility:     · Supine to Sit: moderate assistance  · Sit to Supine: minimum assistance  · Transfers:     · Sit to Stand:  minimum assistance with hand-held assist  · Gait: Pt took 4 lateral steps to the L along EOB with moderate assistance with HHA. Pt demo'd decreased step size, decreased foot clearance, excessive lateral sway requiring moderate assistance to recover to neutral position. VSS throughout, pt with no SOB, reported mild dizziness upon standing, resolved after returning to sit EOB.       Therapeutic Activities and Exercises:   Pt educated on role of PT/POC. Pt verbalized understanding.   Pt encouraged to only perform OOB mobility with assistance from nursing/therapy. Pt agreeable.   Pt sat EOB for ~5 mins with stand by assistance to complete evaluation.   Pt returned to bed 2/2 RN preparing to remove central line.     AM-PAC 6 CLICK MOBILITY  Total Score:12     Patient left HOB elevated with all lines intact, call button in reach and RN notified.    GOALS:   Multidisciplinary Problems     Physical Therapy Goals        Problem: Physical  Therapy Goal    Goal Priority Disciplines Outcome Goal Variances Interventions   Physical Therapy Goal     PT, PT/OT Ongoing (interventions implemented as appropriate)     Description:  Goals to be met by: 2019     Patient will increase functional independence with mobility by performin. Supine to sit with Set-up [place]  2. Sit to stand transfer with Stand-by Assistance  3. Bed to chair transfer with Stand-by Assistance using Rolling Walker  4. Gait  x 50 feet with Stand-by Assistance using Rolling Walker.   5. Lower extremity exercise program x20 reps per handout, with assistance as needed                      History:     Past Medical History:   Diagnosis Date    Anemia     Anticoagulant long-term use     Ascites 12/10/2018    Biliary colic     Cataract     Chronic hepatitis C     Cirrhosis     COPD (chronic obstructive pulmonary disease)     Coronary artery disease     Dyspnea     Epistaxis     Esophageal varices without bleeding     Gallstones     GERD (gastroesophageal reflux disease)     HCC (hepatocellular carcinoma)     Hepatitis B antibody positive     Hyperglycemia     Hyperlipidemia     Hypertension     Hypopituitarism     Mobitz type 2 second degree atrioventricular block     Nuclear sclerosis, left     Pacemaker     Pituitary tumor     Portal hypertension     Pulmonary emphysema     PVT (paroxysmal ventricular tachycardia)     SA node dysfunction     Secondary adrenal insufficiency     Secondary male hypogonadism     SSS (sick sinus syndrome)     Superior mesenteric vein thrombosis     Vitreous hemorrhage, both eyes        Past Surgical History:   Procedure Laterality Date    CARDIAC PACEMAKER PLACEMENT      st rochelle    CATARACT EXTRACTION  13    RT    COLONOSCOPY Left 2019    Performed by [facility] at [facility] ENDO (4TH FLR)    CORONARY ANGIOPLASTY WITH STENT PLACEMENT      2 stents    EGD (ESOPHAGOGASTRODUODENOSCOPY) Left 2019     Performed by Trice Funes [facility] at [facility] ENDO    EGD (ESOPHAGOGASTRODUODENOSCOPY) Left 1/4/2019    Performed by Emanuel [facility] MD at [facility] ENDO (4TH FLR)    ESOPHAGOGASTRODUODENOSCOPY (EGD) Left 5/31/2018    Performed by Trice Funes MD at [facility] ENDO (4TH FLR)    ESOPHAGOGASTRODUODENOSCOPY (EGD) N/A 11/30/2017    Performed by Trice Funes MD at [facility] ENDO (4TH FLR)    ESOPHAGOGASTRODUODENOSCOPY (EGD) Left 10/12/2017    Performed by Jody Humphries MD at [facility] ENDO (4TH FLR)    ESOPHAGOGASTRODUODENOSCOPY (EGD) N/A 8/17/2017    Performed by Trice Funes MD at [facility] ENDO (4TH FLR)    ESOPHAGOGASTRODUODENOSCOPY (EGD) N/A 7/14/2016    Performed by [facility] at [facility] (4TH FLR)    EYE SURGERY      INSERTION, IOL PROSTHESIS Right 9/24/2013    Performed by Ben Sparks MD at [facility] OR 1ST FLR    PARACENTESIS, ABDOMINAL N/A 4/16/2014    Performed by [facility] Diagnostic Provider at [facility] CATH LAB    PHACOEMULSIFICATION, CATARACT Right 9/24/2013    Performed by Ben Sparks MD at [facility] OR 1ST FLR    s/p PPV/AFX/EL (od)  05/09/2012       Time Tracking:     PT Received On: 05/21/19  PT Start Time: 0921     PT Stop Time: 0941  PT Total Time (min): 20 min     Billable Minutes: Evaluation 10 mins  and Gait Training 10 mins       Claire Vaughan, PT  05/21/2019

## 2019-05-21 NOTE — RESIDENT HANDOFF
ICU Transfer of Care Note  Critical Care Medicine    Admit Date: 5/16/2019  LOS: 5    CC: Septic shock due to undetermined organism    Code Status: DNR     HPI and Hospital Course:     HPI:  Mr. Fox Lyons is a 73 y.o. 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 Patient denies fever, headache, chest pain, SOB, urinary symptoms and blood in stool.     Work up in the ED revealed leukocytosis of 14, hgb 9.9 (at baseline), initial creatinine of 3.2 (up from baseline ~ 1.5), transaminitis, lipase elevated at 324 and lactic 6.7 (which improved to 3.9 following 1.5L fluid resuscitation). On presentation pt was hypotensive to 60s/30s which improved to 90s/50s following volume resuscitation. Critical Care Medicine has been consulted for sepsis.       Hospital/ICU Course:  Mr. Lyons was admitted to [facility] evening of 5/16 for pancreatitis and sepsis of unclear etiology. Shortly after admission patient developed hypotension requiring initiation of vasopressors. RUQ performed and gallbladder was unable to be visualized. Diagnostic paracentesis performed which was negative for SBP. HIDA scan showing patent portal flow. The am of 05/19, his lactate remains elevated and vasopressin added overnight. Shock liver worsening. Therapeutic paracentesis on 5/18 with 6.4 L removed, negative for SBP; cultures pending.  Blood cultures remain NGTD. MRCP considered but pacemaker present. Hepatology following for cirrhosis, not a current transplant candidate.  US liver with doppler revealed expansion with complete  occlusive thrombus of superior mesenteric vein with unchanged thrombus in the main portal vein and right anterior portal vein. Anticoagulation not initiated due to coagulopathy and thrombocytopenia. Right hepatic mass 1.4 x 2 x 1.3 cm also noted that was not seen on CT abdomen/pelvis w/o contrast from 5/17. AFP wnl. CEA elevated. Midodrine added on 5/19, weaned off vasopressors on 5/20.  Completed 5 days of vancomycin and pip/tazo.  No clear source of infection with cultures NGTD; however, suspicious for intra abdominal source. Continue fluconazole for 5 days for oral thrush. Increase midodrine and began weaning stress dose steroids to home regimen.  Code status changed to DNR per conversation with wife and patient.  Wife understands that Mr. Lyosn has ESLD and is not a transplant candidate. Wife also in agreement with home hospice.  Appreciate palliative care.       To Follow Up:     1. Palliative Care Recommendations  2. Wean stress dose steriods to home steriod regimen.         Discharge Plan:     Home with Hospice    Call with questions.     BELLA COHEN, ACNP-BC  Critical Care Medicine  [phone]

## 2019-05-21 NOTE — SUBJECTIVE & OBJECTIVE
Interval History/Significant Events: No acute events overnight. More alert today.     Review of Systems   Constitutional: Negative for chills, diaphoresis and fever.   HENT: Negative for trouble swallowing.         Denies oral discomfort   Respiratory: Negative for shortness of breath.    Cardiovascular: Negative for chest pain and leg swelling.   Gastrointestinal: Positive for nausea (intermittent). Negative for abdominal pain, blood in stool and vomiting.   Neurological: Negative for dizziness and headaches.     Objective:     Vital Signs (Most Recent):  Temp: 97.5 °F (36.4 °C) (05/21/19 0701)  Pulse: 60 (05/21/19 1100)  Resp: (!) 27 (05/21/19 1100)  BP: (!) 90/54 (05/21/19 1100)  SpO2: 99 % (05/21/19 1100) Vital Signs (24h Range):  Temp:  [97.5 °F (36.4 °C)-98.1 °F (36.7 °C)] 97.5 °F (36.4 °C)  Pulse:  [60-75] 60  Resp:  [10-27] 27  SpO2:  [93 %-100 %] 99 %  BP: ()/(50-64) 90/54  Arterial Line BP: ()/(42-57) 98/43   Weight: 73.2 kg (161 lb 6 oz)  Body mass index is 23.16 kg/m².      Intake/Output Summary (Last 24 hours) at 5/21/2019 1340  Last data filed at 5/21/2019 0400  Gross per 24 hour   Intake --   Output 840 ml   Net -840 ml       Physical Exam   Constitutional: He appears cachectic. He has a sickly appearance.   HENT:   Head: Normocephalic.   Mouth/Throat: He has dentures.   Improving thrush to tongue   Eyes: Pupils are equal, round, and reactive to light. EOM are normal.   Neck: No JVD present.   Cardiovascular:   Pulses:       Radial pulses are 2+ on the right side, and 2+ on the left side.        Dorsalis pedis pulses are 2+ on the right side, and 2+ on the left side.   Paced rhythm.  Warm extremities.  No peripheral edema.    Pulmonary/Chest: No accessory muscle usage. No tachypnea. No respiratory distress. He has no decreased breath sounds. He has no wheezes. He has no rhonchi. He has no rales.   Room air   Abdominal: Soft. Bowel sounds are normal. He exhibits distension. There is no  tenderness.   Multiple loose light brown bowel movements overnight.    Genitourinary:   Genitourinary Comments: voiding   Neurological: No cranial nerve deficit or sensory deficit. GCS eye subscore is 4. GCS verbal subscore is 5. GCS motor subscore is 6.   Alert, generalized, symmetrical  weakness in BLE.  States that he uses walker at home.    Skin: Skin is warm and dry. No ecchymosis and no rash noted.        Psychiatric: His speech is normal. His mood appears not anxious. His affect is not angry. He is withdrawn. He is not actively hallucinating.   Vitals reviewed.      Vents:     Lines/Drains/Airways     Central Venous Catheter Line                 Percutaneous Central Line Insertion/Assessment - triple lumen  05/17/19 1723 right internal jugular 3 days          Drain                 Rectal Tube 05/20/19 0500 fecal management system 1 day    Male External Urinary Catheter 05/19/19 1840 Medium 1 day          Arterial Line                 Arterial Line 05/18/19 0110 Right Radial 3 days          Peripheral Intravenous Line                 Peripheral IV - Single Lumen 05/17/19 1335 22 G Left Hand 4 days              Significant Labs:    CBC/Anemia Profile:  Recent Labs   Lab 05/19/19  1848 05/20/19  0305 05/21/19  0253   WBC 6.66 5.44 7.01   HGB 7.8* 7.7* 8.3*   HCT 23.9* 24.2* 26.3*   PLT 43* 40* 46*   MCV 84 86 86   RDW 19.8* 19.6* 20.1*        Chemistries:  Recent Labs   Lab 05/19/19  1848 05/20/19  0305 05/21/19  0253   * 133* 134*   K 3.7 3.7 3.4*    103 104   CO2 17* 18* 20*   BUN 36* 42* 48*   CREATININE 1.6* 1.5* 1.5*   CALCIUM 8.8 8.8 8.9   ALBUMIN 2.4* 2.3* 2.1*   PROT 5.6* 5.5* 5.5*   BILITOT 3.1* 3.1* 2.9*   ALKPHOS 317* 291* 279*   * 507* 382*   * 578* 327*   MG  --  1.7 2.0   PHOS  --  3.4 3.8       All pertinent labs within the past 24 hours have been reviewed.    Significant Imaging:  I have reviewed and interpreted all pertinent imaging results/findings within the past 24  hours.

## 2019-05-21 NOTE — ASSESSMENT & PLAN NOTE
--discussed overall prognosis with wife and patient at bedside. Patient did not participate much in conversation.  Wife expresses understanding of end stage liver disease and that Mr. Lyons is not a candidate for liver transplant.  Code status changed to DNR. She relays that Mr. Lyons was receiving home health prior to admission and was having difficulty with ADLs including needing a walker to ambulate. Wife in agreement with home hospice. Palliative care consulted for assistance.

## 2019-05-21 NOTE — HPI
73 y.o. 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    Pt was admitted to [facility] evening of 5/16 for pancreatitis and sepsis of unclear etiology. Shortly after admission patient developed hypotension requiring initiation of vasopressors. RUQ performed and gallbladder was unable to be visualized.Right hepatic mass 1.4 x 2 x 1.3 cm also noted that was not seen on CT abdomen/pelvis w/o contrast from 5/17.  DiagnosticTherapeutic paracentesis on 5/18 with 6.4 L removed, negative for SBP. Pt now continues with resolving sepsis with undetermined organism, ERNESTINA on CKD 3, metabolic acidosis - improving,coagulopathy,  ischemic hepatitis with superior mesenteric vein thrombosis,with expanding thrombus, pancreatitis, and thrombocytopenia.  Patient now off vasopressors.Hepatology following for cirrhosis, not a current transplant candidate.   Per CCS team Code status changed to DNR per conversation with wife and patient.  Wife understands that Mr. Lyons has ESLD and is not a transplant candidate. Wife also in agreement with home hospice.  Home with hospice today with pleurex catheter for ascites management

## 2019-05-21 NOTE — ASSESSMENT & PLAN NOTE
MELD-Na score: 23 at 5/21/2019  2:53 AM  MELD score: 21 at 5/21/2019  2:53 AM  Calculated from:  Serum Creatinine: 1.5 mg/dL at 5/21/2019  2:53 AM  Serum Sodium: 134 mmol/L at 5/21/2019  2:53 AM  Total Bilirubin: 2.9 mg/dL at 5/21/2019  2:53 AM  INR(ratio): 1.8 at 5/21/2019  2:53 AM  Age: 73 years   --HCV cirrhosis s/p treatment with negative viral load in 2016.   -- h/o small non-bleeding esophageal varices  --ammonia WNL, continue lactulose, titrate for 3 stools per day.  --holding home furosemide & aldactone in the setting of recent shock  --paracentesis labs negative for SBP on 5/18.   --see ischemic hepatitis.

## 2019-05-21 NOTE — PLAN OF CARE
No acute events throughout day. See vital signs and assessments in flowsheets. See below for updates on today's progress.     Pulmonary: RA, lung sounds clear.    Cardiovascular: NSR. HR 60s-70s    Neurological: AAOx4, afebrile. Generalized weakness in upper and lower extremities    Gastrointestinal: No BM during shift.    Genitourinary: Voids spontaneously via external urinal catheter. ~ 550 ccs UO     Endocrine: Q6 hours. No coverage given.    Skin/Bath: skin intact      Patient progressing towards goals as tolerated, plan of care communicated and reviewed with Fox Lyons Sr. and family. All concerns addressed. Will continue to monitor.

## 2019-05-21 NOTE — ASSESSMENT & PLAN NOTE
--s/p gamma knife radiosurgery for pituitary adenoma   --begin weaning stress dose (hydrocortisone).  D/c fludrocortisone

## 2019-05-21 NOTE — PLAN OF CARE
[facility] ICU Acceptance Note    Date of Admit: 5/16/2019  Date of Transfer / Stepdown: 5/21/2019  ICU team stepping patient down: MICU,   ICU team member giving verbal handoff: MICU - [id number]  Accepting  team: [facility] - A     Brief History of Present Illness:      Fox Lyons Sr. is a 74 y/o with Cirrhosis, Endstage liver disease, Portal vein thrombosis, SMV thrombosis, Pituitary tumor resection with iatrogenic Adrenal Insufficiency admitted to MICU for Abdominal Pain and Septic Shock .    Hospital/ICU Course:     Per ICU  Mr. Lyons was admitted to [facility] evening of 5/16 for pancreatitis and sepsis of unclear etiology. Shortly after admission patient developed hypotension requiring initiation of vasopressors. RUQ performed and gallbladder was unable to be visualized. Diagnostic paracentesis performed which was negative for SBP. HIDA scan showing patent portal flow. The am of 05/19, his lactate remains elevated and vasopressin added overnight. Shock liver worsening. Therapeutic paracentesis on 5/18 with 6.4 L removed, negative for SBP; cultures pending.  Blood cultures remain NGTD. MRCP considered but pacemaker present. Hepatology following for cirrhosis, not a current transplant candidate.  US liver with doppler revealed expansion with complete occlusive thrombus of superior mesenteric vein with unchanged thrombus in the main portal vein and right anterior portal vein. Anticoagulation not initiated due to coagulopathy and thrombocytopenia. Right hepatic mass 1.4 x 2 x 1.3 cm also noted that was not seen on CT abdomen/pelvis w/o contrast from 5/17. AFP wnl. CEA elevated. Midodrine added on 5/19, weaned off vasopressors on 5/20.  Completed 5 days of vancomycin and pip/tazo.  No clear source of infection with cultures NGTD; however, suspicious for intra abdominal source. Continue fluconazole for 5 days for oral thrush. Increase midodrine and began weaning stress dose steroids to home regimen.  Code status changed  to DNR per conversation with wife and patient.  Wife understands that Mr. Lyons has ESLD and is not a transplant candidate. Wife also in agreement with home hospice.  Appreciate palliative care.        Verbal handoff  Patient presented with abdominal pain, initial blood cultures negative, HIDA negative, paracentesis and ascitic fluid studies negative for SBP.  Patient found on imaging with a possible hepatic mass in the right hepatic lobe.   Patient is not a transplant candidate     Per ICU discussions with patient's spouse, plan will be to pursue home hospice care. Palliative Care team consulted and following.       Consultants and Procedures:     Consultants:  Hepatology  Palliative care    Procedures:    Paracentesis x2  Arterial Line  Central LIne.     Transfer Information:     Diet:  Dysphagia Mech Soft.         To Do / Pending Studies / Follow ups:  -f/u palliative consults  -f/u with CM/SW regarding referrals/placement for nursing home with hospice.   -continue current medications.     Patient has been accepted by Hospital Medicine Team *Juan Luis *, who will assume care of the patient upon arrival to the floor from the ICU. Please contact ICU team with any concerns prior to arrival. Please contact Hospital Medicine at [phone] or [phone] (please do NOT leave a voicemail) when patient arrives to the floor.        Yang Skinner M.D.  Attending Physician  Hospital Medicine Dept.  Pager: [phone]  Spectralink -[phone]

## 2019-05-21 NOTE — PT/OT/SLP EVAL
"Occupational Therapy   Evaluation    Name: Fox Lyons Sr.  MRN: [id number]  Admitting Diagnosis:  Septic shock due to undetermined organism    Pt with chronic HCV including cirrhosis due to IVDA. Pt admitted with low BP 57/38 found by HH aide.    Recommendations:     Discharge Recommendations: nursing facility, skilled  Discharge Equipment Recommendations:  walker, rolling      Assessment:     Fox Lyons Sr. is a 73 y.o. male with a medical diagnosis of Septic shock due to undetermined organism. Performance deficits affecting function: weakness, impaired functional mobilty, gait instability, impaired endurance, impaired balance, impaired self care skills.  Pt tolerated session fairly well. Pt with flat affect and limited interaction with therapy. Pt cooperative. Pt may benefit from continued inpatient therapy prior  to d/c home. Unclear how close pt is to baseline.     Rehab Prognosis: Good; patient would benefit from acute skilled OT services to address these deficits and reach maximum level of function.       Plan:     Patient to be seen 3 x/week to address the above listed problems via self-care/home management, therapeutic activities, therapeutic exercises  · Plan of Care Expires:    · Plan of Care Reviewed with: patient    Subjective     "I have to go to the bathroom" pt reports.  Pt agreeable to therapy.     Occupational Profile:  Living Environment: Pt lives with spouse in one story home with 1 KHAL[address]. Pt reports he uses a RW and required assist for mobility and ADL's. Pt is questionable historian and it is unclear how much assist he was receiving.     Equipment Used at Home:   RW    Pain/Comfort:  · Pain Rating 1: 0/10    Patients cultural, spiritual, [employer]ian conflicts given the current situation:    None stated   Objective:     Communicated with: nsg  prior to session.  Pt found supine in bed with condom cath and external bowel chapito't system.     General Precautions: Standard,   fall      Occupational " Performance:    Bed Mobility:    · Patient completed Supine to Sit with moderate assistance  · Patient completed Sit to Supine with minimum assistance    Functional Mobility/Transfers:  · Patient completed Sit <> Stand Transfer with minimum assistance  with  no assistive device   · Pt able to complete side steps to HOB with MOD A     Activities of Daily Living:  · G/H: seated with set-up  · UE/LE dressing TOTAL A   · Toileting: TOTAL A     Cognitive/Visual Perceptual  Pt is awake, alert and following commands; however, pt with flat affect and poor interaction with OT.    Physical Exam:  Pt is right hand dominant and demo grossly 3+/5 UE strength  AMPAC 6 Click ADL:  AMPA Total Score: 10    Treatment & Education:  Pt demo Fair to Fair+ sitting balance and able to stand and take few side steps. Education provided re: safety with functional mobility/ADL skills and OT PO, but pt with limited understanding. Further education to be provided.   Education:    Patient left left sidelying with all lines intact, call button in reach and nsg notified    GOALS:   Multidisciplinary Problems     Occupational Therapy Goals        Problem: Occupational Therapy Goal    Goal Priority Disciplines Outcome Interventions   Occupational Therapy Goal     OT, PT/OT     Description:  Goals to be met by: 7 days 5/28/19     Patient will increase functional independence with ADLs by performing:    Pt complete t/f to commode with SBA  Pt to complete UE dressing with MIN A  Pt to complete LE dressing with MIN A  Pt to complete toileting with MIN  A                      History:     Past Medical History:   Diagnosis Date    Anemia     Anticoagulant long-term use     Ascites 12/10/2018    Biliary colic     Cataract     Chronic hepatitis C     Cirrhosis     COPD (chronic obstructive pulmonary disease)     Coronary artery disease     Dyspnea     Epistaxis     Esophageal varices without bleeding     Gallstones     GERD (gastroesophageal  reflux disease)     HCC (hepatocellular carcinoma)     Hepatitis B antibody positive     Hyperglycemia     Hyperlipidemia     Hypertension     Hypopituitarism     Mobitz type 2 second degree atrioventricular block     Nuclear sclerosis, left     Pacemaker     Pituitary tumor     Portal hypertension     Pulmonary emphysema     PVT (paroxysmal ventricular tachycardia)     SA node dysfunction     Secondary adrenal insufficiency     Secondary male hypogonadism     SSS (sick sinus syndrome)     Superior mesenteric vein thrombosis     Vitreous hemorrhage, both eyes        Past Surgical History:   Procedure Laterality Date    CARDIAC PACEMAKER PLACEMENT      [facility]    CATARACT EXTRACTION  9/24/13    RT    COLONOSCOPY Left 1/4/2019    Performed by Emanuel Hannah [facility] at [facility] ENDO (4TH FLR)    CORONARY ANGIOPLASTY WITH STENT PLACEMENT      2 stents    EGD (ESOPHAGOGASTRODUODENOSCOPY) Left 2/19/2019    Performed by [facility] ENDO    EGD (ESOPHAGOGASTRODUODENOSCOPY) Left 1/4/2019    Performed by Emanuel Hannah MD at [facility] ENDO (4TH FLR)    ESOPHAGOGASTRODUODENOSCOPY (EGD) Left 5/31/2018    Performed by Trice Funes MD at [facility] ENDO (4TH FLR)    ESOPHAGOGASTRODUODENOSCOPY (EGD) N/A 11/30/2017    Performed by Trice Funes MD at [facility] ENDO (4TH FLR)    ESOPHAGOGASTRODUODENOSCOPY (EGD) Left 10/12/2017    Performed by Jody Humphries MD at [facility] ENDO (4TH FLR)    ESOPHAGOGASTRODUODENOSCOPY (EGD) N/A 8/17/2017    Performed by Trice Funes MD at [facility] ENDO (4TH FLR)    ESOPHAGOGASTRODUODENOSCOPY (EGD) N/A 7/14/2016    Performed by Trice Funes MD at [facility] ENDO (4TH FLR)    EYE SURGERY      INSERTION, IOL PROSTHESIS Right 9/24/2013    Performed by Ben Sparks MD at [facility] OR 1ST FLR    PARACENTESIS, ABDOMINAL N/A 4/16/2014    Performed by [facility] Diagnostic Provider at [facility] CATH LAB    PHACOEMULSIFICATION, CATARACT Right 9/24/2013    Performed by Ben  EL Sparks MD at [facility] OR 1ST FLR    s/p PPV/AFX/EL (od)  05/09/2012       Time Tracking:     OT Date of Treatment: 05/21/19  OT Start Time: 0930  OT Stop Time: 0950  OT Total Time (min): 20 min    Billable Minutes:Evaluation 20    MARY Campos  5/21/2019

## 2019-05-21 NOTE — ASSESSMENT & PLAN NOTE
--unclear etiology; concern for possible intraabdominal source along with worsening, now completely occluded SMV  --leukocytosis resolved. Afebrile.   Lactate level remains elevated likely from ongoing SMV (now completely occluded) and PV thrombus and poor clearance due to cirrhosis.  --CXR notable for bilateral pleural effusions L > R, UA appearing noninfectious  --diagnostic para negative for SBP  --Blood culture NGTD  --completed 5 days of vancomycin and zosyn  --weaned off norepinephrine and vasopressin on 5/20.   --continue midodrine

## 2019-05-21 NOTE — PLAN OF CARE
Problem: Occupational Therapy Goal  Goal: Occupational Therapy Goal  Goals to be met by: 7 days 5/28/19     Patient will increase functional independence with ADLs by performing:    Pt complete t/f to commode with SBA  Pt to complete UE dressing with MIN A  Pt to complete LE dressing with MIN A  Pt to complete toileting with MIN  A    Goals and POC updated this date.

## 2019-05-21 NOTE — SUBJECTIVE & OBJECTIVE
Past Medical History:   Diagnosis Date    Anemia     Anticoagulant long-term use     Ascites 12/10/2018    Biliary colic     Cataract     Chronic hepatitis C     Cirrhosis     COPD (chronic obstructive pulmonary disease)     Coronary artery disease     Dyspnea     Epistaxis     Esophageal varices without bleeding     Gallstones     GERD (gastroesophageal reflux disease)     HCC (hepatocellular carcinoma)     Hepatitis B antibody positive     Hyperglycemia     Hyperlipidemia     Hypertension     Hypopituitarism     Mobitz type 2 second degree atrioventricular block     Nuclear sclerosis, left     Pacemaker     Pituitary tumor     Portal hypertension     Pulmonary emphysema     PVT (paroxysmal ventricular tachycardia)     SA node dysfunction     Secondary adrenal insufficiency     Secondary male hypogonadism     SSS (sick sinus syndrome)     Superior mesenteric vein thrombosis     Vitreous hemorrhage, both eyes        Past Surgical History:   Procedure Laterality Date    CARDIAC PACEMAKER PLACEMENT      st rochelle    CATARACT EXTRACTION  9/24/13    RT    COLONOSCOPY Left 1/4/2019    Performed by Emanuel Hannah [facility] at [facility] ENDO (4TH FLR)    CORONARY ANGIOPLASTY WITH STENT PLACEMENT      2 stents    EGD (ESOPHAGOGASTRODUODENOSCOPY) Left 2/19/2019    Performed by Trice Funes [facility] at [facility] ENDO    EGD (ESOPHAGOGASTRODUODENOSCOPY) Left 1/4/2019    Performed by Emanuel Hannah MD at [facility] ENDO (4TH FLR)    ESOPHAGOGASTRODUODENOSCOPY (EGD) Left 5/31/2018    Performed by Trice Funes MD at [facility] ENDO (4TH FLR)    ESOPHAGOGASTRODUODENOSCOPY (EGD) N/A 11/30/2017    Performed by Trice Funes MD at [facility] ENDO (4TH FLR)    ESOPHAGOGASTRODUODENOSCOPY (EGD) Left 10/12/2017    Performed by Jody Humphries MD at [facility] ENDO (4TH FLR)    ESOPHAGOGASTRODUODENOSCOPY (EGD) N/A 8/17/2017    Performed by Trice Funes MD at [facility] ENDO (4TH FLR)    ESOPHAGOGASTRODUODENOSCOPY (EGD)  N/A 2016    Performed by [facility] at [facility] ENDO (4TH FLR)    EYE SURGERY      INSERTION, IOL PROSTHESIS Right 2013    Performed by Ben Sparks MD at [facility] OR 1ST FLR    PARACENTESIS, ABDOMINAL N/A 2014    Performed by [facility] at [facility] CATH LAB    PHACOEMULSIFICATION, CATARACT Right 2013    Performed by Ben Sparks MD at [facility] OR 1ST FLR    s/p PPV/AFX/EL (od)  2012       Review of patient's allergies indicates:   Allergen Reactions    Codeine Itching, Rash, Edema and Other (See Comments)     Other reaction(s): Itching       Medications:  Continuous Infusions:  Scheduled Meds:   famotidine  20 mg Oral Daily    fluconazole  200 mg Oral Daily    fluticasone furoate-vilanterol  1 puff Inhalation Daily    hydrocortisone sodium succinate  50 mg Intravenous Q8H    lactulose  20 g Oral Daily    midodrine  10 mg Oral TID    tiotropium  1 capsule Inhalation Daily     PRN Meds:albuterol-ipratropium, dextrose 50%, glucagon (human recombinant), insulin aspart U-100, ondansetron, sodium chloride 0.9%    Family History     Problem Relation (Age of Onset)    Cancer Father, Sister, Sister, Brother    Diabetes Brother, Sister    Heart disease Mother, Sister    Lung cancer Brother    No Known Problems Sister, Brother        Tobacco Use    Smoking status: Former Smoker     Packs/day: 1.00     Years: 35.00     Pack years: 35.00     Types: Cigarettes     Last attempt to quit: 1995     Years since quittin.9    Smokeless tobacco: Never Used   Substance and Sexual Activity    Alcohol use: No     Alcohol/week: 0.0 oz    Drug use: No    Sexual activity: Not Currently       Review of Systems   Unable to perform ROS: Other   Endocrine: Negative for polydipsia.     Objective:     Vital Signs (Most Recent):  Temp: 97.5 °F (36.4 °C) (19 0701)  Pulse: 60 (19 1100)  Resp: (!) 27 (19 1100)  BP: (!) 90/54 (19 1100)  SpO2: 99 %  (05/21/19 1100) Vital Signs (24h Range):  Temp:  [97.5 °F (36.4 °C)-98.1 °F (36.7 °C)] 97.5 °F (36.4 °C)  Pulse:  [60-75] 60  Resp:  [10-27] 27  SpO2:  [93 %-100 %] 99 %  BP: ()/(50-64) 90/54  Arterial Line BP: ()/(42-57) 98/43     Weight: 73.2 kg (161 lb 6 oz)  Body mass index is 23.16 kg/m².    Review of Symptoms  Symptom Assessment (ESAS 0-10 scale)   ESAS 0 1 2 3 4 5 6 7 8 9 10   Pain x             Dyspnea x             Anxiety x             Nausea x             Depression               Anorexia              Fatigue              Insomnia              Restlessness  x             Agitation x             CAM / Delirium _x_ --  ___+   Constipation     _x_ --  ___+   Diarrhea           _x_ --  ___+  Bowel Management Plan (BMP): Yes    OME in 24 hours: 0    Performance Status: 30-40    Physical Exam   Constitutional: He is oriented to person, place, and time. He appears well-developed and well-nourished.   HENT:   Head: Normocephalic and atraumatic.   Cardiovascular: Normal rate and regular rhythm.   Pulmonary/Chest: Effort normal.   Abdominal: Soft. He exhibits no distension.   Neurological: He is alert and oriented to person, place, and time.   Skin: Skin is warm and dry.       Significant Labs: All pertinent labs within the past 24 hours have been reviewed.  CBC:   Recent Labs   Lab 05/21/19  0253   WBC 7.01   HGB 8.3*   HCT 26.3*   MCV 86   PLT 46*     BMP:  Recent Labs   Lab 05/21/19  0253   *   *   K 3.4*      CO2 20*   BUN 48*   CREATININE 1.5*   CALCIUM 8.9   MG 2.0     LFT:  Lab Results   Component Value Date     (H) 05/21/2019    ALKPHOS 279 (H) 05/21/2019    BILITOT 2.9 (H) 05/21/2019     Albumin:   Albumin   Date Value Ref Range Status   05/21/2019 2.1 (L) 3.5 - 5.2 g/dL Final   10/06/2014 3.9 3.6 - 5.1 g/dL Final     Comment:     @ Test Performed By:  [facility]  Gagan Heath M.D., FCAP.,   [address]  [place] [address]  [facility] #[id number]       Protein:   Total Protein   Date Value Ref Range Status   05/21/2019 5.5 (L) 6.0 - 8.4 g/dL Final     Lactic acid:   Lab Results   Component Value Date    LACTATE 5.6 (HH) 05/20/2019    LACTATE 7.2 (HH) 05/19/2019       Advanced Directives::  Living Will: No  LaPOST: No  Do Not Resuscitate Status: Yes  Medical Power of : No    Decision-Making Capacity: Patient answered questions, Family answered questions       Living Arrangements: Lives with spouse    Psychosocial/Cultural:  x 54 yrs to his wife with 1 adult son Fox who lives locally  Patient has 2 grandchildren.  Patient has large extended family including sisters,brothers, sister-in laws.  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      Spiritual:     F- Ning and Belief: Voodoo    I - Importance: yes   .  C - Community: local [facility]    A - Address in Care:  visits.  [employer] family is supportive per his wife

## 2019-05-21 NOTE — ASSESSMENT & PLAN NOTE
Impression:  73 yr old gentlemen with decline from ESLD with resolving sepsis with undetermined organism, ERNESTINA on CKD 3, metabolic acidosis - improving,coagulopathy, ischemic hepatitis with superior mesenteric vein thrombosis,with expanding thrombus, pancreatitis, and thrombocytopenia.   Per CCS team Code status changed to DNR per conversation with wife and patient.  Wife understands that Mr. Lyons has ESLD and is not a transplant candidate. Wife also in agreement with home hospice.       Recommendations:  1) Home hospice care.  Patient' s wife will need additional support, Pt's son aware and will assist with plan for this.    2) Per medical record paracentesis yielded  6.4L.  Will observe for reocccurance of ascites, would consider pleurex catheter if necessary    Family conference conducted by this APRN with the pt's wife and son to discuss pt's clinical status, prognosis, long term expected outcomes and goals of care. Pt participated but slept intermittently during discussion.  Pt's family verbalize good understanding and insight pertaining to the pt's clinical status. They have agreed to DNR status.   Hospice care discussed and pt's family asked numerous questions.  Patient's family have agreed to home hospice care.  Pt's son is concerned about his mother needing additional  Assistance.  They will meet later today to discuss.

## 2019-05-21 NOTE — PROGRESS NOTES
[facility]  Critical Care Medicine  Progress Note    Patient Name: Fox Lyons Sr.  MRN: [id number]  Admission Date: 5/16/2019  Hospital Length of Stay: 5 days  Code Status: DNR  Attending Provider: Joan Carmen DO  Primary Care Provider: Clover White MD   Principal Problem: Septic shock due to undetermined organism    Subjective:     HPI:  Mr. Fox Lyons is a 73 y.o. 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 Patient denies fever, headache, chest pain, SOB, urinary symptoms and blood in stool.     Work up in the ED revealed leukocytosis of 14, hgb 9.9 (at baseline), initial creatinine of 3.2 (up from baseline ~ 1.5), transaminitis, lipase elevated at 324 and lactic 6.7 (which improved to 3.9 following 1.5L fluid resuscitation). On presentation pt was hypotensive to 60s/30s which improved to 90s/50s following volume resuscitation. Critical Care Medicine has been consulted for sepsis.         Hospital/ICU Course:  Mr. Lyons was admitted to [facility] evening of 5/16 for pancreatitis and sepsis of unclear etiology. Shortly after admission patient developed hypotension requiring initiation of vasopressors. RUQ performed and gallbladder was unable to be visualized. Diagnostic paracentesis performed which was negative for SBP. HIDA scan showing patent portal flow. The am of 05/19, his lactate remains elevated and vasopressin added overnight. Shock liver worsening. Therapeutic paracentesis on 5/18 with 6.4 L removed, negative for SBP; cultures pending.  Blood  cultures remain NGTD. MRCP considered but pacemaker present. Hepatology following for cirrhosis, not a current transplant candidate.  US liver with doppler revealed expansion with complete occlusive thrombus of superior mesenteric vein with unchanged thrombus in the main portal vein and right anterior portal vein. Anticoagulation not initiated due to coagulopathy and thrombocytopenia. Right hepatic mass 1.4 x 2 x 1.3 cm also noted that was not seen on CT abdomen/pelvis w/o contrast from 5/17. AFP wnl. CEA elevated. Midodrine added on 5/19, weaned off vasopressors on 5/20.  Completed 5 days of vancomycin and pip/tazo.  No clear source of infection with cultures NGTD; however, suspicious for intra abdominal source. Continue fluconazole for 5 days for oral thrush. Increase midodrine and began weaning stress dose steroids to home regimen.  Code status changed to DNR per conversation with wife and patient.  Wife understands that Mr. Lyons has ESLD and is not a transplant candidate. Wife also in agreement with home hospice.  Appreciate palliative care.     Interval History/Significant Events: No acute events overnight. More alert today.     Review of Systems   Constitutional: Negative for chills, diaphoresis and fever.   HENT: Negative for trouble swallowing.         Denies oral discomfort   Respiratory: Negative for shortness of breath.    Cardiovascular: Negative for chest pain and leg swelling.   Gastrointestinal: Positive for nausea (intermittent). Negative for abdominal pain, blood in stool and vomiting.   Neurological: Negative for dizziness and headaches.     Objective:     Vital Signs (Most Recent):  Temp: 97.5 °F (36.4 °C) (05/21/19 0701)  Pulse: 60 (05/21/19 1100)  Resp: (!) 27 (05/21/19 1100)  BP: (!) 90/54 (05/21/19 1100)  SpO2: 99 % (05/21/19 1100) Vital Signs (24h Range):  Temp:  [97.5 °F (36.4 °C)-98.1 °F (36.7 °C)] 97.5 °F (36.4 °C)  Pulse:  [60-75] 60  Resp:  [10-27] 27  SpO2:  [93 %-100 %] 99 %  BP:  ()/(50-64) 90/54  Arterial Line BP: ()/(42-57) 98/43   Weight: 73.2 kg (161 lb 6 oz)  Body mass index is 23.16 kg/m².      Intake/Output Summary (Last 24 hours) at 5/21/2019 1340  Last data filed at 5/21/2019 0400  Gross per 24 hour   Intake --   Output 840 ml   Net -840 ml       Physical Exam   Constitutional: He appears cachectic. He has a sickly appearance.   HENT:   Head: Normocephalic.   Mouth/Throat: He has dentures.   Improving thrush to tongue   Eyes: Pupils are equal, round, and reactive to light. EOM are normal.   Neck: No JVD present.   Cardiovascular:   Pulses:       Radial pulses are 2+ on the right side, and 2+ on the left side.        Dorsalis pedis pulses are 2+ on the right side, and 2+ on the left side.   Paced rhythm.  Warm extremities.  No peripheral edema.    Pulmonary/Chest: No accessory muscle usage. No tachypnea. No respiratory distress. He has no decreased breath sounds. He has no wheezes. He has no rhonchi. He has no rales.   Room air   Abdominal: Soft. Bowel sounds are normal. He exhibits distension. There is no tenderness.   Multiple loose light brown bowel movements overnight.    Genitourinary:   Genitourinary Comments: voiding   Neurological: No cranial nerve deficit or sensory deficit. GCS eye subscore is 4. GCS verbal subscore is 5. GCS motor subscore is 6.   Alert, generalized, symmetrical  weakness in BLE.  States that he uses walker at home.    Skin: Skin is warm and dry. No ecchymosis and no rash noted.        Psychiatric: His speech is normal. His mood appears not anxious. His affect is not angry. He is withdrawn. He is not actively hallucinating.   Vitals reviewed.      Vents:     Lines/Drains/Airways     Central Venous Catheter Line                 Percutaneous Central Line Insertion/Assessment - triple lumen  05/17/19 1723 right internal jugular 3 days          Drain                 Rectal Tube 05/20/19 0500 fecal management system 1 day    Male External Urinary  Catheter 05/19/19 1840 Medium 1 day          Arterial Line                 Arterial Line 05/18/19 0110 Right Radial 3 days          Peripheral Intravenous Line                 Peripheral IV - Single Lumen 05/17/19 1335 22 G Left Hand 4 days              Significant Labs:    CBC/Anemia Profile:  Recent Labs   Lab 05/19/19 1848 05/20/19  0305 05/21/19  0253   WBC 6.66 5.44 7.01   HGB 7.8* 7.7* 8.3*   HCT 23.9* 24.2* 26.3*   PLT 43* 40* 46*   MCV 84 86 86   RDW 19.8* 19.6* 20.1*        Chemistries:  Recent Labs   Lab 05/19/19 1848 05/20/19  0305 05/21/19  0253   * 133* 134*   K 3.7 3.7 3.4*    103 104   CO2 17* 18* 20*   BUN 36* 42* 48*   CREATININE 1.6* 1.5* 1.5*   CALCIUM 8.8 8.8 8.9   ALBUMIN 2.4* 2.3* 2.1*   PROT 5.6* 5.5* 5.5*   BILITOT 3.1* 3.1* 2.9*   ALKPHOS 317* 291* 279*   * 507* 382*   * 578* 327*   MG  --  1.7 2.0   PHOS  --  3.4 3.8       All pertinent labs within the past 24 hours have been reviewed.    Significant Imaging:  I have reviewed and interpreted all pertinent imaging results/findings within the past 24 hours.      ABG  No results for input(s): PH, PO2, PCO2, HCO3, BE in the last 168 hours.  Assessment/Plan:     Pulmonary  Chronic obstructive pulmonary disease (COPD)  --no evidence of acute exacerbation  --continue home trelegy ellipta with duo nebs prn  --on room air    Cardiac/Vascular  Sick sinus syndrome  --s/p pacemaker placement  --currently in atrial paced rhythm on EKG    Coronary artery disease  --s/p stent x2 2011  --holding statin in acute setting; restart when clinically appropriate    Hyperlipidemia  --holding home statin in acute setting  --restart when clinically appropriate    Hypertension  --holding home ARB, lasix & aldactone in setting of recent shock  --restart when clinically appropriate    Renal/  Metabolic acidosis, normal anion gap (NAG)  --improving, suspect secondary to RTA    ERNESTINA (acute kidney injury)  --ERNESTINA on CKD 3  --likely iATN from  shock  --sCr improving and near baseline.        ID  * Septic shock due to undetermined organism  --unclear etiology; concern for possible intraabdominal source along with worsening, now completely occluded SMV  --leukocytosis resolved. Afebrile.   Lactate level remains elevated likely from ongoing SMV (now completely occluded) and PV thrombus and poor clearance due to cirrhosis.  --CXR notable for bilateral pleural effusions L > R, UA appearing noninfectious  --diagnostic para negative for SBP  --Blood culture NGTD  --completed 5 days of vancomycin and zosyn  --weaned off norepinephrine and vasopressin on 5/20.   --continue midodrine    Oral thrush  --nystatin on shortage.  Fluconazole orally for 5 days.  --clinically improving    History of hepatitis C  MELD-Na score: 23 at 5/21/2019  2:53 AM  MELD score: 21 at 5/21/2019  2:53 AM  Calculated from:  Serum Creatinine: 1.5 mg/dL at 5/21/2019  2:53 AM  Serum Sodium: 134 mmol/L at 5/21/2019  2:53 AM  Total Bilirubin: 2.9 mg/dL at 5/21/2019  2:53 AM  INR(ratio): 1.8 at 5/21/2019  2:53 AM  Age: 73 years   --HCV cirrhosis s/p treatment with negative viral load in 2016.   -- h/o small non-bleeding esophageal varices  --ammonia WNL, continue lactulose, titrate for 3 stools per day.  --holding home furosemide & aldactone in the setting of recent shock  --paracentesis labs negative for SBP on 5/18.   --see ischemic hepatitis.    Hematology  Coagulopathy  --suspect secondary to liver injury  --monitor    Superior mesenteric vein thrombosis  --now completely occluded on ultrasound doppler.  --considered anticoagulation however given worsening thrombocytopenia and coagulopathy; will not initiate at this time.     Thrombocytopenia  --chronic, now worsening in the setting of septic shock.   --no signs of active bleeding.  --no recent heparin products    Endocrine  Secondary adrenal insufficiency  --s/p gamma knife radiosurgery for pituitary adenoma   --begin weaning stress dose  (hydrocortisone).  D/c fludrocortisone      GI  Ischemic hepatitis  --in the setting of shock and expanding SMV thrombus.  LFTs downtrending following therapeutic paracentesis.   --AFP wnl, CEA elevated in the setting of hepatic mass noted on ultrasound; however mass was not noted on recent CT imaging.       --Discussed with Hepatology on 5/20, patient is not a liver transplant candidate given co-morbidities and age.       Pancreatitis  --lipase 324 on admit, h/o gallstones on prior imagaing  --unable to visual gallbladder on RUQ US secondary to overlying bowel gas  --triglyceride level WNL  --IVF hydration & supportive care.   --HIDA scan with patent cystic and common bile duct  --Pacemaker prevents MRCP      GERD (gastroesophageal reflux disease)  --continue famotidine    Other  Goals of care, counseling/discussion  --discussed overall prognosis with wife and patient at bedside. Patient did not participate much in conversation.  Wife expresses understanding of end stage liver disease and that Mr. Lyons is not a candidate for liver transplant.  Wife relayed that Mr. Lyons would not want resuscitative measures; therefore, code status was changed to DNR. She relays that Mr. Lyons was receiving home health prior to admission and was having difficulty with ADLs including needing a walker to ambulate. Wife in agreement with home hospice. Palliative care consulted for assistance.       DVT ppx:  SCDs. No pharmacological ppx given thrombocytopenia and coagulopathy.     Transfer to Hospital Medicine.     Discussed plan with Dr. Carmen    I spent >70 minutes reviewing patient records, examining, and counseling the patient with greater than 50% of the time spent with direct patient care and coordination.        Josy Russell NP  Critical Care Medicine  [facility]

## 2019-05-22 ENCOUNTER — TELEPHONE (OUTPATIENT)
Dept: ADMINISTRATIVE | Facility: CLINIC | Age: 74
End: 2019-05-22

## 2019-05-22 LAB
ALBUMIN SERPL BCP-MCNC: 2.1 G/DL (ref 3.5–5.2)
ALP SERPL-CCNC: 294 U/L (ref 55–135)
ALT SERPL W/O P-5'-P-CCNC: 297 U/L (ref 10–44)
ANION GAP SERPL CALC-SCNC: 10 MMOL/L (ref 8–16)
AST SERPL-CCNC: 185 U/L (ref 10–40)
BACTERIA BLD CULT: NORMAL
BACTERIA BLD CULT: NORMAL
BACTERIA SPEC AEROBE CULT: NO GROWTH
BASOPHILS # BLD AUTO: 0 K/UL (ref 0–0.2)
BASOPHILS NFR BLD: 0 % (ref 0–1.9)
BILIRUB SERPL-MCNC: 2.7 MG/DL (ref 0.1–1)
BUN SERPL-MCNC: 61 MG/DL (ref 8–23)
CALCIUM SERPL-MCNC: 9.2 MG/DL (ref 8.7–10.5)
CHLORIDE SERPL-SCNC: 104 MMOL/L (ref 95–110)
CO2 SERPL-SCNC: 20 MMOL/L (ref 23–29)
CREAT SERPL-MCNC: 1.8 MG/DL (ref 0.5–1.4)
DIFFERENTIAL METHOD: ABNORMAL
EOSINOPHIL # BLD AUTO: 0 K/UL (ref 0–0.5)
EOSINOPHIL NFR BLD: 0 % (ref 0–8)
ERYTHROCYTE [DISTWIDTH] IN BLOOD BY AUTOMATED COUNT: 20.5 % (ref 11.5–14.5)
EST. GFR  (AFRICAN AMERICAN): 42.2 ML/MIN/1.73 M^2
EST. GFR  (NON AFRICAN AMERICAN): 36.5 ML/MIN/1.73 M^2
GLUCOSE SERPL-MCNC: 200 MG/DL (ref 70–110)
HCT VFR BLD AUTO: 28.2 % (ref 40–54)
HGB BLD-MCNC: 9 G/DL (ref 14–18)
IMM GRANULOCYTES # BLD AUTO: 0.05 K/UL (ref 0–0.04)
IMM GRANULOCYTES NFR BLD AUTO: 0.5 % (ref 0–0.5)
INR PPP: 1.9 (ref 0.8–1.2)
LYMPHOCYTES # BLD AUTO: 0.4 K/UL (ref 1–4.8)
LYMPHOCYTES NFR BLD: 3.8 % (ref 18–48)
MAGNESIUM SERPL-MCNC: 2.2 MG/DL (ref 1.6–2.6)
MCH RBC QN AUTO: 26.9 PG (ref 27–31)
MCHC RBC AUTO-ENTMCNC: 31.9 G/DL (ref 32–36)
MCV RBC AUTO: 84 FL (ref 82–98)
MONOCYTES # BLD AUTO: 0.6 K/UL (ref 0.3–1)
MONOCYTES NFR BLD: 6.4 % (ref 4–15)
NEUTROPHILS # BLD AUTO: 9 K/UL (ref 1.8–7.7)
NEUTROPHILS NFR BLD: 89.3 % (ref 38–73)
NRBC BLD-RTO: 0 /100 WBC
PHOSPHATE SERPL-MCNC: 3.9 MG/DL (ref 2.7–4.5)
PLATELET # BLD AUTO: 62 K/UL (ref 150–350)
PMV BLD AUTO: 11.1 FL (ref 9.2–12.9)
POCT GLUCOSE: 218 MG/DL (ref 70–110)
POCT GLUCOSE: 236 MG/DL (ref 70–110)
POCT GLUCOSE: 258 MG/DL (ref 70–110)
POTASSIUM SERPL-SCNC: 3.6 MMOL/L (ref 3.5–5.1)
PROT SERPL-MCNC: 5.8 G/DL (ref 6–8.4)
PROTHROMBIN TIME: 18 SEC (ref 9–12.5)
RBC # BLD AUTO: 3.34 M/UL (ref 4.6–6.2)
SODIUM SERPL-SCNC: 134 MMOL/L (ref 136–145)
WBC # BLD AUTO: 10.05 K/UL (ref 3.9–12.7)

## 2019-05-22 PROCEDURE — 36415 COLL VENOUS BLD VENIPUNCTURE: CPT | Mod: HCNC

## 2019-05-22 PROCEDURE — 99233 SBSQ HOSP IP/OBS HIGH 50: CPT | Mod: HCNC,,, | Performed by: CLINICAL NURSE SPECIALIST

## 2019-05-22 PROCEDURE — 99233 PR SUBSEQUENT HOSPITAL CARE,LEVL III: ICD-10-PCS | Mod: HCNC,,, | Performed by: CLINICAL NURSE SPECIALIST

## 2019-05-22 PROCEDURE — 63600175 PHARM REV CODE 636 W HCPCS: Mod: HCNC | Performed by: HOSPITALIST

## 2019-05-22 PROCEDURE — 84100 ASSAY OF PHOSPHORUS: CPT | Mod: HCNC

## 2019-05-22 PROCEDURE — 25000242 PHARM REV CODE 250 ALT 637 W/ HCPCS: Mod: HCNC | Performed by: NURSE PRACTITIONER

## 2019-05-22 PROCEDURE — 63600175 PHARM REV CODE 636 W HCPCS: Mod: HCNC | Performed by: NURSE PRACTITIONER

## 2019-05-22 PROCEDURE — 83735 ASSAY OF MAGNESIUM: CPT | Mod: HCNC

## 2019-05-22 PROCEDURE — 80053 COMPREHEN METABOLIC PANEL: CPT | Mod: HCNC

## 2019-05-22 PROCEDURE — 85025 COMPLETE CBC W/AUTO DIFF WBC: CPT | Mod: HCNC

## 2019-05-22 PROCEDURE — 25000003 PHARM REV CODE 250: Mod: HCNC | Performed by: HOSPITALIST

## 2019-05-22 PROCEDURE — 25000003 PHARM REV CODE 250: Mod: HCNC | Performed by: INTERNAL MEDICINE

## 2019-05-22 PROCEDURE — 85610 PROTHROMBIN TIME: CPT | Mod: HCNC

## 2019-05-22 PROCEDURE — 97116 GAIT TRAINING THERAPY: CPT | Mod: HCNC

## 2019-05-22 PROCEDURE — 25000003 PHARM REV CODE 250: Mod: HCNC | Performed by: NURSE PRACTITIONER

## 2019-05-22 PROCEDURE — 11000001 HC ACUTE MED/SURG PRIVATE ROOM: Mod: HCNC

## 2019-05-22 PROCEDURE — 97530 THERAPEUTIC ACTIVITIES: CPT | Mod: HCNC

## 2019-05-22 PROCEDURE — 99231 SBSQ HOSP IP/OBS SF/LOW 25: CPT | Mod: HCNC,,, | Performed by: HOSPITALIST

## 2019-05-22 PROCEDURE — 99231 PR SUBSEQUENT HOSPITAL CARE,LEVL I: ICD-10-PCS | Mod: HCNC,,, | Performed by: HOSPITALIST

## 2019-05-22 RX ORDER — HYDROCORTISONE 5 MG/1
5 TABLET ORAL
Status: DISCONTINUED | OUTPATIENT
Start: 2019-05-23 | End: 2019-05-24 | Stop reason: HOSPADM

## 2019-05-22 RX ORDER — LIDOCAINE 50 MG/G
1 PATCH TOPICAL
Status: DISCONTINUED | OUTPATIENT
Start: 2019-05-22 | End: 2019-05-24 | Stop reason: HOSPADM

## 2019-05-22 RX ORDER — HYDROCORTISONE 5 MG/1
15 TABLET ORAL DAILY
Status: DISCONTINUED | OUTPATIENT
Start: 2019-05-24 | End: 2019-05-24 | Stop reason: HOSPADM

## 2019-05-22 RX ADMIN — FAMOTIDINE 20 MG: 20 TABLET, FILM COATED ORAL at 08:05

## 2019-05-22 RX ADMIN — TIOTROPIUM BROMIDE 18 MCG: 18 CAPSULE ORAL; RESPIRATORY (INHALATION) at 08:05

## 2019-05-22 RX ADMIN — INSULIN ASPART 3 UNITS: 100 INJECTION, SOLUTION INTRAVENOUS; SUBCUTANEOUS at 12:05

## 2019-05-22 RX ADMIN — FLUTICASONE FUROATE AND VILANTEROL TRIFENATATE 1 PUFF: 100; 25 POWDER RESPIRATORY (INHALATION) at 08:05

## 2019-05-22 RX ADMIN — LIDOCAINE 1 PATCH: 50 PATCH TOPICAL at 01:05

## 2019-05-22 RX ADMIN — HYDROCORTISONE SODIUM SUCCINATE 25 MG: 100 INJECTION, POWDER, FOR SOLUTION INTRAMUSCULAR; INTRAVENOUS at 09:05

## 2019-05-22 RX ADMIN — MIDODRINE HYDROCHLORIDE 10 MG: 5 TABLET ORAL at 01:05

## 2019-05-22 RX ADMIN — FLUCONAZOLE 200 MG: 100 TABLET ORAL at 08:05

## 2019-05-22 RX ADMIN — INSULIN ASPART 2 UNITS: 100 INJECTION, SOLUTION INTRAVENOUS; SUBCUTANEOUS at 05:05

## 2019-05-22 RX ADMIN — MIDODRINE HYDROCHLORIDE 10 MG: 5 TABLET ORAL at 08:05

## 2019-05-22 RX ADMIN — HYDROCORTISONE SODIUM SUCCINATE 50 MG: 100 INJECTION, POWDER, FOR SOLUTION INTRAMUSCULAR; INTRAVENOUS at 05:05

## 2019-05-22 RX ADMIN — HYDROCORTISONE SODIUM SUCCINATE 25 MG: 100 INJECTION, POWDER, FOR SOLUTION INTRAMUSCULAR; INTRAVENOUS at 01:05

## 2019-05-22 RX ADMIN — LACTULOSE 20 G: 20 SOLUTION ORAL at 08:05

## 2019-05-22 NOTE — PLAN OF CARE
CM at bedside to discuss discharge planning assessment.   Patient unable to interact.   Met with spouse who states she does not remember which hospice choices being considered and will discuss with her son.   CM name and phone placed on white board.   Will follow for hospice decisions.     05/22/19 5460   Discharge Reassessment   Assessment Type Discharge Planning Reassessment   Provided patient/caregiver education on the expected discharge date and the discharge plan Yes   Do you have any problems affording any of your prescribed medications? No   Discharge Plan A Hospice/home   Discharge Plan B Hospice/home   DME Needed Upon Discharge  other (see comments)  (to be determined)   Patient choice form signed by patient/caregiver N/A   Anticipated Discharge Disposition HospiceHome   Can the patient answer the patient profile reliably? No, cognitively impaired   How does the patient rate their overall health at the present time? Poor

## 2019-05-22 NOTE — SUBJECTIVE & OBJECTIVE
Past Medical History:   Diagnosis Date    Anemia     Anticoagulant long-term use     Ascites 12/10/2018    Biliary colic     Cataract     Chronic hepatitis C     Cirrhosis     COPD (chronic obstructive pulmonary disease)     Coronary artery disease     Dyspnea     Epistaxis     Esophageal varices without bleeding     Gallstones     GERD (gastroesophageal reflux disease)     HCC (hepatocellular carcinoma)     Hepatitis B antibody positive     Hyperglycemia     Hyperlipidemia     Hypertension     Hypopituitarism     Mobitz type 2 second degree atrioventricular block     Nuclear sclerosis, left     Pacemaker     Pituitary tumor     Portal hypertension     Pulmonary emphysema     PVT (paroxysmal ventricular tachycardia)     SA node dysfunction     Secondary adrenal insufficiency     Secondary male hypogonadism     SSS (sick sinus syndrome)     Superior mesenteric vein thrombosis     Vitreous hemorrhage, both eyes        Past Surgical History:   Procedure Laterality Date    CARDIAC PACEMAKER PLACEMENT      st rochelle    CATARACT EXTRACTION  9/24/13    RT    COLONOSCOPY Left 1/4/2019    Performed by Emanuel Hannah [facility] at [facility] ENDO (4TH FLR)    CORONARY ANGIOPLASTY WITH STENT PLACEMENT      2 stents    EGD (ESOPHAGOGASTRODUODENOSCOPY) Left 2/19/2019    Performed by Trice Funes [facility] at [facility] END[facility]    EGD (ESOPHAGOGASTRODUODENOSCOPY) Left 1/4/2019    Performed by Emanuel Hannah [facility] at [facility] ENDO (4TH FLR)    ESOPHAGOGASTRODUODENOSCOPY (EGD) Left 5/31/2018    Performed by Trice Funes MD at [facility] ENDO (4TH FLR)    ESOPHAGOGASTRODUODENOSCOPY (EGD) N/A 11/30/2017    Performed by Trice Funes MD at [facility] ENDO (4TH FLR)    ESOPHAGOGASTRODUODENOSCOPY (EGD) Left 10/12/2017    Performed by Jody Humphries MD at [facility] ENDO (4TH FLR)    ESOPHAGOGASTRODUODENOSCOPY (EGD) N/A 8/17/2017    Performed by Trice Funes MD at [facility] ENDO (4TH FLR)    ESOPHAGOGASTRODUODENOSCOPY (EGD)  N/A 2016    Performed by [facility] at [facility] ENDO (4TH FLR)    EYE SURGERY      INSERTION, IOL PROSTHESIS Right 2013    Performed by Ben Sparks MD at [facility] OR 1ST FLR    PARACENTESIS, ABDOMINAL N/A 2014    Performed by [facility] CATH LAB    PHACOEMULSIFICATION, CATARACT Right 2013    Performed by Ben Sparks MD at [facility] OR 1ST FLR    s/p PPV/AFX/EL (od)  2012       Review of patient's allergies indicates:   Allergen Reactions    Codeine Itching, Rash, Edema and Other (See Comments)     Other reaction(s): Itching       Medications:  Continuous Infusions:  Scheduled Meds:   famotidine  20 mg Oral Daily    fluconazole  200 mg Oral Daily    fluticasone furoate-vilanterol  1 puff Inhalation Daily    hydrocortisone sodium succinate  25 mg Intravenous Q8H    [START ON 2019] hydrocortisone  15 mg Oral Daily    [START ON 2019] hydrocortisone  5 mg Oral After dinner    lactulose  20 g Oral Daily    lidocaine  1 patch Transdermal Q24H    midodrine  10 mg Oral TID    tiotropium  1 capsule Inhalation Daily     PRN Meds:albuterol-ipratropium, dextrose 50 % in water (D50W), glucagon (human recombinant), insulin aspart U-100, ondansetron, sodium chloride 0.9%    Family History     Problem Relation (Age of Onset)    Cancer Father, Sister, Sister, Brother    Diabetes Brother, Sister    Heart disease Mother, Sister    Lung cancer Brother    No Known Problems Sister, Brother        Tobacco Use    Smoking status: Former Smoker     Packs/day: 1.00     Years: 35.00     Pack years: 35.00     Types: Cigarettes     Last attempt to quit: 1995     Years since quittin.9    Smokeless tobacco: Never Used   Substance and Sexual Activity    Alcohol use: No     Alcohol/week: 0.0 oz    Drug use: No    Sexual activity: Not Currently       Review of Systems   Unable to perform ROS: Other   Endocrine: Negative for polydipsia.      Objective:     Vital Signs (Most Recent):  Temp: 97.6 °F (36.4 °C) (05/22/19 1342)  Pulse: 76 (05/22/19 1342)  Resp: 18 (05/22/19 1342)  BP: 110/65 (05/22/19 1342)  SpO2: (!) 93 % (05/22/19 1342) Vital Signs (24h Range):  Temp:  [97.5 °F (36.4 °C)-98.1 °F (36.7 °C)] 97.6 °F (36.4 °C)  Pulse:  [63-76] 76  Resp:  [14-20] 18  SpO2:  [87 %-99 %] 93 %  BP: (100-127)/(59-70) 110/65     Weight: 73.2 kg (161 lb 6 oz)  Body mass index is 23.16 kg/m².    Review of Symptoms  Symptom Assessment (ESAS 0-10 scale)   ESAS 0 1 2 3 4 5 6 7 8 9 10   Pain x             Dyspnea x             Anxiety x             Nausea x             Depression               Anorexia              Fatigue              Insomnia              Restlessness  x             Agitation x             CAM / Delirium _x_ --  ___+   Constipation     _x_ --  ___+   Diarrhea           _x_ --  ___+  Bowel Management Plan (BMP): Yes    OME in 24 hours: 0    Performance Status: 30-40    Physical Exam   Constitutional: He is oriented to person, place, and time. He appears well-developed and well-nourished.   HENT:   Head: Normocephalic and atraumatic.   Cardiovascular: Normal rate and regular rhythm.   Pulmonary/Chest: Effort normal.   Abdominal: Soft. He exhibits distension. There is no tenderness. There is no guarding.   Neurological: He is alert and oriented to person, place, and time.   Skin: Skin is warm and dry.   Psychiatric: He has a normal mood and affect. His behavior is normal.       Significant Labs: All pertinent labs within the past 24 hours have been reviewed.  CBC:   Recent Labs   Lab 05/22/19  0733   WBC 10.05   HGB 9.0*   HCT 28.2*   MCV 84   PLT 62*     BMP:  Recent Labs   Lab 05/22/19  0734   *   *   K 3.6      CO2 20*   BUN 61*   CREATININE 1.8*   CALCIUM 9.2   MG 2.2     LFT:  Lab Results   Component Value Date     (H) 05/22/2019    ALKPHOS 294 (H) 05/22/2019    BILITOT 2.7 (H) 05/22/2019     Albumin:   Albumin   Date  Value Ref Range Status   05/22/2019 2.1 (L) 3.5 - 5.2 g/dL Final   10/06/2014 3.9 3.6 - 5.1 g/dL Final     Comment:     @ Test Performed By:  [facility]  Gagan Heath M.D., FCAP.,   [address] [place] [address]  CLIA #[id number]       Protein:   Total Protein   Date Value Ref Range Status   05/22/2019 5.8 (L) 6.0 - 8.4 g/dL Final     Lactic acid:   Lab Results   Component Value Date    LACTATE 5.6 (HH) 05/20/2019    LACTATE 7.2 (HH) 05/19/2019       Advanced Directives::  Living Will: No  LaPOST: No  Do Not Resuscitate Status: Yes  Medical Power of : No.  Patient's wife is making decisions with her son along with the patient    Decision-Making Capacity: Patient answered questions, Family answered questions     Living Arrangements: Lives with spouse    Psychosocial/Cultural:  x 54 yrs to his wife with 1 adult son Fxo who lives locally  Patient has 2 grandchildren.  Patient has large extended family including sisters,brothers, sister-in laws.  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      Spiritual:     F- Ning and Belief: [employer]ism    I - Importance: yes   .  C - Community: local [employer]    A - Address in Care:  visits.  [employer] family is supportive per his wife

## 2019-05-22 NOTE — PT/OT/SLP PROGRESS
Physical Therapy Treatment    Patient Name:  Fox Lyons Sr.   MRN:  [id number]  Admitting Diagnosis: Septic shock due to undetermined organism  Recent Surgery: * No surgery found *      Recommendations:     Discharge Recommendations:  nursing facility, skilled   Discharge Equipment Recommendations: walker, rolling   Barriers to discharge: Decreased caregiver support  Pt requiring increased assistance at current time.     Plan:     During this hospitalization, patient to be seen 4 x/week to address the above listed problems via gait training, therapeutic activities, therapeutic exercises, neuromuscular re-education  · Plan of Care Expires:  06/21/19   Plan of Care Reviewed with: spouse    This Plan of care has been discussed with the patient who was involved in its development and understands and is in agreement with the identified goals and treatment plan    Subjective     Communicated with RN (Debora) prior to session.     Patient comments: pt with c/o dizziness upon sitting at the EOB  Pain/Comfort:  · Pain Rating 1: 0/10  · Location - Side 1: Left  · Location - Orientation 1: generalized  · Location 1: abdomen  · Pain Addressed 1: Reposition, Distraction, Cessation of Activity, Nurse notified  · Pain Rating Post-Intervention 1: 8/10    Objective:     Patient found with: bowel management system, Condom Catheter    Patient found sup in bed upon PT entry to room, agreeable to treatment.  Wife present in the room.    General Precautions: Standard, fall   Orthopedic Precautions:N/A   Braces: N/A       BED MOBILITY (vc's for hand placement sequencing of task):        Rolling to the R:  Min A.       Rolling to the L:  NT.        Sup > sit at the EOB:  Mod/max A for trunk and B LE's.       Sit > sup:  Mod A for trunk and LE's.       Scooting hips to EOB with min A                SITTING AT THE EDGE OF THE BED   Assistance Level Required: SBA for safety with B UE support   Postural deviations noted: flexed trunk,  rounded shoulder, PPT   Encouraged: upright posture        TRANSFERS  (vc's for hand placement, sequencing of task and safety)   Patient completed Sit <> Stand Transfer from EOB/BS chair with mod A of 1 for hip elevation and balance with rolling walker x2-3 trial(s)   Patient completed Stand <> Sit Transfer to EOB/BS chair with min A for controlled descent with rolling walker    Patient completed Chair <> EOB Transfer using Stand Pivot technique to the L with mod A with rolling walker    GAIT: within room, tech follows with BS chair   Patient ambulated: 14ft   Patient required: min A for balance and mgt of AD.  Pt with 1 major LOB to the R requiring mod A to prevent fall and to recover   Patient used:  Rolling Walker   Gait Pattern observed: reciprocal gait   Gait Deviation(s): decreased shalom, increased time in double stance, decreased velocity of limb motion, decreased step length, decreased stride length, decreased toe-to-floor clearance, decreased weight-shifting ability and FFP,  R lateral lean    Comments: vc's and tc's for upright posture, placement of AD, directional guidance, sequencing, step length and safety      EDUCATION  Patient provided with daily orientation and goals of this PT session. They were educated to call for assistance and to transfer with hospital staff only.  Also, pt was educated on the effects of prolonged immobility and the importance of performing OOB activity and exercises to promote healing and reduce recovery time    Whiteboard updated with correct mobility information. RN/PCT notified.  Pt safe to transfer OOB/BTB with RN/PCT via SPT: Use no AD with mod A of 1 person.    Patient left supine, with  all lines intact, call button in reach, bed alarm on, RN notified and wive present    AM-PAC 6 CLICK MOBILITY  Turning over in bed (including adjusting bedclothes, sheets and blankets)?: 3  Sitting down on and standing up from a chair with arms (e.g., wheelchair, bedside commode,  etc.): 2  Moving from lying on back to sitting on the side of the bed?: 2  Moving to and from a bed to a chair (including a wheelchair)?: 2  Need to walk in hospital room?: 2  Climbing 3-5 steps with a railing?: 1  Basic Mobility Total Score: 12     Assessment:     Fox Lyons Sr. is a 73 y.o. male admitted with a medical diagnosis of Septic shock due to undetermined organism.  He presents with the following impairments/functional limitations:  weakness, impaired endurance, impaired sensation, impaired self care skills, impaired functional mobilty, gait instability, impaired balance, impaired cognition, decreased coordination, decreased upper extremity function, decreased lower extremity function, decreased safety awareness, pain. requiring significant assistance and verbal cues for bed mob, scooting to/along the EOB, sit < > stand, standing and gait to prevent falls due to weakness, instability, safety awareness and fatigue.   In light of pt's current functional level and deficits, it is anticipated that pt will need to participate in an intense rehab program consisting of PT and OT in order to achieve full rehab potential to return to previous level of function and roles.  Pt will cont to benefit from skilled PT intervention to address deficits and improve functional mobility.     Rehab Prognosis:  Fair to Good; patient would benefit from acute skilled PT services to address these deficits and reach maximum level of function.      GOALS:   Multidisciplinary Problems     Physical Therapy Goals        Problem: Physical Therapy Goal    Goal Priority Disciplines Outcome Goal Variances Interventions   Physical Therapy Goal     PT, PT/OT Ongoing (interventions implemented as appropriate)     Description:  Goals to be met by: 2019     Patient will increase functional independence with mobility by performin. Supine to sit with Set-up Debary  2. Sit to stand transfer with Stand-by Assistance  3. Bed to  chair transfer with Stand-by Assistance using Rolling Walker  4. Gait  x 50 feet with Stand-by Assistance using Rolling Walker.   5. Lower extremity exercise program x20 reps per handout, with assistance as needed                      Time Tracking:     PT Received On: 05/22/19  PT Start Time: 1022     PT Stop Time: 1056  PT Total Time (min): 34 min     Billable Minutes: Gait Training 14 and Therapeutic Activity 20    Treatment Type: Treatment  PT/PTA: PTA     PTA Visit Number: 1       Carrol Ivan PTA.  Pager [phone]    5/22/2019    .

## 2019-05-22 NOTE — NURSING
Received pt from ICU via w/c resp even family at bedside vitals stable, all safety measures effective

## 2019-05-22 NOTE — PLAN OF CARE
05/22/19 1628   Post-Acute Status   Post-Acute Authorization Placement   Post-Acute Placement Status Referrals Sent       SW met with patient and patient family in regard to hospice placement. Patient is requesting home hospice with [facility]. SW sent the referral via [facility] and will follow up.      Roxann Castro LMSW  [facility]   [id number]

## 2019-05-22 NOTE — PROGRESS NOTES
[facility]  Palliative Medicine  Progress Note    Patient Name: Fox Lyons Sr.  MRN: [id number]  Admission Date: 5/16/2019  Hospital Length of Stay: 6 days  Code Status: DNR   Attending Provider: Yang Skinner MD  Consulting Provider: VICKI Rodriguez, CNS  Primary Care Physician: Clover White MD  Principal Problem:Septic shock due to undetermined organism    Patient information was obtained from patient, spouse/SO, relative(s), past medical records and Dr. Skinner.      Assessment/Plan:     Palliative care encounter  Impression:  73 yr old gentlemen with decline from ESLD with resolving sepsis with undetermined organism, ERNESTINA on CKD 3, metabolic acidosis - improving,coagulopathy, ischemic hepatitis with superior mesenteric vein thrombosis,with expanding thrombus, pancreatitis, and thrombocytopenia. DNR status   Wife understands that Mr. Lyons has ESLD and is not a transplant candidate. Pt,  Pt's Wife and son in agreement with home hospice and have chosen hospice provider.  Pt alert and more participative today.Patient's abdomen soft with more distention noted as compared to yesterdays exam.     Recommendations:  1) Home hospice care. Pt and his family have chosen [facility] as their provider of choice. Please contact pt's son Ivan [phone] to set up informational visit for pt., wife and son.  2)  Patient' s wife will need additional support, Pt's son aware and will assist with plan for this.    3) Abdomen is soft but appears more distended today.  Consider paracentesis /pleurex catheter for ascites management prior to discharge.  Discussed with .  Per medical record last paracentesis this admission yielded  6.4L.      Goals of Care  Discussion conducted by this APRN with the pt, pt's wife and son to discuss decision regarding goals of care. Pt alert and more participative today.   Hospice care discussed and they have agreed to [facility]  hospice care.  Patient's abdomen soft with more distention noted.  Discussed ascites management briefly. Patient's wife and son sound open to discussion with Dr. Skinner concerning deciding on a management plan prior to discharge.              I will follow-up with patient. Please contact us if you have any additional questions.    Subjective:     Chief Complaint:   Chief Complaint   Patient presents with    Hypotension     Pt told to come to ED by home health nurse-wife reports 57/38 approx 1 hr ago.        HPI:   73 y.o. 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    Pt was admitted to [facility] evening of 5/16 for pancreatitis and sepsis of unclear etiology. Shortly after admission patient developed hypotension requiring initiation of vasopressors. RUQ performed and gallbladder was unable to be visualized.Right hepatic mass 1.4 x 2 x 1.3 cm also noted that was not seen on CT abdomen/pelvis w/o contrast from 5/17.  DiagnosticTherapeutic paracentesis on 5/18 with 6.4 L removed, negative for SBP. Pt now continues with resolving sepsis with undetermined organism, ERNESTINA on CKD 3, metabolic acidosis - improving,coagulopathy,  ischemic hepatitis with superior mesenteric vein thrombosis,with expanding thrombus, pancreatitis, and thrombocytopenia.  Patient now off vasopressors.Hepatology following for cirrhosis, not a current transplant candidate.    Per [facility] team Code status changed to DNR per conversation with wife and patient.  Wife understands that Mr. Lyons has ESLD and is not a transplant candidate. Wife also in agreement with home hospice.              Hospital Course:  No notes on file      Past Medical  History:   Diagnosis Date    Anemia     Anticoagulant long-term use     Ascites 12/10/2018    Biliary colic     Cataract     Chronic hepatitis C     Cirrhosis     COPD (chronic obstructive pulmonary disease)     Coronary artery disease     Dyspnea     Epistaxis     Esophageal varices without bleeding     Gallstones     GERD (gastroesophageal reflux disease)     HCC (hepatocellular carcinoma)     Hepatitis B antibody positive     Hyperglycemia     Hyperlipidemia     Hypertension     Hypopituitarism     Mobitz type 2 second degree atrioventricular block     Nuclear sclerosis, left     Pacemaker     Pituitary tumor     Portal hypertension     Pulmonary emphysema     PVT (paroxysmal ventricular tachycardia)     SA node dysfunction     Secondary adrenal insufficiency     Secondary male hypogonadism     SSS (sick sinus syndrome)     Superior mesenteric vein thrombosis     Vitreous hemorrhage, both eyes        Past Surgical History:   Procedure Laterality Date    CARDIAC PACEMAKER PLACEMENT      [facility]    CATARACT EXTRACTION  9/24/13    RT    COLONOSCOPY Left 1/4/2019    Performed by Emanuel Hannah MD at [facility] ENDO (4TH FLR)    CORONARY ANGIOPLASTY WITH STENT PLACEMENT      2 stents    EGD (ESOPHAGOGASTRODUODENOSCOPY) Left 2/19/2019    Performed by [facility]unes [facility] at [facility] ENDO    EGD (ESOPHAGOGASTRODUODENOSCOPY) Left 1/4/2019    Performed by Emanuel Hnanah MD at [facility] ENDO (4TH FLR)    ESOPHAGOGASTRODUODENOSCOPY (EGD) Left 5/31/2018    Performed by Trice Funes MD at [facility] ENDO (4TH FLR)    ESOPHAGOGASTRODUODENOSCOPY (EGD) N/A 11/30/2017    Performed by Trice Funes MD at [facility] ENDO (4TH FLR)    ESOPHAGOGASTRODUODENOSCOPY (EGD) Left 10/12/2017    Performed by Jody Humphries MD at [facility] ENDO (4TH FLR)    ESOPHAGOGASTRODUODENOSCOPY (EGD) N/A 8/17/2017    Performed by Trice Funes MD at [facility] ENDO (4TH FLR)    ESOPHAGOGASTRODUODENOSCOPY (EGD) N/A 7/14/2016     Performed by [facility]unes MD at [facility] ENDO (4TH FLR)    EYE SURGERY      INSERTION, IOL PROSTHESIS Right 2013    Performed by Ben Sparks MD at [facility] OR 1ST FLR    PARACENTESIS, ABDOMINAL N/A 2014    Performed by [facility] CATH LAB    PHACOEMULSIFICATION, CATARACT Right 2013    Performed by Ben [facility]ks MD at [facility] OR 1ST FLR    s/p PPV/AFX/EL (od)  2012       Review of patient's allergies indicates:   Allergen Reactions    Codeine Itching, Rash, Edema and Other (See Comments)     Other reaction(s): Itching       Medications:  Continuous Infusions:  Scheduled Meds:   famotidine  20 mg Oral Daily    fluconazole  200 mg Oral Daily    fluticasone furoate-vilanterol  1 puff Inhalation Daily    hydrocortisone sodium succinate  25 mg Intravenous Q8H    [START ON 2019] hydrocortisone  15 mg Oral Daily    [START ON 2019] hydrocortisone  5 mg Oral After dinner    lactulose  20 g Oral Daily    lidocaine  1 patch Transdermal Q24H    midodrine  10 mg Oral TID    tiotropium  1 capsule Inhalation Daily     PRN Meds:albuterol-ipratropium, dextrose 50 % in water (D50W), glucagon (human recombinant), insulin aspart U-100, ondansetron, sodium chloride 0.9%    Family History     Problem Relation (Age of Onset)    Cancer Father, Sister, Sister, Brother    Diabetes Brother, Sister    Heart disease Mother, Sister    Lung cancer Brother    No Known Problems Sister, Brother        Tobacco Use    Smoking status: Former Smoker     Packs/day: 1.00     Years: 35.00     Pack years: 35.00     Types: Cigarettes     Last attempt to quit: 1995     Years since quittin.9    Smokeless tobacco: Never Used   Substance and Sexual Activity    Alcohol use: No     Alcohol/week: 0.0 oz    Drug use: No    Sexual activity: Not Currently       Review of Systems   Unable to perform ROS: Other   Endocrine: Negative for polydipsia.     Objective:      Vital Signs (Most Recent):  Temp: 97.6 °F (36.4 °C) (05/22/19 1342)  Pulse: 76 (05/22/19 1342)  Resp: 18 (05/22/19 1342)  BP: 110/65 (05/22/19 1342)  SpO2: (!) 93 % (05/22/19 1342) Vital Signs (24h Range):  Temp:  [97.5 °F (36.4 °C)-98.1 °F (36.7 °C)] 97.6 °F (36.4 °C)  Pulse:  [63-76] 76  Resp:  [14-20] 18  SpO2:  [87 %-99 %] 93 %  BP: (100-127)/(59-70) 110/65     Weight: 73.2 kg (161 lb 6 oz)  Body mass index is 23.16 kg/m².    Review of Symptoms  Symptom Assessment (ESAS 0-10 scale)   ESAS 0 1 2 3 4 5 6 7 8 9 10   Pain x             Dyspnea x             Anxiety x             Nausea x             Depression               Anorexia              Fatigue              Insomnia              Restlessness  x             Agitation x             CAM / Delirium _x_ --  ___+   Constipation     _x_ --  ___+   Diarrhea           _x_ --  ___+  Bowel Management Plan (BMP): Yes    OME in 24 hours: 0    Performance Status: 30-40    Physical Exam   Constitutional: He is oriented to person, place, and time. He appears well-developed and well-nourished.   HENT:   Head: Normocephalic and atraumatic.   Cardiovascular: Normal rate and regular rhythm.   Pulmonary/Chest: Effort normal.   Abdominal: Soft. He exhibits distension. There is no tenderness. There is no guarding.   Neurological: He is alert and oriented to person, place, and time.   Skin: Skin is warm and dry.   Psychiatric: He has a normal mood and affect. His behavior is normal.       Significant Labs: All pertinent labs within the past 24 hours have been reviewed.  CBC:   Recent Labs   Lab 05/22/19  0733   WBC 10.05   HGB 9.0*   HCT 28.2*   MCV 84   PLT 62*     BMP:  Recent Labs   Lab 05/22/19  0734   *   *   K 3.6      CO2 20*   BUN 61*   CREATININE 1.8*   CALCIUM 9.2   MG 2.2     LFT:  Lab Results   Component Value Date     (H) 05/22/2019    ALKPHOS 294 (H) 05/22/2019    BILITOT 2.7 (H) 05/22/2019     Albumin:   Albumin   Date Value Ref Range  Status   05/22/2019 2.1 (L) 3.5 - 5.2 g/dL Final   10/06/2014 3.9 3.6 - 5.1 g/dL Final     Comment:     @ Test Performed By:  [facility]  Gagan Heath M.D., FCAP[address]   [address] [place] [address]  CLIA #[id number]       Protein:   Total Protein   Date Value Ref Range Status   05/22/2019 5.8 (L) 6.0 - 8.4 g/dL Final     Lactic acid:   Lab Results   Component Value Date    LACTATE 5.6 (HH) 05/20/2019    LACTATE 7.2 (HH) 05/19/2019       Advanced Directives::  Living Will: No  LaPOST: No  Do Not Resuscitate Status: Yes  Medical Power of : No.  Patient's wife is making decisions with her son along with the patient    Decision-Making Capacity: Patient answered questions, Family answered questions     Living Arrangements: Lives with spouse    Psychosocial/Cultural:  x 54 yrs to his wife with 1 adult son Fox who lives locally  Patient has 2 grandchildren.  Patient has large extended family including sisters,brothers, sister-in laws.  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      Spiritual:     F- Ning and Belief: Uatsdin    I - Importance: yes   .  C - Community: local [facility]    A - Address in Care:  visits.  [place] family is supportive per his wife      > 50% of 35 min visit spent in chart review, face to face discussion of goals of care,  symptom assessment, coordination of care and emotional support.    VICKI Rodriguez, CNS, [facility]  Palliative Medicine  [facility]

## 2019-05-22 NOTE — PT/OT/SLP PROGRESS
Occupational Therapy      Patient Name:  Fox Lyons Sr.   MRN:  [id number]    Patient not seen today secondary to Other (Comment)(Pt workign w/ PT ). Writing therapist attempted pt in AM, but pt working w/ PT. OT unable to return at later time. Will follow-up as scheduled.    Gumaro Stone, OT  5/22/2019

## 2019-05-22 NOTE — PLAN OF CARE
Problem: Physical Therapy Goal  Goal: Physical Therapy Goal  Goals to be met by: 2019     Patient will increase functional independence with mobility by performin. Supine to sit with Set-up [place]  2. Sit to stand transfer with Stand-by Assistance  3. Bed to chair transfer with Stand-by Assistance using Rolling Walker  4. Gait  x 50 feet with Stand-by Assistance using Rolling Walker.   5. Lower extremity exercise program x20 reps per handout, with assistance as needed       Discharge Recommendations: SNF    Pt safe to transfer OOB/BTB with RN/PCT via SPT: Use no AD with mod A of 1 person.    Goals remain appropriate.     Carrol Ivan, PTA.   [phone]   2019

## 2019-05-22 NOTE — PLAN OF CARE
Problem: Adult Inpatient Plan of Care  Goal: Plan of Care Review  Outcome: Ongoing (interventions implemented as appropriate)  Pt free of falls/injuries throughout the shift. Bed locked, in lowest position, call bell within reach. Pt afebrile, pain assessed & denied. VSS, pt in no distress, wife at bs, no acute events will continue to monitor.

## 2019-05-22 NOTE — PLAN OF CARE
05/22/19 1233   Post-Acute Status   Post-Acute Authorization Placement   Post-Acute Placement Status Patient List Provided     SW met with patient and patient spouse at bedside to discuss discharge plan in regard to home hospice. Patient spouse reports that she will need to converse with family member before selecting a home hospice choice. SW will follow up.      Roxann Castro LMSW  [facility]   [id number]

## 2019-05-22 NOTE — PLAN OF CARE
SW met with patient and patient family at bedside to discuss the discharge plan in regard to home hospice. Patient son reports that they are wanting home hospice with the facility in [facility]. Patient son is requesting [facility]IDA to follow up with [facility]  [phone]   that could provide the name of the hospice facility. SW will follow up.          Roxann Castro LMSW  [facility]   [phone]

## 2019-05-23 LAB
ALBUMIN SERPL BCP-MCNC: 2.1 G/DL (ref 3.5–5.2)
ALP SERPL-CCNC: 307 U/L (ref 55–135)
ALT SERPL W/O P-5'-P-CCNC: 239 U/L (ref 10–44)
ANION GAP SERPL CALC-SCNC: 13 MMOL/L (ref 8–16)
AST SERPL-CCNC: 144 U/L (ref 10–40)
BASOPHILS # BLD AUTO: 0.01 K/UL (ref 0–0.2)
BASOPHILS NFR BLD: 0.1 % (ref 0–1.9)
BILIRUB SERPL-MCNC: 2.9 MG/DL (ref 0.1–1)
BUN SERPL-MCNC: 76 MG/DL (ref 8–23)
CALCIUM SERPL-MCNC: 9.2 MG/DL (ref 8.7–10.5)
CHLORIDE SERPL-SCNC: 103 MMOL/L (ref 95–110)
CO2 SERPL-SCNC: 20 MMOL/L (ref 23–29)
CREAT SERPL-MCNC: 2.4 MG/DL (ref 0.5–1.4)
DIFFERENTIAL METHOD: ABNORMAL
EOSINOPHIL # BLD AUTO: 0 K/UL (ref 0–0.5)
EOSINOPHIL NFR BLD: 0 % (ref 0–8)
ERYTHROCYTE [DISTWIDTH] IN BLOOD BY AUTOMATED COUNT: 20.4 % (ref 11.5–14.5)
EST. GFR  (AFRICAN AMERICAN): 29.8 ML/MIN/1.73 M^2
EST. GFR  (NON AFRICAN AMERICAN): 25.8 ML/MIN/1.73 M^2
GLUCOSE SERPL-MCNC: 205 MG/DL (ref 70–110)
HCT VFR BLD AUTO: 30.2 % (ref 40–54)
HGB BLD-MCNC: 9.5 G/DL (ref 14–18)
IMM GRANULOCYTES # BLD AUTO: 0.06 K/UL (ref 0–0.04)
IMM GRANULOCYTES NFR BLD AUTO: 0.5 % (ref 0–0.5)
INR PPP: 2 (ref 0.8–1.2)
LYMPHOCYTES # BLD AUTO: 0.5 K/UL (ref 1–4.8)
LYMPHOCYTES NFR BLD: 3.8 % (ref 18–48)
MAGNESIUM SERPL-MCNC: 2.4 MG/DL (ref 1.6–2.6)
MCH RBC QN AUTO: 26.8 PG (ref 27–31)
MCHC RBC AUTO-ENTMCNC: 31.5 G/DL (ref 32–36)
MCV RBC AUTO: 85 FL (ref 82–98)
MONOCYTES # BLD AUTO: 0.8 K/UL (ref 0.3–1)
MONOCYTES NFR BLD: 6.3 % (ref 4–15)
NEUTROPHILS # BLD AUTO: 10.9 K/UL (ref 1.8–7.7)
NEUTROPHILS NFR BLD: 89.3 % (ref 38–73)
NRBC BLD-RTO: 0 /100 WBC
PHOSPHATE SERPL-MCNC: 4.6 MG/DL (ref 2.7–4.5)
PLATELET # BLD AUTO: 56 K/UL (ref 150–350)
PMV BLD AUTO: 10.4 FL (ref 9.2–12.9)
POCT GLUCOSE: 225 MG/DL (ref 70–110)
POCT GLUCOSE: 246 MG/DL (ref 70–110)
POCT GLUCOSE: 285 MG/DL (ref 70–110)
POTASSIUM SERPL-SCNC: 4 MMOL/L (ref 3.5–5.1)
PROT SERPL-MCNC: 6 G/DL (ref 6–8.4)
PROTHROMBIN TIME: 19.1 SEC (ref 9–12.5)
RBC # BLD AUTO: 3.55 M/UL (ref 4.6–6.2)
SODIUM SERPL-SCNC: 136 MMOL/L (ref 136–145)
WBC # BLD AUTO: 12.24 K/UL (ref 3.9–12.7)

## 2019-05-23 PROCEDURE — 83735 ASSAY OF MAGNESIUM: CPT | Mod: HCNC

## 2019-05-23 PROCEDURE — 11000001 HC ACUTE MED/SURG PRIVATE ROOM: Mod: HCNC

## 2019-05-23 PROCEDURE — 99233 SBSQ HOSP IP/OBS HIGH 50: CPT | Mod: HCNC,,, | Performed by: CLINICAL NURSE SPECIALIST

## 2019-05-23 PROCEDURE — 97530 THERAPEUTIC ACTIVITIES: CPT | Mod: HCNC

## 2019-05-23 PROCEDURE — 85610 PROTHROMBIN TIME: CPT | Mod: HCNC

## 2019-05-23 PROCEDURE — 25000003 PHARM REV CODE 250: Mod: HCNC | Performed by: HOSPITALIST

## 2019-05-23 PROCEDURE — 97535 SELF CARE MNGMENT TRAINING: CPT | Mod: HCNC

## 2019-05-23 PROCEDURE — 25000003 PHARM REV CODE 250: Mod: HCNC | Performed by: INTERNAL MEDICINE

## 2019-05-23 PROCEDURE — 84100 ASSAY OF PHOSPHORUS: CPT | Mod: HCNC

## 2019-05-23 PROCEDURE — 99231 PR SUBSEQUENT HOSPITAL CARE,LEVL I: ICD-10-PCS | Mod: HCNC,,, | Performed by: HOSPITALIST

## 2019-05-23 PROCEDURE — 99233 PR SUBSEQUENT HOSPITAL CARE,LEVL III: ICD-10-PCS | Mod: HCNC,,, | Performed by: CLINICAL NURSE SPECIALIST

## 2019-05-23 PROCEDURE — 99231 SBSQ HOSP IP/OBS SF/LOW 25: CPT | Mod: HCNC,,, | Performed by: HOSPITALIST

## 2019-05-23 PROCEDURE — 85025 COMPLETE CBC W/AUTO DIFF WBC: CPT | Mod: HCNC

## 2019-05-23 PROCEDURE — 80053 COMPREHEN METABOLIC PANEL: CPT | Mod: HCNC

## 2019-05-23 PROCEDURE — 25000003 PHARM REV CODE 250: Mod: HCNC | Performed by: NURSE PRACTITIONER

## 2019-05-23 PROCEDURE — 63600175 PHARM REV CODE 636 W HCPCS: Mod: HCNC | Performed by: HOSPITALIST

## 2019-05-23 PROCEDURE — 25000242 PHARM REV CODE 250 ALT 637 W/ HCPCS: Mod: HCNC | Performed by: NURSE PRACTITIONER

## 2019-05-23 RX ORDER — LOPERAMIDE HYDROCHLORIDE 2 MG/1
2 CAPSULE ORAL 4 TIMES DAILY PRN
Status: DISCONTINUED | OUTPATIENT
Start: 2019-05-23 | End: 2019-05-24 | Stop reason: HOSPADM

## 2019-05-23 RX ADMIN — MIDODRINE HYDROCHLORIDE 10 MG: 5 TABLET ORAL at 10:05

## 2019-05-23 RX ADMIN — FLUTICASONE FUROATE AND VILANTEROL TRIFENATATE 1 PUFF: 100; 25 POWDER RESPIRATORY (INHALATION) at 10:05

## 2019-05-23 RX ADMIN — LOPERAMIDE HYDROCHLORIDE 2 MG: 2 CAPSULE ORAL at 01:05

## 2019-05-23 RX ADMIN — HYDROCORTISONE SODIUM SUCCINATE 25 MG: 100 INJECTION, POWDER, FOR SOLUTION INTRAMUSCULAR; INTRAVENOUS at 05:05

## 2019-05-23 RX ADMIN — FAMOTIDINE 20 MG: 20 TABLET, FILM COATED ORAL at 10:05

## 2019-05-23 RX ADMIN — TIOTROPIUM BROMIDE 18 MCG: 18 CAPSULE ORAL; RESPIRATORY (INHALATION) at 10:05

## 2019-05-23 RX ADMIN — FLUCONAZOLE 200 MG: 100 TABLET ORAL at 10:05

## 2019-05-23 RX ADMIN — HYDROCORTISONE 5 MG: 5 TABLET ORAL at 06:05

## 2019-05-23 RX ADMIN — LIDOCAINE 1 PATCH: 50 PATCH TOPICAL at 01:05

## 2019-05-23 RX ADMIN — MIDODRINE HYDROCHLORIDE 10 MG: 5 TABLET ORAL at 08:05

## 2019-05-23 RX ADMIN — MIDODRINE HYDROCHLORIDE 10 MG: 5 TABLET ORAL at 03:05

## 2019-05-23 NOTE — PT/OT/SLP PROGRESS
Occupational Therapy   Treatment    Name: Fox Lyons Sr.  MRN: [id number]  Admitting Diagnosis:  Septic shock due to undetermined organism       Recommendations:     Discharge Recommendations: nursing facility, skilled  Discharge Equipment Recommendations:  walker, rolling  Barriers to discharge:       Assessment:     Fox Lyons Sr. is a 73 y.o. male with a medical diagnosis of Septic shock due to undetermined organism.  He presents with impaired ADL and functional mobility performance as limited by generalized weakness and impaired endurance. Pt agreeable to therapy however became emotionally labile following toileting episode requiring total (A) for hygiene and max (A) for bed mobility. Pt had ostomy that had been placed in ICU that had been leaking on linens to which nurse was immediately notified. Pt then became emotional and politely declined therapy following bed mobility required for appropriate toileting cleaning.  Performance deficits affecting function are weakness, impaired functional mobilty, impaired endurance, gait instability, impaired balance, impaired self care skills, decreased lower extremity function, decreased coordination, decreased safety awareness, pain. Pt would benefit from continued OT skilled services 3x/wk to improve daily living skills to optimize QOL. Pt is recommended to discharge to SNF at this time.      Rehab Prognosis:  Good; patient would benefit from acute skilled OT services to address these deficits and reach maximum level of function.       Plan:     Patient to be seen 3 x/week to address the above listed problems via self-care/home management, therapeutic activities, therapeutic exercises  · Plan of Care Expires:    · Plan of Care Reviewed with: patient, spouse    Subjective     Pain/Comfort:  · Pain Rating 1: (Numerical rating not given however pt reported pain in abdomen and L side of body )  · Location - Side 1: Left  · Location - Orientation 1:  generalized  · Location 1: abdomen  · Pain Addressed 1: Reposition, Cessation of Activity, Nurse notified, Distraction  · Pain Addressed 2: Cessation of Activity    Objective:     Communicated with: RN prior to session.  Patient found supine with bowel management system, Condom Catheter, ho catheter upon OT entry to room.    General Precautions: Standard, fall   Orthopedic Precautions:N/A   Braces: N/A     Occupational Performance:     Bed Mobility:    · Patient completed Rolling/Turning to Left with  maximal assistance  · Patient completed Rolling/Turning to Right with maximal assistance     Activities of Daily Living:  · Toileting: total assistance for hygiene of ripped ostomy      [facility] 6 Click ADL: 10    Treatment & Education:  Pt educated on role of occupational therapy, POC, and safety during ADLs and functional mobility. Pt and OT discussed importance of safe, continued mobility to optimize daily living skills. Pt verbalized understanding.   Pt completed the following during session: bed mobility, toileting, toileting hygiene.  White board updated during session. Pt given instruction to call for medical staff/nurse for assistance.       Patient left HOB elevated with all lines intact, call button in reach, nurse notified and spouse presentEducation:      GOALS:   Multidisciplinary Problems     Occupational Therapy Goals        Problem: Occupational Therapy Goal    Goal Priority Disciplines Outcome Interventions   Occupational Therapy Goal     OT, PT/OT Ongoing (interventions implemented as appropriate)    Description:  Goals to be met by: 7 days 5/28/19     Patient will increase functional independence with ADLs by performing:    Pt complete t/f to commode with SBA  Pt to complete UE dressing with MIN A  Pt to complete LE dressing with MIN A  Pt to complete toileting with MIN  A                      Time Tracking:     OT Date of Treatment: 05/23/19  OT Start Time: 1049  OT Stop Time: 1116  OT Total Time  (min): 27 min    Billable Minutes:Self Care/Home Management 15 min  Therapeutic Activity 12 min    Trice Calix OT  5/23/2019

## 2019-05-23 NOTE — H&P
Inpatient Radiology Pre-procedure Note    History of Present Illness:  Fox Lyons Sr. is a 73 y.o. male with ESLD and extensive portal vein thrombosis who presents for peritoneal pleurx catheter placement. Patient is pursuing hospice care.    Admission H&P reviewed.  Past Medical History:   Diagnosis Date    Anemia     Anticoagulant long-term use     Ascites 12/10/2018    Biliary colic     Cataract     Chronic hepatitis C     Cirrhosis     COPD (chronic obstructive pulmonary disease)     Coronary artery disease     Dyspnea     Epistaxis     Esophageal varices without bleeding     Gallstones     GERD (gastroesophageal reflux disease)     HCC (hepatocellular carcinoma)     Hepatitis B antibody positive     Hyperglycemia     Hyperlipidemia     Hypertension     Hypopituitarism     Mobitz type 2 second degree atrioventricular block     Nuclear sclerosis, left     Pacemaker     Pituitary tumor     Portal hypertension     Pulmonary emphysema     PVT (paroxysmal ventricular tachycardia)     SA node dysfunction     Secondary adrenal insufficiency     Secondary male hypogonadism     SSS (sick sinus syndrome)     Superior mesenteric vein thrombosis     Vitreous hemorrhage, both eyes      Past Surgical History:   Procedure Laterality Date    CARDIAC PACEMAKER PLACEMENT      st rochelle    CATARACT EXTRACTION  9/24/13    RT    COLONOSCOPY Left 1/4/2019    Performed by [facility] ([facility])    CORONARY ANGIOPLASTY WITH STENT PLACEMENT      2 stents    EGD (ESOPHAGOGASTRODUODENOSCOPY) Left 2/19/2019    Per[facility] by [facility]unes [facility] at [facility] ENDO    EGD (ESOPHAGOGASTRODUODENOSCOPY) Left 1/4/2019    Performed by [facility] MD at [facility] ENDO (4TH FLR)    ESOPHAGOGASTRODUODENOSCOPY (EGD) Left 5/31/2018    Performed by Trice [facility]unes MD at [facility] ENDO (4TH FLR)    ESOPHAGOGASTRODUODENOSCOPY (EGD) N/A 11/30/2017    Performed by Trice Funes MD at [facility] ENDO  (4TH FLR)    ESOPHAGOGASTRODUODENOSCOPY (EGD) Left 10/12/2017    Performed by Jody Humphries MD at [facility] ENDO (4TH FLR)    ESOPHAGOGASTRODUODENOSCOPY (EGD) N/A 8/17/2017    Performed by Trice Funes MD at [facility] ENDO (4TH FLR)    ESOPHAGOGASTRODUODENOSCOPY (EGD) N/A 7/14/2016    Performed by Trice Funes MD at [facility] ENDO (4TH FLR)    EYE SURGERY      INSERTION, IOL PROSTHESIS Right 9/24/2013    Performed by Ben Sparks MD at [facility] OR 1ST FLR    PARACENTESIS, ABDOMINAL N/A 4/16/2014    Performed by [facility] CATH LAB    PHACOEMULSIFICATION, CATARACT Right 9/24/2013    Performed by Ben Sparks MD at [facility] OR 1ST FLR    s/p PPV/AFX/EL (od)  05/09/2012       Review of Systems:   As documented in primary team H&P    Home Meds:   Prior to Admission medications    Medication Sig Start Date End Date Taking? Authorizing Provider   ciprofloxacin HCl (CIPRO) 500 MG tablet Take 1 tablet (500 mg total) by mouth once daily. 3/18/19  Yes Trice Funes MD   fluticasone-umeclidin-vilanter (TRELEGY ELLIPTA) 100-62.5-25 mcg DsDv Inhale 1 puff into the lungs once daily. 11/6/18  Yes Katelyn Ortiz MD   furosemide (LASIX) 40 MG tablet Take 0.5 tablets (20 mg total) by mouth 2 (two) times daily.  Patient taking differently: Take 40 mg by mouth 2 (two) times daily.  12/4/18 12/4/19 Yes Trice Funes MD   hydrocortisone (CORTEF) 10 MG Tab TAKE ONE AND ONE-HALF TABLETS BY MOUTH EVERY MORNING AND ONE-HALF TABLET EVERY EVENING./NO FURTHER REFILLS  NEED FOLLOW UP VISIT 4/11/19  Yes Teo Isbell MD   lactulose (CHRONULAC) 20 gram/30 mL Soln Take 30 mLs (20 g total) by mouth once daily. Please ensure you are having 3-4 bowels movements daily for 100 doses 4/18/19 7/27/19 Yes Randall Brooks MD   losartan (COZAAR) 100 MG tablet Take 1 tablet (100 mg total) by mouth once daily. 5/9/18  Yes Clover White MD   multivitamin-minerals-lutein (CENTRUM SILVER) Tab Take  1 tablet by mouth once daily.     Yes Historical Provider, MD   omeprazole (PRILOSEC) 40 MG capsule TAKE 1 CAPSULE(40 MG) BY MOUTH TWICE DAILY BEFORE MEALS 3/11/19  Yes Clover White MD   pravastatin (PRAVACHOL) 40 MG tablet TAKE 1 TABLET(40 MG) BY MOUTH EVERY EVENING 5/9/18  Yes Clover White MD   spironolactone (ALDACTONE) 100 MG tablet Take 2 tablets (200 mg total) by mouth once daily. 4/19/19 4/18/20 Yes Randall Brooks MD   ondansetron (ZOFRAN) 8 MG tablet Take 1 tablet (8 mg total) by mouth every 8 (eight) hours as needed for Nausea. 5/15/19   Clover White MD     Scheduled Meds:    famotidine  20 mg Oral Daily    fluconazole  200 mg Oral Daily    fluticasone furoate-vilanterol  1 puff Inhalation Daily    [START ON 5/24/2019] hydrocortisone  15 mg Oral Daily    hydrocortisone  5 mg Oral After dinner    lactulose  20 g Oral Daily    lidocaine  1 patch Transdermal Q24H    midodrine  10 mg Oral TID    tiotropium  1 capsule Inhalation Daily     Continuous Infusions:   PRN Meds:albuterol-ipratropium, dextrose 50 % in water (D50W), glucagon (human recombinant), insulin aspart U-100, ondansetron, sodium chloride 0.9%  Anticoagulants/Antiplatelets: no anticoagulation    Allergies:   Review of patient's allergies indicates:   Allergen Reactions    Codeine Itching, Rash, Edema and Other (See Comments)     Other reaction(s): Itching     Sedation Hx: have not been any systemic reactions    Labs:  Recent Labs   Lab 05/22/19  0732   INR 1.9*       Recent Labs   Lab 05/22/19  0733   WBC 10.05   HGB 9.0*   HCT 28.2*   MCV 84   PLT 62*      Recent Labs   Lab 05/22/19  0734   *   *   K 3.6      CO2 20*   BUN 61*   CREATININE 1.8*   CALCIUM 9.2   MG 2.2   *   *   ALBUMIN 2.1*   BILITOT 2.7*         Vitals:  Temp: 98 °F (36.7 °C) (05/23/19 0444)  Pulse: 72 (05/23/19 0444)  Resp: 16 (05/23/19 0444)  BP: (!) 101/59 (05/23/19 0444)  SpO2: (!) 91 % (05/23/19 0444)      Physical Exam:  ASA: 2  Mallampati: 3    General:Ill appearing.   Mental Status: alert and oriented to person, place and time  HEENT: normocephalic, atraumatic  Chest: unlabored breathing  Heart: regular heart rate  Abdomen: distended  Extremity: moves all extremities    Plan:     Image guided peritoneal pleurx catheter placement.   Sedation Plan: Moderate sedation.       Taran Cartagena MD   PGY-2 Radiology.

## 2019-05-23 NOTE — PT/OT/SLP PROGRESS
Physical Therapy  Pt Not Seen    Patient Name:  Fox Lyons .   MRN:  [id number]    Patient not seen today secondary to  Other (Comment)(OT (Trice) reports pt on hold per RN this date.  ). Will follow-up on next scheduled visit.    Carrol Ivan, PTA  5/23/2019

## 2019-05-23 NOTE — PLAN OF CARE
Problem: Adult Inpatient Plan of Care  Goal: Plan of Care Review  Outcome: Ongoing (interventions implemented as appropriate)  Pt remains free of falls or injury throughout shift. Pt turned and repositioned. FMS intact and condom cath in place. Pt family at bedside. Call light within reach. Bed in low position. Fall precautions maintained. Will continue to monitor.

## 2019-05-23 NOTE — SUBJECTIVE & OBJECTIVE
Past Medical History:   Diagnosis Date    Anemia     Anticoagulant long-term use     Ascites 12/10/2018    Biliary colic     Cataract     Chronic hepatitis C     Cirrhosis     COPD (chronic obstructive pulmonary disease)     Coronary artery disease     Dyspnea     Epistaxis     Esophageal varices without bleeding     Gallstones     GERD (gastroesophageal reflux disease)     HCC (hepatocellular carcinoma)     Hepatitis B antibody positive     Hyperglycemia     Hyperlipidemia     Hypertension     Hypopituitarism     Mobitz type 2 second degree atrioventricular block     Nuclear sclerosis, left     Pacemaker     Pituitary tumor     Portal hypertension     Pulmonary emphysema     PVT (paroxysmal ventricular tachycardia)     SA node dysfunction     Secondary adrenal insufficiency     Secondary male hypogonadism     SSS (sick sinus syndrome)     Superior mesenteric vein thrombosis     Vitreous hemorrhage, both eyes        Past Surgical History:   Procedure Laterality Date    CARDIAC PACEMAKER PLACEMENT      st rochelle    CATARACT EXTRACTION  9/24/13    RT    COLONOSCOPY Left 1/4/2019    Performed by [facility] at [facility] ENDO (4TH FLR)    CORONARY ANGIOPLASTY WITH STENT PLACEMENT      2 stents    EGD (ESOPHAGOGASTRODUODENOSCOPY) Left 2/19/2019    [facility]    EGD (ESOPHAGOGASTRODUODENOSCOPY) Left 1/4/2019    Performed by Emanuel Hannah MD at [facility] ENDO (4TH FLR)    ESOPHAGOGASTRODUODENOSCOPY (EGD) Left 5/31/2018    Performed by Tr[facility] MD at [facility] ENDO (4TH FLR)    ESOPHAGOGASTRODUODENOSCOPY (EGD) N/A 11/30/2017    Performed by Trice Funes MD at [facility] ENDO (4TH FLR)    ESOPHAGOGASTRODUODENOSCOPY (EGD) Left 10/12/2017    Performed by Jody Humphries MD at [facility] ENDO (4TH FLR)    ESOPHAGOGASTRODUODENOSCOPY (EGD) N/A 8/17/2017    Performed by Trice [facility] MD at [facility] ENDO (4TH FLR)    ESOPHAGOGASTRODUODENOSCOPY (EGD)  N/A 2016    [facility] at [facility] ENDO (4TH FLR)    EYE SURGERY      INSERTION, IOL PROSTHESIS Right 2013    Performed by Ben [facility]ks MD at [facility] OR 1ST FLR    PARACENTESIS, ABDOMINAL N/A 2014    Performed by [facility] Provider [facility] CATH LAB    PHACOEMULSIFICATION, CATARACT Right 2013    Performed by [facility] at [facility] OR 1ST FLR    s/p PPV/AFX/EL (od)  2012       Review of patient's allergies indicates:   Allergen Reactions    Codeine Itching, Rash, Edema and Other (See Comments)     Other reaction(s): Itching       Medications:  Continuous Infusions:  Scheduled Meds:   famotidine  20 mg Oral Daily    fluconazole  200 mg Oral Daily    fluticasone furoate-vilanterol  1 puff Inhalation Daily    [START ON 2019] hydrocortisone  15 mg Oral Daily    hydrocortisone  5 mg Oral After dinner    lactulose  20 g Oral Daily    lidocaine  1 patch Transdermal Q24H    midodrine  10 mg Oral TID    tiotropium  1 capsule Inhalation Daily     PRN Meds:albuterol-ipratropium, dextrose 50 % in water (D50W), glucagon (human recombinant), insulin aspart U-100, loperamide, ondansetron, sodium chloride 0.9%    Family History     Problem Relation (Age of Onset)    Cancer Father, Sister, Sister, Brother    Diabetes Brother, Sister    Heart disease Mother, Sister    Lung cancer Brother    No Known Problems Sister, Brother        Tobacco Use    Smoking status: Former Smoker     Packs/day: 1.00     Years: 35.00     Pack years: 35.00     Types: Cigarettes     Last attempt to quit: 1995     Years since quittin.9    Smokeless tobacco: Never Used   Substance and Sexual Activity    Alcohol use: No     Alcohol/week: 0.0 oz    Drug use: No    Sexual activity: Not Currently       Review of Systems   Unable to perform ROS: Other   Endocrine: Negative for polydipsia.     Objective:     Vital Signs (Most Recent):  Temp: 98 °F (36.7 °C)  (05/23/19 1124)  Pulse: 79 (05/23/19 1124)  Resp: 19 (05/23/19 1124)  BP: (!) 98/54 (05/23/19 1124)  SpO2: (!) 88 % (05/23/19 1124) Vital Signs (24h Range):  Temp:  [97.5 °F (36.4 °C)-98.7 °F (37.1 °C)] 98 °F (36.7 °C)  Pulse:  [67-94] 79  Resp:  [16-19] 19  SpO2:  [88 %-95 %] 88 %  BP: ()/(54-67) 98/54     Weight: 73.2 kg (161 lb 6 oz)  Body mass index is 23.16 kg/m².    Review of Symptoms  Symptom Assessment (ESAS 0-10 scale)   ESAS 0 1 2 3 4 5 6 7 8 9 10   Pain x             Dyspnea x             Anxiety x             Nausea x             Depression               Anorexia              Fatigue              Insomnia              Restlessness  x             Agitation x             CAM / Delirium _x_ --  ___+   Constipation     _x_ --  ___+   Diarrhea           _x_ --  ___+  Bowel Management Plan (BMP): Yes    OME in 24 hours: 0    Performance Status: 30-40    Physical Exam   Constitutional: He is oriented to person, place, and time. He appears well-developed and well-nourished.   HENT:   Head: Normocephalic and atraumatic.   Cardiovascular: Normal rate and regular rhythm.   Pulmonary/Chest: Effort normal.   Abdominal: Soft. He exhibits distension. There is no tenderness. There is no guarding.   Neurological: He is alert and oriented to person, place, and time.   Skin: Skin is warm and dry.   Psychiatric: He has a normal mood and affect. His behavior is normal.       CBC:   Recent Labs   Lab 05/23/19  0747   WBC 12.24   HGB 9.5*   HCT 30.2*   MCV 85   PLT 56*     BMP:  Recent Labs   Lab 05/23/19  0747   *      K 4.0      CO2 20*   BUN 76*   CREATININE 2.4*   CALCIUM 9.2   MG 2.4     LFT:  Lab Results   Component Value Date     (H) 05/23/2019    ALKPHOS 307 (H) 05/23/2019    BILITOT 2.9 (H) 05/23/2019     Albumin:   Albumin   Date Value Ref Range Status   05/23/2019 2.1 (L) 3.5 - 5.2 g/dL Final   10/06/2014 3.9 3.6 - 5.1 g/dL Final     Comment:     @ Test Performed By:  [facility]  Gagan Heath M.D., FCAP.,   [address] [place] [address]  [facility] #[id number]       Protein:   Total Protein   Date Value Ref Range Status   05/23/2019 6.0 6.0 - 8.4 g/dL Final     Lactic acid:   Lab Results   Component Value Date    LACTATE 5.6 (HH) 05/20/2019    LACTATE 7.2 () 05/19/2019       Advanced Directives::  Living Will: No  LaPOST: No  Do Not Resuscitate Status: Yes  Medical Power of : No.  Patient's wife is making decisions with her son along with the patient    Decision-Making Capacity: Patient answered questions, Family answered questions     Living Arrangements: Lives with spouse    Psychosocial/Cultural:  x 54 yrs to his wife with 1 adult son Fox who lives locally  Patient has 2 grandchildren.  Patient has large extended family including sisters,brothers, sister-in laws.  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      Spiritual:     F- Ning and Belief: Hoahaoism    I - Importance: yes   .  C - Community: local [employer]    A - Address in Care:  visits.  Orthodoxy family is supportive per his wife

## 2019-05-23 NOTE — PLAN OF CARE
Problem: Occupational Therapy Goal  Goal: Occupational Therapy Goal  Goals to be met by: 7 days 5/28/19     Patient will increase functional independence with ADLs by performing:    Pt complete t/f to commode with SBA  Pt to complete UE dressing with MIN A  Pt to complete LE dressing with MIN A  Pt to complete toileting with MIN  A     Outcome: Ongoing (interventions implemented as appropriate)  Continue OT POC.  Trice Calix, OT  5/23/2019

## 2019-05-23 NOTE — PROGRESS NOTES
[facility]  Palliative Medicine  Progress Note    Patient Name: oFx Lyons Sr.  MRN: [id number]  Admission Date: 5/16/2019  Hospital Length of Stay: 7 days  Code Status: DNR   Attending Provider: Yang Skinner MD  Consulting Provider: VICKI Rodriguez, CNS  Primary Care Physician: Clover White MD  Principal Problem:Septic shock due to undetermined organism    Patient information was obtained from patient, spouse/SO, past medical records and Dr. Skinner.      Assessment/Plan:     Palliative care encounter  Impression:  73 yr old gentlemen with decline from ESLD with resolving sepsis with undetermined organism, ERNESTINA on CKD 3, metabolic acidosis - improving,coagulopathy, ischemic hepatitis with superior mesenteric vein thrombosis,with expanding thrombus, pancreatitis, and thrombocytopenia. DNR status   Wife understands that Mr. Lyons has ESLD and is not a transplant candidate. Pt,  Pt's Wife and son in agreement with home hospice and have chosen hospice provider.  Patient, his wife and son are meeting with [facility] today.       Recommendations:  1) Home hospice care. Pt and his family have chosen [facility] as their provider of choice. Please contact pt's son Ivan [phone] to set up informational visit for pt., wife and son.  2)  Patient' s wife will need additional support, Pt's son aware and will assist with plan for this.    3) IR assessment paracentesis /pleurex catheter for ascites management prior to discharge in process.  Discussed with .   Patient's wife is requesting to continue flexaseal incontinence management system.  OK with Dr. Washington.  Orders placed. Spoke with [facility], will assist family in managing pts' flexaseal incontinence management system.    4) IM team starting prn loperamide         Goals of Care  Discussion conducted by this APRN with the pt, pt's wife and son to discuss decision regarding goals of care.  Pt alert and more participative today.   Hospice care discussed and they have agreed to [facility] hospice care.  Patient's abdomen soft with more distention noted.  Discussed ascites management briefly.    Pt,  Pt's Wife and son in agreement with home hospice and have chosen hospice provider.  Patient, his wife and son are meeting with [facility] today.   IR assessment paracentesis /pleurex catheter for ascites management in process.  Spoke with [facility], will assist family in managing pts' flexaseal incontinence management system.                  I will follow-up with patient. Please contact us if you have any additional questions.    Subjective:     Chief Complaint:   Chief Complaint   Patient presents with    Hypotension     Pt told to come to ED by home health nurse-wife reports 57/38 approx 1 hr ago.        HPI:   73 y.o. 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    Pt was admitted to [facility] evening of 5/16 for pancreatitis and sepsis of unclear etiology. Shortly after admission patient developed hypotension requiring initiation of vasopressors. RUQ performed and gallbladder was unable to be visualized.Right hepatic mass 1.4 x 2 x 1.3 cm also noted that was not seen on CT abdomen/pelvis w/o contrast from 5/17.  DiagnosticTherapeutic paracentesis on 5/18 with 6.4 L removed, negative for SBP. Pt now continues with resolving sepsis with undetermined organism, ERNESTINA on CKD 3, metabolic acidosis - improving,coagulopathy,  ischemic hepatitis with superior mesenteric vein thrombosis,with expanding thrombus, pancreatitis, and thrombocytopenia.  Patient now off vasopressors.Hepatology  following for cirrhosis, not a current transplant candidate.    Per [facility] team Code status changed to DNR per conversation with wife and patient.  Wife understands that Mr. Lyons has ESLD and is not a transplant candidate. Wife also in agreement with home hospice.              Hospital Course:  No notes on file      Past Medical History:   Diagnosis Date    Anemia     Anticoagulant long-term use     Ascites 12/10/2018    Biliary colic     Cataract     Chronic hepatitis C     Cirrhosis     COPD (chronic obstructive pulmonary disease)     Coronary artery disease     Dyspnea     Epistaxis     Esophageal varices without bleeding     Gallstones     GERD (gastroesophageal reflux disease)     HCC (hepatocellular carcinoma)     Hepatitis B antibody positive     Hyperglycemia     Hyperlipidemia     Hypertension     Hypopituitarism     Mobitz type 2 second degree atrioventricular block     Nuclear sclerosis, left     Pacemaker     Pituitary tumor     Portal hypertension     Pulmonary emphysema     PVT (paroxysmal ventricular tachycardia)     SA node dysfunction     Secondary adrenal insufficiency     Secondary male hypogonadism     SSS (sick sinus syndrome)     Superior mesenteric vein thrombosis     Vitreous hemorrhage, both eyes        Past Surgical History:   Procedure Laterality Date    CARDIAC PACEMAKER PLACEMENT      [facility]    CATARACT EXTRACTION  9/24/13    RT    COLONOSCOPY Left 1/4/2019    Performed by Emanuel Hannah MD at [facility] ENDO (4TH FLR)    CORONARY ANGIOPLASTY WITH STENT PLACEMENT      2 stents    EGD (ESOPHAGOGASTRODUODENOSCOPY) Left 2/19/2019    Performed by Trice Funes [facility] at [facility] ENDO    EGD (ESOPHAGOGASTRODUODENOSCOPY) Left 1/4/2019    Performed by Emanuel Hananh MD at [facility] ENDO (4TH FLR)    ESOPHAGOGASTRODUODENOSCOPY (EGD) Left 5/31/2018    Performed by Trice Funes MD at [facility] ENDO (4TH FLR)    ESOPHAGOGASTRODUODENOSCOPY (EGD) N/A 11/30/2017     Performed by [facility] MD at [facility] ENDO (4TH FLR)    ESOPHAGOGASTRODUODENOSCOPY (EGD) Left 10/12/2017    Performed by Jody Humphries MD at [facility] ENDO (4TH FLR)    ESOPHAGOGASTRODUODENOSCOPY (EGD) N/A 2017    Performed by Trice Funes MD at [facility] ENDO (4TH FLR)    ESOPHAGOGASTRODUODENOSCOPY (EGD) N/A 2016    Performed by Trice Funes MD at [facility] ENDO (4TH FLR)    EYE SURGERY      INSERTION, IOL PROSTHESIS Right 2013    Performed by Ben Sparks MD at [facility] OR 1ST FLR    PARACENTESIS, ABDOMINAL N/A 2014    Performed by [facility] Diagnostic Provider at [facility] CATH LAB    PHACOEMULSIFICATION, CATARACT Right 2013    Performed by Ben Sparks MD at [facility] OR 1ST FLR    s/p PPV/AFX/EL (od)  2012       Review of patient's allergies indicates:   Allergen Reactions    Codeine Itching, Rash, Edema and Other (See Comments)     Other reaction(s): Itching       Medications:  Continuous Infusions:  Scheduled Meds:   famotidine  20 mg Oral Daily    fluconazole  200 mg Oral Daily    fluticasone furoate-vilanterol  1 puff Inhalation Daily    [START ON 2019] hydrocortisone  15 mg Oral Daily    hydrocortisone  5 mg Oral After dinner    lactulose  20 g Oral Daily    lidocaine  1 patch Transdermal Q24H    midodrine  10 mg Oral TID    tiotropium  1 capsule Inhalation Daily     PRN Meds:albuterol-ipratropium, dextrose 50 % in water (D50W), glucagon (human recombinant), insulin aspart U-100, loperamide, ondansetron, sodium chloride 0.9%    Family History     Problem Relation (Age of Onset)    Cancer Father, Sister, Sister, Brother    Diabetes Brother, Sister    Heart disease Mother, Sister    Lung cancer Brother    No Known Problems Sister, Brother        Tobacco Use    Smoking status: Former Smoker     Packs/day: 1.00     Years: 35.00     Pack years: 35.00     Types: Cigarettes     Last attempt to quit: 1995     Years since quittin.9    Smokeless  tobacco: Never Used   Substance and Sexual Activity    Alcohol use: No     Alcohol/week: 0.0 oz    Drug use: No    Sexual activity: Not Currently       Review of Systems   Unable to perform ROS: Other   Endocrine: Negative for polydipsia.     Objective:     Vital Signs (Most Recent):  Temp: 98 °F (36.7 °C) (05/23/19 1124)  Pulse: 79 (05/23/19 1124)  Resp: 19 (05/23/19 1124)  BP: (!) 98/54 (05/23/19 1124)  SpO2: (!) 88 % (05/23/19 1124) Vital Signs (24h Range):  Temp:  [97.5 °F (36.4 °C)-98.7 °F (37.1 °C)] 98 °F (36.7 °C)  Pulse:  [67-94] 79  Resp:  [16-19] 19  SpO2:  [88 %-95 %] 88 %  BP: ()/(54-67) 98/54     Weight: 73.2 kg (161 lb 6 oz)  Body mass index is 23.16 kg/m².    Review of Symptoms  Symptom Assessment (ESAS 0-10 scale)   ESAS 0 1 2 3 4 5 6 7 8 9 10   Pain x             Dyspnea x             Anxiety x             Nausea x             Depression               Anorexia              Fatigue              Insomnia              Restlessness  x             Agitation x             CAM / Delirium _x_ --  ___+   Constipation     _x_ --  ___+   Diarrhea           _x_ --  ___+  Bowel Management Plan (BMP): Yes    OME in 24 hours: 0    Performance Status: 30-40    Physical Exam   Constitutional: He is oriented to person, place, and time. He appears well-developed and well-nourished.   HENT:   Head: Normocephalic and atraumatic.   Cardiovascular: Normal rate and regular rhythm.   Pulmonary/Chest: Effort normal.   Abdominal: Soft. He exhibits distension. There is no tenderness. There is no guarding.   Neurological: He is alert and oriented to person, place, and time.   Skin: Skin is warm and dry.   Psychiatric: He has a normal mood and affect. His behavior is normal.       CBC:   Recent Labs   Lab 05/23/19  0747   WBC 12.24   HGB 9.5*   HCT 30.2*   MCV 85   PLT 56*     BMP:  Recent Labs   Lab 05/23/19  0747   *      K 4.0      CO2 20*   BUN 76*   CREATININE 2.4*   CALCIUM 9.2   MG 2.4      LFT:  Lab Results   Component Value Date     (H) 05/23/2019    ALKPHOS 307 (H) 05/23/2019    BILITOT 2.9 (H) 05/23/2019     Albumin:   Albumin   Date Value Ref Range Status   05/23/2019 2.1 (L) 3.5 - 5.2 g/dL Final   10/06/2014 3.9 3.6 - 5.1 g/dL Final     Comment:     @ Test Performed By:  [facility]  Gagan Heath M.D., FCAP.,   [address] [place] [address]  [facility] #[id number]       Protein:   Total Protein   Date Value Ref Range Status   05/23/2019 6.0 6.0 - 8.4 g/dL Final     Lactic acid:   Lab Results   Component Value Date    LACTATE 5.6 (HH) 05/20/2019    LACTATE 7.2 (HH) 05/19/2019       Advanced Directives::  Living Will: No  LaPOST: No  Do Not Resuscitate Status: Yes  Medical Power of : No.  Patient's wife is making decisions with her son along with the patient    Decision-Making Capacity: Patient answered questions, Family answered questions     Living Arrangements: Lives with spouse    Psychosocial/Cultural:  x 54 yrs to his wife with 1 adult son Fox who lives locally  Patient has 2 grandchildren.  Patient has large extended family including sisters,brothers, sister-in laws.  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      Spiritual:     F- Ning and Belief: Mormonism    I - Importance: yes   .  C - Community: local [facility]    A - Address in Care:  visits.  Worship family is supportive per his wife      > 50% of 35 min visit spent in chart review, face to face discussion of goals of care,  symptom assessment, coordination of care and emotional support.    VICKI Rodriguez, CNS  Palliative Medicine  [facility]

## 2019-05-23 NOTE — ASSESSMENT & PLAN NOTE
Impression:  73 yr old gentlemen with decline from ESLD with resolving sepsis with undetermined organism, ERNESTINA on CKD 3, metabolic acidosis - improving,coagulopathy, ischemic hepatitis with superior mesenteric vein thrombosis,with expanding thrombus, pancreatitis, and thrombocytopenia. DNR status   Wife understands that Mr. Lyons has ESLD and is not a transplant candidate. Pt,  Pt's Wife and son in agreement with home hospice and have chosen hospice provider.  Patient, his wife and son are meeting with [facility] today.       Recommendations:  1) Home hospice care. Pt and his family have chosen [facility] as their provider of choice. Please contact pt's son Ivan [phone] to set up informational visit for pt., wife and son.  2)  Patient' s wife will need additional support, Pt's son aware and will assist with plan for this.    3) IR assessment paracentesis /pleurex catheter for ascites management prior to discharge in process.  Discussed with .   Patient's wife is requesting to continue flexaseal incontinence management system.  OK with Dr. Washington.  Orders placed. Spoke with [facility], will assist family in managing pts' flexaseal incontinence management system.    4) IM team starting prn loperamide         Goals of Care  Discussion conducted by this APRN with the pt, pt's wife and son to discuss decision regarding goals of care. Pt alert and more participative today.   Hospice care discussed and they have agreed to [facility] hospice care.  Patient's abdomen soft with more distention noted.  Discussed ascites management briefly.    Pt,  Pt's Wife and son in agreement with home hospice and have chosen hospice provider.  Patient, his wife and son are meeting with [facility] today.   IR assessment paracentesis /pleurex catheter for ascites management in process.  Spoke with [facility], will assist family in managing pts' flexaseal incontinence  management system.

## 2019-05-23 NOTE — PROGRESS NOTES
[facility] Hospital Medicine  Progress Note    Patient Name: Fox Lyons Sr.  MRN: [id number]  Patient Class: IP- Inpatient   Admission Date: 5/16/2019  Length of Stay: 6 days  Attending Physician: Yang Skinner MD  Primary Care Provider: Clover White MD    [facility] Medicine Team: [facility] [place] Yang Skinner MD    Subjective:     Principal Problem:Septic shock due to undetermined organism    HPI:    Fox Lyons Sr. is a 72 y/o with Cirrhosis, Endstage liver disease, Portal vein thrombosis, SMV thrombosis, Pituitary tumor resection with iatrogenic Adrenal Insufficiency admitted to MICU for Abdominal Pain and Septic Shock     Hospital Course:   Per ICU  Mr. Lyons was admitted to [facility] evening of 5/16 for pancreatitis and sepsis of unclear etiology. Shortly after admission patient developed hypotension requiring initiation of vasopressors. RUQ performed and gallbladder was unable to be visualized. Diagnostic paracentesis performed which was negative for SBP. HIDA scan showing patent portal flow. The am of 05/19, his lactate remains elevated and vasopressin added overnight. Shock liver worsening. Therapeutic paracentesis on 5/18 with 6.4 L removed, negative for SBP; cultures pending.  Blood cultures remain NGTD. MRCP considered but pacemaker present. Hepatology following for cirrhosis, not a current transplant candidate.  US liver with doppler revealed expansion with complete occlusive thrombus of superior mesenteric vein with unchanged thrombus in the main portal vein and right anterior portal vein. Anticoagulation not initiated due to coagulopathy and thrombocytopenia. Right hepatic mass 1.4 x 2 x 1.3 cm also noted that was not seen on CT abdomen/pelvis w/o contrast from 5/17. AFP wnl. CEA elevated. Midodrine added on 5/19, weaned off vasopressors on 5/20.  Completed 5 days of vancomycin and pip/tazo.  No clear source of infection with cultures NGTD; however, suspicious for intra  abdominal source. Continue fluconazole for 5 days for oral thrush. Increase midodrine and began weaning stress dose steroids to home regimen.  Code status changed to DNR per conversation with wife and patient.  Wife understands that Mr. Lyons has ESLD and is not a transplant candidate. Wife also in agreement with home hospice.  Appreciate palliative care.         Verbal handoff  Patient presented with abdominal pain, initial blood cultures negative, HIDA negative, paracentesis and ascitic fluid studies negative for SBP.  Patient found on imaging with a possible hepatic mass in the right hepatic lobe.   Patient is not a transplant candidate      Per ICU discussions with patient's spouse, plan will be to pursue home hospice care. Palliative Care team consulted and following.     Interval History: Patient working with PT/OT at bedside today,     Review of Systems   Unable to perform ROS: Mental status change     Objective:     Vital Signs (Most Recent):  Temp: 98.2 °F (36.8 °C) (05/22/19 2054)  Pulse: 67 (05/22/19 2054)  Resp: 16 (05/22/19 2054)  BP: 109/65 (05/22/19 2054)  SpO2: (!) 90 % (05/22/19 2054) Vital Signs (24h Range):  Temp:  [97.5 °F (36.4 °C)-98.2 °F (36.8 °C)] 98.2 °F (36.8 °C)  Pulse:  [63-76] 67  Resp:  [16-20] 16  SpO2:  [87 %-93 %] 90 %  BP: (100-120)/(60-70) 109/65     Weight: 73.2 kg (161 lb 6 oz)  Body mass index is 23.16 kg/m².    Intake/Output Summary (Last 24 hours) at 5/22/2019 2059  Last data filed at 5/22/2019 0807  Gross per 24 hour   Intake 60 ml   Output 740 ml   Net -680 ml      Limited exam due to ongoing PT?OT eval.     Physical Exam   Constitutional: No distress.   HENT:   Temporal wasting   Eyes: Scleral icterus is present.   Pulmonary/Chest: No respiratory distress.   Abdominal: Soft. He exhibits distension. There is no guarding.   Musculoskeletal: He exhibits edema.   Skin: Skin is warm and dry.       Significant Labs:   CBC:   Recent Labs   Lab 05/21/19  0253 05/22/19  0733   WBC  7.01 10.05   HGB 8.3* 9.0*   HCT 26.3* 28.2*   PLT 46* 62*     CMP:   Recent Labs   Lab 05/21/19  0253 05/22/19  0734   * 134*   K 3.4* 3.6    104   CO2 20* 20*   * 200*   BUN 48* 61*   CREATININE 1.5* 1.8*   CALCIUM 8.9 9.2   PROT 5.5* 5.8*   ALBUMIN 2.1* 2.1*   BILITOT 2.9* 2.7*   ALKPHOS 279* 294*   * 185*   * 297*   ANIONGAP 10 10   EGFRNONAA 45.5* 36.5*     Lactic Acid: No results for input(s): LACTATE in the last 48 hours.    Significant Imaging: I have reviewed all pertinent imaging results/findings within the past 24 hours.    Assessment/Plan:      Septic shock due to undetermined organism  --unclear etiology; concern for possible intraabdominal source along with worsening, now completely occluded SMV  --leukocytosis resolved. Afebrile.   Lactate level remains elevated likely from ongoing SMV (now completely occluded) and PV thrombus and poor clearance due to cirrhosis.  --CXR notable for bilateral pleural effusions L > R, UA appearing noninfectious  --diagnostic para negative for SBP  --Blood culture NGTD  --completed 5 days of vancomycin and zosyn  --weaned off norepinephrine and vasopressin on 5/20.   --continue midodrine    Superior mesenteric vein thrombosis  --now completely occluded on ultrasound doppler.  --considered anticoagulation however given worsening thrombocytopenia and coagulopathy; will not initiate at this time.     Ischemic hepatitis  --in the setting of shock and expanding SMV thrombus.  LFTs downtrending following therapeutic paracentesis.   --AFP wnl, CEA elevated in the setting of hepatic mass noted on ultrasound; however mass was not noted on recent CT imaging.       --Discussed with Hepatology on 5/20, patient is not a liver transplant candidate given co-morbidities and age.  -Palliative care recommending Eval for abdominal peritoneal drain, will place IR consult      Pancreatitis  --lipase 324 on admit, h/o gallstones on prior imagaing  --unable to  visual gallbladder on RUQ US secondary to overlying bowel gas  --triglyceride level WNL  --IVF hydration & supportive care.   --HIDA scan with patent cystic and common bile duct  --Pacemaker prevents MRCP       \Endocrine  Secondary adrenal insufficiency  --s/p gamma knife radiosurgery for pituitary adenoma   Weaning orders for IV hydrocort placed, and for pt to resume home hydrocortisone dosing of 15mg PO AM and 5mg PO PM when IV dose weaned off.  Discussed with endocrine over phone, no indication for any higher dose steroids as a prophylactic measure, only reason to titrate hydrocortisone dosages is based on actual clinical signs/symptoms of hypotension on baseline maintenance dose.     Goals of care, counseling/discussion  --PER ICU and Palliative care discussions - Code Status is DNR  - Discharge plan for Home with Home Hospice.     Active Diagnoses:    Diagnosis Date Noted POA    PRINCIPAL PROBLEM:  Septic shock due to undetermined organism [A41.9, R65.21] 05/17/2019 Yes    DNR (do not resuscitate) [Z66]  Unknown    End-stage liver disease [K72.90] 05/21/2019 Yes    Palliative care encounter [Z51.5] 05/21/2019 Not Applicable    Goals of care, counseling/discussion [Z71.89] 05/21/2019 Not Applicable    Coagulopathy [D68.9] 05/19/2019 Yes    Oral thrush [B37.0] 05/19/2019 Clinically Undetermined    Ischemic hepatitis [K75.9] 05/18/2019 Unknown    Metabolic acidosis, normal anion gap (NAG) [E87.2] 05/17/2019 Yes    Pancreatitis [K85.90] 05/16/2019 Yes    Other ascites [R18.8] 12/10/2018 Unknown    History of hepatitis C [Z86.19] 06/14/2016 Yes    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] 06/09/2016 Yes    Chronic obstructive pulmonary disease (COPD) [J44.9] 05/04/2016 Yes    ERNESTINA (acute kidney injury) [N17.9] 05/04/2016 Yes    Thrombocytopenia [D69.6] 05/04/2016 Yes    Secondary adrenal insufficiency [E27.49] 03/31/2015 Yes    GERD (gastroesophageal reflux disease) [K21.9] 10/06/2014 Yes    Sick  sinus syndrome [I49.5] 06/28/2013 Yes     Chronic    Hyperlipidemia [E78.5]  Yes     Chronic    Hypertension [I10]  Yes     Chronic    Coronary artery disease [I25.10]  Yes     Chronic      Problems Resolved During this Admission:     VTE Risk Mitigation (From admission, onward)        Ordered     Place sequential compression device  Until discontinued      05/16/19 2206     IP VTE HIGH RISK PATIENT  Once      05/16/19 2206             Yang Skinner MD  Department of Hospital Medicine   [facility]

## 2019-05-24 VITALS
SYSTOLIC BLOOD PRESSURE: 124 MMHG | BODY MASS INDEX: 23.1 KG/M2 | HEART RATE: 71 BPM | HEIGHT: 70 IN | OXYGEN SATURATION: 94 % | DIASTOLIC BLOOD PRESSURE: 70 MMHG | RESPIRATION RATE: 14 BRPM | TEMPERATURE: 97 F | WEIGHT: 161.38 LBS

## 2019-05-24 PROBLEM — K85.90 PANCREATITIS: Status: RESOLVED | Noted: 2019-05-16 | Resolved: 2019-05-24

## 2019-05-24 PROBLEM — B37.0 ORAL THRUSH: Status: RESOLVED | Noted: 2019-05-19 | Resolved: 2019-05-24

## 2019-05-24 LAB
ALBUMIN SERPL BCP-MCNC: 1.9 G/DL (ref 3.5–5.2)
ALP SERPL-CCNC: 291 U/L (ref 55–135)
ALT SERPL W/O P-5'-P-CCNC: 199 U/L (ref 10–44)
ANION GAP SERPL CALC-SCNC: 11 MMOL/L (ref 8–16)
AST SERPL-CCNC: 122 U/L (ref 10–40)
BACTERIA SPEC ANAEROBE CULT: NORMAL
BASOPHILS # BLD AUTO: 0.01 K/UL (ref 0–0.2)
BASOPHILS NFR BLD: 0.1 % (ref 0–1.9)
BILIRUB SERPL-MCNC: 3.4 MG/DL (ref 0.1–1)
BUN SERPL-MCNC: 88 MG/DL (ref 8–23)
CALCIUM SERPL-MCNC: 9.3 MG/DL (ref 8.7–10.5)
CHLORIDE SERPL-SCNC: 103 MMOL/L (ref 95–110)
CO2 SERPL-SCNC: 19 MMOL/L (ref 23–29)
CREAT SERPL-MCNC: 2.7 MG/DL (ref 0.5–1.4)
DIFFERENTIAL METHOD: ABNORMAL
EOSINOPHIL # BLD AUTO: 0 K/UL (ref 0–0.5)
EOSINOPHIL NFR BLD: 0.1 % (ref 0–8)
ERYTHROCYTE [DISTWIDTH] IN BLOOD BY AUTOMATED COUNT: 20.3 % (ref 11.5–14.5)
EST. GFR  (AFRICAN AMERICAN): 25.9 ML/MIN/1.73 M^2
EST. GFR  (NON AFRICAN AMERICAN): 22.4 ML/MIN/1.73 M^2
GLUCOSE SERPL-MCNC: 176 MG/DL (ref 70–110)
HCT VFR BLD AUTO: 30.8 % (ref 40–54)
HGB BLD-MCNC: 9.4 G/DL (ref 14–18)
IMM GRANULOCYTES # BLD AUTO: 0.08 K/UL (ref 0–0.04)
IMM GRANULOCYTES NFR BLD AUTO: 0.6 % (ref 0–0.5)
INR PPP: 2.1 (ref 0.8–1.2)
LYMPHOCYTES # BLD AUTO: 0.6 K/UL (ref 1–4.8)
LYMPHOCYTES NFR BLD: 4.3 % (ref 18–48)
MAGNESIUM SERPL-MCNC: 2.2 MG/DL (ref 1.6–2.6)
MCH RBC QN AUTO: 26.6 PG (ref 27–31)
MCHC RBC AUTO-ENTMCNC: 30.5 G/DL (ref 32–36)
MCV RBC AUTO: 87 FL (ref 82–98)
MONOCYTES # BLD AUTO: 1.1 K/UL (ref 0.3–1)
MONOCYTES NFR BLD: 7.6 % (ref 4–15)
NEUTROPHILS # BLD AUTO: 12.5 K/UL (ref 1.8–7.7)
NEUTROPHILS NFR BLD: 87.3 % (ref 38–73)
NRBC BLD-RTO: 0 /100 WBC
PHOSPHATE SERPL-MCNC: 4.8 MG/DL (ref 2.7–4.5)
PLATELET # BLD AUTO: 62 K/UL (ref 150–350)
PMV BLD AUTO: 10.8 FL (ref 9.2–12.9)
POCT GLUCOSE: 207 MG/DL (ref 70–110)
POCT GLUCOSE: 229 MG/DL (ref 70–110)
POCT GLUCOSE: 254 MG/DL (ref 70–110)
POTASSIUM SERPL-SCNC: 3.9 MMOL/L (ref 3.5–5.1)
PROT SERPL-MCNC: 5.8 G/DL (ref 6–8.4)
PROTHROMBIN TIME: 20.6 SEC (ref 9–12.5)
RBC # BLD AUTO: 3.53 M/UL (ref 4.6–6.2)
SODIUM SERPL-SCNC: 133 MMOL/L (ref 136–145)
WBC # BLD AUTO: 14.26 K/UL (ref 3.9–12.7)

## 2019-05-24 PROCEDURE — 99231 SBSQ HOSP IP/OBS SF/LOW 25: CPT | Mod: HCNC,,, | Performed by: CLINICAL NURSE SPECIALIST

## 2019-05-24 PROCEDURE — 83735 ASSAY OF MAGNESIUM: CPT | Mod: HCNC

## 2019-05-24 PROCEDURE — 25000003 PHARM REV CODE 250: Mod: HCNC | Performed by: HOSPITALIST

## 2019-05-24 PROCEDURE — 25000242 PHARM REV CODE 250 ALT 637 W/ HCPCS: Mod: HCNC | Performed by: NURSE PRACTITIONER

## 2019-05-24 PROCEDURE — 84100 ASSAY OF PHOSPHORUS: CPT | Mod: HCNC

## 2019-05-24 PROCEDURE — 25000003 PHARM REV CODE 250: Mod: HCNC | Performed by: NURSE PRACTITIONER

## 2019-05-24 PROCEDURE — 85025 COMPLETE CBC W/AUTO DIFF WBC: CPT | Mod: HCNC

## 2019-05-24 PROCEDURE — 99239 PR HOSPITAL DISCHARGE DAY,>30 MIN: ICD-10-PCS | Mod: HCNC,,, | Performed by: HOSPITALIST

## 2019-05-24 PROCEDURE — 25000003 PHARM REV CODE 250: Mod: HCNC | Performed by: INTERNAL MEDICINE

## 2019-05-24 PROCEDURE — 99231 PR SUBSEQUENT HOSPITAL CARE,LEVL I: ICD-10-PCS | Mod: HCNC,,, | Performed by: CLINICAL NURSE SPECIALIST

## 2019-05-24 PROCEDURE — 85610 PROTHROMBIN TIME: CPT | Mod: HCNC

## 2019-05-24 PROCEDURE — 99239 HOSP IP/OBS DSCHRG MGMT >30: CPT | Mod: HCNC,,, | Performed by: HOSPITALIST

## 2019-05-24 PROCEDURE — 63600175 PHARM REV CODE 636 W HCPCS: Mod: HCNC | Performed by: RADIOLOGY

## 2019-05-24 PROCEDURE — 80053 COMPREHEN METABOLIC PANEL: CPT | Mod: HCNC

## 2019-05-24 RX ORDER — CEFAZOLIN SODIUM 1 G/50ML
SOLUTION INTRAVENOUS
Status: COMPLETED | OUTPATIENT
Start: 2019-05-24 | End: 2019-05-24

## 2019-05-24 RX ORDER — LOPERAMIDE HYDROCHLORIDE 2 MG/1
2 CAPSULE ORAL 4 TIMES DAILY PRN
Refills: 0 | COMMUNITY
Start: 2019-05-24 | End: 2019-06-03

## 2019-05-24 RX ORDER — FUROSEMIDE 40 MG/1
40 TABLET ORAL DAILY PRN
Qty: 60 TABLET | Refills: 11
Start: 2019-05-24 | End: 2020-05-23

## 2019-05-24 RX ORDER — FENTANYL CITRATE 50 UG/ML
INJECTION, SOLUTION INTRAMUSCULAR; INTRAVENOUS CODE/TRAUMA/SEDATION MEDICATION
Status: COMPLETED | OUTPATIENT
Start: 2019-05-24 | End: 2019-05-24

## 2019-05-24 RX ORDER — MIDODRINE HYDROCHLORIDE 10 MG/1
10 TABLET ORAL 3 TIMES DAILY
Qty: 90 TABLET | Refills: 2 | Status: SHIPPED | OUTPATIENT
Start: 2019-05-24 | End: 2019-08-22

## 2019-05-24 RX ORDER — LACTULOSE 10 G/15ML
20 SOLUTION ORAL DAILY PRN
Qty: 3000 ML | Refills: 11
Start: 2019-05-24 | End: 2019-09-01

## 2019-05-24 RX ADMIN — HYDROCORTISONE 5 MG: 5 TABLET ORAL at 05:05

## 2019-05-24 RX ADMIN — FLUCONAZOLE 200 MG: 100 TABLET ORAL at 09:05

## 2019-05-24 RX ADMIN — TIOTROPIUM BROMIDE 18 MCG: 18 CAPSULE ORAL; RESPIRATORY (INHALATION) at 12:05

## 2019-05-24 RX ADMIN — FAMOTIDINE 20 MG: 20 TABLET, FILM COATED ORAL at 09:05

## 2019-05-24 RX ADMIN — HYDROCORTISONE 15 MG: 5 TABLET ORAL at 09:05

## 2019-05-24 RX ADMIN — FENTANYL CITRATE 50 MCG: 50 INJECTION, SOLUTION INTRAMUSCULAR; INTRAVENOUS at 08:05

## 2019-05-24 RX ADMIN — FLUTICASONE FUROATE AND VILANTEROL TRIFENATATE 1 PUFF: 100; 25 POWDER RESPIRATORY (INHALATION) at 10:05

## 2019-05-24 RX ADMIN — CEFAZOLIN SODIUM 1 G: 1 SOLUTION INTRAVENOUS at 08:05

## 2019-05-24 RX ADMIN — INSULIN ASPART 2 UNITS: 100 INJECTION, SOLUTION INTRAVENOUS; SUBCUTANEOUS at 05:05

## 2019-05-24 RX ADMIN — MIDODRINE HYDROCHLORIDE 10 MG: 5 TABLET ORAL at 09:05

## 2019-05-24 RX ADMIN — LIDOCAINE 1 PATCH: 50 PATCH TOPICAL at 12:05

## 2019-05-24 RX ADMIN — MIDODRINE HYDROCHLORIDE 10 MG: 5 TABLET ORAL at 03:05

## 2019-05-24 RX ADMIN — INSULIN ASPART 2 UNITS: 100 INJECTION, SOLUTION INTRAVENOUS; SUBCUTANEOUS at 12:05

## 2019-05-24 NOTE — PLAN OF CARE
Problem: Adult Inpatient Plan of Care  Goal: Plan of Care Review  Outcome: Ongoing (interventions implemented as appropriate)  Pt family at bedside. Pt NPO past midnight. Given ice chips sparingly for dry mouth. Pt turned and repositioned. Pt remains free of falls or injury throughout shift. Call light within reach. Bed in low position. Fall precautions maintained. Will continue to monitor.

## 2019-05-24 NOTE — ASSESSMENT & PLAN NOTE
Impression:  73 yr old gentlemen with decline from ESLD with resolving sepsis with undetermined organism, ERNESTINA on CKD 3, metabolic acidosis - improving,coagulopathy, ischemic hepatitis with superior mesenteric vein thrombosis,with expanding thrombus, pancreatitis, and thrombocytopenia. DNR status   Wife understands that Mr. Lyons has ESLD and is not a transplant candidate. Pt,  Pt's Wife and son in agreement with home hospice and have chosen hospice provider.  Patient home today with home hospice, pleurex catheter and flexaseal incontinence system in place       Recommendations:  1) Home hospice care.    2) pleurex catheter for ascites management in place.   3) flexaseal incontinence management system in place.  Spoke with [facility], will assist family in managing pts' flexaseal incontinence management system.         Goals of Care  Discussion conducted by this APRN with the pt, and pt's wife regarding goals of care.  Pt's Wife and son in agreement with home hospice and are discharging with [facility] today.   IR assessment paracentesis /pleurex catheter for ascites management in place.  Spoke with [facility], will assist family in managing pts' flexaseal incontinence management system- flexaseal in place

## 2019-05-24 NOTE — PROCEDURES
Radiology Post-Procedure Note    Pre Op Diagnosis: ESLD, PVT, recurrent ascites  Post Op Diagnosis: Same    Procedure: Tunneled Pleur-x catheter placement    Procedure performed by: Nayan MARIN, Anish    Written Informed Consent Obtained: Yes  Specimen Removed: NO  Estimated Blood Loss: Minimal    Findings:   US and fluoroscopic guided tunneled abdominal pleur-x catheter placed.  1L serous fluid removed.  No complications.    Patient tolerated procedure well.    Anish Spicer MD  Diagnostic and Interventional Radiologist  Department of Radiology  Pager: [phone]

## 2019-05-24 NOTE — SEDATION DOCUMENTATION
Left abdominal pleurex cath insertion complete. Pt tolerated well. VSS. No signs or symptoms of distress noted. Pt will be transferred to [facility] to await transport to floor and report called to Kendra MARCANO.

## 2019-05-24 NOTE — PROGRESS NOTES
[facility]  Palliative Medicine  Progress Note    Patient Name: Fox Lyons Sr.  MRN: [id number]  Admission Date: 5/16/2019  Hospital Length of Stay: 8 days  Code Status: DNR   Attending Provider: Yang Skinner MD  Consulting Provider: VICKI Rodriguez, CNS  Primary Care Physician: Clover White MD  Principal Problem:Ischemic hepatitis    Patient information was obtained from patient, spouse/SO and past medical records.      Assessment/Plan:     Palliative care encounter  Impression:  73 yr old gentlemen with decline from ESLD with resolving sepsis with undetermined organism, ERNESTINA on CKD 3, metabolic acidosis - improving,coagulopathy, ischemic hepatitis with superior mesenteric vein thrombosis,with expanding thrombus, pancreatitis, and thrombocytopenia. DNR status   Wife understands that Mr. Lyons has ESLD and is not a transplant candidate. Pt,  Pt's Wife and son in agreement with home hospice and have chosen hospice provider.  Patient home today with home hospice, pleurex catheter and flexaseal incontinence system in place       Recommendations:  1) Home hospice care.    2) pleurex catheter for ascites management in place.   3) flexaseal incontinence management system in place.  Spoke with [facility], will assist family in managing pts' flexaseal incontinence management system.         Goals of Care  Discussion conducted by this APRN with the pt, and pt's wife regarding goals of care.  Pt's Wife and son in agreement with home hospice and are discharging with [facility] today.   IR assessment paracentesis /pleurex catheter for ascites management in place.  Spoke with [facility], will assist family in managing pts' flexaseal incontinence management system- flexaseal in place                 I will sign off. Please contact us if you have any additional questions.    Subjective:     Chief Complaint:   Chief Complaint   Patient presents with    Hypotension      Pt told to come to ED by home health nurse-wife reports 57/38 approx 1 hr ago.        HPI:   73 y.o. 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    Pt was admitted to [facility] evening of 5/16 for pancreatitis and sepsis of unclear etiology. Shortly after admission patient developed hypotension requiring initiation of vasopressors. RUQ performed and gallbladder was unable to be visualized.Right hepatic mass 1.4 x 2 x 1.3 cm also noted that was not seen on CT abdomen/pelvis w/o contrast from 5/17.  DiagnosticTherapeutic paracentesis on 5/18 with 6.4 L removed, negative for SBP. Pt now continues with resolving sepsis with undetermined organism, ERNESTINA on CKD 3, metabolic acidosis - improving,coagulopathy,  ischemic hepatitis with superior mesenteric vein thrombosis,with expanding thrombus, pancreatitis, and thrombocytopenia.  Patient now off vasopressors.Hepatology following for cirrhosis, not a current transplant candidate.    Per [facility] team Code status changed to DNR per conversation with wife and patient.  Wife understands that Mr. Lyons has ESLD and is not a transplant candidate. Wife also in agreement with home hospice.  Home with hospice today with pleurex catheter for ascites management            Hospital Course:  No notes on file      Past Medical History:   Diagnosis Date    Anemia     Anticoagulant long-term use     Ascites 12/10/2018    Biliary colic     Cataract     Chronic hepatitis C     Cirrhosis     COPD (chronic obstructive pulmonary disease)     Coronary artery disease     Dyspnea     Epistaxis     Esophageal varices without bleeding     Gallstones     GERD (gastroesophageal reflux  disease)     HCC (hepatocellular carcinoma)     Hepatitis B antibody positive     Hyperglycemia     Hyperlipidemia     Hypertension     Hypopituitarism     Mobitz type 2 second degree atrioventricular block     Nuclear sclerosis, left     Pacemaker     Pituitary tumor     Portal hypertension     Pulmonary emphysema     PVT (paroxysmal ventricular tachycardia)     SA node dysfunction     Secondary adrenal insufficiency     Secondary male hypogonadism     SSS (sick sinus syndrome)     Superior mesenteric vein thrombosis     Vitreous hemorrhage, both eyes        Past Surgical History:   Procedure Laterality Date    CARDIAC PACEMAKER PLACEMENT      [facility]    CATARACT EXTRACTION  9/24/13    RT    COLONOSCOPY Left 1/4/2019    Performed by Emanuel Hannah MD at [facility] ENDO (4TH FLR)    CORONARY ANGIOPLASTY WITH STENT PLACEMENT      2 stents    EGD (ESOPHAGOGASTRODUODENOSCOPY) Left 2/19/2019    [facility]formed [facility] ENDO    EGD (ESOPHAGOGASTRODUODENOSCOPY) Left 1/4/2019    Performed by Emanuel Hannah MD at [facility] ENDO (4TH FLR)    ESOPHAGOGASTRODUODENOSCOPY (EGD) Left 5/31/2018    Performed by Trice Funes MD at [facility] ENDO (4TH FLR)    ESOPHAGOGASTRODUODENOSCOPY (EGD) N/A 11/30/2017    Performed by Trice Funes MD at [facility] ENDO (4TH FLR)    ESOPHAGOGASTRODUODENOSCOPY (EGD) Left 10/12/2017    Performed by Jody Humphries MD at [facility] ENDO (4TH FLR)    ESOPHAGOGASTRODUODENOSCOPY (EGD) N/A 8/17/2017    Performed by Trice Funes MD at [facility] ENDO (4TH FLR)    ESOPHAGOGASTRODUODENOSCOPY (EGD) N/A 7/14/2016    Performed by Trice Funes MD at [facility] ENDO (4TH FLR)    EYE SURGERY      INSERTION, IOL PROSTHESIS Right 9/24/2013    Performed by Ben Sparks MD at [facility] OR 1ST FLR    PARACENTESIS, ABDOMINAL N/A 4/16/2014    Performed by [facility] Diagnostic Provider at [facility] CATH LAB    PHACOEMULSIFICATION, CATARACT Right 9/24/2013    Performed by Ben GALLEGOS  MD [facility] FLR    s/p PPV/AFX/EL (od)  2012       Review of patient's allergies indicates:   Allergen Reactions    Codeine Itching, Rash, Edema and Other (See Comments)     Other reaction(s): Itching       Medications:  Continuous Infusions:  Scheduled Meds:   famotidine  20 mg Oral Daily    fluconazole  200 mg Oral Daily    fluticasone furoate-vilanterol  1 puff Inhalation Daily    hydrocortisone  15 mg Oral Daily    hydrocortisone  5 mg Oral After dinner    lidocaine  1 patch Transdermal Q24H    midodrine  10 mg Oral TID    tiotropium  1 capsule Inhalation Daily     PRN Meds:albuterol-ipratropium, dextrose 50 % in water (D50W), glucagon (human recombinant), insulin aspart U-100, loperamide, ondansetron, sodium chloride 0.9%    Family History     Problem Relation (Age of Onset)    Cancer Father, Sister, Sister, Brother    Diabetes Brother, Sister    Heart disease Mother, Sister    Lung cancer Brother    No Known Problems Sister, Brother        Tobacco Use    Smoking status: Former Smoker     Packs/day: 1.00     Years: 35.00     Pack years: 35.00     Types: Cigarettes     Last attempt to quit: 1995     Years since quittin.9    Smokeless tobacco: Never Used   Substance and Sexual Activity    Alcohol use: No     Alcohol/week: 0.0 oz    Drug use: No    Sexual activity: Not Currently       Review of Systems   Unable to perform ROS: Other   Endocrine: Negative for polydipsia.     Objective:     Vital Signs (Most Recent):  Temp: 97.3 °F (36.3 °C) (19 1513)  Pulse: 71 (19 1513)  Resp: 14 (19 1513)  BP: 124/70 (19 1513)  SpO2: (!) 94 % (19 1513) Vital Signs (24h Range):  Temp:  [96.8 °F (36 °C)-98.1 °F (36.7 °C)] 97.3 °F (36.3 °C)  Pulse:  [63-94] 71  Resp:  [10-18] 14  SpO2:  [93 %-97 %] 94 %  BP: (106-128)/(62-71) 124/70     Weight: 73.2 kg (161 lb 6 oz)  Body mass index is 23.16 kg/m².    Review of Symptoms  Symptom Assessment (ESAS 0-10  scale)   ESAS 0 1 2 3 4 5 6 7 8 9 10   Pain x             Dyspnea x             Anxiety x             Nausea x             Depression               Anorexia              Fatigue              Insomnia              Restlessness  x             Agitation x             CAM / Delirium _x_ --  ___+   Constipation     _x_ --  ___+   Diarrhea           _x_ --  ___+  Bowel Management Plan (BMP): Yes    OME in 24 hours: 0    Performance Status: 30-40    Physical Exam   Constitutional: He is oriented to person, place, and time. He appears well-developed and well-nourished.   HENT:   Head: Normocephalic and atraumatic.   Cardiovascular: Normal rate and regular rhythm.   Pulmonary/Chest: Effort normal.   Abdominal: Soft. He exhibits distension. There is no tenderness. There is no guarding.   pleurex catheter in place  Fexaseal bowel management system in place   Neurological: He is alert and oriented to person, place, and time.   Skin: Skin is warm and dry.   Psychiatric: He has a normal mood and affect. His behavior is normal.       CBC:   Recent Labs   Lab 05/24/19  0700   WBC 14.26*   HGB 9.4*   HCT 30.8*   MCV 87   PLT 62*     BMP:  Recent Labs   Lab 05/24/19  0700   *   *   K 3.9      CO2 19*   BUN 88*   CREATININE 2.7*   CALCIUM 9.3   MG 2.2     LFT:  Lab Results   Component Value Date     (H) 05/24/2019    ALKPHOS 291 (H) 05/24/2019    BILITOT 3.4 (H) 05/24/2019     Albumin:   Albumin   Date Value Ref Range Status   05/24/2019 1.9 (L) 3.5 - 5.2 g/dL Final   10/06/2014 3.9 3.6 - 5.1 g/dL Final     Comment:     @ Test Performed By:  [facility]  Gagan Heath M.D., [address]   [address] [place] [address]  [facility] #[id number]       Protein:   Total Protein   Date Value Ref Range Status   05/24/2019 5.8 (L) 6.0 - 8.4 g/dL Final     Lactic acid:   Lab Results   Component Value Date    LACTATE 5.6 (HH) 05/20/2019    LACTATE 7.2 (HH) 05/19/2019        Advanced Directives::  Living Will: No  LaPOST: No  Do Not Resuscitate Status: Yes  Medical Power of : No.  Patient's wife is making decisions with her son along with the patient    Decision-Making Capacity: Patient answered questions, Family answered questions     Living Arrangements: Lives with spouse    Psychosocial/Cultural:  x 54 yrs to his wife with 1 adult son Fox who lives locally  Patient has 2 grandchildren.  Patient has large extended family including sisters,brothers, sister-in laws.  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      Spiritual:     F- Ning and Belief: Mandaen    I - Importance: yes   .  C - [facility]    A - Address in Care:  visits.  Oriental orthodox family is supportive per his wife      > 50% of 15 min visit spent in chart review, face to face discussion of goals of care,  symptom assessment, coordination of care and emotional support.    VICKI Rodriguez, CNS, [facility]  Palliative Medicine  [facility]

## 2019-05-24 NOTE — PLAN OF CARE
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]  Yes     Priority: 3      Partial occlusion        ERNESTINA (acute kidney injury) [N17.9]  Yes     Priority: 4     Secondary adrenal insufficiency [E27.49]  Yes     Priority: 6     Chronic obstructive pulmonary disease (COPD) [J44.9]  Yes     Priority: 7      Mild copd, doing well, pfts unchanged      GERD (gastroesophageal reflux disease) [K21.9]  Yes     Priority: 8     Coronary artery disease [I25.10]  Yes     Priority: 9      Chronic     5/30/11 cath mid LCX 50%, 2 stents patent      Goals of care, counseling/discussion [Z71.89]  Not Applicable     Priority: 10     Metabolic acidosis, normal anion gap (NAG) [E87.2]  Yes     Priority: 10     Essential hypertension [I10]  Yes     Priority: 11     History of hepatitis C [Z86.19]  Yes     Priority: 12     Coagulopathy [D68.9]  Yes     Priority: 13     Thrombocytopenia [D69.6]  Yes     Priority: 14     Hyperlipidemia [E78.5]  Yes     Priority: 17      Chronic    Full incontinence of feces [R15.9]  Yes    DNR (do not resuscitate) [Z66]  Yes    Palliative care encounter [Z51.5]  Not Applicable    Sick sinus syndrome [I49.5]  Yes     Chronic      Resolved Hospital Problems    Diagnosis Date Resolved POA    Pancreatitis [K85.90] 05/24/2019 Yes     Priority: 15     Oral thrush [B37.0] 05/24/2019 Yes     Priority: 16        Hospice Qualifying Diagnoses: ACUTE ISCHEMIC  HEPATITIS/END-STAGE LIVER DISEASE       Patient has a life expectancy < 6 months due to:  Primary Hospice Diagnosis: ACUTE ISCHEMIC HEPATITIS/END-STAGE LIVER DISEASE    1) Comorbid Conditions Contributing to Decline:ACUTE ON CHRONIC KIDNEY DISEASE STAGE 3, MESENTERIC VEIN THROMBOSIS, CORONARY ARTERY DISEASE    Vital Signs: Routine per Hospice Protocol.    Code Status: DNR     Allergies:   Review of patient's allergies indicates:   Allergen Reactions    Codeine Itching, Rash, Edema and Other (See Comments)     Other reaction(s): Itching       Diet: MECHANICAL SOFT WITH THIN LIQIUDS  Activities: As tolerated    Nursing: Per Hospice Routine.     CONDOM CATHETER Care: CONDOM CATHETER  Empty Forbes bag Q shift and PRN.  Change Forbes every month.    Routine Skin for Bedridden Patients: Apply moisture barrier cream to all skin folds and   wet areas in perineal area daily and after baths and all bowel movements.      Oxygen: 3 L NASAL CANNULA      Other Miscellaneous Care:     1. ABDOMINAL PERITONEAL DRAIN (PLEURx)  -DRAIN 1-2L FROM PERITONEAL DRAIN  EVERY WEEK OR AS NEEDED FOR SYMPTOM MANAGEMENT.         Medications:      Fox Lyons Sr.   Home Medication Instructions NASRIN:[id number]    Printed on:05/24/19 8598   Medication Information                      ciprofloxacin HCl (CIPRO) 500 MG tablet  Take 1 tablet (500 mg total) by mouth once daily.             fluticasone-umeclidin-vilanter (TRELEGY ELLIPTA) 100-62.5-25 mcg DsDv  Inhale 1 puff into the lungs once daily.             furosemide (LASIX) 40 MG tablet  Take 1 tablet (40 mg total) by mouth daily as needed. AS DIRECTED BY HOSPICE             hydrocortisone (CORTEF) 10 MG Tab  TAKE ONE AND ONE-HALF TABLETS BY MOUTH EVERY MORNING AND ONE-HALF TABLET EVERY EVENING./NO FURTHER REFILLS  NEED FOLLOW UP VISIT             lactulose (CHRONULAC) 20 gram/30 mL Soln  Take 30 mLs (20 g total) by mouth daily as needed. Please ensure you are having 3-4 bowels movements daily,  AS DIRECTED BY HOSPICE             loperamide (IMODIUM) 2 mg capsule  Take 1 capsule (2 mg total) by mouth 4 (four) times daily as needed for Diarrhea.             midodrine (PROAMATINE) 10 MG tablet  Take 1 tablet (10 mg total) by mouth 3 (three) times daily.             multivitamin-minerals-lutein (CENTRUM SILVER) Tab  Take 1 tablet by mouth once daily.               omeprazole (PRILOSEC) 40 MG capsule  TAKE 1 CAPSULE(40 MG) BY MOUTH TWICE DAILY BEFORE MEALS             ondansetron (ZOFRAN) 8 MG tablet  Take 1 tablet (8 mg total) by mouth every 8 (eight) hours as needed for Nausea.                 Future Orders:  Hospice Medical Director may dictate new orders for comfortable care measures & sign death certificate.          _________________________________  Yang Skinner MD  05/24/2019

## 2019-05-24 NOTE — PROGRESS NOTES
[facility] Hospital Medicine  Progress Note    Patient Name: Fox Lyons Sr.  MRN: [id number]  Patient Class: IP- Inpatient   Admission Date: 5/16/2019  Length of Stay: 8 days  Attending Physician: Yang Skinner MD  Primary Care Provider: Clover White MD    [facility] Medicine Team: [facility] Yang Skinner MD    Subjective:     Principal Problem:Ischemic hepatitis    HPI:    Fox Lyons Sr. is a 74 y/o with Cirrhosis, Endstage liver disease, Portal vein thrombosis, SMV thrombosis, Pituitary tumor resection with iatrogenic Adrenal Insufficiency admitted to MICU for Abdominal Pain and Septic Shock     Hospital Course:   Per ICU  Mr. Lyons was admitted to [facility] evening of 5/16 for pancreatitis and sepsis of unclear etiology. Shortly after admission patient developed hypotension requiring initiation of vasopressors. RUQ performed and gallbladder was unable to be visualized. Diagnostic paracentesis performed which was negative for SBP. HIDA scan showing patent portal flow. The am of 05/19, his lactate remains elevated and vasopressin added overnight. Shock liver worsening. Therapeutic paracentesis on 5/18 with 6.4 L removed, negative for SBP; cultures pending.  Blood cultures remain NGTD. MRCP considered but pacemaker present. Hepatology following for cirrhosis, not a current transplant candidate.  US liver with doppler revealed expansion with complete occlusive thrombus of superior mesenteric vein with unchanged thrombus in the main portal vein and right anterior portal vein. Anticoagulation not initiated due to coagulopathy and thrombocytopenia. Right hepatic mass 1.4 x 2 x 1.3 cm also noted that was not seen on CT abdomen/pelvis w/o contrast from 5/17. AFP wnl. CEA elevated. Midodrine added on 5/19, weaned off vasopressors on 5/20.  Completed 5 days of vancomycin and pip/tazo.  No clear source of infection with cultures NGTD; however, suspicious for intra abdominal source.  Continue fluconazole for 5 days for oral thrush. Increase midodrine and began weaning stress dose steroids to home regimen.  Code status changed to DNR per conversation with wife and patient.  Wife understands that Mr. Lyons has ESLD and is not a transplant candidate. Wife also in agreement with home hospice.  Appreciate palliative care.         Verbal handoff  Patient presented with abdominal pain, initial blood cultures negative, HIDA negative, paracentesis and ascitic fluid studies negative for SBP.  Patient found on imaging with a possible hepatic mass in the right hepatic lobe.   Patient is not a transplant candidate      Per ICU discussions with patient's spouse, plan will be to pursue home hospice care. Palliative Care team consulted and following.     Interval History:  Patient s/p peritoneal drain placement by IR, patient somnolent, on 3L o2    Discussed with wife at bedside regarding discharge med rec. And will place hospice orders, inform CM/SW need for assistance with transportation.     1L of ascitic fluid removed after procedure.     Review of Systems   Unable to perform ROS: Mental status change     Objective:     Vital Signs (Most Recent):  Temp: 97.3 °F (36.3 °C) (05/24/19 1513)  Pulse: 71 (05/24/19 1513)  Resp: 14 (05/24/19 1513)  BP: 124/70 (05/24/19 1513)  SpO2: (!) 94 % (05/24/19 1513) Vital Signs (24h Range):  Temp:  [96.8 °F (36 °C)-98.1 °F (36.7 °C)] 97.3 °F (36.3 °C)  Pulse:  [63-94] 71  Resp:  [10-18] 14  SpO2:  [93 %-97 %] 94 %  BP: (106-128)/(62-71) 124/70     Weight: 73.2 kg (161 lb 6 oz)  Body mass index is 23.16 kg/m².    Intake/Output Summary (Last 24 hours) at 5/24/2019 1700  Last data filed at 5/24/2019 1300  Gross per 24 hour   Intake 200 ml   Output 1300 ml   Net -1100 ml      Limited exam due to ongoing PT?OT eval.     Physical Exam   Constitutional: No distress.   HENT:   Temporal wasting   Eyes: Scleral icterus is present.   Pulmonary/Chest: No respiratory distress.    Abdominal: Soft. He exhibits distension. There is no tenderness. There is no guarding.   Shifting dullness present, ascites present, luq abdominal drain bandage. Soft abdomen   Musculoskeletal: He exhibits edema.   Lymphadenopathy:     He has no cervical adenopathy.   Neurological: He is alert. GCS eye subscore is 4. GCS verbal subscore is 4. GCS motor subscore is 6.   No asterixis, alert, oriented to self/hospital, slowed response to questioning or commands   Skin: Skin is warm and dry.       Significant Labs:   CBC:   Recent Labs   Lab 05/23/19  0747 05/24/19  0700   WBC 12.24 14.26*   HGB 9.5* 9.4*   HCT 30.2* 30.8*   PLT 56* 62*     CMP:   Recent Labs   Lab 05/23/19  0747 05/24/19  0700    133*   K 4.0 3.9    103   CO2 20* 19*   * 176*   BUN 76* 88*   CREATININE 2.4* 2.7*   CALCIUM 9.2 9.3   PROT 6.0 5.8*   ALBUMIN 2.1* 1.9*   BILITOT 2.9* 3.4*   ALKPHOS 307* 291*   * 122*   * 199*   ANIONGAP 13 11   EGFRNONAA 25.8* 22.4*     Lactic Acid: No results for input(s): LACTATE in the last 48 hours.    Significant Imaging: I have reviewed all pertinent imaging results/findings within the past 24 hours.    Assessment/Plan:      Septic shock due to undetermined organism  --unclear etiology; concern for possible intraabdominal source along with worsening, now completely occluded SMV  --leukocytosis resolved. Afebrile.   Lactate level remains elevated likely from ongoing SMV (now completely occluded) and PV thrombus and poor clearance due to cirrhosis.  --CXR notable for bilateral pleural effusions L > R, UA appearing noninfectious  --diagnostic para negative for SBP  --Blood culture NGTD  --completed 5 days of vancomycin and zosyn  --weaned off norepinephrine and vasopressin on 5/20.   --continue midodrine    Superior mesenteric vein thrombosis  --now completely occluded on ultrasound doppler.  --considered anticoagulation however given worsening thrombocytopenia and coagulopathy; will  not initiate at this time.     Ischemic hepatitis  --in the setting of shock and expanding SMV thrombus.  LFTs downtrending following therapeutic paracentesis.   --AFP wnl, CEA elevated in the setting of hepatic mass noted on ultrasound; however mass was not noted on recent CT imaging.       --Discussed with Hepatology on 5/20, patient is not a liver transplant candidate given co-morbidities and age.  -Palliative care recommending Eval for abdominal peritoneal drain  -IR placed pleurX peritoneal drain today .     Pancreatitis  --lipase 324 on admit, h/o gallstones on prior imagaing  --unable to visual gallbladder on RUQ US secondary to overlying bowel gas  --triglyceride level WNL  --IVF hydration & supportive care.   --HIDA scan with patent cystic and common bile duct  --Pacemaker prevents MRCP       Endocrine  Secondary adrenal insufficiency  --s/p gamma knife radiosurgery for pituitary adenoma   Weaned  back to chronic oral hydrocortisone regimen     Oral Thrush   -continue fluconazole through today.     Oropharyngeal dysphagia  -mechanical soft diet.     Goals of care, counseling/discussion  --PER ICU and Palliative care discussions - Code Status is DNR  - Discharge plan for Home with Home Hospice.     Dispo - discharge home today with hospice care.     Active Diagnoses:    Diagnosis Date Noted POA    PRINCIPAL PROBLEM:  Ischemic hepatitis [K75.9] 05/18/2019 Yes    Septic shock due to undetermined organism [A41.9, R65.21] 05/17/2019 Yes    End-stage liver disease [K72.90] 05/21/2019 Yes    Other ascites [R18.8] 12/10/2018 Yes    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] 06/09/2016 Yes    Acute renal failure superimposed on stage 3 chronic kidney disease [N17.9, N18.3] 05/04/2016 Yes    Secondary adrenal insufficiency [E27.49] 03/31/2015 Yes    Chronic obstructive pulmonary disease (COPD) [J44.9] 05/04/2016 Yes    GERD (gastroesophageal reflux disease) [K21.9] 10/06/2014 Yes    Coronary artery disease  [I25.10]  Yes     Chronic    Goals of care, counseling/discussion [Z71.89] 05/21/2019 Not Applicable    Metabolic acidosis, normal anion gap (NAG) [E87.2] 05/17/2019 Yes    Essential hypertension [I10]  Yes    History of hepatitis C [Z86.19] 06/14/2016 Yes    Coagulopathy [D68.9] 05/19/2019 Yes    Thrombocytopenia [D69.6] 05/04/2016 Yes    Hyperlipidemia [E78.5]  Yes     Chronic    Full incontinence of feces [R15.9]  Yes    DNR (do not resuscitate) [Z66]  Yes    Palliative care encounter [Z51.5] 05/21/2019 Not Applicable    Sick sinus syndrome [I49.5] 06/28/2013 Yes     Chronic      Problems Resolved During this Admission:    Diagnosis Date Noted Date Resolved POA    Pancreatitis [K85.90] 05/16/2019 05/24/2019 Yes    Oral thrush [B37.0] 05/19/2019 05/24/2019 Yes     VTE Risk Mitigation (From admission, onward)        Ordered     Place sequential compression device  Until discontinued      05/16/19 2206     IP VTE HIGH RISK PATIENT  Once      05/16/19 2206             Yang Skinner MD  Department of Hospital Medicine   [facility]

## 2019-05-24 NOTE — PLAN OF CARE
CM met with patient and his wife who is in  agreement with discharge plan to discharge today with home hospice.     05/24/19 1248   Discharge Reassessment   Assessment Type Discharge Planning Reassessment   Provided patient/caregiver education on the expected discharge date and the discharge plan Yes   Do you have any problems affording any of your prescribed medications? No   Discharge Plan A Hospice/home   Discharge Plan B Home with family;Hospice/home   Anticipated Discharge Disposition HospiceHome   Can the patient answer the patient profile reliably? No, cognitively impaired   How does the patient rate their overall health at the present time? Poor   Post-Acute Status   Post-Acute Authorization Home Health/Hospice

## 2019-05-24 NOTE — PLAN OF CARE
AIDA sent hospice orders via [facility] to [facility], awaiting a call from Bryon in admissions in regard to the hospital bed. AIDA will follow up.        Roxann Castro LMSW  [facility]   [phone]

## 2019-05-24 NOTE — SUBJECTIVE & OBJECTIVE
Past Medical History:   Diagnosis Date    Anemia     Anticoagulant long-term use     Ascites 12/10/2018    Biliary colic     Cataract     Chronic hepatitis C     Cirrhosis     COPD (chronic obstructive pulmonary disease)     Coronary artery disease     Dyspnea     Epistaxis     Esophageal varices without bleeding     Gallstones     GERD (gastroesophageal reflux disease)     HCC (hepatocellular carcinoma)     Hepatitis B antibody positive     Hyperglycemia     Hyperlipidemia     Hypertension     Hypopituitarism     Mobitz type 2 second degree atrioventricular block     Nuclear sclerosis, left     Pacemaker     Pituitary tumor     Portal hypertension     Pulmonary emphysema     PVT (paroxysmal ventricular tachycardia)     SA node dysfunction     Secondary adrenal insufficiency     Secondary male hypogonadism     SSS (sick sinus syndrome)     Superior mesenteric vein thrombosis     Vitreous hemorrhage, both eyes        Past Surgical History:   Procedure Laterality Date    CARDIAC PACEMAKER PLACEMENT      st [facility]    CATARACT EXTRACTION  9/24/13    RT    COLONOSCOPY Left 1/4/2019    Performed by Emanuel Hannah [facility] at [facility] ENDO (4TH FLR)    CORONARY ANGIOPLASTY WITH STENT PLACEMENT      2 stents    EGD (ESOPHAGOGASTRODUODENOSCOPY) Left 2/19/2019    [facility] by [facility]    EGD (ESOPHAGOGASTRODUODENOSCOPY) Left 1/4/2019    Performed by Emanuel Hannah MD at [facility] ENDO (4TH FLR)    ESOPHAGOGASTRODUODENOSCOPY (EGD) Left 5/31/2018    Performed by [facility] at [facility] ENDO (4TH FLR)    ESOPHAGOGASTRODUODENOSCOPY (EGD) N/A 11/30/2017    Performed by [facility] at [facility] ENDO (4TH FLR)    ESOPHAGOGASTRODUODENOSCOPY (EGD) Left 10/12/2017    Performed by Jody Humphries MD at [facility] ENDO (4TH FLR)    ESOPHAGOGASTRODUODENOSCOPY (EGD) N/A 8/17/2017    Performed by [facility] MD at [facility] ENDO (4TH FLR)    ESOPHAGOGASTRODUODENOSCOPY (EGD)  N/A 2016    Performed by [facility] at [facility] ENDO (4TH FLR)    EYE SURGERY      INSERTION, IOL PROSTHESIS Right 2013    Performed by Ben Sparks MD at [facility] OR 1ST FLR    PARACENTESIS, ABDOMINAL N/A 2014    Performed by [facility] Diagnostic Provider at [facility] CATH LAB    PHACOEMULSIFICATION, CATARACT Right 2013    Performed by Ben Sparks MD at [facility] OR 1ST FLR    s/p PPV/AFX/EL (od)  2012       Review of patient's allergies indicates:   Allergen Reactions    Codeine Itching, Rash, Edema and Other (See Comments)     Other reaction(s): Itching       Medications:  Continuous Infusions:  Scheduled Meds:   famotidine  20 mg Oral Daily    fluconazole  200 mg Oral Daily    fluticasone furoate-vilanterol  1 puff Inhalation Daily    hydrocortisone  15 mg Oral Daily    hydrocortisone  5 mg Oral After dinner    lidocaine  1 patch Transdermal Q24H    midodrine  10 mg Oral TID    tiotropium  1 capsule Inhalation Daily     PRN Meds:albuterol-ipratropium, dextrose 50 % in water (D50W), glucagon (human recombinant), insulin aspart U-100, loperamide, ondansetron, sodium chloride 0.9%    Family History     Problem Relation (Age of Onset)    Cancer Father, Sister, Sister, Brother    Diabetes Brother, Sister    Heart disease Mother, Sister    Lung cancer Brother    No Known Problems Sister, Brother        Tobacco Use    Smoking status: Former Smoker     Packs/day: 1.00     Years: 35.00     Pack years: 35.00     Types: Cigarettes     Last attempt to quit: 1995     Years since quittin.9    Smokeless tobacco: Never Used   Substance and Sexual Activity    Alcohol use: No     Alcohol/week: 0.0 oz    Drug use: No    Sexual activity: Not Currently       Review of Systems   Unable to perform ROS: Other   Endocrine: Negative for polydipsia.     Objective:     Vital Signs (Most Recent):  Temp: 97.3 °F (36.3 °C) (19 1513)  Pulse: 71 (19 1513)  Resp: 14  (05/24/19 1513)  BP: 124/70 (05/24/19 1513)  SpO2: (!) 94 % (05/24/19 1513) Vital Signs (24h Range):  Temp:  [96.8 °F (36 °C)-98.1 °F (36.7 °C)] 97.3 °F (36.3 °C)  Pulse:  [63-94] 71  Resp:  [10-18] 14  SpO2:  [93 %-97 %] 94 %  BP: (106-128)/(62-71) 124/70     Weight: 73.2 kg (161 lb 6 oz)  Body mass index is 23.16 kg/m².    Review of Symptoms  Symptom Assessment (ESAS 0-10 scale)   ESAS 0 1 2 3 4 5 6 7 8 9 10   Pain x             Dyspnea x             Anxiety x             Nausea x             Depression               Anorexia              Fatigue              Insomnia              Restlessness  x             Agitation x             CAM / Delirium _x_ --  ___+   Constipation     _x_ --  ___+   Diarrhea           _x_ --  ___+  Bowel Management Plan (BMP): Yes    OME in 24 hours: 0    Performance Status: 30-40    Physical Exam   Constitutional: He is oriented to person, place, and time. He appears well-developed and well-nourished.   HENT:   Head: Normocephalic and atraumatic.   Cardiovascular: Normal rate and regular rhythm.   Pulmonary/Chest: Effort normal.   Abdominal: Soft. He exhibits distension. There is no tenderness. There is no guarding.   pleurex catheter in place  Fexaseal bowel management system in place   Neurological: He is alert and oriented to person, place, and time.   Skin: Skin is warm and dry.   Psychiatric: He has a normal mood and affect. His behavior is normal.       CBC:   Recent Labs   Lab 05/24/19  0700   WBC 14.26*   HGB 9.4*   HCT 30.8*   MCV 87   PLT 62*     BMP:  Recent Labs   Lab 05/24/19  0700   *   *   K 3.9      CO2 19*   BUN 88*   CREATININE 2.7*   CALCIUM 9.3   MG 2.2     LFT:  Lab Results   Component Value Date     (H) 05/24/2019    ALKPHOS 291 (H) 05/24/2019    BILITOT 3.4 (H) 05/24/2019     Albumin:   Albumin   Date Value Ref Range Status   05/24/2019 1.9 (L) 3.5 - 5.2 g/dL Final   10/06/2014 3.9 3.6 - 5.1 g/dL Final     Comment:     @ Test Performed  By:  [facility]  Gagan Heath M.D., FCAP.,   [address] [place] [address]  [facility] #[id number]       Protein:   Total Protein   Date Value Ref Range Status   05/24/2019 5.8 (L) 6.0 - 8.4 g/dL Final     Lactic acid:   Lab Results   Component Value Date    LACTATE 5.6 (HH) 05/20/2019    LACTATE 7.2 (HH) 05/19/2019       Advanced Directives::  Living Will: No  LaPOST: No  Do Not Resuscitate Status: Yes  Medical Power of : No.  Patient's wife is making decisions with her son along with the patient    Decision-Making Capacity: Patient answered questions, Family answered questions     Living Arrangements: Lives with spouse    Psychosocial/Cultural:  x 54 yrs to his wife with 1 adult son Fox who lives locally  Patient has 2 grandchildren.  Patient has large extended family including sisters,brothers, sister-in laws.  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      Spiritual:     F- Ning and Belief: Shinto    I - Importance: yes   .  C - [facility]    A - Address in Care:  visits.  Religion family is supportive per his wife

## 2019-05-24 NOTE — PLAN OF CARE
Patient will discharge with [facility] hospice. SW met with patient and patient spouse at bedside to confirm that patient hospital bed arrived at the home. SW set up transportation via [facility] for 4:30pm. Patient will travel by stretcher and 3 liters of oxygen.        Roxann Castro LMSW  [facility]   [phone]

## 2019-05-24 NOTE — PROGRESS NOTES
[facility] Hospital Medicine  Progress Note    Patient Name: Fox Lyons Sr.  MRN: [id number]  Patient Class: IP- Inpatient   Admission Date: 5/16/2019  Length of Stay: 7 days  Attending Physician: Yang Skinner MD  Primary Care Provider: Clover White MD    [facility] Medicine Team: [facility]OS[facility] MED A Yang Skinner MD    Subjective:     Principal Problem:Septic shock due to undetermined organism    HPI:    Fox Lyons Sr. is a 74 y/o with Cirrhosis, Endstage liver disease, Portal vein thrombosis, SMV thrombosis, Pituitary tumor resection with iatrogenic Adrenal Insufficiency admitted to MICU for Abdominal Pain and Septic Shock     Hospital Course:   Per ICU  Mr. Lyons was admitted to [facility] evening of 5/16 for pancreatitis and sepsis of unclear etiology. Shortly after admission patient developed hypotension requiring initiation of vasopressors. RUQ performed and gallbladder was unable to be visualized. Diagnostic paracentesis performed which was negative for SBP. HIDA scan showing patent portal flow. The am of 05/19, his lactate remains elevated and vasopressin added overnight. Shock liver worsening. Therapeutic paracentesis on 5/18 with 6.4 L removed, negative for SBP; cultures pending.  Blood cultures remain NGTD. MRCP considered but pacemaker present. Hepatology following for cirrhosis, not a current transplant candidate.  US liver with doppler revealed expansion with complete occlusive thrombus of superior mesenteric vein with unchanged thrombus in the main portal vein and right anterior portal vein. Anticoagulation not initiated due to coagulopathy and thrombocytopenia. Right hepatic mass 1.4 x 2 x 1.3 cm also noted that was not seen on CT abdomen/pelvis w/o contrast from 5/17. AFP wnl. CEA elevated. Midodrine added on 5/19, weaned off vasopressors on 5/20.  Completed 5 days of vancomycin and pip/tazo.  No clear source of infection with cultures NGTD; however, suspicious for intra  abdominal source. Continue fluconazole for 5 days for oral thrush. Increase midodrine and began weaning stress dose steroids to home regimen.  Code status changed to DNR per conversation with wife and patient.  Wife understands that Mr. Lyons has ESLD and is not a transplant candidate. Wife also in agreement with home hospice.  Appreciate palliative care.         Verbal handoff  Patient presented with abdominal pain, initial blood cultures negative, HIDA negative, paracentesis and ascitic fluid studies negative for SBP.  Patient found on imaging with a possible hepatic mass in the right hepatic lobe.   Patient is not a transplant candidate      Per ICU discussions with patient's spouse, plan will be to pursue home hospice care. Palliative Care team consulted and following.     Interval History: Patient at bedside, wife present,  Discussed reason for IR consultation and for peritoneal drain placement as palliative measure to avoid repeated procedural paracentesis.  Wife is amenable pending discussion with IR.         Review of Systems   Unable to perform ROS: Mental status change     Objective:     Vital Signs (Most Recent):  Temp: 96.8 °F (36 °C) (05/23/19 1851)  Pulse: 77 (05/23/19 1851)  Resp: 18 (05/23/19 1851)  BP: 117/70 (05/23/19 1851)  SpO2: (!) 93 % (05/23/19 1851) Vital Signs (24h Range):  Temp:  [96.8 °F (36 °C)-98.7 °F (37.1 °C)] 96.8 °F (36 °C)  Pulse:  [67-94] 77  Resp:  [16-19] 18  SpO2:  [88 %-96 %] 93 %  BP: ()/(54-70) 117/70     Weight: 73.2 kg (161 lb 6 oz)  Body mass index is 23.16 kg/m².    Intake/Output Summary (Last 24 hours) at 5/23/2019 1907  Last data filed at 5/23/2019 1700  Gross per 24 hour   Intake --   Output 1800 ml   Net -1800 ml      Limited exam due to ongoing PT?OT eval.     Physical Exam   Constitutional: No distress.   HENT:   Temporal wasting   Eyes: Scleral icterus is present.   Pulmonary/Chest: No respiratory distress.   Abdominal: Soft. He exhibits distension. There is  no tenderness. There is no guarding.   Shifting dullness present, ascites present   Musculoskeletal: He exhibits edema.   Lymphadenopathy:     He has no cervical adenopathy.   Neurological: He is alert. GCS eye subscore is 4. GCS verbal subscore is 4. GCS motor subscore is 6.   No asterixis, alert, oriented to self/hospital, slowed response to questioning or commands   Skin: Skin is warm and dry.       Significant Labs:   CBC:   Recent Labs   Lab 05/22/19  0733 05/23/19  0747   WBC 10.05 12.24   HGB 9.0* 9.5*   HCT 28.2* 30.2*   PLT 62* 56*     CMP:   Recent Labs   Lab 05/22/19  0734 05/23/19  0747   * 136   K 3.6 4.0    103   CO2 20* 20*   * 205*   BUN 61* 76*   CREATININE 1.8* 2.4*   CALCIUM 9.2 9.2   PROT 5.8* 6.0   ALBUMIN 2.1* 2.1*   BILITOT 2.7* 2.9*   ALKPHOS 294* 307*   * 144*   * 239*   ANIONGAP 10 13   EGFRNONAA 36.5* 25.8*     Lactic Acid: No results for input(s): LACTATE in the last 48 hours.    Significant Imaging: I have reviewed all pertinent imaging results/findings within the past 24 hours.    Assessment/Plan:      Septic shock due to undetermined organism  --unclear etiology; concern for possible intraabdominal source along with worsening, now completely occluded SMV  --leukocytosis resolved. Afebrile.   Lactate level remains elevated likely from ongoing SMV (now completely occluded) and PV thrombus and poor clearance due to cirrhosis.  --CXR notable for bilateral pleural effusions L > R, UA appearing noninfectious  --diagnostic para negative for SBP  --Blood culture NGTD  --completed 5 days of vancomycin and zosyn  --weaned off norepinephrine and vasopressin on 5/20.   --continue midodrine    Superior mesenteric vein thrombosis  --now completely occluded on ultrasound doppler.  --considered anticoagulation however given worsening thrombocytopenia and coagulopathy; will not initiate at this time.     Ischemic hepatitis  --in the setting of shock and expanding SMV  thrombus.  LFTs downtrending following therapeutic paracentesis.   --AFP wnl, CEA elevated in the setting of hepatic mass noted on ultrasound; however mass was not noted on recent CT imaging.       --Discussed with Hepatology on 5/20, patient is not a liver transplant candidate given co-morbidities and age.  -Palliative care recommending Eval for abdominal peritoneal drain  -IR consulted and initially plans to have pt on schedule today, procedure is delayed until tomorrow, Keep NPO at midnight.     Pancreatitis  --lipase 324 on admit, h/o gallstones on prior imagaing  --unable to visual gallbladder on RUQ US secondary to overlying bowel gas  --triglyceride level WNL  --IVF hydration & supportive care.   --HIDA scan with patent cystic and common bile duct  --Pacemaker prevents MRCP       \Endocrine  Secondary adrenal insufficiency  --s/p gamma knife radiosurgery for pituitary adenoma   Weaning back to chronic oral hydrocortisone regimen for this evening and moving forward.   - Discussed with endocrine over phone, no indication for any higher dose steroids as a prophylactic measure, only reason to titrate hydrocortisone dosages is based on actual clinical signs/symptoms of hypotension on baseline maintenance dose.     Oral Thrush   -day 5 of 7 of fluconazole.     Oropharyngeal dysphagia  -mechanical soft diet.     Goals of care, counseling/discussion  --PER ICU and Palliative care discussions - Code Status is DNR  - Discharge plan for Home with Home Hospice.     Dispo - Will be medically stable to discharge when Peritoneal Drain placed.     Active Diagnoses:    Diagnosis Date Noted POA    PRINCIPAL PROBLEM:  Septic shock due to undetermined organism [A41.9, R65.21] 05/17/2019 Yes    End-stage liver disease [K72.90] 05/21/2019 Yes    Ischemic hepatitis [K75.9] 05/18/2019 Yes    Other ascites [R18.8] 12/10/2018 Yes    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] 06/09/2016 Yes    ERNESTINA (acute kidney injury) [N17.9]  05/04/2016 Yes    Secondary adrenal insufficiency [E27.49] 03/31/2015 Yes    Chronic obstructive pulmonary disease (COPD) [J44.9] 05/04/2016 Yes    GERD (gastroesophageal reflux disease) [K21.9] 10/06/2014 Yes    Coronary artery disease [I25.10]  Yes     Chronic    Goals of care, counseling/discussion [Z71.89] 05/21/2019 Not Applicable    Metabolic acidosis, normal anion gap (NAG) [E87.2] 05/17/2019 Yes    Essential hypertension [I10]  Yes    History of hepatitis C [Z86.19] 06/14/2016 Yes    Coagulopathy [D68.9] 05/19/2019 Yes    Thrombocytopenia [D69.6] 05/04/2016 Yes    Pancreatitis [K85.90] 05/16/2019 Yes    Oral thrush [B37.0] 05/19/2019 Yes    Hyperlipidemia [E78.5]  Yes     Chronic    Full incontinence of feces [R15.9]  Unknown    DNR (do not resuscitate) [Z66]  Yes    Palliative care encounter [Z51.5] 05/21/2019 Not Applicable    Sick sinus syndrome [I49.5] 06/28/2013 Yes     Chronic      Problems Resolved During this Admission:     VTE Risk Mitigation (From admission, onward)        Ordered     Place sequential compression device  Until discontinued      05/16/19 2206     IP VTE HIGH RISK PATIENT  Once      05/16/19 2206             Yang Skinner MD  Department of Hospital Medicine   [facility]

## 2019-05-25 NOTE — DISCHARGE SUMMARY
[facility] Hospital Medicine  Discharge Summary      Patient Name: Fox Lyons Sr.  MRN: [id number]  Admission Date: 5/16/2019  Hospital Length of Stay: 8 days  Discharge Date and Time: 5/24/2019  6:29 PM  Attending Physician: Yang Skinner MD  Discharging Provider: Yang Skinner MD  Primary Care Provider: Clover White MD    [facility] Medicine Team: [facility] [place] HOSP MED A Yang Skinner MD    Principal Problem:Ischemic hepatitis    HPI:   Patient is a 73 y.o. 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 Patient denies fever, headache, chest pain, SOB, urinary symptoms and blood in stool.      Work up in the ED revealed leukocytosis of 14, hgb 9.9 (at baseline), initial creatinine of 3.2 (up from baseline ~ 1.5), transaminitis, lipase elevated at 324 and lactic 6.7 (which improved to 3.9 following 1.5L fluid resuscitation). On presentation pt was hypotensive to 60s/30s which improved to 90s/50s following volume resuscitation. Critical Care Medicine has been consulted for sepsis.     * No surgery found *      Hospital Course:   Per ICU   Mr. Lyons was admitted to [facility] evening of 5/16 for pancreatitis and sepsis of unclear etiology. Shortly after admission patient developed hypotension requiring initiation of vasopressors. RUQ performed and gallbladder was unable to be visualized. Diagnostic paracentesis performed which was negative for SBP. HIDA scan showing patent portal flow. The am of 05/19, his lactate remains elevated and vasopressin added overnight. Shock  liver worsening. Therapeutic paracentesis on 5/18 with 6.4 L removed, negative for SBP; cultures pending.     Blood cultures remain NGTD. MRCP considered but pacemaker present. Hepatology following for cirrhosis, not a current transplant candidate.  US liver with doppler revealed expansion with complete occlusive thrombus of superior mesenteric vein with unchanged thrombus in the main portal vein and right anterior portal vein. Anticoagulation not initiated due to coagulopathy and thrombocytopenia.    Right hepatic mass 1.4 x 2 x 1.3 cm also noted that was not seen on CT abdomen/pelvis w/o contrast from 5/17. AFP wnl. CEA elevated. Midodrine added on 5/19, weaned off vasopressors on 5/20.     Completed 5 days of vancomycin and pip/tazo.  No clear source of infection with cultures NGTD; however, suspicious for intra abdominal source. Continue fluconazole for 5 days for oral thrush. Increase midodrine and began weaning stress dose steroids to home regimen.  Code status changed to DNR per conversation with wife and patient.  Wife understands that Mr. Lyons has ESLD and is not a transplant candidate. Wife also in agreement with home hospice.  Appreciate palliative care.     Hospital Medicine Course     After stepdown from ICU, patient had case management team involved to arrange hospice with agency selection and setup.  Palliative care team noted patient's abdominal ascites progressing and recommended placement of Peritoneal drain as palliative measure.  Interventional radiology was consulted and placed pleurX catheter in abdomen for palliative ascitic fluid drainage as outpatient, 1L removed day of discharge during procedure.  Hospice orders written and discussed medications to be continued or removed with patient's wife.  Will Order initially weekly drainage from peritoneal drain and hospice agency can modify as needed.       Patients renal function markers continued to trend upward prior to discharge.  Home Lasix  dosage increased. Other nephrotoxic medications discontinued.     [facility] arranged transportation home on day of discharge.          Consults:   Consults (From admission, onward)        Status Ordering Provider     Inpatient consult to Critical Care Medicine  Once     Provider:  (Not yet assigned)    Completed JAYLEN BARROSO     Inpatient consult to Hepatology  Once     Provider:  (Not yet assigned)    Completed JAH ORTEGA     Inpatient consult to Interventional Radiology  Once     Provider:  (Not yet assigned)    Completed JOSELO VALVERDE     Inpatient consult to Palliative Care  Once     Provider:  (Not yet assigned)    Completed AUSTIN RDZ          Final Active Diagnoses:    Diagnosis Date Noted POA    PRINCIPAL PROBLEM:  Ischemic hepatitis [K75.9] 05/18/2019 Yes    Septic shock due to undetermined organism [A41.9, R65.21] 05/17/2019 Yes    End-stage liver disease [K72.90] 05/21/2019 Yes    Other ascites [R18.8] 12/10/2018 Yes    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] 06/09/2016 Yes    Acute renal failure superimposed on stage 3 chronic kidney disease [N17.9, N18.3] 05/04/2016 Yes    Secondary adrenal insufficiency [E27.49] 03/31/2015 Yes    Chronic obstructive pulmonary disease (COPD) [J44.9] 05/04/2016 Yes    GERD (gastroesophageal reflux disease) [K21.9] 10/06/2014 Yes    Coronary artery disease [I25.10]  Yes     Chronic    Goals of care, counseling/discussion [Z71.89] 05/21/2019 Not Applicable    Metabolic acidosis, normal anion gap (NAG) [E87.2] 05/17/2019 Yes    Essential hypertension [I10]  Yes    History of hepatitis C [Z86.19] 06/14/2016 Yes    Coagulopathy [D68.9] 05/19/2019 Yes    Thrombocytopenia [D69.6] 05/04/2016 Yes    Hyperlipidemia [E78.5]  Yes     Chronic    Full incontinence of feces [R15.9]  Yes    DNR (do not resuscitate) [Z66]  Yes    Palliative care encounter [Z51.5] 05/21/2019 Not Applicable    Sick sinus syndrome [I49.5] 06/28/2013 Yes     Chronic       Problems Resolved During this Admission:    Diagnosis Date Noted Date Resolved POA    Pancreatitis [K85.90] 05/16/2019 05/24/2019 Yes    Oral thrush [B37.0] 05/19/2019 05/24/2019 Yes      Discharged Condition: poor    Disposition: Hospice/Home    Follow Up:    Patient Instructions:   No discharge procedures on file.  Medications:  Reconciled Home Medications:      Medication List      START taking these medications    loperamide 2 mg capsule  Commonly known as:  IMODIUM  Take 1 capsule (2 mg total) by mouth 4 (four) times daily as needed for Diarrhea.     midodrine 10 MG tablet  Commonly known as:  PROAMATINE  Take 1 tablet (10 mg total) by mouth 3 (three) times daily.        CHANGE how you take these medications    furosemide 40 MG tablet  Commonly known as:  LASIX  Take 1 tablet (40 mg total) by mouth daily as needed. AS DIRECTED BY HOSPICE  What changed:    · how much to take  · when to take this  · reasons to take this  · additional instructions     lactulose 20 gram/30 mL Soln  Commonly known as:  CHRONULAC  Take 30 mLs (20 g total) by mouth daily as needed. Please ensure you are having 3-4 bowels movements daily, AS DIRECTED BY HOSPICE  What changed:    · when to take this  · reasons to take this  · additional instructions        CONTINUE taking these medications    CENTRUM SILVER Tab  Generic drug:  multivitamin-minerals-lutein  Take 1 tablet by mouth once daily.     ciprofloxacin HCl 500 MG tablet  Commonly known as:  CIPRO  Take 1 tablet (500 mg total) by mouth once daily.     fluticasone-umeclidin-vilanter 100-62.5-25 mcg Dsdv  Commonly known as:  TRELEGY ELLIPTA  Inhale 1 puff into the lungs once daily.     hydrocortisone 10 MG Tab  Commonly known as:  CORTEF  TAKE ONE AND ONE-HALF TABLETS BY MOUTH EVERY MORNING AND ONE-HALF TABLET EVERY EVENING./NO FURTHER REFILLS  NEED FOLLOW UP VISIT     omeprazole 40 MG capsule  Commonly known as:  PRILOSEC  TAKE 1 CAPSULE(40 MG) BY MOUTH TWICE DAILY BEFORE  MEALS     ondansetron 8 MG tablet  Commonly known as:  ZOFRAN  Take 1 tablet (8 mg total) by mouth every 8 (eight) hours as needed for Nausea.        STOP taking these medications    losartan 100 MG tablet  Commonly known as:  COZAAR     pravastatin 40 MG tablet  Commonly known as:  PRAVACHOL     spironolactone 100 MG tablet  Commonly known as:  ALDACTONE            Significant Diagnostic Studies: Labs:   CMP   Recent Labs   Lab 05/24/19  0700   *   K 3.9      CO2 19*   *   BUN 88*   CREATININE 2.7*   CALCIUM 9.3   PROT 5.8*   ALBUMIN 1.9*   BILITOT 3.4*   ALKPHOS 291*   *   *   ANIONGAP 11   ESTGFRAFRICA 25.9*   EGFRNONAA 22.4*    and CBC   Recent Labs   Lab 05/24/19  0700   WBC 14.26*   HGB 9.4*   HCT 30.8*   PLT 62*     Results for JACKSONJAN SR. (MRN [id number]) as of 5/25/2019 18:54   Ref. Range 5/23/2019 07:47 5/24/2019 07:00   Protime Latest Ref Range: 9.0 - 12.5 sec 19.1 (H) 20.6 (H)   Coumadin Monitoring INR Latest Ref Range: 0.8 - 1.2  2.0 (H) 2.1 (H)       US LIVER WITH DOPPLER    CLINICAL HISTORY:  Evolution of possible SMV thrombosis;    TECHNIQUE:  Duplex scan of the liver was performed using B-mode/gray scale imaging and Doppler spectral analysis and color flow.    COMPARISON:  Ultrasound liver Doppler 05/06/2019, CT abdomen pelvis with contrast 11/29/2018, CT abdomen pelvis without contrast 05/17/2019    FINDINGS:  The liver measures 16.6 cm and demonstrates a nodular contour suggesting cirrhosis. There is marked coarsening and heterogeneous echotexture of the patent parenchyma compatible with cirrhosis.  There is a 1.4 x 2.0 x 1.3 cm heterogeneous mass in the right hepatic lobe not definitively seen on prior studies.  No intra or extrahepatic bile duct dilatation. The common duct measures 4 mm.    The middle, right, and left hepatic veins are patent and unremarkable. Expansion of chronic thrombus which is now completely occlusive and involves the superior  mesenteric vein, new from prior.  Occlusive thrombus in the main portal vein and right anterior portal vein is unchanged.  Thrombus within the right posterior portal vein is now occlusive and there is new occlusive thrombus in the left portal vein.    The pancreas is unremarkable.  The gallbladder is filled with stones.  No pericholecystic fluid is seen.  The spleen measures 13.8 x 4.9 cm and is unremarkable.    The hepatic arterial system is unremarkable.    The IVC is unremarkable. There is mild ascites.  There is a left pleural effusion.  There is a nonspecific 1.7 x 1.8 x 1.3 cm hypoechoic lesion at the francesco hepatis likely reflects a prominent peripancreatic lymph node.      Impression       Interval worsening of thrombus which is now occlusive throughout the entirety of the portal venous system with new involvement of the superior mesenteric and left portal veins.    Cirrhotic liver.  Nonspecific hypoechoic lesion in hepatic segment IV is not definitively seen on prior studies.  Hepatic protocol CT or MRI follow-up is recommended to evaluate for HCC.    Sequela of portal hypertension, including splenomegaly and ascites.    Cholelithiasis without sonographic evidence for acute cholecystitis.    Prominent peripancreatic lymph node.    Left pleural effusion.    This report was flagged in Epic as abnormal.    Electronically signed by resident: Stephanie Salazar  Date: 05/18/2019     [facility]PAT[facility] ([facility])    CLINICAL HISTORY:  Nausea, vomiting, diarrhea;    TECHNIQUE:  Following the IV administration of 5.1 mCi of Tc-99m-mebrofenin, sequential dynamic anterior images of the abdomen were obtained for 60 minutes followed by a right lateral view at 60 minutes.  Additional 4 hr delayed images were obtained.    COMPARISON:  CT abdomen pelvis 05/17/2019, abdominal ultrasound 05/17/2019    FINDINGS:  The early images demonstrate homogeneous uptake of the radiopharmaceutical by the liver with no focal hepatic  abnormalities.  Relatively delayed clearance from the hepatic parenchyma which can be seen with hepatocellular dysfunction.    Normal activity is present in the common bile duct, gallbladder, and small bowel.      Impression       The cystic and common maryan ducts are patent.    Electronically signed by resident: Derek Stallings     XR CHEST AP PORTABLE    CLINICAL HISTORY:  Encounter for adjustment and management of vascular access device    TECHNIQUE:  Single frontal view of the chest was performed.    COMPARISON:  05/16/2019    FINDINGS:  Right central venous catheter tip projects over the distal SVC.  Left pacer stable.  There is elevation of the right hemidiaphragm.  The cardiomediastinal silhouette is stable in configuration noting calcification of the aorta..  There is obscuration of the costophrenic angles, left greater than right, suggesting effusions.  The trachea is midline.  The lungs are symmetrically expanded bilaterally with patchy increased interstitial and parenchymal attenuation, may reflect edema noting shallow inspiratory effort.  Developing left lower lung zone consolidation not excluded..  No large focal consolidation seen.  There is no pneumothorax.  The osseous structures are remarkable for degenerative changes..      Impression       As above      Electronically signed by: Elier Damian MD  Date: 05/17/2019     EXAMINATION:  Tunneled peritoneal catheter placement    Procedural Personnel    Attending physician(s): Anish Spicer    Fellow physician(s): None    Resident physician(s): None    Advanced practice provider(s): None    Pre-procedure diagnosis: End-stage liver disease, portal vein thrombosis, and recurrent ascites    Post-procedure diagnosis: Same    Indication: Non-malignant ascites    Additional clinical history: None    Complications: No immediate complications.    TECHNIQUE:  - Ultrasound and fluoroscopic-guided tunneled peritoneal catheter  placement    FINDINGS:  Pre-procedure    Consent: Informed consent for the procedure including risks, benefits and alternatives was obtained and time-out was performed prior to the procedure.    Preparation: The site was prepared and draped using maximal sterile barrier technique including cutaneous antisepsis.    Anesthesia/sedation    Level of anesthesia/sedation: Moderate sedation (conscious sedation)    Anesthesia/sedation administered by: Nurse or other independent trained observer    Total intra-service sedation time (minutes): 30    Limited abdominal ultrasound    Limited abdominal ultrasound was performed.    Moderate ascites. A safe window for catheter insertion was identified.    Tunneled peritoneal catheter placement    Local anesthesia was administered. The peritoneal cavity was accessed with a 18 gauge needle and a wire was placed. A subcutaneous tunnel was anesthetized adjacent to the puncture site and the catheter was tunneled through the subcutaneous tissues. Serial dilation of the peritoneal access point was performed, and a peel-away sheath was placed. The catheter was passed into the sheath as it was peeled away.    Peritoneal access technique: Real-time ultrasound guidance    Catheter placed: Peritoneal PleurX    Closure    The peritoneal access site was closed and the catheter was secured to the skin. A sterile bandage was applied.    Access site closure technique: Absorbable suture    Catheter securement technique: Non-absorbable suture    Additional Details    Additional description of procedure: None    Equipment details: None    Specimens removed: Abdominal fluid    Estimated blood loss (mL): Less than 10    Standardized report: [email or web address]    Attestation    Signer name: Anish Spicer    I attest that I was present for the entire procedure. I reviewed the stored images and agree with the report as written.      Impression       Tunneled peritoneal catheter placement in  the left lower quadrant with immediate drainage of 1000 mL of serous fluid.    Plan:    Resume care per primary team.         Pending Diagnostic Studies:     Procedure Component Value Units Date/Time    Lactic acid, plasma [[id number]] Collected:  05/18/19 0214    Order Status:  Sent Lab Status:  In process Updated:  05/18/19 0214    Specimen:  Blood     Phosphatidylethanol (PETH) [[id number]] Collected:  05/18/19 1420    Order Status:  Sent Lab Status:  In process Updated:  05/18/19 1456    Specimen:  Blood         Indwelling Lines/Drains at time of discharge:   Lines/Drains/Airways     Drain            Male External Urinary Catheter 05/19/19 1840 Medium 6 days         Rectal Tube 05/20/19 0500 rectal tube w/o balloon 5 days         Drain/Device  05/24/19 0844 Left lower abdomen other (see comments) 1 day                Time spent on the discharge of patient: 40 minutes  Patient was seen and examined on the date of discharge and determined to be suitable for discharge.         Yang Skinner MD  Department of Hospital Medicine  [facility]

## 2019-05-26 NOTE — PT/OT/SLP DISCHARGE
Occupational Therapy Discharge Summary    Fox Lyons Sr.  MRN: [id number]   Principal Problem: Ischemic hepatitis      Patient Discharged from acute Occupational Therapy on 5/24/19.  Please refer to prior OT note dated 5/23/19 for functional status.    Assessment:      Goals partially met.    Objective:     GOALS:   Multidisciplinary Problems     Occupational Therapy Goals        Problem: Occupational Therapy Goal    Goal Priority Disciplines Outcome Interventions   Occupational Therapy Goal     OT, PT/OT Ongoing (interventions implemented as appropriate)    Description:  Goals to be met by: 7 days 5/28/19     Patient will increase functional independence with ADLs by performing:    Pt complete t/f to commode with SBA  Pt to complete UE dressing with MIN A  Pt to complete LE dressing with MIN A  Pt to complete toileting with MIN  A                      Reasons for Discontinuation of Therapy Services  Transfer to alternate level of care.      Plan:     Patient Discharged to: Palliative Care/Hospice    Trice Calix OT  5/26/2019

## 2019-05-27 LAB
BACTERIA SPEC ANAEROBE CULT: NORMAL
PHOSPHATIDYLETHANOL (PETH): NEGATIVE NG/ML

## 2019-05-27 NOTE — PLAN OF CARE
05/27/19 0921   Final Note   Assessment Type Final Discharge Note   Anticipated Discharge Disposition HospiceHome  ([facility] )   What phone number can be called within the next 1-3 days to see how you are doing after discharge? [phone]   Hospital Follow Up  Appt(s) scheduled? Yes  (Patient will be followed by the Hospice MD)   Discharge plans and expectations educations in teach back method with documentation complete? Yes

## 2019-05-31 DIAGNOSIS — N18.9 CHRONIC KIDNEY DISEASE, UNSPECIFIED CKD STAGE: ICD-10-CM

## 2019-06-11 LAB — FUNGUS SPEC CULT: NORMAL

## 2019-07-08 LAB
ACID FAST MOD KINY STN SPEC: NORMAL
MYCOBACTERIUM SPEC QL CULT: NORMAL

## 2019-09-13 DIAGNOSIS — N18.9 CHRONIC KIDNEY DISEASE, UNSPECIFIED CKD STAGE: ICD-10-CM

## 2019-10-24 ENCOUNTER — PES CALL (OUTPATIENT)
Dept: ADMINISTRATIVE | Facility: CLINIC | Age: 74
End: 2019-10-24

## 2020-01-11 NOTE — TELEPHONE ENCOUNTER
Called pt to schedule an appt with Cardiology.  Pt informed me that he do not want another appt.  Pt stated he will call once he is ready to schedule and appt with cardiology.   gabapentin  300 mg Oral TID    Vitamin D  5,000 Units Oral Daily    betamethasone dipropionate   Topical Daily    mometasone-formoterol  2 puff Inhalation BID    rivaroxaban  20 mg Oral Daily    senna  1 tablet Oral Nightly    tiotropium  18 mcg Inhalation Daily     Continuous Infusions:  PRN Meds:zinc oxide, [DISCONTINUED] heparin flush (100 units/mL) **OR** heparin flush (100 units/mL), sodium chloride flush, HYDROcodone-acetaminophen, magnesium hydroxide, albuterol sulfate HFA, bisacodyl, hydrOXYzine, promethazine    Review of Systems  Pertinent items are noted in HPI. Objective:     Patient Vitals for the past 8 hrs:   BP Temp Temp src Pulse Resp SpO2 Weight   01/11/20 1058 -- -- -- -- 18 91 % --   01/11/20 0815 -- 98.4 °F (36.9 °C) Oral -- 18 99 % --   01/11/20 0812 (!) 109/51 -- -- 97 -- -- --   01/11/20 0634 -- -- -- -- -- -- 147 lb 9.6 oz (67 kg)     I/O last 3 completed shifts: In: 1130 [P.O.:1120; I.V.:10]  Out: -   I/O this shift:   In: 720 [P.O.:720]  Out: -     BP (!) 109/51   Pulse 97   Temp 98.4 °F (36.9 °C) (Oral)   Resp 18   Ht 5' (1.524 m)   Wt 147 lb 9.6 oz (67 kg)   SpO2 91%   BMI 28.83 kg/m²     BP (!) 109/51   Pulse 97   Temp 98.4 °F (36.9 °C) (Oral)   Resp 18   Ht 5' (1.524 m)   Wt 147 lb 9.6 oz (67 kg)   SpO2 91%   BMI 28.83 kg/m²   General appearance: alert, appears stated age and cooperative  Head: Normocephalic, without obvious abnormality, atraumatic  Lungs: clear to auscultation bilaterally  Heart: regular rate and rhythm, S1, S2 normal, no murmur, click, rub or gallop  Abdomen: soft, non-tender; bowel sounds normal; no masses,  no organomegaly  Extremities: extremities normal, atraumatic, no cyanosis or edema  Skin: Skin color, texture, turgor normal. No rashes or lesions  Neurologic: weak      Electronically signed by Beatriz Schofield MD on 1/11/2020 at 2:31 PM

## 2020-01-14 ENCOUNTER — PES CALL (OUTPATIENT)
Dept: ADMINISTRATIVE | Facility: CLINIC | Age: 75
End: 2020-01-14

## 2020-02-09 NOTE — PROGRESS NOTES
HEPATOLOGY FOLLOW UP    Referring Physician: Clover White MD  Current Corresponding Physician: Clover White MD    Fox Lyons Sr. is here for follow up of cirrhosis. I saw him in 2016 for clearance for cholecystectomy. Because of cirrhosis I did not recommend a cholecystectomy.     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 Plts are 58), esophageal varices, COPD, CAD with cardiac stents and pacemaker, CKD. He was dx w/ chronic HCV infection in 1995, likely r/t IVDU. Patient presented at the time with c/o weakness, loss of appetite. Patient states had liver bx at that time as well and was told he was negative for cancer but does not recall any further results. He received [facility] ~[facility], treated x 6 months w/ SVR. Bailee occurred shortly after completion of treatment and he never followed up. Last HCV VL was done in 2016 and was negative.    HPI   Fox Lyons Sr. had a  Ct scan done 10/26/18 and 11/29/18: no HCC but it did show moderate ascites. It also showed partial thrombosis of the proximal SMV, portal confluence, and main portal vein extending into the left portal vein. Despite the initiation of lasix 40 mg daily and spironolactone 50 mg daily, he continued to have significant ascites. Diuretics were not further escalated due to a concern for renal insufficiency. He initially deferred a paracentesis but subsequently had it done 3/18/19 (5 L). Fluid analysis was c/w portal htn but there was no SBP. Creat and GFR not normal (42-49.5). Fluid protein was <1.0. Cipro prophylaxis was added for low protein ascites.    EGD 1/4/19:  Banded; repeat EGD 02/19/19: no banding; repeat recommended in 3 months (May 2019). Colonoscopy was done 01/19: polyps and repeat recommended in 2022.    Today is c/o weakness. He has become progressively weak over the last week. He is slow to answer questions today although he does not have  asterixis. His wife states he is not eating and sleeping all the time. He does have an increase in his abdominal girth but denies N/V, abdominal pain or diarrhea. He denies fevers/chills.    MELD-Na score: 15 at 1/29/2019  3:20 PM  MELD score: 14 at 1/29/2019  3:20 PM  Calculated from:  Serum Creatinine: 1.4 mg/dL at 1/29/2019  3:20 PM  Serum Sodium: 136 mmol/L at 1/29/2019  3:20 PM  Total Bilirubin: 1.9 mg/dL at 1/29/2019  3:20 PM  INR(ratio): 1.2 at 1/29/2019  3:20 PM  Age: 73 years    Outpatient Encounter Medications as of 4/16/2019   Medication Sig Dispense Refill    ciprofloxacin HCl (CIPRO) 500 MG tablet Take 1 tablet (500 mg total) by mouth once daily. 30 tablet 11    felodipine (PLENDIL) 5 MG 24 hr tablet TAKE 1 TABLET(5 MG) BY MOUTH EVERY DAY 90 tablet 1    fluticasone-umeclidin-vilanter (TRELEGY ELLIPTA) 100-62.5-25 mcg DsDv Inhale 1 puff into the lungs once daily. 1 each 12    furosemide (LASIX) 40 MG tablet Take 0.5 tablets (20 mg total) by mouth 2 (two) times daily. 30 tablet 11    hydrocortisone (CORTEF) 10 MG Tab TAKE ONE AND ONE-HALF TABLETS BY MOUTH EVERY MORNING AND ONE-HALF TABLET EVERY EVENING./NO FURTHER REFILLS  NEED FOLLOW UP VISIT 180 tablet 3    lactulose (CHRONULAC) 20 gram/30 mL Soln Take 30 mLs (20 g total) by mouth once daily. for 100 doses 3000 mL 11    losartan (COZAAR) 100 MG tablet Take 1 tablet (100 mg total) by mouth once daily. 90 tablet 3    multivitamin-minerals-lutein (CENTRUM SILVER) Tab Take 1 tablet by mouth once daily.        NITROSTAT 0.4 mg SL tablet DISSOLVE 1 TABLET UNDER THE TONGUE EVERY 5 MINUTES AS NEEDED 25 tablet 0    omeprazole (PRILOSEC) 40 MG capsule TAKE 1 CAPSULE(40 MG) BY MOUTH TWICE DAILY BEFORE MEALS 180 capsule 0    pravastatin (PRAVACHOL) 40 MG tablet TAKE 1 TABLET(40 MG) BY MOUTH EVERY EVENING 90 tablet 3    spironolactone (ALDACTONE) 100 MG tablet Take 0.5 tablets (50 mg total) by mouth once daily. 15 tablet 11     No facility-administered  encounter medications on file as of 4/16/2019.      Review of patient's allergies indicates:   Allergen Reactions    Codeine Itching, Rash, Edema and Other (See Comments)     Other reaction(s): Itching     Past Medical History:   Diagnosis Date    Anemia     Anticoagulant long-term use     Ascites 12/10/2018    Biliary colic     Cataract     Chronic hepatitis C     Cirrhosis     COPD (chronic obstructive pulmonary disease)     Coronary artery disease     Dyspnea     Epistaxis     Esophageal varices without bleeding     Gallstones     GERD (gastroesophageal reflux disease)     HCC (hepatocellular carcinoma)     Hepatitis B antibody positive     Hyperglycemia     Hyperlipidemia     Hypertension     Hypopituitarism     Mobitz type 2 second degree atrioventricular block     Nuclear sclerosis, left     Pacemaker     Pituitary tumor     Portal hypertension     Pulmonary emphysema     PVT (paroxysmal ventricular tachycardia)     SA node dysfunction     Secondary adrenal insufficiency     Secondary male hypogonadism     SSS (sick sinus syndrome)     Superior mesenteric vein thrombosis     Vitreous hemorrhage, both eyes        Review of Systems   Constitutional: Negative.    HENT: Negative.    Eyes: Negative.    Respiratory: Negative.    Cardiovascular: Negative.    Gastrointestinal: Negative.    Genitourinary: Negative.    Musculoskeletal: Negative.    Skin: Negative.    Neurological: Positive for weakness.        +problems with balance   Psychiatric/Behavioral: Negative.      Vitals:    04/16/19 1607   BP: 126/72   Resp: 18   Temp: 97.9 °F (36.6 °C)       Physical Exam   Constitutional: He is oriented to person, place, and time. He appears well-developed and well-nourished.   HENT:   Head: Normocephalic and atraumatic.   Eyes: Pupils are equal, round, and reactive to light. Conjunctivae and EOM are normal. No scleral icterus.   Neck: Normal range of motion. Neck supple. No thyromegaly  present.   Cardiovascular: Normal rate, regular rhythm and normal heart sounds.   Pulmonary/Chest: Effort normal and breath sounds normal. He has no rales.   Abdominal: Soft. Bowel sounds are normal. He exhibits distension. He exhibits no mass. There is no tenderness.   Musculoskeletal: Normal range of motion. He exhibits no edema.   Neurological: He is alert and oriented to person, place, and time.   Skin: Skin is warm and dry. No rash noted.   Psychiatric: He has a normal mood and affect.   Vitals reviewed.      Lab Results   Component Value Date     (H) 01/29/2019     (H) 01/29/2019    BUN 15 01/29/2019    BUN 15 01/29/2019    CREATININE 1.4 01/29/2019    CREATININE 1.4 01/29/2019    CALCIUM 8.6 01/29/2019    CALCIUM 8.6 01/29/2019     01/29/2019     01/29/2019    K 3.6 01/29/2019    K 3.6 01/29/2019     01/29/2019     01/29/2019    PROT 8.2 01/29/2019    PROT 8.2 01/29/2019    CO2 24 01/29/2019    CO2 24 01/29/2019    ANIONGAP 7 (L) 01/29/2019    ANIONGAP 7 (L) 01/29/2019    WBC 9.00 01/29/2019    WBC 9.00 01/29/2019    RBC 4.44 (L) 01/29/2019    RBC 4.44 (L) 01/29/2019    HGB 10.7 (L) 01/29/2019    HGB 10.7 (L) 01/29/2019    HCT 34.1 (L) 01/29/2019    HCT 34.1 (L) 01/29/2019    MCV 77 (L) 01/29/2019    MCV 77 (L) 01/29/2019    MCH 24.1 (L) 01/29/2019    MCH 24.1 (L) 01/29/2019    MCHC 31.3 (L) 01/29/2019    MCHC 31.3 (L) 01/29/2019     Lab Results   Component Value Date    RDW 20.6 (H) 01/29/2019    RDW 20.6 (H) 01/29/2019    PLT 91 (L) 01/29/2019    PLT 91 (L) 01/29/2019    MPV 8.6 (L) 01/29/2019    MPV 8.6 (L) 01/29/2019    GRAN 7.7 01/29/2019    GRAN 85.4 (H) 01/29/2019    GRAN 7.7 01/29/2019    GRAN 85.4 (H) 01/29/2019    LYMPH 0.7 (L) 01/29/2019    LYMPH 8.2 (L) 01/29/2019    LYMPH 0.7 (L) 01/29/2019    LYMPH 8.2 (L) 01/29/2019    MONO 0.5 01/29/2019    MONO 5.2 01/29/2019    MONO 0.5 01/29/2019    MONO 5.2 01/29/2019    EOSINOPHIL 0.5 01/29/2019    EOSINOPHIL 0.5  01/29/2019    BASOPHIL 0.7 01/29/2019    BASOPHIL 0.7 01/29/2019    EOS 0.0 01/29/2019    EOS 0.0 01/29/2019    BASO 0.10 01/29/2019    BASO 0.10 01/29/2019    APTT 27.6 05/07/2012    GROUPTRH B POS 05/28/2011    BNP <10 02/05/2016    CHOL 143 05/09/2018    TRIG 177 (H) 05/09/2018    HDL 37 (L) 05/09/2018    CHOLHDL 25.9 05/09/2018    TOTALCHOLEST 3.9 05/09/2018    ALBUMIN 3.0 (L) 01/29/2019    ALBUMIN 3.0 (L) 01/29/2019    ALBUMIN 3.9 10/06/2014    BILIDIR 0.4 (H) 08/02/2013    AST 52 (H) 01/29/2019    AST 52 (H) 01/29/2019    ALT 31 01/29/2019    ALT 31 01/29/2019    ALKPHOS 132 (H) 01/29/2019    ALKPHOS 132 (H) 01/29/2019    MG 2.0 05/31/2011    LABPROT 13.1 (H) 01/29/2019    LABPROT 13.1 (H) 01/29/2019    INR 1.2 01/29/2019    INR 1.2 01/29/2019    PSA <0.01 10/26/2016       Assessment and Plan:    Fox Lyons Sr. is a 73 y.o. male with decompensated cirrhosis. He is weak with hepatic encephalopathy and an increase in abdominal girth complicated by minimal po intake. I am recommending admission to hospital for evaluation and management of these symptoms: recommend a septic w/u and empiric abx; given cardiac issues- r/o MI; place on telemetry; repeat 2D echo. Other recommendations:  1. Cirrhosis, decompensated: not clear why he has decompensated. He developed varices and ascites in the last 6 months, despite having been cured of HCV previously. He was scanned in the fall - no obvious malignancy, but did show partial thrombosis of the proximal SMV, portal confluence, and main portal vein extending into the left portal vein. It is possible that this has extended. I am recommending US with doppler and repeat TP ct scan abdomen/pelvis. He was not anticoagulated due to varices. Note, most recent EGD - no further banding.  2. Ascites, not well controlled: paracentesis, diagnostic and therapeutic; hold cipro prophylaxis while on empiric abx  3. HE: lactulose- titrate to have 3-4 BMs per day  4. Portal htn: recommend  repeat EGD 05/19 to f/u on EV  5. Colorectal ca screening: repeat in 2022.  5. CRI: monitor    Return 4 weeks  . . .    (2) potential problem

## 2022-12-28 NOTE — PLAN OF CARE
D/c instructions reviewed with patient and wife. Verbalized understanding.  
Dr. Funes at bedside speaking to patient and wife.  
Dr. Hernandez at bedside speaking to patient and wife.  
Plan of care reviewed with pt. No questions or concerns  
(E4) spontaneous

## 2023-12-12 NOTE — ASSESSMENT & PLAN NOTE
--now completely occluded on ultrasound doppler.  --considered anticoagulation however given worsening thrombocytopenia and coagulopathy; will not initiate at this time.    Pt AAOx4. Heparin infusing @ 17un/kg. Currently therapeutic. Fluid restriction education provided to patient. Heart monitor 8683. Remains free from incident/injury. Call light in reach. All active orders reviewed. Chart check complete.